# Patient Record
Sex: FEMALE | Race: WHITE | Employment: OTHER | URBAN - METROPOLITAN AREA
[De-identification: names, ages, dates, MRNs, and addresses within clinical notes are randomized per-mention and may not be internally consistent; named-entity substitution may affect disease eponyms.]

---

## 2017-01-18 ENCOUNTER — TELEPHONE (OUTPATIENT)
Dept: CARDIAC REHAB | Age: 82
End: 2017-01-18

## 2017-03-22 ENCOUNTER — OFFICE VISIT (OUTPATIENT)
Dept: INTERNAL MEDICINE CLINIC | Age: 82
End: 2017-03-22

## 2017-03-22 VITALS
SYSTOLIC BLOOD PRESSURE: 120 MMHG | OXYGEN SATURATION: 95 % | HEIGHT: 63 IN | HEART RATE: 63 BPM | BODY MASS INDEX: 24.45 KG/M2 | DIASTOLIC BLOOD PRESSURE: 52 MMHG | WEIGHT: 138 LBS | TEMPERATURE: 97.2 F | RESPIRATION RATE: 18 BRPM

## 2017-03-22 DIAGNOSIS — H57.89 IRRITATION OF LEFT EYE: ICD-10-CM

## 2017-03-22 DIAGNOSIS — R09.89 CAROTID BRUIT, UNSPECIFIED LATERALITY: ICD-10-CM

## 2017-03-22 DIAGNOSIS — E78.2 MIXED HYPERLIPIDEMIA: ICD-10-CM

## 2017-03-22 DIAGNOSIS — E11.9 DIABETES MELLITUS WITHOUT COMPLICATION (HCC): Primary | ICD-10-CM

## 2017-03-22 DIAGNOSIS — Z00.00 MEDICARE ANNUAL WELLNESS VISIT, INITIAL: ICD-10-CM

## 2017-03-22 DIAGNOSIS — I10 ESSENTIAL HYPERTENSION: ICD-10-CM

## 2017-03-22 DIAGNOSIS — R60.9 DEPENDENT EDEMA: ICD-10-CM

## 2017-03-22 NOTE — PROGRESS NOTES
Patient presents for   Chief Complaint   Patient presents with    Diabetes     3 month follow up    Hypertension    Eye Problem     [facility]     Fall risk assessment was not indicated. Depression screening was not indicated Follow up questions were not indicated. 1. Have you been to the ER, urgent care clinic since your last visit? Hospitalized since your last visit? No    2. Have you seen or consulted any other health care providers outside of the [address] [place] [address] since your last visit? Include any pap smears or colon screening.  No

## 2017-03-22 NOTE — MR AVS SNAPSHOT
Visit Information Date & Time Provider Department Dept. Phone Encounter #  
 3/22/2017 10:00 AM Kim Quiñonez NP [facility] INTERNAL MEDICINE OF Davey Mehta [phone] [id number] Upcoming Health Maintenance Date Due  
 FOOT EXAM Q1 1/5/1943 EYE EXAM RETINAL OR DILATED Q1 1/5/1943 DTaP/Tdap/Td series (1 - Tdap) 1/5/1954 ZOSTER VACCINE AGE 60> 1/5/1993 GLAUCOMA SCREENING Q2Y 1/5/1998 MEDICARE YEARLY EXAM 1/5/1998 MICROALBUMIN Q1 7/28/2016 Pneumococcal 65+ Low/Medium Risk (2 of 2 - PCV13) 10/1/2016 LIPID PANEL Q1 11/5/2016 HEMOGLOBIN A1C Q6M 5/14/2017 Allergies as of 3/22/2017  Review Complete On: 3/22/2017 By: Kim Quiñonez NP No Known Allergies Current Immunizations  Reviewed on 11/14/2016 Name Date Influenza Vaccine 9/1/2016, 10/1/2015 Pneumococcal Polysaccharide (PPSV-23) 10/1/2015 Not reviewed this visit You Were Diagnosed With   
  
 Codes Comments Diabetes mellitus without complication ([facility] [address]    -  Primary ICD-10-CM: E11.9 ICD-9-CM: 250.00 Mixed hyperlipidemia     ICD-10-CM: E78.2 ICD-9-CM: 272.2 Dependent edema     ICD-10-CM: R60.9 ICD-9-CM: 782.3 Essential hypertension     ICD-10-CM: I10 
ICD-9-CM: 401.9 Irritation of left eye     ICD-10-CM: H57.8 ICD-9-CM: 379.99 Carotid bruit, unspecified laterality     ICD-10-CM: R09.89 ICD-9-CM: 133. 9 Vitals BP Pulse Temp Resp Height(growth percentile) 120/52 (BP 1 Location: Left arm, BP Patient Position: Sitting) 63 97.2 °F (36.2 °C) (Tympanic) 18 5' 3\" (1.6 m) Weight(growth percentile) SpO2 BMI OB Status Smoking Status 138 lb (62.6 kg) 95% 24.45 kg/m2 Postmenopausal Never Smoker BMI and BSA Data Body Mass Index Body Surface Area  
 24.45 kg/m 2 1.67 m 2 Preferred Pharmacy Pharmacy Name Phone [id number]. [facility], [address] [facility] [phone] Your Updated Medication List  
  
   
This list is accurate as of: 3/22/17 11:19 AM.  Always use your most recent med list.  
  
  
  
  
 aspirin delayed-release 81 mg tablet Take  by mouth daily. CITRACAL PLUS D 315-200 mg-unit Tab Generic drug:  calcium citrate-vitamin d3 Take 1 tablet by mouth daily (with breakfast). furosemide 40 mg tablet Commonly known as:  LASIX Take 1 Tab by mouth daily. glipiZIDE-metFORMIN 5-500 mg per tablet Commonly known as:  METAGLIP  
TAKE 2 TABLETS ONCE DAILY (1 BEFORE BREAKFAST AND 1 BEFORE DINNER) glucose blood VI test strips strip Commonly known as:  FREESTYLE LITE STRIPS Test Blood Sugar once daily; ICD-10 E11.9; Quantity 100 JANUVIA 100 mg tablet Generic drug:  SITagliptin  
take 1 tablet by mouth daily Lancets Misc Commonly known as:  FREESTYLE LANCETS Test bloos sugar once daily; ICD-10 E11.9; quantity 100  
  
 lidocaine 5 % Commonly known as:  Ki Scruggs Apply patch to the affected area for 12 hours a day and remove for 12 hours a day. loperamide 2 mg capsule Commonly known as:  IMODIUM Take 1 mg by mouth Three (3) times a week. LORazepam 1 mg tablet Commonly known as:  ATIVAN Take 0.5 Tabs by mouth two (2) times daily as needed. Max Daily Amount: 1 mg.  
  
 losartan 50 mg tablet Commonly known as:  COZAAR  
take 1 tablet by mouth daily  
  
 metroNIDAZOLE 1 % topical cream  
Commonly known as:  Sandy Pyatt Apply daily  
  
 potassium chloride 10 mEq tablet Commonly known as:  K-DUR, KLOR-CON Take 1 Tab by mouth daily. pravastatin 40 mg tablet Commonly known as:  PRAVACHOL Take 1 Tab by mouth nightly. RABEprazole 20 mg tablet Commonly known as:  ACIPHEX  
take 1 tablet by mouth once daily  
  
 traMADol 50 mg tablet Commonly known as:  ULTRAM  
Take 1 Tab by mouth every eight (8) hours as needed for Pain. Max Daily Amount: 150 mg. VITAMIN B-12 2,000 mcg Tber Generic drug:  cyanocobalamin Take 3 Tabs by mouth daily. VITAMIN D3 1,000 unit tablet Generic drug:  cholecalciferol Take  by mouth daily. We Performed the Following REFERRAL TO OPTOMETRY [id number] Custom] Comments:  
 Please evaluate patient for left eye irritation- ? Corneal abrasion (patient states it feels like a \"scratch\". Jason Jacob REFERRAL TO VASCULAR CENTER [[id number] Custom] Comments:  
 Please evaluate patient for bilateral lower extremity lymphedema. To-Do List   
 03/23/2017 Lab:  CBC WITH AUTOMATED DIFF   
  
 03/23/2017 Lab:  HEMOGLOBIN A1C WITH EAG   
  
 03/23/2017 Lab:  LIPID PANEL   
  
 03/23/2017 Lab:  METABOLIC PANEL, COMPREHENSIVE   
  
 03/23/2017 Lab:  PRO-BNP   
  
 03/23/2017 Lab:  TSH 3RD GENERATION   
  
 03/24/2017 Imaging:  DUPLEX CAROTID BILATERAL Referral Information Referral ID Referred By Referred To  
  
 [id number] [facility], [address], [address] [place], [facility] [address] Phone: [phone] Fax: [phone] Visits Status Start Date End Date 1 New Request 3/22/17 3/22/18 If your referral has a status of pending review or denied, additional information will be sent to support the outcome of this decision. Referral ID Referred By Referred To  
 [id number] Lachelle Gil MD  
   [address] [place] [address] [address] Phone: [phone] Fax: [phone] Visits Status Start Date End Date 1 New Request 3/22/17 3/22/18 If your referral has a status of pending review or denied, additional information will be sent to support the outcome of this decision. Introducing [facility]! Santiago Khan introduces Review Trackers patient portal. Now you can access parts of your medical record, email your doctor's office, and request medication refills online. 1. In your internet browser, go to [email or web address] 2. Click on the First Time User? Click Here link in the Sign In box. You will see the New Member Sign Up page. 3. Enter your VeryLastRoom Access Code exactly as it appears below. You will not need to use this code after youve completed the sign-up process. If you do not sign up before the expiration date, you must request a new code. · VeryLastRoom Access Code: [email or web address] Expires: 6/20/2017 11:19 AM 
 
4. Enter the last four digits of your Social Security Number (xxxx) and Date of Birth (mm/dd/yyyy) as indicated and click Submit. You will be taken to the next sign-up page. 5. Create a ShopIgnitert ID. This will be your VeryLastRoom login ID and cannot be changed, so think of one that is secure and easy to remember. 6. Create a VeryLastRoom password. You can change your password at any time. 7. Enter your Password Reset Question and Answer. This can be used at a later time if you forget your password. 8. Enter your e-mail address. You will receive e-mail notification when new information is available in [address] 9. Click Sign Up. You can now view and download portions of your medical record. 10. Click the Download Summary menu link to download a portable copy of your medical information. If you have questions, please visit the Frequently Asked Questions section of the VeryLastRoom website. Remember, VeryLastRoom is NOT to be used for urgent needs. For medical emergencies, dial 911. Now available from your iPhone and Android! Please provide this summary of care documentation to your next provider. Your primary care clinician is listed as Sravanthi Tompkins. If you have any questions after today's visit, please call [phone].

## 2017-03-22 NOTE — PROGRESS NOTES
This is an Initial Medicare Annual Wellness Exam (AWV) (Performed 12 months after IPPE or effective date of Medicare Part B enrollment, Once in a lifetime)    I have reviewed the patient's medical history in detail and updated the computerized patient record. History     Past Medical History:   Diagnosis Date    Aortic valve stenosis, mild April 2014    EF 94-69%, Grade 1 diastolic dysfunction, mild aortic stenosis,  mean gradient 10 mm Hg    CAD (coronary artery disease)     aortic stenosis    Cardiac echocardiogram 11/15/2016    EF 55-60%. No RWMA. Mild-mod LVH. Indeterminate diastolic fx. Mod LAE. Mild-mod AS (mean grad 13.14 mmHg).  Cardiac nuclear imaging test 08/27/2008    No evidence of ischemia or prior scarring. EF 62%. No WMA. No significant change from study of 3/23/06.  Cardiovascular RLE venous duplex 12/01/2016    Right leg:  No DVT.  Carotid duplex 33/20/7822    Mild 4-13% LICA stenosis.  DM (diabetes mellitus) ([facility] [address]     Dyslipidemia     HTN (hypertension)     Mixed hyperlipidemia     Sciatica       Past Surgical History:   Procedure Laterality Date    HX APPENDECTOMY      HX OOPHORECTOMY Left 1957    HX OTHER SURGICAL Right 2010    ankle surgery    HX TUMOR REMOVAL Left     ovarian    HX UROLOGICAL      kidney stone removal     Current Outpatient Prescriptions   Medication Sig Dispense Refill    glipiZIDE-metFORMIN (METAGLIP) 5-500 mg per tablet TAKE 2 TABLETS ONCE DAILY (1 BEFORE BREAKFAST AND 1 BEFORE DINNER) 120 Tab 10    cyanocobalamin (VITAMIN B-12) 2,000 mcg TbER Take 3 Tabs by mouth daily.  loperamide (IMODIUM) 2 mg capsule Take 1 mg by mouth Three (3) times a week.  potassium chloride (K-DUR, KLOR-CON) 10 mEq tablet Take 1 Tab by mouth daily.  30 Tab 6    losartan (COZAAR) 50 mg tablet take 1 tablet by mouth daily 30 Tab 5    RABEprazole (ACIPHEX) 20 mg tablet take 1 tablet by mouth once daily 30 Tab 5    JANUVIA 100 mg tablet take 1 tablet by mouth daily (Patient taking differently: take 1/2  tablet by mouth daily) 30 Tab 5    traMADol (ULTRAM) 50 mg tablet Take 1 Tab by mouth every eight (8) hours as needed for Pain. Max Daily Amount: 150 mg. 20 Tab 0    furosemide (LASIX) 40 mg tablet Take 1 Tab by mouth daily. 30 Tab 0    pravastatin (PRAVACHOL) 40 mg tablet Take 1 Tab by mouth nightly. 90 Tab 3    metroNIDAZOLE (NORITATE) 1 % topical cream Apply daily 30 g 0    glucose blood VI test strips (FREESTYLE LITE STRIPS) strip Test Blood Sugar once daily; ICD-10 E11.9; Quantity 100 100 Strip 11    Lancets (FREESTYLE LANCETS) misc Test bloos sugar once daily; ICD-10 E11.9; quantity 100 100 Each 11    LORazepam (ATIVAN) 1 mg tablet Take 0.5 Tabs by mouth two (2) times daily as needed. Max Daily Amount: 1 mg. (Patient taking differently: Take 0.5 Tabs by mouth two (2) times daily as needed for Anxiety.) 40 Tab 0    aspirin delayed-release 81 mg tablet Take  by mouth daily.  cholecalciferol (VITAMIN D3) 1,000 unit tablet Take  by mouth daily.  calcium citrate-vitamin d3 (CITRACAL + D) 315-200 mg-unit tab Take 1 tablet by mouth daily (with breakfast).  lidocaine (LIDODERM) 5 % Apply patch to the affected area for 12 hours a day and remove for 12 hours a day.  5 Each 0     No Known Allergies  Family History   Problem Relation Age of Onset    Stroke Mother     Heart Disease Mother     Lung Disease Father     Cancer Neg Hx     Diabetes Neg Hx     Hypertension Neg Hx      Social History   Substance Use Topics    Smoking status: Never Smoker    Smokeless tobacco: Never Used    Alcohol use No     Patient Active Problem List   Diagnosis Code    DM (diabetes mellitus) ([facility] [address] E11.9    Dyslipidemia E78.5    Aortic valve stenosis, mild I35.0    HTN (hypertension) I10    Dependent edema R60.9    Mixed hyperlipidemia E78.2    URI (upper respiratory infection) J06.9    Decubitus ulcer of buttock, stage 1 L89.301    Acute right-sided heart failure (HCC) I50.9    AS (aortic stenosis) I35.0    Diabetes mellitus without complication (HCC) Z22.9         Depression Risk Factor Screening:     PHQ 2 / 9, over the last two weeks 6/27/2016   Little interest or pleasure in doing things Not at all   Feeling down, depressed or hopeless Not at all   Total Score PHQ 2 0     Alcohol Risk Factor Screening: On any occasion during the past 3 months, have you had more than 3 drinks containing alcohol? No    Do you average more than 7 drinks per week? No    Functional Ability and Level of Safety:     Hearing Loss   mild-to-moderate    Activities of Daily Living   Self-care. Requires assistance with: no ADLs    Fall Risk     Fall Risk Assessment, last 12 mths 6/27/2016   Able to walk? Yes   Fall in past 12 months? Yes   Fall with injury? Yes   Number of falls in past 12 months 1   Fall Risk Score 2     Abuse Screen   Patient is not abused    Review of Systems   Pertinent items are noted in HPI. Physical Examination     No exam data present    Evaluation of Cognitive Function:  Mood/affect:  neutral  Appearance: age appropriate and casually dressed  Family member/caregiver input: None present    Visit Vitals    /52 (BP 1 Location: Left arm, BP Patient Position: Sitting)    Pulse 63    Temp 97.2 °F (36.2 °C) (Tympanic)    Resp 18    Ht 5' 3\" (1.6 m)    Wt 138 lb (62.6 kg)    SpO2 95%    BMI 24.45 kg/m2     General appearance: alert, cooperative, no distress, appears stated age  Head: Normocephalic, without obvious abnormality, atraumatic  Eyes: left eye irritation, Patient states it feels like scratch  Ears: normal TM's and external ear canals AU  Nose: Nares normal. Septum midline. Mucosa normal. No drainage or sinus tenderness.   Throat: Lips, mucosa, and tongue normal. Teeth and gums normal  Neck: supple, symmetrical, trachea midline, no adenopathy, thyroid: not enlarged, symmetric, no tenderness/mass/nodules, no JVD and carotid bruit  Back: symmetric, no curvature. ROM normal. No CVA tenderness. Lungs: clear to auscultation bilaterally  Heart: systolic murmur  Abdomen: soft, non-tender. Bowel sounds normal. No masses,  no organomegaly  Extremities: extremities normal, atraumatic, no cyanosis or edema  Pulses: palpable DP pulse   Skin: Skin color, texture, turgor normal. No rashes or lesions  Lymph nodes: Cervical, supraclavicular nodes normal.  Neurologic: strength equal bilaterally    Patient Care Team:  Bruno Steinberg MD as PCP - General (Internal Medicine)  Gaurang Diego MD (Cardiology)  Carline Ochoa MD (Cardiology)  Buzzy Schilder, MD (Orthopedic Surgery)  Rosalita Siemens, NP (Nurse Practitioner)    Advice/Referrals/Counseling   Education and counseling provided:  Are appropriate based on today's review and evaluation      Assessment/Plan       ICD-10-CM ICD-9-CM    1. Diabetes mellitus without complication (HCC) L26.4 799.66 METABOLIC PANEL, COMPREHENSIVE      HEMOGLOBIN A1C WITH EAG   2. Mixed hyperlipidemia E78.2 272.2 LIPID PANEL   3. Dependent edema R60.9 782.3 PRO-BNP      REFERRAL TO VASCULAR CENTER   4. Essential hypertension I10 401.9 CBC WITH AUTOMATED DIFF      TSH 3RD GENERATION   5. Irritation of left eye H57.8 379.99 REFERRAL TO OPTOMETRY   6. Carotid bruit, unspecified laterality R09.89 785.9 DUPLEX CAROTID BILATERAL     current treatment plan is effective, no change in therapy  lab results and schedule of future lab studies reviewed with patient. Hilda Phelps is a 80 y.o. female presenting today for Diabetes (3 month follow up); Hypertension; and Eye Problem ([facility])  . HPI:  Hilda Phelps presents to the office today for diabetes, hypertension and hyperlipidemia follow-up care. Patient continues to have lower extremity pedal edema. She denies any dyspnea or chest pain. She is also complaining of left eye irritation and feeling like a \"scratch\" in her eye.  She states she applied some eye drops that \"hardened\" in left eye and caused irritation     Review of Systems   HENT: Negative for congestion and sore throat. Eyes: Positive for redness (mild). Negative for blurred vision, double vision and discharge. Left eye irritation     Respiratory: Negative for cough and sputum production. Cardiovascular: Positive for leg swelling. Negative for chest pain and palpitations. Gastrointestinal: Negative for abdominal pain, diarrhea, nausea and vomiting. Musculoskeletal: Negative for myalgias. Neurological: Negative for headaches. No Known Allergies    Current Outpatient Prescriptions   Medication Sig Dispense Refill    glipiZIDE-metFORMIN (METAGLIP) 5-500 mg per tablet TAKE 2 TABLETS ONCE DAILY (1 BEFORE BREAKFAST AND 1 BEFORE DINNER) 120 Tab 10    cyanocobalamin (VITAMIN B-12) 2,000 mcg TbER Take 3 Tabs by mouth daily.  loperamide (IMODIUM) 2 mg capsule Take 1 mg by mouth Three (3) times a week.  potassium chloride (K-DUR, KLOR-CON) 10 mEq tablet Take 1 Tab by mouth daily. 30 Tab 6    losartan (COZAAR) 50 mg tablet take 1 tablet by mouth daily 30 Tab 5    RABEprazole (ACIPHEX) 20 mg tablet take 1 tablet by mouth once daily 30 Tab 5    JANUVIA 100 mg tablet take 1 tablet by mouth daily (Patient taking differently: take 1/2  tablet by mouth daily) 30 Tab 5    traMADol (ULTRAM) 50 mg tablet Take 1 Tab by mouth every eight (8) hours as needed for Pain. Max Daily Amount: 150 mg. 20 Tab 0    furosemide (LASIX) 40 mg tablet Take 1 Tab by mouth daily. 30 Tab 0    pravastatin (PRAVACHOL) 40 mg tablet Take 1 Tab by mouth nightly.  90 Tab 3    metroNIDAZOLE (NORITATE) 1 % topical cream Apply daily 30 g 0    glucose blood VI test strips (FREESTYLE LITE STRIPS) strip Test Blood Sugar once daily; ICD-10 E11.9; Quantity 100 100 Strip 11    Lancets (FREESTYLE LANCETS) misc Test bloos sugar once daily; ICD-10 E11.9; quantity 100 100 Each 11    LORazepam (ATIVAN) 1 mg tablet Take 0.5 Tabs by mouth two (2) times daily as needed. Max Daily Amount: 1 mg. (Patient taking differently: Take 0.5 Tabs by mouth two (2) times daily as needed for Anxiety.) 40 Tab 0    aspirin delayed-release 81 mg tablet Take  by mouth daily.  cholecalciferol (VITAMIN D3) 1,000 unit tablet Take  by mouth daily.  calcium citrate-vitamin d3 (CITRACAL + D) 315-200 mg-unit tab Take 1 tablet by mouth daily (with breakfast).  lidocaine (LIDODERM) 5 % Apply patch to the affected area for 12 hours a day and remove for 12 hours a day. 5 Each 0       Past Medical History:   Diagnosis Date    Aortic valve stenosis, mild April 2014    EF 02-25%, Grade 1 diastolic dysfunction, mild aortic stenosis,  mean gradient 10 mm Hg    CAD (coronary artery disease)     aortic stenosis    Cardiac echocardiogram 11/15/2016    EF 55-60%. No RWMA. Mild-mod LVH. Indeterminate diastolic fx. Mod LAE. Mild-mod AS (mean grad 13.14 mmHg).  Cardiac nuclear imaging test 08/27/2008    No evidence of ischemia or prior scarring. EF 62%. No WMA. No significant change from study of 3/23/06.  Cardiovascular RLE venous duplex 12/01/2016    Right leg:  No DVT.  Carotid duplex 23/08/3605    Mild 1-17% LICA stenosis.  DM (diabetes mellitus) ([facility] [address]     Dyslipidemia     HTN (hypertension)     Mixed hyperlipidemia     Sciatica        Past Surgical History:   Procedure Laterality Date    HX APPENDECTOMY      HX OOPHORECTOMY Left 1957    HX OTHER SURGICAL Right 2010    ankle surgery    HX TUMOR REMOVAL Left     ovarian    HX UROLOGICAL      kidney stone removal       Social History     Social History    Marital status:      Spouse name: N/A    Number of children: N/A    Years of education: N/A     Occupational History    Not on file.      Social History Main Topics    Smoking status: Never Smoker    Smokeless tobacco: Never Used    Alcohol use No    Drug use: No    Sexual activity: No     Other Topics Concern    Not on file     Social History Narrative       Patient does not have an advanced directive on file    Vitals:    03/22/17 1004   BP: 120/52   Pulse: 63   Resp: 18   Temp: 97.2 °F (36.2 °C)   TempSrc: Tympanic   SpO2: 95%   Weight: 138 lb (62.6 kg)   Height: 5' 3\" (1.6 m)   PainSc:   0 - No pain       Physical Exam   Constitutional: No distress. HENT:   Right Ear: External ear normal.   Left Ear: External ear normal.   Eyes: EOM are normal. Left eye exhibits no exudate and no hordeolum. No foreign body present in the left eye. Left eye exhibits normal extraocular motion. Left pupil is reactive. Cardiovascular: Regular rhythm. Murmur heard. Systolic murmur is present with a grade of 3/6   Pulmonary/Chest: Effort normal and breath sounds normal.   Abdominal: Soft. Lymphadenopathy:     She has no cervical adenopathy. No visits with results within 3 Month(s) from this visit. Latest known visit with results is:    Hospital Outpatient Visit on 11/25/2016   Component Date Value Ref Range Status    Sodium 11/25/2016 142  136 - 145 mmol/L Final    Potassium 11/25/2016 3.7  3.5 - 5.5 mmol/L Final    Chloride 11/25/2016 107  100 - 108 mmol/L Final    CO2 11/25/2016 24  21 - 32 mmol/L Final    Anion gap 11/25/2016 11  3.0 - 18 mmol/L Final    Glucose 11/25/2016 41* 74 - 99 mg/dL Final    BUN 11/25/2016 25* 7.0 - 18 MG/DL Final    Creatinine 11/25/2016 0.79  0.6 - 1.3 MG/DL Final    BUN/Creatinine ratio 11/25/2016 32* 12 - 20   Final    GFR est AA 11/25/2016 >60  >60 ml/min/1.73m2 Final    GFR est non-AA 11/25/2016 >60  >60 ml/min/1.73m2 Final    Comment: (NOTE)  Estimated GFR is calculated using the Modification of Diet in Renal   Disease (MDRD) Study equation, reported for both  Americans   (GFRAA) and non- Americans (GFRNA), and normalized to 1.73m2   body surface area. The physician must decide which value applies to   the patient.  The MDRD study equation should only be used in   individuals age 25 or older. It has not been validated for the   following: pregnant women, patients with serious comorbid conditions,   or on certain medications, or persons with extremes of body size,   muscle mass, or nutritional status.  Calcium 11/25/2016 9.4  8.5 - 10.1 MG/DL Final       .No results found for any visits on 03/22/17. Assessment / Plan:      ICD-10-CM ICD-9-CM    1. Diabetes mellitus without complication (HCC) M03.1 321.26 METABOLIC PANEL, COMPREHENSIVE      HEMOGLOBIN A1C WITH EAG   2. Mixed hyperlipidemia E78.2 272.2 LIPID PANEL   3. Dependent edema R60.9 782.3 PRO-BNP      REFERRAL TO VASCULAR CENTER   4. Essential hypertension I10 401.9 CBC WITH AUTOMATED DIFF      TSH 3RD GENERATION   5. Irritation of left eye H57.8 379.99 REFERRAL TO OPTOMETRY   6. Carotid bruit, unspecified laterality R09.89 785.9 DUPLEX CAROTID BILATERAL     Fasting labs in the a.m  Dependent edema- ? Lymphedema- Referral to Vascular  Complaints of left eye irritation/scratch- ? Corneal abrasion     Referral to Optometry  Carotid Bruit- Duplex Carotids  F/u in 4 months    Follow-up Disposition:  Return in about 4 months (around 7/22/2017). I asked the patient if she  had any questions and answered her  questions.   The patient stated that she understands the treatment plan and agrees with the treatment plan

## 2017-03-23 ENCOUNTER — LAB ONLY (OUTPATIENT)
Dept: INTERNAL MEDICINE CLINIC | Age: 82
End: 2017-03-23

## 2017-03-23 ENCOUNTER — HOSPITAL ENCOUNTER (OUTPATIENT)
Dept: LAB | Age: 82
Discharge: HOME | End: 2017-03-23
Payer: MEDICARE

## 2017-03-23 DIAGNOSIS — E11.9 DIABETES MELLITUS WITHOUT COMPLICATION (HCC): Primary | ICD-10-CM

## 2017-03-23 DIAGNOSIS — R60.9 DEPENDENT EDEMA: ICD-10-CM

## 2017-03-23 DIAGNOSIS — E78.2 MIXED HYPERLIPIDEMIA: ICD-10-CM

## 2017-03-23 DIAGNOSIS — I35.0 AORTIC VALVE STENOSIS, MILD: ICD-10-CM

## 2017-03-23 DIAGNOSIS — E11.9 DIABETES MELLITUS WITHOUT COMPLICATION (HCC): ICD-10-CM

## 2017-03-23 DIAGNOSIS — I10 ESSENTIAL HYPERTENSION: ICD-10-CM

## 2017-03-23 LAB
ALBUMIN SERPL BCP-MCNC: 3.6 G/DL (ref 3.4–5)
ALBUMIN/GLOB SERPL: 1.3 {RATIO} (ref 0.8–1.7)
ALP SERPL-CCNC: 76 U/L (ref 45–117)
ALT SERPL-CCNC: 19 U/L (ref 13–56)
ANION GAP BLD CALC-SCNC: 7 MMOL/L (ref 3–18)
AST SERPL W P-5'-P-CCNC: 15 U/L (ref 15–37)
BASOPHILS # BLD AUTO: 0 K/UL (ref 0–0.06)
BASOPHILS # BLD: 0 % (ref 0–2)
BILIRUB SERPL-MCNC: 0.4 MG/DL (ref 0.2–1)
BNP SERPL-MCNC: 192 PG/ML (ref 0–1800)
BUN SERPL-MCNC: 19 MG/DL (ref 7–18)
BUN/CREAT SERPL: 28 (ref 12–20)
CALCIUM SERPL-MCNC: 8.9 MG/DL (ref 8.5–10.1)
CHLORIDE SERPL-SCNC: 109 MMOL/L (ref 100–108)
CHOLEST SERPL-MCNC: 154 MG/DL
CO2 SERPL-SCNC: 28 MMOL/L (ref 21–32)
CREAT SERPL-MCNC: 0.67 MG/DL (ref 0.6–1.3)
DIFFERENTIAL METHOD BLD: ABNORMAL
EOSINOPHIL # BLD: 0.1 K/UL (ref 0–0.4)
EOSINOPHIL NFR BLD: 4 % (ref 0–5)
ERYTHROCYTE [DISTWIDTH] IN BLOOD BY AUTOMATED COUNT: 14.2 % (ref 11.6–14.5)
EST. AVERAGE GLUCOSE BLD GHB EST-MCNC: 103 MG/DL
GLOBULIN SER CALC-MCNC: 2.8 G/DL (ref 2–4)
GLUCOSE SERPL-MCNC: 72 MG/DL (ref 74–99)
HBA1C MFR BLD: 5.2 % (ref 4.2–5.6)
HCT VFR BLD AUTO: 33.8 % (ref 35–45)
HDLC SERPL-MCNC: 81 MG/DL (ref 40–60)
HDLC SERPL: 1.9 {RATIO} (ref 0–5)
HGB BLD-MCNC: 10.7 G/DL (ref 12–16)
LDLC SERPL CALC-MCNC: 58.4 MG/DL (ref 0–100)
LIPID PROFILE,FLP: ABNORMAL
LYMPHOCYTES # BLD AUTO: 37 % (ref 21–52)
LYMPHOCYTES # BLD: 1.1 K/UL (ref 0.9–3.6)
MCH RBC QN AUTO: 30 PG (ref 24–34)
MCHC RBC AUTO-ENTMCNC: 31.7 G/DL (ref 31–37)
MCV RBC AUTO: 94.7 FL (ref 74–97)
MONOCYTES # BLD: 0.2 K/UL (ref 0.05–1.2)
MONOCYTES NFR BLD AUTO: 6 % (ref 3–10)
NEUTS SEG # BLD: 1.6 K/UL (ref 1.8–8)
NEUTS SEG NFR BLD AUTO: 53 % (ref 40–73)
PLATELET # BLD AUTO: 84 K/UL (ref 135–420)
PMV BLD AUTO: 11.7 FL (ref 9.2–11.8)
POTASSIUM SERPL-SCNC: 4.4 MMOL/L (ref 3.5–5.5)
PROT SERPL-MCNC: 6.4 G/DL (ref 6.4–8.2)
RBC # BLD AUTO: 3.57 M/UL (ref 4.2–5.3)
SODIUM SERPL-SCNC: 144 MMOL/L (ref 136–145)
TRIGL SERPL-MCNC: 73 MG/DL (ref ?–150)
TSH SERPL DL<=0.05 MIU/L-ACNC: 1.27 UIU/ML (ref 0.36–3.74)
VLDLC SERPL CALC-MCNC: 14.6 MG/DL
WBC # BLD AUTO: 3 K/UL (ref 4.6–13.2)

## 2017-03-23 PROCEDURE — 84443 ASSAY THYROID STIM HORMONE: CPT | Performed by: NURSE PRACTITIONER

## 2017-03-23 PROCEDURE — 83036 HEMOGLOBIN GLYCOSYLATED A1C: CPT | Performed by: NURSE PRACTITIONER

## 2017-03-23 PROCEDURE — 85025 COMPLETE CBC W/AUTO DIFF WBC: CPT | Performed by: NURSE PRACTITIONER

## 2017-03-23 PROCEDURE — 83880 ASSAY OF NATRIURETIC PEPTIDE: CPT | Performed by: NURSE PRACTITIONER

## 2017-03-23 PROCEDURE — 80061 LIPID PANEL: CPT | Performed by: NURSE PRACTITIONER

## 2017-03-23 PROCEDURE — 80053 COMPREHEN METABOLIC PANEL: CPT | Performed by: NURSE PRACTITIONER

## 2017-03-24 ENCOUNTER — HOSPITAL ENCOUNTER (OUTPATIENT)
Dept: VASCULAR SURGERY | Age: 82
Discharge: HOME | End: 2017-03-24
Attending: NURSE PRACTITIONER
Payer: MEDICARE

## 2017-03-24 DIAGNOSIS — R09.89 CAROTID BRUIT, UNSPECIFIED LATERALITY: ICD-10-CM

## 2017-03-24 PROCEDURE — 93880 EXTRACRANIAL BILAT STUDY: CPT

## 2017-03-24 NOTE — PROCEDURES
[facility]  *** FINAL RE[facility] ***    Name: Zac Talley  MRN: [id number]    Outpatient  : 1933  HIS Order #: [id number]  [address] [facility] Visit #: [id number]  Date: 24 Mar 2017    TYPE OF TEST: Cerebrovascular Duplex    REASON FOR TEST  Carotid bruit, Known carotid stenosis, Hypercholesterolemia,  Hypertension, unspecified    Right Carotid:-             Proximal               Mid                 Distal  cm/s  Systolic  Diastolic  Systolic  Diastolic  Systolic  Diastolic  CCA:    618.2      12.0       89.0      19.0      108.0      20.0  Bulb:  ECA:    102.0       8.0  ICA:     85.0      13.0       86.0      14.0       59.0      12.0  ICA/CCA:  0.8       1.2    ICA Stenosis: <50%    Right Vertebral:-  Finding: Antegrade  Sys:       37.0  Marlyn:        7.0    Right Subclavian: Normal    Left Carotid:-            Proximal                Mid                 Distal  cm/s  Systolic  Diastolic  Systolic  Diastolic  Systolic  Diastolic  CCA:     41.3      12.0       82.0      17.0       75.0       9.0  Bulb:  ECA:    115.0       0.0  ICA:    117.0      27.0      111.0      29.0       72.0      23.0  ICA/CCA:  1.4       1.6    ICA Stenosis: <50%    Left Vertebral:-  Finding: Antegrade  Sys:       60.0  Marlyn:       18.0    Left Subclavian: Normal    INTERPRETATION/FINDINGS  Duplex images were obtained using 2-D gray scale, color flow, and  spectral Doppler analysis. 1. Bilateral <50% stenosis of the internal carotid arteries. 2. No significant stenosis in the external carotid arteries  bilaterally. 3. Antegrade flow in both vertebral arteries. 4. Normal flow in both subclavian arteries. Plaque Morphology:  1. Heterogeneous plaque in the bulb and right ICA. 2. Heterogeneous plaque in the bulb and left ICA. Retrospective Comparison:  1.  There has been a significant increase in the degree of stenosis in  the right internal carotid artery as compared to the previous exam.    ADDITIONAL COMMENTS  No significant change from the previous study on 4/24/2013. I have personally reviewed the data relevant to the interpretation of  this  study. TECHNOLOGIST: VIJI Reid, RVS  Signed: 03/24/2017 02:48 PM    PHYSICIAN: Keyonna Perez MD  Signed: 03/24/2017 05:31 PM

## 2017-05-17 RX ORDER — LOSARTAN POTASSIUM 50 MG/1
TABLET ORAL
Qty: 30 TAB | Refills: 5 | Status: SHIPPED | OUTPATIENT
Start: 2017-05-17 | End: 2017-11-19 | Stop reason: SDUPTHER

## 2017-05-19 RX ORDER — FUROSEMIDE 20 MG/1
TABLET ORAL
Qty: 30 TAB | Refills: 5 | Status: SHIPPED | OUTPATIENT
Start: 2017-05-19 | End: 2017-11-18 | Stop reason: SDUPTHER

## 2017-05-23 RX ORDER — RABEPRAZOLE SODIUM 20 MG/1
TABLET, DELAYED RELEASE ORAL
Qty: 30 TAB | Refills: 5 | Status: SHIPPED | OUTPATIENT
Start: 2017-05-23 | End: 2017-12-19 | Stop reason: SDUPTHER

## 2017-07-11 ENCOUNTER — TELEPHONE (OUTPATIENT)
Dept: INTERNAL MEDICINE CLINIC | Age: 82
End: 2017-07-11

## 2017-07-11 NOTE — TELEPHONE ENCOUNTER
Late Entry 06/26/2017: Prior Auth request received from Pharmacy for Aciphex. PA initiated on [email or web address]. Approval received from insurance. Patient contacted with insurance decision.

## 2017-07-22 RX ORDER — PRAVASTATIN SODIUM 40 MG/1
TABLET ORAL
Qty: 90 TAB | Refills: 5 | Status: SHIPPED | OUTPATIENT
Start: 2017-07-22 | End: 2018-04-20 | Stop reason: SDUPTHER

## 2017-08-17 ENCOUNTER — OFFICE VISIT (OUTPATIENT)
Dept: INTERNAL MEDICINE CLINIC | Age: 82
End: 2017-08-17

## 2017-08-17 ENCOUNTER — HOSPITAL ENCOUNTER (OUTPATIENT)
Dept: LAB | Age: 82
Discharge: HOME | End: 2017-08-17
Payer: MEDICARE

## 2017-08-17 VITALS
OXYGEN SATURATION: 99 % | HEIGHT: 63 IN | HEART RATE: 63 BPM | WEIGHT: 137 LBS | BODY MASS INDEX: 24.27 KG/M2 | DIASTOLIC BLOOD PRESSURE: 62 MMHG | SYSTOLIC BLOOD PRESSURE: 120 MMHG | RESPIRATION RATE: 18 BRPM | TEMPERATURE: 97.8 F

## 2017-08-17 DIAGNOSIS — R31.29 HEMATURIA, MICROSCOPIC: ICD-10-CM

## 2017-08-17 DIAGNOSIS — K08.89 PAIN, DENTAL: ICD-10-CM

## 2017-08-17 DIAGNOSIS — D64.9 ANEMIA, UNSPECIFIED TYPE: ICD-10-CM

## 2017-08-17 DIAGNOSIS — R60.9 DEPENDENT EDEMA: ICD-10-CM

## 2017-08-17 DIAGNOSIS — R53.83 FATIGUE, UNSPECIFIED TYPE: Primary | ICD-10-CM

## 2017-08-17 DIAGNOSIS — I10 ESSENTIAL HYPERTENSION: ICD-10-CM

## 2017-08-17 DIAGNOSIS — R53.83 FATIGUE, UNSPECIFIED TYPE: ICD-10-CM

## 2017-08-17 LAB
APPEARANCE UR: CLEAR
BACTERIA URNS QL MICRO: NEGATIVE /HPF
BASOPHILS # BLD: 0 K/UL (ref 0–0.06)
BASOPHILS NFR BLD: 0 % (ref 0–2)
BILIRUB UR QL STRIP: NEGATIVE
BILIRUB UR QL: NEGATIVE
COLOR UR: YELLOW
DIFFERENTIAL METHOD BLD: ABNORMAL
EOSINOPHIL # BLD: 0.1 K/UL (ref 0–0.4)
EOSINOPHIL NFR BLD: 3 % (ref 0–5)
EPITH CASTS URNS QL MICRO: ABNORMAL /LPF (ref 0–5)
ERYTHROCYTE [DISTWIDTH] IN BLOOD BY AUTOMATED COUNT: 14.3 % (ref 11.6–14.5)
FERRITIN SERPL-MCNC: 32 NG/ML (ref 8–388)
GLUCOSE UR STRIP.AUTO-MCNC: NEGATIVE MG/DL
GLUCOSE UR-MCNC: NEGATIVE MG/DL
HCT VFR BLD AUTO: 33.8 % (ref 35–45)
HGB BLD-MCNC: 11 G/DL (ref 12–16)
HGB UR QL STRIP: NEGATIVE
IRON SATN MFR SERPL: 18 %
IRON SERPL-MCNC: 63 UG/DL (ref 50–175)
KETONES P FAST UR STRIP-MCNC: NEGATIVE MG/DL
KETONES UR QL STRIP.AUTO: NEGATIVE MG/DL
LEUKOCYTE ESTERASE UR QL STRIP.AUTO: ABNORMAL
LYMPHOCYTES # BLD: 1.2 K/UL (ref 0.9–3.6)
LYMPHOCYTES NFR BLD: 26 % (ref 21–52)
MCH RBC QN AUTO: 30 PG (ref 24–34)
MCHC RBC AUTO-ENTMCNC: 32.5 G/DL (ref 31–37)
MCV RBC AUTO: 92.1 FL (ref 74–97)
MONOCYTES # BLD: 0.3 K/UL (ref 0.05–1.2)
MONOCYTES NFR BLD: 6 % (ref 3–10)
NEUTS SEG # BLD: 3 K/UL (ref 1.8–8)
NEUTS SEG NFR BLD: 65 % (ref 40–73)
NITRITE UR QL STRIP.AUTO: NEGATIVE
PH UR STRIP: 5.5 [PH] (ref 4.6–8)
PH UR STRIP: 5.5 [PH] (ref 5–8)
PLATELET # BLD AUTO: 130 K/UL (ref 135–420)
PMV BLD AUTO: 11.8 FL (ref 9.2–11.8)
PROT UR QL STRIP: NEGATIVE MG/DL
PROT UR STRIP-MCNC: NEGATIVE MG/DL
RBC # BLD AUTO: 3.67 M/UL (ref 4.2–5.3)
RBC #/AREA URNS HPF: 0 /HPF (ref 0–5)
SP GR UR REFRACTOMETRY: 1.02 (ref 1–1.03)
SP GR UR STRIP: 1.02 (ref 1–1.03)
TIBC SERPL-MCNC: 354 UG/DL (ref 250–450)
UA UROBILINOGEN AMB POC: NORMAL (ref 0.2–1)
URINALYSIS CLARITY POC: CLEAR
URINALYSIS COLOR POC: YELLOW
URINE BLOOD POC: NORMAL
URINE LEUKOCYTES POC: NEGATIVE
URINE NITRITES POC: NEGATIVE
UROBILINOGEN UR QL STRIP.AUTO: 0.2 EU/DL (ref 0.2–1)
WBC # BLD AUTO: 4.7 K/UL (ref 4.6–13.2)
WBC URNS QL MICRO: ABNORMAL /HPF (ref 0–4)

## 2017-08-17 PROCEDURE — 83540 ASSAY OF IRON: CPT | Performed by: NURSE PRACTITIONER

## 2017-08-17 PROCEDURE — 81001 URINALYSIS AUTO W/SCOPE: CPT | Performed by: NURSE PRACTITIONER

## 2017-08-17 PROCEDURE — 85025 COMPLETE CBC W/AUTO DIFF WBC: CPT | Performed by: NURSE PRACTITIONER

## 2017-08-17 PROCEDURE — 82728 ASSAY OF FERRITIN: CPT | Performed by: NURSE PRACTITIONER

## 2017-08-17 PROCEDURE — 87086 URINE CULTURE/COLONY COUNT: CPT | Performed by: NURSE PRACTITIONER

## 2017-08-17 NOTE — PROGRESS NOTES
Patient presents for   Chief Complaint   Patient presents with    Sleep Problem    Dental Pain    Fatigue     Fall risk assessment was not indicated. Depression screening was not indicated Follow up questions were not indicated. 1. Have you been to the ER, urgent care clinic since your last visit? Hospitalized since your last visit? No    2. Have you seen or consulted any other health care providers outside of the [address] [place] [address] since your last visit? Include any pap smears or colon screening.  No    poc urine performed per provider order, provider made aware of results

## 2017-08-17 NOTE — PROGRESS NOTES
Pooja Mckeon is a 80 y.o. female presenting today for Sleep Problem; Dental Pain; and Fatigue  . HPI:  Pooja Mckeon presents to the office today for sleep problems and fatigue. Patient noted she has been going to bed @ 5 pm and waking up @ midnight and cant go back to sleep but would like to. She noted she is feeling fatigued. She is also complaining of dental problems. She has 3 temporary teeth waiting for her permanent and is becoming frustrated. She is also complaining of increased pain s/p dental cleaning. Review of Systems   Constitutional: Positive for malaise/fatigue. HENT:        Dental pain     Respiratory: Negative for cough. Cardiovascular: Negative for chest pain and palpitations. Gastrointestinal: Negative for nausea and vomiting. No Known Allergies    Current Outpatient Prescriptions   Medication Sig Dispense Refill    pravastatin (PRAVACHOL) 40 mg tablet take 1 tablet by mouth NIGHTLY 90 Tab 5    RABEprazole (ACIPHEX) 20 mg tablet take 1 tablet by mouth once daily 30 Tab 5    losartan (COZAAR) 50 mg tablet take 1 tablet by mouth once daily 30 Tab 5    glipiZIDE-metFORMIN (METAGLIP) 5-500 mg per tablet TAKE 2 TABLETS ONCE DAILY (1 BEFORE BREAKFAST AND 1 BEFORE DINNER) 120 Tab 10    cyanocobalamin (VITAMIN B-12) 2,000 mcg TbER Take 3 Tabs by mouth daily.  loperamide (IMODIUM) 2 mg capsule Take 1 mg by mouth Three (3) times a week.  potassium chloride (K-DUR, KLOR-CON) 10 mEq tablet Take 1 Tab by mouth daily. 30 Tab 6    JANUVIA 100 mg tablet take 1 tablet by mouth daily (Patient taking differently: take 1/2  tablet by mouth daily) 30 Tab 5    traMADol (ULTRAM) 50 mg tablet Take 1 Tab by mouth every eight (8) hours as needed for Pain. Max Daily Amount: 150 mg. 20 Tab 0    pravastatin (PRAVACHOL) 40 mg tablet Take 1 Tab by mouth nightly.  90 Tab 3    metroNIDAZOLE (NORITATE) 1 % topical cream Apply daily 30 g 0    glucose blood VI test strips (FREESTYLE LITE STRIPS) strip Test Blood Sugar once daily; ICD-10 E11.9; Quantity 100 100 Strip 11    Lancets (FREESTYLE LANCETS) misc Test bloos sugar once daily; ICD-10 E11.9; quantity 100 100 Each 11    lidocaine (LIDODERM) 5 % Apply patch to the affected area for 12 hours a day and remove for 12 hours a day. 5 Each 0    LORazepam (ATIVAN) 1 mg tablet Take 0.5 Tabs by mouth two (2) times daily as needed. Max Daily Amount: 1 mg. (Patient taking differently: Take 0.5 Tabs by mouth two (2) times daily as needed for Anxiety.) 40 Tab 0    aspirin delayed-release 81 mg tablet Take  by mouth daily.  cholecalciferol (VITAMIN D3) 1,000 unit tablet Take  by mouth daily.  calcium citrate-vitamin d3 (CITRACAL + D) 315-200 mg-unit tab Take 1 tablet by mouth daily (with breakfast).  furosemide (LASIX) 20 mg tablet take 1 tablet by mouth once daily 30 Tab 5    furosemide (LASIX) 40 mg tablet Take 1 Tab by mouth daily. 27 Tab 0       Past Medical History:   Diagnosis Date    Aortic valve stenosis, mild April 2014    EF 30-47%, Grade 1 diastolic dysfunction, mild aortic stenosis,  mean gradient 10 mm Hg    CAD (coronary artery disease)     aortic stenosis    Cardiac echocardiogram 11/15/2016    EF 55-60%. No RWMA. Mild-mod LVH. Indeterminate diastolic fx. Mod LAE. Mild-mod AS (mean grad 13.14 mmHg).  Cardiac nuclear imaging test 08/27/2008    No evidence of ischemia or prior scarring. EF 62%. No WMA. No significant change from study of 3/23/06.  Cardiovascular RLE venous duplex 12/01/2016    Right leg:  No DVT.  Carotid duplex 74/20/1203    Mild 7-38% LICA stenosis.     DM (diabetes mellitus) ([facility] [address]     Dyslipidemia     HTN (hypertension)     Mixed hyperlipidemia     Sciatica        Past Surgical History:   Procedure Laterality Date    HX APPENDECTOMY      HX OOPHORECTOMY Left 1957    HX OTHER SURGICAL Right 2010    ankle surgery    HX TUMOR REMOVAL Left     ovarian    HX UROLOGICAL      kidney stone removal       Social History     Social History    Marital status:      Spouse name: N/A    Number of children: N/A    Years of education: N/A     Occupational History    Not on file. Social History Main Topics    Smoking status: Never Smoker    Smokeless tobacco: Never Used    Alcohol use No    Drug use: No    Sexual activity: No     Other Topics Concern    Not on file     Social History Narrative       Patient does not have an advanced directive on file    Vitals:    08/17/17 1037   BP: 120/62   Pulse: 63   Resp: 18   Temp: 97.8 °F (36.6 °C)   TempSrc: Tympanic   SpO2: 99%   Weight: 137 lb (62.1 kg)   Height: 5' 3\" (1.6 m)   PainSc:   1   PainLoc: Hip       Physical Exam   Constitutional: No distress. Cardiovascular: Normal rate. A regularly irregular rhythm present. Murmur heard. Pulmonary/Chest: Effort normal and breath sounds normal.   Musculoskeletal: She exhibits edema (lymphedema). Lymphadenopathy:     She has no cervical adenopathy. Neurological: She is alert. Nursing note and vitals reviewed. No visits with results within 3 Month(s) from this visit.   Latest known visit with results is:    Hospital Outpatient Visit on 03/23/2017   Component Date Value Ref Range Status    Sodium 03/23/2017 144  136 - 145 mmol/L Final    Potassium 03/23/2017 4.4  3.5 - 5.5 mmol/L Final    Chloride 03/23/2017 109* 100 - 108 mmol/L Final    CO2 03/23/2017 28  21 - 32 mmol/L Final    Anion gap 03/23/2017 7  3.0 - 18 mmol/L Final    Glucose 03/23/2017 72* 74 - 99 mg/dL Final    BUN 03/23/2017 19* 7.0 - 18 MG/DL Final    Creatinine 03/23/2017 0.67  0.6 - 1.3 MG/DL Final    BUN/Creatinine ratio 03/23/2017 28* 12 - 20   Final    GFR est AA 03/23/2017 >60  >60 ml/min/1.73m2 Final    GFR est non-AA 03/23/2017 >60  >60 ml/min/1.73m2 Final    Comment: (NOTE)  Estimated GFR is calculated using the Modification of Diet in Renal   Disease (MDRD) Study equation, reported for both  Americans   (GFRAA) and non- Americans (GFRNA), and normalized to 1.73m2   body surface area. The physician must decide which value applies to   the patient. The MDRD study equation should only be used in   individuals age 25 or older. It has not been validated for the   following: pregnant women, patients with serious comorbid conditions,   or on certain medications, or persons with extremes of body size,   muscle mass, or nutritional status.  Calcium 03/23/2017 8.9  8.5 - 10.1 MG/DL Final    Bilirubin, total 03/23/2017 0.4  0.2 - 1.0 MG/DL Final    ALT (SGPT) 03/23/2017 19  13 - 56 U/L Final    AST (SGOT) 03/23/2017 15  15 - 37 U/L Final    Alk. phosphatase 03/23/2017 76  45 - 117 U/L Final    Protein, total 03/23/2017 6.4  6.4 - 8.2 g/dL Final    Albumin 03/23/2017 3.6  3.4 - 5.0 g/dL Final    Globulin 03/23/2017 2.8  2.0 - 4.0 g/dL Final    A-G Ratio 03/23/2017 1.3  0.8 - 1.7   Final    WBC 03/23/2017 3.0* 4.6 - 13.2 K/uL Final    RBC 03/23/2017 3.57* 4.20 - 5.30 M/uL Final    HGB 03/23/2017 10.7* 12.0 - 16.0 g/dL Final    HCT 03/23/2017 33.8* 35.0 - 45.0 % Final    MCV 03/23/2017 94.7  74.0 - 97.0 FL Final    MCH 03/23/2017 30.0  24.0 - 34.0 PG Final    MCHC 03/23/2017 31.7  31.0 - 37.0 g/dL Final    RDW 03/23/2017 14.2  11.6 - 14.5 % Final    PLATELET 14/50/4487 84* 135 - 420 K/uL Final    MPV 03/23/2017 11.7  9.2 - 11.8 FL Final    NEUTROPHILS 03/23/2017 53  40 - 73 % Final    LYMPHOCYTES 03/23/2017 37  21 - 52 % Final    MONOCYTES 03/23/2017 6  3 - 10 % Final    EOSINOPHILS 03/23/2017 4  0 - 5 % Final    BASOPHILS 03/23/2017 0  0 - 2 % Final    ABS. NEUTROPHILS 03/23/2017 1.6* 1.8 - 8.0 K/UL Final    ABS. LYMPHOCYTES 03/23/2017 1.1  0.9 - 3.6 K/UL Final    ABS. MONOCYTES 03/23/2017 0.2  0.05 - 1.2 K/UL Final    ABS. EOSINOPHILS 03/23/2017 0.1  0.0 - 0.4 K/UL Final    ABS.  BASOPHILS 03/23/2017 0.0  0.0 - 0.06 K/UL Final    DF 03/23/2017 AUTOMATED    Final    LIPID PROFILE 03/23/2017        Final    Cholesterol, total 03/23/2017 154  <200 MG/DL Final    Triglyceride 03/23/2017 73  <150 MG/DL Final    Comment: The drugs N-acetylcysteine (NAC) and  Metamiszole have been found to cause falsely  low results in this chemical assay. Please  be sure to submit blood samples obtained  BEFORE administration of either of these  drugs to assure correct results.  HDL Cholesterol 03/23/2017 81* 40 - 60 MG/DL Final    LDL, calculated 03/23/2017 58.4  0 - 100 MG/DL Final    VLDL, calculated 03/23/2017 14.6  MG/DL Final    CHOL/HDL Ratio 03/23/2017 1.9  0 - 5.0   Final    TSH 03/23/2017 1.27  0.36 - 3.74 uIU/mL Final    Hemoglobin A1c 03/23/2017 5.2  4.2 - 5.6 % Final    Comment: (NOTE)  HbA1C Interpretive Ranges  <5.7              Normal  5.7 - 6.4         Consider Prediabetes  >6.5              Consider Diabetes      Est. average glucose 03/23/2017 103  mg/dL Final    Comment: (NOTE)  The eAG should be interpreted with patient characteristics in mind   since ethnicity, interindividual differences, red cell lifespan,   variation in rates of glycation, etc. may affect the validity of the   calculation.  NT pro-BNP 03/23/2017 192  0 - 1800 PG/ML Final    Comment:           For patients with dyspnea, NT proBNP is highly sensitive for detecting acute congestive heart failure. Also, an NT proBNP <300 pg/mL effectively rules out acute congestive heart failure, with 99% negative predictive value. Our reference ranges are for acute dyspnea. Age              Range (pg/ml)                            0-49                0-450        50-75               0-900        >75                 0-1800       For ambulatory office patients, the ranges below apply:     Age 76 and below, use cutoff of 125 pg/ml     Age 68 and above, use cutoff of 450 pg/ml         . No results found for any visits on 08/17/17. Assessment / Plan:      ICD-10-CM ICD-9-CM    1.  Fatigue, unspecified type R53.83 780.79 AMB POC URINALYSIS DIP STICK AUTO W/O MICRO      CBC WITH AUTOMATED DIFF      FERRITIN      IRON PROFILE   2. Anemia, unspecified type D64.9 285.9 CBC WITH AUTOMATED DIFF      FERRITIN      IRON PROFILE   3. Essential hypertension I10 401.9    4. Dependent edema R60.9 782.3    5. Pain, dental K08.89 525.9    6. Hematuria, microscopic R31.29 599.72 URINALYSIS W/MICROSCOPIC      CULTURE, URINE     Labs today  Hematuria - Microscopic and urine culture  Okay to try Melatonin  POC UA- trace hematuria  F/u prn      Follow-up Disposition:  Return in about 1 month (around 9/17/2017). I asked the patient if she  had any questions and answered her  questions.   The patient stated that she understands the treatment plan and agrees with the treatment plan

## 2017-08-18 ENCOUNTER — TELEPHONE (OUTPATIENT)
Dept: INTERNAL MEDICINE CLINIC | Age: 82
End: 2017-08-18

## 2017-08-18 NOTE — TELEPHONE ENCOUNTER
Patient called the office requesting lab results--results given to patient other than urine culture which is still pending and patient made aware. Understanding was voiced.

## 2017-08-18 NOTE — TELEPHONE ENCOUNTER
Patient was seen by Dennis Bernard 08/17/2017 Marley Puente ask her to call back with the amount of Lasix she was taking which is 20 mg.

## 2017-08-19 LAB
BACTERIA SPEC CULT: NORMAL
SERVICE CMNT-IMP: NORMAL

## 2017-10-20 RX ORDER — SITAGLIPTIN 100 MG/1
TABLET, FILM COATED ORAL
Qty: 30 TAB | Refills: 5 | Status: SHIPPED | OUTPATIENT
Start: 2017-10-20 | End: 2018-12-22 | Stop reason: SDUPTHER

## 2017-11-19 RX ORDER — LOSARTAN POTASSIUM 50 MG/1
TABLET ORAL
Qty: 30 TAB | Refills: 5 | Status: SHIPPED | OUTPATIENT
Start: 2017-11-19 | End: 2018-04-20 | Stop reason: SDUPTHER

## 2017-11-22 RX ORDER — FUROSEMIDE 20 MG/1
TABLET ORAL
Qty: 30 TAB | Refills: 5 | Status: SHIPPED | OUTPATIENT
Start: 2017-11-22 | End: 2018-05-18

## 2017-11-22 RX ORDER — FUROSEMIDE 20 MG/1
TABLET ORAL
Qty: 30 TAB | Refills: 5 | Status: SHIPPED | OUTPATIENT
Start: 2017-11-22 | End: 2018-06-07 | Stop reason: SDUPTHER

## 2017-11-29 ENCOUNTER — TELEPHONE (OUTPATIENT)
Dept: INTERNAL MEDICINE CLINIC | Age: 82
End: 2017-11-29

## 2017-11-29 NOTE — TELEPHONE ENCOUNTER
Prior Auth request received from Pharmacy for Aciphex. PA initiated on [email or web address]. Approval received from insurance. Pharmacy faxed insurance decision.

## 2017-12-19 RX ORDER — RABEPRAZOLE SODIUM 20 MG/1
TABLET, DELAYED RELEASE ORAL
Qty: 30 TAB | Refills: 5 | Status: SHIPPED | OUTPATIENT
Start: 2017-12-19 | End: 2018-05-18 | Stop reason: SDUPTHER

## 2017-12-30 RX ORDER — GLIPIZIDE AND METFORMIN HCL 5; 500 MG/1; MG/1
TABLET, FILM COATED ORAL
Qty: 120 TAB | Refills: 10 | Status: SHIPPED | OUTPATIENT
Start: 2017-12-30 | End: 2018-11-16 | Stop reason: SDUPTHER

## 2018-02-22 ENCOUNTER — OFFICE VISIT (OUTPATIENT)
Dept: INTERNAL MEDICINE CLINIC | Age: 83
End: 2018-02-22

## 2018-02-22 VITALS
SYSTOLIC BLOOD PRESSURE: 130 MMHG | HEART RATE: 60 BPM | TEMPERATURE: 97.9 F | BODY MASS INDEX: 24.98 KG/M2 | WEIGHT: 141 LBS | HEIGHT: 63 IN | RESPIRATION RATE: 16 BRPM | DIASTOLIC BLOOD PRESSURE: 70 MMHG | OXYGEN SATURATION: 98 %

## 2018-02-22 DIAGNOSIS — F32.A MILD DEPRESSION: Primary | ICD-10-CM

## 2018-02-22 DIAGNOSIS — I10 ESSENTIAL HYPERTENSION: ICD-10-CM

## 2018-02-22 RX ORDER — SERTRALINE HYDROCHLORIDE 25 MG/1
25 TABLET, FILM COATED ORAL DAILY
Qty: 30 TAB | Refills: 2 | Status: SHIPPED | OUTPATIENT
Start: 2018-02-22 | End: 2018-05-26 | Stop reason: SDUPTHER

## 2018-02-22 NOTE — MR AVS SNAPSHOT
[address] [facility] 
 
 
 [address], [address] [place] [address] 
[phone] Patient: Vlad Perez MRN: [id number] GKT:[id number] Visit Information Date & Time Provider Department Dept. Phone Encounter #  
 2/22/2018  2:45 PM Aly Velazquez NP [facility] INTERNAL MEDICINE OF [address] [id number] Follow-up Instructions Return in about 4 weeks (around 3/22/2018), or if symptoms worsen or fail to improve. Your Appointments 3/22/2018 12:45 PM  
Follow Up with Aly Velazquez NP  
[facility] INTERNAL MEDICINE OF [place] ([facility]) Appt Note: 4wk  
 [address], [address] [place] [facility] [address]  
  
   
 [address], [address] [place] [address] Upcoming Health Maintenance Date Due  
 FOOT EXAM Q1 1/5/1943 DTaP/Tdap/Td series (1 - Tdap) 1/5/1954 MICROALBUMIN Q1 7/28/2016 Influenza Age 5 to Adult 8/1/2017 HEMOGLOBIN A1C Q6M 9/23/2017 MEDICARE YEARLY EXAM 3/23/2018 LIPID PANEL Q1 3/23/2018 EYE EXAM RETINAL OR DILATED Q1 5/11/2018 GLAUCOMA SCREENING Q2Y 5/11/2019 Allergies as of 2/22/2018  Review Complete On: 2/22/2018 By: Aly Velazquez NP No Known Allergies Current Immunizations  Reviewed on 8/24/2017 Name Date Influenza High Dose Vaccine PF 9/1/2017 Influenza Vaccine 9/1/2016, 10/1/2015 Pneumococcal Conjugate (PCV-13) 5/17/2017 Pneumococcal Polysaccharide (PPSV-23) 10/1/2015 Zoster Vaccine, Live 1/17/2012 Not reviewed this visit You Were Diagnosed With   
  
 Codes Comments Mild depression ([facility] [address]    -  Primary ICD-10-CM: F32.0 ICD-9-CM: 669 Essential hypertension     ICD-10-CM: I10 
ICD-9-CM: 401.9 Vitals BP Pulse Temp Resp Height(growth percentile) 130/70 (BP 1 Location: Left arm, BP Patient Position: Sitting) 60 97.9 °F (36.6 °C) (Tympanic) 16 5' 3\" (1.6 m) Weight(growth percentile) SpO2 BMI OB Status Smoking Status 141 lb (64 kg) 98% 24.98 kg/m2 Postmenopausal Never Smoker BMI and BSA Data Body Mass Index Body Surface Area 24.98 kg/m 2 1.69 m 2 Preferred Pharmacy Pharmacy Name Phone [id number] [address] [facility], [address] [phone] Your Updated Medication List  
  
   
This list is accurate as of 2/22/18  4:15 PM.  Always use your most recent med list.  
  
  
  
  
 aspirin delayed-release 81 mg tablet Take  by mouth daily. CITRACAL PLUS D 315-200 mg-unit Tab Generic drug:  calcium citrate-vitamin d3 Take 1 tablet by mouth daily (with breakfast). * furosemide 40 mg tablet Commonly known as:  LASIX Take 1 Tab by mouth daily. * furosemide 20 mg tablet Commonly known as:  LASIX  
take 1 tablet by mouth once daily * furosemide 20 mg tablet Commonly known as:  LASIX  
take 1 tablet by mouth once daily  
  
 glipiZIDE-metFORMIN 5-500 mg per tablet Commonly known as:  METAGLIP  
take 2 tablets by mouth once daily (1 BEFORE BREAKFAST AND 1 BEFORE DINNER) glucose blood VI test strips strip Commonly known as:  FREESTYLE LITE STRIPS Test Blood Sugar once daily; ICD-10 E11.9; Quantity 100 JANUVIA 100 mg tablet Generic drug:  SITagliptin  
take 1 tablet by mouth once daily Lancets Misc Commonly known as:  FREESTYLE LANCETS Test bloos sugar once daily; ICD-10 E11.9; quantity 100  
  
 lidocaine 5 % Commonly known as:  Tonye Blotter Apply patch to the affected area for 12 hours a day and remove for 12 hours a day. loperamide 2 mg capsule Commonly known as:  IMODIUM Take 1 mg by mouth Three (3) times a week. LORazepam 1 mg tablet Commonly known as:  ATIVAN Take 0.5 Tabs by mouth two (2) times daily as needed. Max Daily Amount: 1 mg.  
  
 losartan 50 mg tablet Commonly known as:  COZAAR  
take 1 tablet by mouth once daily metroNIDAZOLE 1 % topical cream  
Commonly known as:  Urvashi Black Apply daily  
  
 potassium chloride 10 mEq tablet Commonly known as:  KLOR-CON Take 1 Tab by mouth daily. * pravastatin 40 mg tablet Commonly known as:  PRAVACHOL Take 1 Tab by mouth nightly. * pravastatin 40 mg tablet Commonly known as:  PRAVACHOL  
take 1 tablet by mouth NIGHTLY  
  
 RABEprazole 20 mg tablet Commonly known as:  ACIPHEX  
take 1 tablet by mouth once daily  
  
 sertraline 25 mg tablet Commonly known as:  ZOLOFT Take 1 Tab by mouth daily. traMADol 50 mg tablet Commonly known as:  ULTRAM  
Take 1 Tab by mouth every eight (8) hours as needed for Pain. Max Daily Amount: 150 mg. VITAMIN B-12 2,000 mcg Tber Generic drug:  cyanocobalamin Take 3 Tabs by mouth daily. VITAMIN D3 1,000 unit tablet Generic drug:  cholecalciferol Take  by mouth daily. * Notice: This list has 5 medication(s) that are the same as other medications prescribed for you. Read the directions carefully, and ask your doctor or other care provider to review them with you. Prescriptions Sent to Pharmacy Refills  
 sertraline (ZOLOFT) 25 mg tablet 2 Sig: Take 1 Tab by mouth daily. Class: Normal  
 [id number] [place] [address] [facility] #: [phone] Route: Oral  
  
Follow-up Instructions Return in about 4 weeks (around 3/22/2018), or if symptoms worsen or fail to improve. Introducing [facility]! [facility] introduces [employer] patient portal. Now you can access parts of your medical record, email your doctor's office, and request medication refills online. 1. In your internet browser, go to [email or web address]. [email or web address] 2. Click on the First Time User? Click Here link in the Sign In box. You will see the New Member Sign Up page. 3. Enter your [employer] Access Code exactly as it appears below.  You will not need to use this code after youve completed the sign-up process. If you do not sign up before the expiration date, you must request a new code. · [employer] Access Code: [email or web address] Expires: 5/23/2018  4:15 PM 
 
4. Enter the last four digits of your Social Security Number (xxxx) and Date of Birth (mm/dd/yyyy) as indicated and click Submit. You will be taken to the next sign-up page. 5. Create a [employer] ID. This will be your [employer] login ID and cannot be changed, so think of one that is secure and easy to remember. 6. Create a [employer] password. You can change your password at any time. 7. Enter your Password Reset Question and Answer. This can be used at a later time if you forget your password. 8. Enter your e-mail address. You will receive e-mail notification when new information is available in [address]. 9. Click Sign Up. You can now view and download portions of your medical record. 10. Click the Download Summary menu link to download a portable copy of your medical information. If you have questions, please visit the Frequently Asked Questions section of the [employer] website. Remember, [employer] is NOT to be used for urgent needs. For medical emergencies, dial 911. Now available from your iPhone and Android! Please provide this summary of care documentation to your next provider. Your primary care clinician is listed as Regis Bruno. If you have any questions after today's visit, please call [phone].

## 2018-02-22 NOTE — PROGRESS NOTES
Chris Perez is a 80 y.o. female presenting today for Arm Pain (right); Eye Problem (left); and Head Pain  . HPI:  Chris Perez presents to the office today for intermittent right shoulder and arm pain, isolated one time incident of right-sided head pain and left lower lid eye pain x 2 days. Patient is scheduled to see Ophthalmology on Tuesday. Her head pain has resolved and she does have chronic right shoulder pain. Patient notes she has been under a lot of stress and she is feeling down. Review of Systems   Respiratory: Negative for cough. Cardiovascular: Negative for chest pain and palpitations. Neurological:        Head pain - isolated incident   Psychiatric/Behavioral: Positive for depression (mild). No Known Allergies    Current Outpatient Prescriptions   Medication Sig Dispense Refill    sertraline (ZOLOFT) 25 mg tablet Take 1 Tab by mouth daily. 30 Tab 2    glipiZIDE-metFORMIN (METAGLIP) 5-500 mg per tablet take 2 tablets by mouth once daily (1 BEFORE BREAKFAST AND 1 BEFORE DINNER) 120 Tab 10    RABEprazole (ACIPHEX) 20 mg tablet take 1 tablet by mouth once daily 30 Tab 5    furosemide (LASIX) 20 mg tablet take 1 tablet by mouth once daily 30 Tab 5    losartan (COZAAR) 50 mg tablet take 1 tablet by mouth once daily 30 Tab 5    JANUVIA 100 mg tablet take 1 tablet by mouth once daily (Patient taking differently: take 1/2 tablet by mouth once daily) 30 Tab 5    pravastatin (PRAVACHOL) 40 mg tablet take 1 tablet by mouth NIGHTLY 90 Tab 5    cyanocobalamin (VITAMIN B-12) 2,000 mcg TbER Take 3 Tabs by mouth daily.  loperamide (IMODIUM) 2 mg capsule Take 1 mg by mouth Three (3) times a week.  traMADol (ULTRAM) 50 mg tablet Take 1 Tab by mouth every eight (8) hours as needed for Pain.  Max Daily Amount: 150 mg. 20 Tab 0    metroNIDAZOLE (NORITATE) 1 % topical cream Apply daily 30 g 0    glucose blood VI test strips (FREESTYLE LITE STRIPS) strip Test Blood Sugar once daily; ICD-10 E11.9; Quantity 100 100 Strip 11    Lancets (FREESTYLE LANCETS) misc Test bloos sugar once daily; ICD-10 E11.9; quantity 100 100 Each 11    lidocaine (LIDODERM) 5 % Apply patch to the affected area for 12 hours a day and remove for 12 hours a day. 5 Each 0    LORazepam (ATIVAN) 1 mg tablet Take 0.5 Tabs by mouth two (2) times daily as needed. Max Daily Amount: 1 mg. (Patient taking differently: Take 0.5 Tabs by mouth two (2) times daily as needed for Anxiety.) 40 Tab 0    aspirin delayed-release 81 mg tablet Take  by mouth daily.  cholecalciferol (VITAMIN D3) 1,000 unit tablet Take  by mouth daily.  calcium citrate-vitamin d3 (CITRACAL + D) 315-200 mg-unit tab Take 1 tablet by mouth daily (with breakfast).  furosemide (LASIX) 20 mg tablet take 1 tablet by mouth once daily 30 Tab 5    potassium chloride (K-DUR, KLOR-CON) 10 mEq tablet Take 1 Tab by mouth daily. 30 Tab 6    furosemide (LASIX) 40 mg tablet Take 1 Tab by mouth daily. (Patient taking differently: Take 20 mg by mouth daily.) 30 Tab 0    pravastatin (PRAVACHOL) 40 mg tablet Take 1 Tab by mouth nightly. 80 Tab 3       Past Medical History:   Diagnosis Date    Aortic valve stenosis, mild April 2014    EF 10-17%, Grade 1 diastolic dysfunction, mild aortic stenosis,  mean gradient 10 mm Hg    CAD (coronary artery disease)     aortic stenosis    Cardiac echocardiogram 11/15/2016    EF 55-60%. No RWMA. Mild-mod LVH. Indeterminate diastolic fx. Mod LAE. Mild-mod AS (mean grad 13.14 mmHg).  Cardiac nuclear imaging test 08/27/2008    No evidence of ischemia or prior scarring. EF 62%. No WMA. No significant change from study of 3/23/06.  Cardiovascular RLE venous duplex 12/01/2016    Right leg:  No DVT.  Carotid duplex 41/54/6878    Mild 6-58% LICA stenosis.     DM (diabetes mellitus) ([facility] [address]     Dyslipidemia     HTN (hypertension)     Mixed hyperlipidemia     Sciatica        Past Surgical History:   Procedure Laterality Date    HX APPENDECTOMY      HX OOPHORECTOMY Left 1957    HX OTHER SURGICAL Right 2010    ankle surgery    HX TUMOR REMOVAL Left     ovarian    HX UROLOGICAL      kidney stone removal       Social History     Social History    Marital status:      Spouse name: N/A    Number of children: N/A    Years of education: N/A     Occupational History    Not on file. Social History Main Topics    Smoking status: Never Smoker    Smokeless tobacco: Never Used    Alcohol use No    Drug use: No    Sexual activity: No     Other Topics Concern    Not on file     Social History Narrative       Patient does not have an advanced directive on file    Vitals:    02/22/18 1535   BP: 130/70   Pulse: 60   Resp: 16   Temp: 97.9 °F (36.6 °C)   TempSrc: Tympanic   SpO2: 98%   Weight: 141 lb (64 kg)   Height: 5' 3\" (1.6 m)   PainSc:   0 - No pain       Physical Exam   Constitutional: She is oriented to person, place, and time. No distress. Eyes: Left eye exhibits no discharge, no exudate and no hordeolum. Left pupil is reactive. Cardiovascular: Normal rate, regular rhythm and normal heart sounds. Pulmonary/Chest: Effort normal and breath sounds normal. No respiratory distress. Neurological: She is alert and oriented to person, place, and time. Gait normal.   Nursing note and vitals reviewed. No visits with results within 3 Month(s) from this visit.   Latest known visit with results is:    Hospital Outpatient Visit on 08/17/2017   Component Date Value Ref Range Status    WBC 08/17/2017 4.7  4.6 - 13.2 K/uL Final    RBC 08/17/2017 3.67* 4.20 - 5.30 M/uL Final    HGB 08/17/2017 11.0* 12.0 - 16.0 g/dL Final    HCT 08/17/2017 33.8* 35.0 - 45.0 % Final    MCV 08/17/2017 92.1  74.0 - 97.0 FL Final    MCH 08/17/2017 30.0  24.0 - 34.0 PG Final    MCHC 08/17/2017 32.5  31.0 - 37.0 g/dL Final    RDW 08/17/2017 14.3  11.6 - 14.5 % Final    PLATELET 23/11/5415 466* 135 - 420 K/uL Final    MPV 08/17/2017 11.8  9.2 - 11.8 FL Final    NEUTROPHILS 08/17/2017 65  40 - 73 % Final    LYMPHOCYTES 08/17/2017 26  21 - 52 % Final    MONOCYTES 08/17/2017 6  3 - 10 % Final    EOSINOPHILS 08/17/2017 3  0 - 5 % Final    BASOPHILS 08/17/2017 0  0 - 2 % Final    ABS. NEUTROPHILS 08/17/2017 3.0  1.8 - 8.0 K/UL Final    ABS. LYMPHOCYTES 08/17/2017 1.2  0.9 - 3.6 K/UL Final    ABS. MONOCYTES 08/17/2017 0.3  0.05 - 1.2 K/UL Final    ABS. EOSINOPHILS 08/17/2017 0.1  0.0 - 0.4 K/UL Final    ABS. BASOPHILS 08/17/2017 0.0  0.0 - 0.06 K/UL Final    DF 08/17/2017 AUTOMATED    Final    Ferritin 08/17/2017 32  8 - 388 NG/ML Final    Iron 08/17/2017 63  50 - 175 ug/dL Final    Patients receiving metal-binding drugs (e.g. deferoxamine) may show spuriously depressed iron values, as chelated iron may not properly react in the iron assay.  TIBC 08/17/2017 354  250 - 450 ug/dL Final    Iron % saturation 08/17/2017 18  % Final    Color 08/17/2017 YELLOW    Final    Appearance 08/17/2017 CLEAR    Final    Specific gravity 08/17/2017 1.022  1.005 - 1.030   Final    pH (UA) 08/17/2017 5.5  5.0 - 8.0   Final    Protein 08/17/2017 NEGATIVE   NEG mg/dL Final    Glucose 08/17/2017 NEGATIVE   NEG mg/dL Final    Ketone 08/17/2017 NEGATIVE   NEG mg/dL Final    Bilirubin 08/17/2017 NEGATIVE   NEG   Final    Blood 08/17/2017 NEGATIVE   NEG   Final    Urobilinogen 08/17/2017 0.2  0.2 - 1.0 EU/dL Final    Nitrites 08/17/2017 NEGATIVE   NEG   Final    Leukocyte Esterase 08/17/2017 TRACE* NEG   Final    WBC 08/17/2017 0 to 3  0 - 4 /hpf Final    RBC 08/17/2017 0  0 - 5 /hpf Final    Epithelial cells 08/17/2017 FEW  0 - 5 /lpf Final    Bacteria 08/17/2017 NEGATIVE   NEG /hpf Final    Special Requests: 08/17/2017 NO SPECIAL REQUESTS    Final    Culture result: 08/17/2017 NO GROWTH 2 DAYS    Final       .No results found for any visits on 02/22/18.     Assessment / Plan:      ICD-10-CM ICD-9-CM    1. Mild depression ([facility] [address] F32.0 311 sertraline (ZOLOFT) 25 mg tablet   2. Essential hypertension I10 401.9      Zoloft rx  HTN- controlled      Follow-up Disposition:  Return in about 4 weeks (around 3/22/2018), or if symptoms worsen or fail to improve. I asked the patient if she  had any questions and answered her  questions.   The patient stated that she understands the treatment plan and agrees with the treatment plan

## 2018-04-20 RX ORDER — LOSARTAN POTASSIUM 50 MG/1
TABLET ORAL
Qty: 30 TAB | Refills: 5 | Status: SHIPPED | OUTPATIENT
Start: 2018-04-20 | End: 2018-05-18 | Stop reason: CLARIF

## 2018-04-20 RX ORDER — PRAVASTATIN SODIUM 40 MG/1
TABLET ORAL
Qty: 90 TAB | Refills: 5 | Status: SHIPPED | OUTPATIENT
Start: 2018-04-20 | End: 2018-07-19 | Stop reason: SDUPTHER

## 2018-05-14 ENCOUNTER — OFFICE VISIT (OUTPATIENT)
Dept: INTERNAL MEDICINE CLINIC | Age: 83
End: 2018-05-14

## 2018-05-14 VITALS
TEMPERATURE: 99 F | HEART RATE: 66 BPM | HEIGHT: 63 IN | DIASTOLIC BLOOD PRESSURE: 50 MMHG | SYSTOLIC BLOOD PRESSURE: 112 MMHG | RESPIRATION RATE: 18 BRPM | OXYGEN SATURATION: 97 %

## 2018-05-14 DIAGNOSIS — R60.9 DEPENDENT EDEMA: ICD-10-CM

## 2018-05-14 DIAGNOSIS — I10 ESSENTIAL HYPERTENSION: ICD-10-CM

## 2018-05-14 DIAGNOSIS — E11.9 DIABETES MELLITUS WITHOUT COMPLICATION (HCC): ICD-10-CM

## 2018-05-14 DIAGNOSIS — S81.801A WOUND OF RIGHT LOWER EXTREMITY, INITIAL ENCOUNTER: Primary | ICD-10-CM

## 2018-05-14 DIAGNOSIS — E78.5 DYSLIPIDEMIA: ICD-10-CM

## 2018-05-14 DIAGNOSIS — R06.00 DYSPNEA, UNSPECIFIED TYPE: ICD-10-CM

## 2018-05-14 RX ORDER — CEPHALEXIN 250 MG/1
250 CAPSULE ORAL 4 TIMES DAILY
Qty: 28 CAP | Refills: 0 | Status: SHIPPED | OUTPATIENT
Start: 2018-05-14 | End: 2018-05-21

## 2018-05-14 NOTE — MR AVS SNAPSHOT
[facility] 
 
 
 [address], [address] [place] [address] 
[phone] Patient: Jourdan Garsia MRN: [id number] UXE:[birth year] Visit Information Date & Time Provider Department Dept. Phone Encounter #  
 5/14/2018  3:45 PM Jason Steven NP [facility] OF [address] [id number] Your Appointments 5/18/2018  2:45 PM  
Follow Up with Jason Steven NP  
[facility] [place] ([place]) Appt Note: 4 day f/u  
 [address], [address] [place] [address]  
  
   
 [address], [address] [place] [address] Upcoming Health Maintenance Date Due  
 FOOT EXAM Q1 1/5/1943 DTaP/Tdap/Td series (1 - Tdap) 1/5/1954 MICROALBUMIN Q1 7/28/2016 HEMOGLOBIN A1C Q6M 9/23/2017 MEDICARE YEARLY EXAM 3/23/2018 LIPID PANEL Q1 3/23/2018 EYE EXAM RETINAL OR DILATED Q1 5/11/2018 Influenza Age 5 to Adult 8/1/2018 GLAUCOMA SCREENING Q2Y 5/11/2019 Allergies as of 5/14/2018  Review Complete On: 5/14/2018 By: Jason Steven NP No Known Allergies Current Immunizations  Reviewed on 3/21/2018 Name Date Influenza High Dose Vaccine PF 9/12/2017 Influenza Vaccine 9/1/2016, 10/1/2015 Pneumococcal Conjugate (PCV-13) 5/17/2017 Pneumococcal Polysaccharide (PPSV-23) 10/1/2015, 9/10/2014, 4/18/2013 Zoster Vaccine, Live 1/17/2012 Not reviewed this visit You Were Diagnosed With   
  
 Codes Comments Wound of right lower extremity, initial encounter    -  Primary ICD-10-CM: H70.121T ICD-9-CM: 894.0 Dependent edema     ICD-10-CM: R60.9 ICD-9-CM: 782.3 Essential hypertension     ICD-10-CM: I10 
ICD-9-CM: 401.9 Dyslipidemia     ICD-10-CM: E78.5 ICD-9-CM: 272.4 Dyspnea, unspecified type     ICD-10-CM: R06.00 
ICD-9-CM: 786.09 Vitals BP Pulse Temp Resp Height(growth percentile) SpO2 112/50 (BP 1 Location: Left arm, BP Patient Position: Sitting) 66 99 °F (37.2 °C) (Tympanic) 18 5' 3\" (1.6 m) 97% OB Status Smoking Status Postmenopausal Never Smoker Vitals History [id number] [address] [address] [phone] Your Updated Medication List  
  
   
This list is accurate as of 5/14/18  5:15 PM.  Always use your most recent med list.  
  
  
  
  
 aspirin delayed-release 81 mg tablet Take  by mouth daily. cephALEXin 250 mg capsule Commonly known as:  Vanessa Platts Take 1 Cap by mouth four (4) times daily for 7 days. CITRACAL PLUS D 315-200 mg-unit Tab Generic drug:  calcium citrate-vitamin d3 Take 1 tablet by mouth daily (with breakfast). * furosemide 40 mg tablet Commonly known as:  LASIX Take 1 Tab by mouth daily. * furosemide 20 mg tablet Commonly known as:  LASIX  
take 1 tablet by mouth once daily * furosemide 20 mg tablet Commonly known as:  LASIX  
take 1 tablet by mouth once daily  
  
 glipiZIDE-metFORMIN 5-500 mg per tablet Commonly known as:  METAGLIP  
take 2 tablets by mouth once daily (1 BEFORE BREAKFAST AND 1 BEFORE DINNER) glucose blood VI test strips strip Commonly known as:  FREESTYLE LITE STRIPS Test Blood Sugar once daily; ICD-10 E11.9; Quantity 100 JANUVIA 100 mg tablet Generic drug:  SITagliptin  
take 1 tablet by mouth once daily Lancets Misc Commonly known as:  FREESTYLE LANCETS Test bloos sugar once daily; ICD-10 E11.9; quantity 100  
  
 lidocaine 5 % Commonly known as:  Arthor Double Apply patch to the affected area for 12 hours a day and remove for 12 hours a day. loperamide 2 mg capsule Commonly known as:  IMODIUM Take 1 mg by mouth Three (3) times a week. LORazepam 1 mg tablet Commonly known as:  ATIVAN  
 Take 0.5 Tabs by mouth two (2) times daily as needed. Max Daily Amount: 1 mg.  
  
 losartan 50 mg tablet Commonly known as:  COZAAR  
take 1 tablet by mouth once daily  
  
 metroNIDAZOLE 1 % topical cream  
Commonly known as:  Brown Phani Apply daily  
  
 potassium chloride 10 mEq tablet Commonly known as:  KLOR-CON Take 1 Tab by mouth daily. * pravastatin 40 mg tablet Commonly known as:  PRAVACHOL Take 1 Tab by mouth nightly. * pravastatin 40 mg tablet Commonly known as:  PRAVACHOL  
take 1 tablet by mouth NIGHTLY  
  
 RABEprazole 20 mg tablet Commonly known as:  ACIPHEX  
take 1 tablet by mouth once daily  
  
 sertraline 25 mg tablet Commonly known as:  ZOLOFT Take 1 Tab by mouth daily. traMADol 50 mg tablet Commonly known as:  ULTRAM  
Take 1 Tab by mouth every eight (8) hours as needed for Pain. Max Daily Amount: 150 mg. VITAMIN B-12 2,000 mcg Tber Generic drug:  cyanocobalamin Take 3 Tabs by mouth daily. VITAMIN D3 1,000 unit tablet Generic drug:  cholecalciferol Take  by mouth daily. * Notice: This list has 5 medication(s) that are the same as other medications prescribed for you. Read the directions carefully, and ask your doctor or other care provider to review them with you. Prescriptions Sent to Pharmacy Refills  
 cephALEXin (KEFLEX) 250 mg capsule 0 Sig: Take 1 Cap by mouth four (4) times daily for 7 days. Class: Normal  
 Pharmacy: [id number] [address] #: [phone] Route: Oral  
  
To-Do List   
 05/14/2018 Imaging:  XR CHEST PA LAT Introducing [facility]! [employer] introduces [employer] patient portal. Now you can access parts of your medical record, email your doctor's office, and request medication refills online. 1. In your internet browser, go to [email or web address] 2. Click on the First Time User? Click Here link in the Sign In box. You will see the New Member Sign Up page. 3. Enter your [employer] Access Code exactly as it appears below. You will not need to use this code after youve completed the sign-up process. If you do not sign up before the expiration date, you must request a new code. · [employer] Access Code: [email or web address] Expires: 5/23/2018  5:15 PM 
 
4. Enter the last four digits of your Social Security Number (xxxx) and Date of Birth (mm/dd/yyyy) as indicated and click Submit. You will be taken to the next sign-up page. 5. Create a [employer] ID. This will be your [employer] login ID and cannot be changed, so think of one that is secure and easy to remember. 6. Create a [employer] password. You can change your password at any time. 7. Enter your Password Reset Question and Answer. This can be used at a later time if you forget your password. 8. Enter your e-mail address. You will receive e-mail notification when new information is available in [address] 9. Click Sign Up. You can now view and download portions of your medical record. 10. Click the Download Summary menu link to download a portable copy of your medical information. If you have questions, please visit the Frequently Asked Questions section of the [employer] website. Remember, [employer] is NOT to be used for urgent needs. For medical emergencies, dial 911. Now available from your iPhone and Android! Please provide this summary of care documentation to your next provider. Your primary care clinician is listed as Evelyne Casey. If you have any questions after today's visit, please call [phone].

## 2018-05-14 NOTE — PROGRESS NOTES
Janak Conrad is a 80 y.o. female presenting today for Skin Problem  . HPI:  Janak Conrad presents to the office today for right lower extremity wound. The etiology of the wounds are unknown. She is negative for pain but does have a low grade fever and edema to the RLE. Review of Systems   Respiratory: Negative for cough. Cardiovascular: Negative for chest pain and palpitations. Skin: Negative for itching. No Known Allergies    Current Outpatient Prescriptions   Medication Sig Dispense Refill    cephALEXin (KEFLEX) 250 mg capsule Take 1 Cap by mouth four (4) times daily for 7 days. 28 Cap 0    losartan (COZAAR) 50 mg tablet take 1 tablet by mouth once daily 30 Tab 5    pravastatin (PRAVACHOL) 40 mg tablet take 1 tablet by mouth NIGHTLY 90 Tab 5    sertraline (ZOLOFT) 25 mg tablet Take 1 Tab by mouth daily. 30 Tab 2    glipiZIDE-metFORMIN (METAGLIP) 5-500 mg per tablet take 2 tablets by mouth once daily (1 BEFORE BREAKFAST AND 1 BEFORE DINNER) 120 Tab 10    RABEprazole (ACIPHEX) 20 mg tablet take 1 tablet by mouth once daily 30 Tab 5    furosemide (LASIX) 20 mg tablet take 1 tablet by mouth once daily 30 Tab 5    JANUVIA 100 mg tablet take 1 tablet by mouth once daily (Patient taking differently: take 1/2 tablet by mouth once daily) 30 Tab 5    cyanocobalamin (VITAMIN B-12) 2,000 mcg TbER Take 3 Tabs by mouth daily.  loperamide (IMODIUM) 2 mg capsule Take 1 mg by mouth Three (3) times a week.  potassium chloride (K-DUR, KLOR-CON) 10 mEq tablet Take 1 Tab by mouth daily. 30 Tab 6    traMADol (ULTRAM) 50 mg tablet Take 1 Tab by mouth every eight (8) hours as needed for Pain.  Max Daily Amount: 150 mg. 20 Tab 0    metroNIDAZOLE (NORITATE) 1 % topical cream Apply daily 30 g 0    glucose blood VI test strips (FREESTYLE LITE STRIPS) strip Test Blood Sugar once daily; ICD-10 E11.9; Quantity 100 100 Strip 11    Lancets (FREESTYLE LANCETS) misc Test bloos sugar once daily; ICD-10 E11.9; quantity 100 100 Each 11    lidocaine (LIDODERM) 5 % Apply patch to the affected area for 12 hours a day and remove for 12 hours a day. 5 Each 0    LORazepam (ATIVAN) 1 mg tablet Take 0.5 Tabs by mouth two (2) times daily as needed. Max Daily Amount: 1 mg. (Patient taking differently: Take 0.5 Tabs by mouth two (2) times daily as needed for Anxiety.) 40 Tab 0    aspirin delayed-release 81 mg tablet Take  by mouth daily.  cholecalciferol (VITAMIN D3) 1,000 unit tablet Take  by mouth daily.  calcium citrate-vitamin d3 (CITRACAL + D) 315-200 mg-unit tab Take 1 tablet by mouth daily (with breakfast).  furosemide (LASIX) 20 mg tablet take 1 tablet by mouth once daily 30 Tab 5    furosemide (LASIX) 40 mg tablet Take 1 Tab by mouth daily. (Patient taking differently: Take 20 mg by mouth daily.) 30 Tab 0    pravastatin (PRAVACHOL) 40 mg tablet Take 1 Tab by mouth nightly. 80 Tab 3       Past Medical History:   Diagnosis Date    Aortic valve stenosis, mild April 2014    EF 92-67%, Grade 1 diastolic dysfunction, mild aortic stenosis,  mean gradient 10 mm Hg    CAD (coronary artery disease)     aortic stenosis    Cardiac echocardiogram 11/15/2016    EF 55-60%. No RWMA. Mild-mod LVH. Indeterminate diastolic fx. Mod LAE. Mild-mod AS (mean grad 13.14 mmHg).  Cardiac nuclear imaging test 08/27/2008    No evidence of ischemia or prior scarring. EF 62%. No WMA. No significant change from study of 3/23/06.  Cardiovascular RLE venous duplex 12/01/2016    Right leg:  No DVT.  Carotid duplex 80/86/4317    Mild 9-99% LICA stenosis.     DM (diabetes mellitus) ([facility] [address]     Dyslipidemia     HTN (hypertension)     Mixed hyperlipidemia     Sciatica        Past Surgical History:   Procedure Laterality Date    HX APPENDECTOMY      HX OOPHORECTOMY Left 1957    HX OTHER SURGICAL Right 2010    ankle surgery    HX TUMOR REMOVAL Left     ovarian    HX UROLOGICAL      kidney stone removal Social History     Social History    Marital status:      Spouse name: N/A    Number of children: N/A    Years of education: N/A     Occupational History    Not on file. Social History Main Topics    Smoking status: Never Smoker    Smokeless tobacco: Never Used    Alcohol use No    Drug use: No    Sexual activity: No     Other Topics Concern    Not on file     Social History Narrative       Patient does not have an advanced directive on file    Vitals:    05/14/18 1617   BP: 112/50   Pulse: 66   Resp: 18   Temp: 99 °F (37.2 °C)   TempSrc: Tympanic   SpO2: 97%   Height: 5' 3\" (1.6 m)   PainSc:   0 - No pain       Physical Exam   Constitutional: No distress. Cardiovascular: Normal rate and regular rhythm. Murmur heard. Pulmonary/Chest: Effort normal and breath sounds normal.   Musculoskeletal: Edema: 1-2+ edema. Lymphadenopathy:     She has no cervical adenopathy. Neurological: Gait normal.   Skin: Skin is warm. Pinpoint size open wound to the RLE, 1 cm size leg wound to RLE   Nursing note and vitals reviewed. No visits with results within 3 Month(s) from this visit. Latest known visit with results is:    Hospital Outpatient Visit on 08/17/2017   Component Date Value Ref Range Status    WBC 08/17/2017 4.7  4.6 - 13.2 K/uL Final    RBC 08/17/2017 3.67* 4.20 - 5.30 M/uL Final    HGB 08/17/2017 11.0* 12.0 - 16.0 g/dL Final    HCT 08/17/2017 33.8* 35.0 - 45.0 % Final    MCV 08/17/2017 92.1  74.0 - 97.0 FL Final    MCH 08/17/2017 30.0  24.0 - 34.0 PG Final    MCHC 08/17/2017 32.5  31.0 - 37.0 g/dL Final    RDW 08/17/2017 14.3  11.6 - 14.5 % Final    PLATELET 36/66/0069 426* 135 - 420 K/uL Final    MPV 08/17/2017 11.8  9.2 - 11.8 FL Final    NEUTROPHILS 08/17/2017 65  40 - 73 % Final    LYMPHOCYTES 08/17/2017 26  21 - 52 % Final    MONOCYTES 08/17/2017 6  3 - 10 % Final    EOSINOPHILS 08/17/2017 3  0 - 5 % Final    BASOPHILS 08/17/2017 0  0 - 2 % Final    ABS. NEUTROPHILS 08/17/2017 3.0  1.8 - 8.0 K/UL Final    ABS. LYMPHOCYTES 08/17/2017 1.2  0.9 - 3.6 K/UL Final    ABS. MONOCYTES 08/17/2017 0.3  0.05 - 1.2 K/UL Final    ABS. EOSINOPHILS 08/17/2017 0.1  0.0 - 0.4 K/UL Final    ABS. BASOPHILS 08/17/2017 0.0  0.0 - 0.06 K/UL Final    DF 08/17/2017 AUTOMATED    Final    Ferritin 08/17/2017 32  8 - 388 NG/ML Final    Iron 08/17/2017 63  50 - 175 ug/dL Final    Patients receiving metal-binding drugs (e.g. deferoxamine) may show spuriously depressed iron values, as chelated iron may not properly react in the iron assay.  TIBC 08/17/2017 354  250 - 450 ug/dL Final    Iron % saturation 08/17/2017 18  % Final    Color 08/17/2017 YELLOW    Final    Appearance 08/17/2017 CLEAR    Final    Specific gravity 08/17/2017 1.022  1.005 - 1.030   Final    pH (UA) 08/17/2017 5.5  5.0 - 8.0   Final    Protein 08/17/2017 NEGATIVE   NEG mg/dL Final    Glucose 08/17/2017 NEGATIVE   NEG mg/dL Final    Ketone 08/17/2017 NEGATIVE   NEG mg/dL Final    Bilirubin 08/17/2017 NEGATIVE   NEG   Final    Blood 08/17/2017 NEGATIVE   NEG   Final    Urobilinogen 08/17/2017 0.2  0.2 - 1.0 EU/dL Final    Nitrites 08/17/2017 NEGATIVE   NEG   Final    Leukocyte Esterase 08/17/2017 TRACE* NEG   Final    WBC 08/17/2017 0 to 3  0 - 4 /hpf Final    RBC 08/17/2017 0  0 - 5 /hpf Final    Epithelial cells 08/17/2017 FEW  0 - 5 /lpf Final    Bacteria 08/17/2017 NEGATIVE   NEG /hpf Final    Special Requests: 08/17/2017 NO SPECIAL REQUESTS    Final    Culture result: 08/17/2017 NO GROWTH 2 DAYS    Final       .No results found for any visits on 05/14/18. Assessment / Plan:      ICD-10-CM ICD-9-CM    1. Wound of right lower extremity, initial encounter S81.801A 894.0 cephALEXin (KEFLEX) 250 mg capsule   2. Dependent edema H07.3 542.3 METABOLIC PANEL, COMPREHENSIVE      CBC WITH AUTOMATED DIFF   3. Essential hypertension I10 401.9    4. Dyslipidemia E78.5 272.4 LIPID PANEL   5.  Dyspnea, unspecified type R06.00 786.09 XR CHEST PA LAT      NT-PRO BNP   6. Diabetes mellitus without complication (HCC) A59.2 250.00 MICROALBUMIN, UR, RAND W/ MICROALB/CREAT RATIO      HEMOGLOBIN A1C WITH EAG     Keflex rx given for wound  Elevate LE's  HTN- controlled  Patient needs fasting labs  Chest xray ordered for intermittent dyspnea  F/u on Friday        Follow-up Disposition:  Return if symptoms worsen or fail to improve. I asked the patient if she  had any questions and answered her  questions.   The patient stated that she understands the treatment plan and agrees with the treatment plan

## 2018-05-15 ENCOUNTER — HOSPITAL ENCOUNTER (OUTPATIENT)
Dept: LAB | Age: 83
Discharge: HOME | End: 2018-05-15
Payer: MEDICARE

## 2018-05-15 DIAGNOSIS — E78.5 DYSLIPIDEMIA: ICD-10-CM

## 2018-05-15 DIAGNOSIS — E11.9 DIABETES MELLITUS WITHOUT COMPLICATION (HCC): ICD-10-CM

## 2018-05-15 DIAGNOSIS — R60.9 DEPENDENT EDEMA: ICD-10-CM

## 2018-05-15 DIAGNOSIS — R06.00 DYSPNEA, UNSPECIFIED TYPE: ICD-10-CM

## 2018-05-15 LAB
ALBUMIN SERPL-MCNC: 3.6 G/DL (ref 3.4–5)
ALBUMIN/GLOB SERPL: 1.4 {RATIO} (ref 0.8–1.7)
ALP SERPL-CCNC: 71 U/L (ref 45–117)
ALT SERPL-CCNC: 22 U/L (ref 13–56)
ANION GAP SERPL CALC-SCNC: 9 MMOL/L (ref 3–18)
AST SERPL-CCNC: 20 U/L (ref 15–37)
BASOPHILS # BLD: 0 K/UL (ref 0–0.06)
BASOPHILS NFR BLD: 1 % (ref 0–2)
BILIRUB SERPL-MCNC: 0.4 MG/DL (ref 0.2–1)
BNP SERPL-MCNC: 249 PG/ML (ref 0–1800)
BUN SERPL-MCNC: 21 MG/DL (ref 7–18)
BUN/CREAT SERPL: 38 (ref 12–20)
CALCIUM SERPL-MCNC: 8.9 MG/DL (ref 8.5–10.1)
CHLORIDE SERPL-SCNC: 108 MMOL/L (ref 100–108)
CHOLEST SERPL-MCNC: 137 MG/DL
CO2 SERPL-SCNC: 26 MMOL/L (ref 21–32)
CREAT SERPL-MCNC: 0.55 MG/DL (ref 0.6–1.3)
DIFFERENTIAL METHOD BLD: ABNORMAL
EOSINOPHIL # BLD: 0.1 K/UL (ref 0–0.4)
EOSINOPHIL NFR BLD: 3 % (ref 0–5)
ERYTHROCYTE [DISTWIDTH] IN BLOOD BY AUTOMATED COUNT: 14.1 % (ref 11.6–14.5)
EST. AVERAGE GLUCOSE BLD GHB EST-MCNC: NORMAL MG/DL
GLOBULIN SER CALC-MCNC: 2.5 G/DL (ref 2–4)
GLUCOSE SERPL-MCNC: 78 MG/DL (ref 74–99)
HBA1C MFR BLD: 4.5 % (ref 4.2–5.6)
HCT VFR BLD AUTO: 35.8 % (ref 35–45)
HDLC SERPL-MCNC: 77 MG/DL (ref 40–60)
HDLC SERPL: 1.8 {RATIO} (ref 0–5)
HGB BLD-MCNC: 11.5 G/DL (ref 12–16)
LDLC SERPL CALC-MCNC: 49 MG/DL (ref 0–100)
LIPID PROFILE,FLP: ABNORMAL
LYMPHOCYTES # BLD: 1.1 K/UL (ref 0.9–3.6)
LYMPHOCYTES NFR BLD: 28 % (ref 21–52)
MCH RBC QN AUTO: 30.7 PG (ref 24–34)
MCHC RBC AUTO-ENTMCNC: 32.1 G/DL (ref 31–37)
MCV RBC AUTO: 95.5 FL (ref 74–97)
MONOCYTES # BLD: 0.2 K/UL (ref 0.05–1.2)
MONOCYTES NFR BLD: 5 % (ref 3–10)
NEUTS SEG # BLD: 2.6 K/UL (ref 1.8–8)
NEUTS SEG NFR BLD: 63 % (ref 40–73)
PLATELET # BLD AUTO: 96 K/UL (ref 135–420)
PMV BLD AUTO: 12 FL (ref 9.2–11.8)
POTASSIUM SERPL-SCNC: 4 MMOL/L (ref 3.5–5.5)
PROT SERPL-MCNC: 6.1 G/DL (ref 6.4–8.2)
RBC # BLD AUTO: 3.75 M/UL (ref 4.2–5.3)
SODIUM SERPL-SCNC: 143 MMOL/L (ref 136–145)
TRIGL SERPL-MCNC: 55 MG/DL (ref ?–150)
VLDLC SERPL CALC-MCNC: 11 MG/DL
WBC # BLD AUTO: 4.1 K/UL (ref 4.6–13.2)

## 2018-05-15 PROCEDURE — 36415 COLL VENOUS BLD VENIPUNCTURE: CPT | Performed by: NURSE PRACTITIONER

## 2018-05-15 PROCEDURE — 83880 ASSAY OF NATRIURETIC PEPTIDE: CPT | Performed by: NURSE PRACTITIONER

## 2018-05-15 PROCEDURE — 80061 LIPID PANEL: CPT | Performed by: NURSE PRACTITIONER

## 2018-05-15 PROCEDURE — 85025 COMPLETE CBC W/AUTO DIFF WBC: CPT | Performed by: NURSE PRACTITIONER

## 2018-05-15 PROCEDURE — 82043 UR ALBUMIN QUANTITATIVE: CPT | Performed by: NURSE PRACTITIONER

## 2018-05-15 PROCEDURE — 80053 COMPREHEN METABOLIC PANEL: CPT | Performed by: NURSE PRACTITIONER

## 2018-05-15 PROCEDURE — 83036 HEMOGLOBIN GLYCOSYLATED A1C: CPT | Performed by: NURSE PRACTITIONER

## 2018-05-16 ENCOUNTER — HOSPITAL ENCOUNTER (OUTPATIENT)
Dept: GENERAL RADIOLOGY | Age: 83
Discharge: HOME | End: 2018-05-16
Payer: MEDICARE

## 2018-05-16 DIAGNOSIS — R06.00 DYSPNEA, UNSPECIFIED TYPE: ICD-10-CM

## 2018-05-16 LAB
CREAT UR-MCNC: 140.95 MG/DL (ref 30–125)
MICROALBUMIN UR-MCNC: 5.2 MG/DL (ref 0–3)
MICROALBUMIN/CREAT UR-RTO: 37 MG/G (ref 0–30)

## 2018-05-16 PROCEDURE — 71046 X-RAY EXAM CHEST 2 VIEWS: CPT

## 2018-05-18 ENCOUNTER — OFFICE VISIT (OUTPATIENT)
Dept: INTERNAL MEDICINE CLINIC | Age: 83
End: 2018-05-18

## 2018-05-18 VITALS
RESPIRATION RATE: 18 BRPM | BODY MASS INDEX: 24.27 KG/M2 | DIASTOLIC BLOOD PRESSURE: 62 MMHG | HEART RATE: 90 BPM | OXYGEN SATURATION: 98 % | HEIGHT: 63 IN | SYSTOLIC BLOOD PRESSURE: 120 MMHG | WEIGHT: 137 LBS | TEMPERATURE: 98.2 F

## 2018-05-18 DIAGNOSIS — R80.9 MICROALBUMINURIA: ICD-10-CM

## 2018-05-18 DIAGNOSIS — Z00.00 MEDICARE ANNUAL WELLNESS VISIT, SUBSEQUENT: ICD-10-CM

## 2018-05-18 DIAGNOSIS — I10 ESSENTIAL HYPERTENSION: ICD-10-CM

## 2018-05-18 DIAGNOSIS — S81.801A WOUND, OPEN, LEG, RIGHT, INITIAL ENCOUNTER: Primary | ICD-10-CM

## 2018-05-18 RX ORDER — LISINOPRIL 10 MG/1
10 TABLET ORAL DAILY
Qty: 30 TAB | Refills: 1 | Status: SHIPPED | OUTPATIENT
Start: 2018-05-18 | End: 2018-06-07

## 2018-05-18 RX ORDER — RABEPRAZOLE SODIUM 20 MG/1
TABLET, DELAYED RELEASE ORAL
Qty: 30 TAB | Refills: 5 | Status: SHIPPED | OUTPATIENT
Start: 2018-05-18 | End: 2018-11-16 | Stop reason: SDUPTHER

## 2018-05-18 NOTE — MR AVS SNAPSHOT
[facility] 
 
 
 [address], [address] [place] [address] 
[phone] Patient: Tasneem Palacios MRN: [id number] PSQ:[id number] Visit Information Date & Time Provider Department Dept. Phone Encounter #  
 5/18/2018  2:45 PM Vania Dominguez NP [facility] INTERNAL MEDICINE OF [address] [id number] Your Appointments 5/24/2018  4:00 PM  
Follow Up with Vania Dominguez NP  
[facility] INTERNAL MEDICINE OF [place] ([address]) Appt Note: 1wk  
 [address], [address] [place] [address]  
  
   
 [address], [address] [facility] [address] Upcoming Health Maintenance Date Due  
 FOOT EXAM Q1 1/5/1943 DTaP/Tdap/Td series (1 - Tdap) 1/5/1954 MEDICARE YEARLY EXAM 3/23/2018 EYE EXAM RETINAL OR DILATED Q1 5/11/2018 Influenza Age 5 to Adult 8/1/2018 HEMOGLOBIN A1C Q6M 11/15/2018 GLAUCOMA SCREENING Q2Y 5/11/2019 MICROALBUMIN Q1 5/15/2019 LIPID PANEL Q1 5/15/2019 Allergies as of 5/18/2018  Review Complete On: 5/18/2018 By: Winston Koroma LPN No Known Allergies Current Immunizations  Reviewed on 3/21/2018 Name Date Influenza High Dose Vaccine PF 9/12/2017 Influenza Vaccine 9/1/2016, 10/1/2015 Pneumococcal Conjugate (PCV-13) 5/17/2017 Pneumococcal Polysaccharide (PPSV-23) 10/1/2015, 9/10/2014, 4/18/2013 Zoster Vaccine, Live 1/17/2012 Not reviewed this visit You Were Diagnosed With   
  
 Codes Comments Wound, open, leg, right, initial encounter    -  Primary ICD-10-CM: I86.820W ICD-9-CM: 891.0 Essential hypertension     ICD-10-CM: I10 
ICD-9-CM: 401.9 Microalbuminuria     ICD-10-CM: R80.9 ICD-9-CM: 791.0 Vitals BP Pulse Temp Resp Height(growth percentile) 120/62 (BP 1 Location: Left arm, BP Patient Position: Sitting) 90 98.2 °F (36.8 °C) (Tympanic) 18 5' 3\" (1.6 m) Weight(growth percentile) SpO2 BMI OB Status Smoking Status 137 lb (62.1 kg) 98% 24.27 kg/m2 Postmenopausal Never Smoker BMI and BSA Data Body Mass Index Body Surface Area  
 24.27 kg/m 2 1.66 m 2 Preferred Pharmacy Pharmacy Name Phone [id number] [place], [address] [phone] Your Updated Medication List  
  
   
This list is accurate as of 5/18/18  2:48 PM.  Always use your most recent med list.  
  
  
  
  
 aspirin delayed-release 81 mg tablet Take  by mouth daily. cephALEXin 250 mg capsule Commonly known as:  Prudence Leavens Take 1 Cap by mouth four (4) times daily for 7 days. CITRACAL PLUS D 315-200 mg-unit Tab Generic drug:  calcium citrate-vitamin d3 Take 1 tablet by mouth daily (with breakfast). furosemide 20 mg tablet Commonly known as:  LASIX  
take 1 tablet by mouth once daily  
  
 glipiZIDE-metFORMIN 5-500 mg per tablet Commonly known as:  METAGLIP  
take 2 tablets by mouth once daily (1 BEFORE BREAKFAST AND 1 BEFORE DINNER) glucose blood VI test strips strip Commonly known as:  FREESTYLE LITE STRIPS Test Blood Sugar once daily; ICD-10 E11.9; Quantity 100 JANUVIA 100 mg tablet Generic drug:  SITagliptin  
take 1 tablet by mouth once daily Lancets Misc Commonly known as:  FREESTYLE LANCETS Test bloos sugar once daily; ICD-10 E11.9; quantity 100  
  
 lidocaine 5 % Commonly known as:  Chitra Elizabeth Apply patch to the affected area for 12 hours a day and remove for 12 hours a day. lisinopril 10 mg tablet Commonly known as:  Ge Mary Alice Take 1 Tab by mouth daily. STOP Losartan  
  
 loperamide 2 mg capsule Commonly known as:  IMODIUM Take 1 mg by mouth Three (3) times a week. LORazepam 1 mg tablet Commonly known as:  ATIVAN Take 0.5 Tabs by mouth two (2) times daily as needed.  Max Daily Amount: 1 mg.  
  
 metroNIDAZOLE 1 % topical cream  
 Commonly known as:  Jennifer Koroma Apply daily  
  
 potassium chloride 10 mEq tablet Commonly known as:  KLOR-CON Take 1 Tab by mouth daily. * pravastatin 40 mg tablet Commonly known as:  PRAVACHOL Take 1 Tab by mouth nightly. * pravastatin 40 mg tablet Commonly known as:  PRAVACHOL  
take 1 tablet by mouth NIGHTLY  
  
 RABEprazole 20 mg tablet Commonly known as:  ACIPHEX  
take 1 tablet by mouth once daily  
  
 sertraline 25 mg tablet Commonly known as:  ZOLOFT Take 1 Tab by mouth daily. traMADol 50 mg tablet Commonly known as:  ULTRAM  
Take 1 Tab by mouth every eight (8) hours as needed for Pain. Max Daily Amount: 150 mg. VITAMIN B-12 2,000 mcg Tber Generic drug:  cyanocobalamin Take 3 Tabs by mouth daily. VITAMIN D3 1,000 unit tablet Generic drug:  cholecalciferol Take  by mouth daily. * Notice: This list has 2 medication(s) that are the same as other medications prescribed for you. Read the directions carefully, and ask your doctor or other care provider to review them with you. Prescriptions Sent to Pharmacy Refills  
 lisinopril (PRINIVIL, ZESTRIL) 10 mg tablet 1 Sig: Take 1 Tab by mouth daily. STOP Losartan Class: Normal  
 Pharmacy: [facility] [facility] [address] [facility] #: [phone] Route: Oral  
  
Introducing [facility]! Carly Kramer introduces [employer] patient portal. Now you can access parts of your medical record, email your doctor's office, and request medication refills online. 1. In your internet browser, go to [email or web address] 2. Click on the First Time User? Click Here link in the Sign In box. You will see the New Member Sign Up page. 3. Enter your [employer] Access Code exactly as it appears below. You will not need to use this code after youve completed the sign-up process.  If you do not sign up before the expiration date, you must request a new code. 
 
· [employer] Access Code: [email or web address] Expires: 5/23/2018  5:15 PM 
 
4. Enter the last four digits of your Social Security Number (xxxx) and Date of Birth (mm/dd/yyyy) as indicated and click Submit. You will be taken to the next sign-up page. 5. Create a [employer] ID. This will be your [employer] login ID and cannot be changed, so think of one that is secure and easy to remember. 6. Create a [employer] password. You can change your password at any time. 7. Enter your Password Reset Question and Answer. This can be used at a later time if you forget your password. 8. Enter your e-mail address. You will receive e-mail notification when new information is available in [address] 9. Click Sign Up. You can now view and download portions of your medical record. 10. Click the Download Summary menu link to download a portable copy of your medical information. If you have questions, please visit the Frequently Asked Questions section of the [employer] website. Remember, [employer] is NOT to be used for urgent needs. For medical emergencies, dial 911. Now available from your iPhone and Android! Please provide this summary of care documentation to your next provider. Your primary care clinician is listed as Geronimo Isabel. If you have any questions after today's visit, please call [phone].

## 2018-05-18 NOTE — PROGRESS NOTES
Martin Cevallos is a 80 y.o. female presenting today for Wound Check  . HPI:  Martin Cevallos presents to the office today for right lateral 1 cm wound check follow-up care. Patient reported she is having drainage from the wound and lower extremity edema. She c/o pain at a level 5/10. She is negative for fever. Review of Systems   Constitutional: Negative for fever. Respiratory: Negative for cough, sputum production and shortness of breath. Cardiovascular: Positive for leg swelling. Negative for chest pain and palpitations. No Known Allergies    Current Outpatient Prescriptions   Medication Sig Dispense Refill    lisinopril (PRINIVIL, ZESTRIL) 10 mg tablet Take 1 Tab by mouth daily. STOP Losartan 30 Tab 1    cephALEXin (KEFLEX) 250 mg capsule Take 1 Cap by mouth four (4) times daily for 7 days. 28 Cap 0    sertraline (ZOLOFT) 25 mg tablet Take 1 Tab by mouth daily. 30 Tab 2    glipiZIDE-metFORMIN (METAGLIP) 5-500 mg per tablet take 2 tablets by mouth once daily (1 BEFORE BREAKFAST AND 1 BEFORE DINNER) 120 Tab 10    furosemide (LASIX) 20 mg tablet take 1 tablet by mouth once daily 30 Tab 5    JANUVIA 100 mg tablet take 1 tablet by mouth once daily (Patient taking differently: take 1/2 tablet by mouth once daily) 30 Tab 5    cyanocobalamin (VITAMIN B-12) 2,000 mcg TbER Take 3 Tabs by mouth daily.  loperamide (IMODIUM) 2 mg capsule Take 1 mg by mouth Three (3) times a week.  potassium chloride (K-DUR, KLOR-CON) 10 mEq tablet Take 1 Tab by mouth daily. 30 Tab 6    traMADol (ULTRAM) 50 mg tablet Take 1 Tab by mouth every eight (8) hours as needed for Pain. Max Daily Amount: 150 mg. 20 Tab 0    pravastatin (PRAVACHOL) 40 mg tablet Take 1 Tab by mouth nightly.  90 Tab 3    metroNIDAZOLE (NORITATE) 1 % topical cream Apply daily 30 g 0    glucose blood VI test strips (FREESTYLE LITE STRIPS) strip Test Blood Sugar once daily; ICD-10 E11.9; Quantity 100 100 Strip 11    Lancets (FREESTYLE LANCETS) misc Test bloos sugar once daily; ICD-10 E11.9; quantity 100 100 Each 11    lidocaine (LIDODERM) 5 % Apply patch to the affected area for 12 hours a day and remove for 12 hours a day. 5 Each 0    LORazepam (ATIVAN) 1 mg tablet Take 0.5 Tabs by mouth two (2) times daily as needed. Max Daily Amount: 1 mg. (Patient taking differently: Take 0.5 Tabs by mouth two (2) times daily as needed for Anxiety.) 40 Tab 0    aspirin delayed-release 81 mg tablet Take  by mouth daily.  cholecalciferol (VITAMIN D3) 1,000 unit tablet Take  by mouth daily.  calcium citrate-vitamin d3 (CITRACAL + D) 315-200 mg-unit tab Take 1 tablet by mouth daily (with breakfast).  RABEprazole (ACIPHEX) 20 mg tablet take 1 tablet by mouth once daily 30 Tab 5    pravastatin (PRAVACHOL) 40 mg tablet take 1 tablet by mouth NIGHTLY 90 Tab 5       Past Medical History:   Diagnosis Date    Aortic valve stenosis, mild April 2014    EF 04-34%, Grade 1 diastolic dysfunction, mild aortic stenosis,  mean gradient 10 mm Hg    CAD (coronary artery disease)     aortic stenosis    Cardiac echocardiogram 11/15/2016    EF 55-60%. No RWMA. Mild-mod LVH. Indeterminate diastolic fx. Mod LAE. Mild-mod AS (mean grad 13.14 mmHg).  Cardiac nuclear imaging test 08/27/2008    No evidence of ischemia or prior scarring. EF 62%. No WMA. No significant change from study of 3/23/06.  Cardiovascular RLE venous duplex 12/01/2016    Right leg:  No DVT.  Carotid duplex 86/01/8089    Mild 5-34% LICA stenosis.     DM [facility]diabetes mellitus) ([facility] [address]     Dyslipidemia     HTN (hypertension)     Mixed hyperlipidemia     Sciatica        Past Surgical History:   Procedure Laterality Date    HX APPENDECTOMY      HX OOPHORECTOMY Left 1957    HX OTHER SURGICAL Right 2010    ankle surgery    HX TUMOR REMOVAL Left     ovarian    HX UROLOGICAL      kidney stone removal       Social History     Social History    Marital status:      Spouse name: N/A    Number of children: N/A    Years of education: N/A     Occupational History    Not on file. Social History Main Topics    Smoking status: Never Smoker    Smokeless tobacco: Never Used    Alcohol use No    Drug use: No    Sexual activity: No     Other Topics Concern    Not on file     Social History Narrative       Patient does not have an advanced directive on file    Vitals:    05/18/18 1357   BP: 120/62   Pulse: 90   Resp: 18   Temp: 98.2 °F (36.8 °C)   TempSrc: Tympanic   SpO2: 98%   Weight: 137 lb (62.1 kg)   Height: 5' 3\" (1.6 m)   PainSc:   5   PainLoc: Leg       Physical Exam   Constitutional: No distress. Cardiovascular: Normal rate, regular rhythm and normal heart sounds. Pulmonary/Chest: Effort normal and breath sounds normal.   Musculoskeletal: She exhibits edema. Lymphadenopathy:     She has no cervical adenopathy. Skin: Skin is warm. There is erythema (mild). Nursing note and vitals reviewed. Hospital Outpatient Visit on 05/15/2018   Component Date Value Ref Range Status    Sodium 05/15/2018 143  136 - 145 mmol/L Final    Potassium 05/15/2018 4.0  3.5 - 5.5 mmol/L Final    Chloride 05/15/2018 108  100 - 108 mmol/L Final    CO2 05/15/2018 26  21 - 32 mmol/L Final    Anion gap 05/15/2018 9  3.0 - 18 mmol/L Final    Glucose 05/15/2018 78  74 - 99 mg/dL Final    BUN 05/15/2018 21* 7.0 - 18 MG/DL Final    Creatinine 05/15/2018 0.55* 0.6 - 1.3 MG/DL Final    BUN/Creatinine ratio 05/15/2018 38* 12 - 20   Final    GFR est AA 05/15/2018 >60  >60 ml/min/1.73m2 Final    GFR est non-AA 05/15/2018 >60  >60 ml/min/1.73m2 Final    Comment: (NOTE)  Estimated GFR is calculated using the Modification of Diet in Renal   Disease (MDRD) Study equation, reported for both  Americans   (GFRAA) and non- Americans (GFRNA), and normalized to 1.73m2   body surface area. The physician must decide which value applies to   the patient.  The MDRD study equation should only be used in   individuals age 25 or older. It has not been validated for the   following: pregnant women, patients with serious comorbid conditions,   or on certain medications, or persons with extremes of body size,   muscle mass, or nutritional status.  Calcium 05/15/2018 8.9  8.5 - 10.1 MG/DL Final    Bilirubin, total 05/15/2018 0.4  0.2 - 1.0 MG/DL Final    ALT (SGPT) 05/15/2018 22  13 - 56 U/L Final    AST (SGOT) 05/15/2018 20  15 - 37 U/L Final    Alk. phosphatase 05/15/2018 71  45 - 117 U/L Final    Protein, total 05/15/2018 6.1* 6.4 - 8.2 g/dL Final    Albumin 05/15/2018 3.6  3.4 - 5.0 g/dL Final    Globulin 05/15/2018 2.5  2.0 - 4.0 g/dL Final    A-G Ratio 05/15/2018 1.4  0.8 - 1.7   Final    WBC 05/15/2018 4.1* 4.6 - 13.2 K/uL Final    RBC 05/15/2018 3.75* 4.20 - 5.30 M/uL Final    HGB 05/15/2018 11.5* 12.0 - 16.0 g/dL Final    HCT 05/15/2018 35.8  35.0 - 45.0 % Final    MCV 05/15/2018 95.5  74.0 - 97.0 FL Final    MCH 05/15/2018 30.7  24.0 - 34.0 PG Final    MCHC 05/15/2018 32.1  31.0 - 37.0 g/dL Final    RDW 05/15/2018 14.1  11.6 - 14.5 % Final    PLATELET 70/30/6400 96* 135 - 420 K/uL Final    MPV 05/15/2018 12.0* 9.2 - 11.8 FL Final    NEUTROPHILS 05/15/2018 63  40 - 73 % Final    LYMPHOCYTES 05/15/2018 28  21 - 52 % Final    MONOCYTES 05/15/2018 5  3 - 10 % Final    EOSINOPHILS 05/15/2018 3  0 - 5 % Final    BASOPHILS 05/15/2018 1  0 - 2 % Final    ABS. NEUTROPHILS 05/15/2018 2.6  1.8 - 8.0 K/UL Final    ABS. LYMPHOCYTES 05/15/2018 1.1  0.9 - 3.6 K/UL Final    ABS. MONOCYTES 05/15/2018 0.2  0.05 - 1.2 K/UL Final    ABS. EOSINOPHILS 05/15/2018 0.1  0.0 - 0.4 K/UL Final    ABS. BASOPHILS 05/15/2018 0.0  0.0 - 0.06 K/UL Final    DF 05/15/2018 AUTOMATED    Final    NT pro-BNP 05/15/2018 249  0 - 1800 PG/ML Final    Comment:           For patients with dyspnea, NT proBNP is highly sensitive for detecting acute congestive heart failure.  Also, an NT proBNP <300 pg/mL effectively rules out acute congestive heart failure, with 99% negative predictive value. Our reference ranges are for acute dyspnea. Age              Range (pg/ml)                            0-49                0-450        50-75               0-900        >75                 0-1800       For ambulatory office patients, the ranges below apply: For patients with dyspnea, NT proBNP is highly sensitive for detecting acute congestive heart failure. Also, an NT proBNP <300 pg/mL effectively rules out acute congestive heart failure, with 99% negative predictive value. Our reference ranges are for acute dyspnea.  Microalbumin,urine random 05/15/2018 5.20* 0 - 3.0 MG/DL Final    Creatinine, urine 05/15/2018 140.95* 30 - 125 mg/dL Final    Microalbumin/Creat ratio (mg/g cre* 05/15/2018 37* 0 - 30 mg/g Final    Hemoglobin A1c 05/15/2018 4.5  4.2 - 5.6 % Final    Comment: (NOTE)  HbA1C Interpretive Ranges  <5.7              Normal  5.7 - 6.4         Consider Prediabetes  >6.5              Consider Diabetes      Est. average glucose 05/15/2018 Cannot be calculated  mg/dL Final    Estimated average glucose results are not reported when the hemoglobin A1c is <5% or >15% since it has not been validated for values in these ranges.  LIPID PROFILE 05/15/2018        Final    Cholesterol, total 05/15/2018 137  <200 MG/DL Final    Triglyceride 05/15/2018 55  <150 MG/DL Final    Comment: The drugs N-acetylcysteine (NAC) and  Metamiszole have been found to cause falsely  low results in this chemical assay. Please  be sure to submit blood samples obtained  BEFORE administration of either of these  drugs to assure correct results.  HDL Cholesterol 05/15/2018 77* 40 - 60 MG/DL Final    LDL, calculated 05/15/2018 49  0 - 100 MG/DL Final    VLDL, calculated 05/15/2018 11  MG/DL Final    CHOL/HDL Ratio 05/15/2018 1.8  0 - 5.0   Final       .No results found for any visits on 05/18/18.     Assessment / Plan:      ICD-10-CM ICD-9-CM    1. Wound, open, leg, right, initial encounter S81.801A 891.0 CULTURE, WOUND W GRAM STAIN   2. Essential hypertension I10 401.9 lisinopril (PRINIVIL, ZESTRIL) 10 mg tablet   3. Microalbuminuria R80.9 791.0 lisinopril (PRINIVIL, ZESTRIL) 10 mg tablet     Wound Culture  HTN- controlled  Microalbuminuria- STOP Losartan. Lisinopril rx       Follow-up Disposition:  Return if symptoms worsen or fail to improve. I asked the patient if she  had any questions and answered her  questions. The patient stated that she understands the treatment plan and agrees with the treatment plan    This is the Subsequent Medicare Annual Wellness Exam, performed 12 months or more after the Initial AWV or the last Subsequent AWV    I have reviewed the patient's medical history in detail and updated the computerized patient record. History     Past Medical History:   Diagnosis Date    Aortic valve stenosis, mild April 2014    EF 08-97%, Grade 1 diastolic dysfunction, mild aortic stenosis,  mean gradient 10 mm Hg    CAD (coronary artery disease)     aortic stenosis    Cardiac echocardiogram 11/15/2016    EF 55-60%. No RWMA. Mild-mod LVH. Indeterminate diastolic fx. Mod LAE. Mild-mod AS (mean grad 13.14 mmHg).  Cardiac nuclear imaging test 08/27/2008    No evidence of ischemia or prior scarring. EF 62%. No WMA. No significant change from study of 3/23/06.  Cardiovascular RLE venous duplex 12/01/2016    Right leg:  No DVT.  Carotid duplex 51/79/6301    Mild 8-04% LICA stenosis.     [facility] mellitus) ([facility] [address]     Dyslipidemia     HTN (hypertension)     Mixed hyperlipidemia     Sciatica       Past Surgical History:   Procedure Laterality Date    HX APPENDECTOMY      HX OOPHORECTOMY Left 1957    HX OTHER SURGICAL Right 2010    ankle surgery    HX TUMOR REMOVAL Left     ovarian    HX UROLOGICAL      kidney stone removal     Current Outpatient Prescriptions   Medication Sig Dispense Refill    lisinopril (PRINIVIL, ZESTRIL) 10 mg tablet Take 1 Tab by mouth daily. STOP Losartan 30 Tab 1    cephALEXin (KEFLEX) 250 mg capsule Take 1 Cap by mouth four (4) times daily for 7 days. 28 Cap 0    sertraline (ZOLOFT) 25 mg tablet Take 1 Tab by mouth daily. 30 Tab 2    glipiZIDE-metFORMIN (METAGLIP) 5-500 mg per tablet take 2 tablets by mouth once daily (1 BEFORE BREAKFAST AND 1 BEFORE DINNER) 120 Tab 10    furosemide (LASIX) 20 mg tablet take 1 tablet by mouth once daily 30 Tab 5    JANUVIA 100 mg tablet take 1 tablet by mouth once daily (Patient taking differently: take 1/2 tablet by mouth once daily) 30 Tab 5    cyanocobalamin (VITAMIN B-12) 2,000 mcg TbER Take 3 Tabs by mouth daily.  loperamide (IMODIUM) 2 mg capsule Take 1 mg by mouth Three (3) times a week.  potassium chloride (K-DUR, KLOR-CON) 10 mEq tablet Take 1 Tab by mouth daily. 30 Tab 6    traMADol (ULTRAM) 50 mg tablet Take 1 Tab by mouth every eight (8) hours as needed for Pain. Max Daily Amount: 150 mg. 20 Tab 0    pravastatin (PRAVACHOL) 40 mg tablet Take 1 Tab by mouth nightly. 90 Tab 3    metroNIDAZOLE (NORITATE) 1 % topical cream Apply daily 30 g 0    glucose blood VI test strips (FREESTYLE LITE STRIPS) strip Test Blood Sugar once daily; ICD-10 E11.9; Quantity 100 100 Strip 11    Lancets (FREESTYLE LANCETS) misc Test bloos sugar once daily; ICD-10 E11.9; quantity 100 100 Each 11    lidocaine (LIDODERM) 5 % Apply patch to the affected area for 12 hours a day and remove for 12 hours a day. 5 Each 0    LORazepam (ATIVAN) 1 mg tablet Take 0.5 Tabs by mouth two (2) times daily as needed. Max Daily Amount: 1 mg. (Patient taking differently: Take 0.5 Tabs by mouth two (2) times daily as needed for Anxiety.) 40 Tab 0    aspirin delayed-release 81 mg tablet Take  by mouth daily.  cholecalciferol (VITAMIN D3) 1,000 unit tablet Take  by mouth daily.       calcium citrate-vitamin d3 (CITRACAL + D) 315-200 mg-unit tab Take 1 tablet by mouth daily (with breakfast).       RABEprazole (ACIPHEX) 20 mg tablet take 1 tablet by mouth once daily 30 Tab 5    pravastatin (PRAVACHOL) 40 mg tablet take 1 tablet by mouth NIGHTLY 90 Tab 5     No Known Allergies  Family History   Problem Relation Age of Onset    Stroke Mother     Heart Disease Mother     Lung Disease Father     Cancer Neg Hx     Diabetes Neg Hx     Hypertension Neg Hx      Social History   Substance Use Topics    Smoking status: Never Smoker    Smokeless tobacco: Never Used    Alcohol use No     Patient Active Problem List   Diagnosis Code    DM (diabetes mellitus) ([facility] [address] E11.9    Dyslipidemia E78.5    Aortic valve stenosis, mild I35.0    HTN (hypertension) I10    Dependent edema R60.9    Mixed hyperlipidemia E78.2    URI (upper respiratory infection) J06.9    Decubitus ulcer of buttock, stage 1 L89.301    Acute right-sided heart failure (HCC) I50.811    AS (aortic stenosis) I35.0    Diabetes mellitus without complication (HCC) J36.4       Depression Risk Factor Screening:     PHQ over the last two weeks 2/22/2018   Little interest or pleasure in doing things Not at all   Feeling down, depressed or hopeless Several days   Total Score PHQ 2 1   Trouble falling or staying asleep, or sleeping too much More than half the days   Feeling tired or having little energy More than half the days   Poor appetite or overeating Not at all   Feeling bad about yourself - or that you are a failure or have let yourself or your family down Not at all   Trouble concentrating on things such as school, work, reading or watching TV Not at all   Moving or speaking so slowly that other people could have noticed; or the opposite being so fidgety that others notice Not at all   Thoughts of being better off dead, or hurting yourself in some way Not at all   PHQ 9 Score 5   How difficult have these problems made it for you to do your work, take care of your home and get along with others Somewhat difficult     Alcohol Risk Factor Screening: You do not drink alcohol or very rarely. Functional Ability and Level of Safety:   Hearing Loss  Hearing is good. Activities of Daily Living  The home contains: no safety equipment. Patient does total self care    Fall Risk  Fall Risk Assessment, last 12 mths 2/22/2018   Able to walk? Yes   Fall in past 12 months? No   Fall with injury? -   Number of falls in past 12 months -   Fall Risk Score -       Abuse Screen  Patient is not abused    Cognitive Screening   Evaluation of Cognitive Function:  Has your family/caregiver stated any concerns about your memory: no  Normal    Patient Care Team   Patient Care Team:  Ingrid Moreno MD as PCP - General (Internal Medicine)  Deion Leal MD (Cardiology)  Peola Kayser, MD (Cardiology)  Merline Iraheta MD (Orthopedic Surgery)  Fabiola Leal NP (Nurse Practitioner)    Assessment/Plan   Education and counseling provided:  Are appropriate based on today's review and evaluation    Diagnoses and all orders for this visit:    1. Wound, open, leg, right, initial encounter  -     CULTURE, WOUND W GRAM STAIN ([facility] Only); Future    2. Essential hypertension  -     lisinopril (PRINIVIL, ZESTRIL) 10 mg tablet; Take 1 Tab by mouth daily. STOP Losartan    3. Microalbuminuria  -     lisinopril (PRINIVIL, ZESTRIL) 10 mg tablet; Take 1 Tab by mouth daily.  STOP Losartan        Health Maintenance Due   Topic Date Due    FOOT EXAM Q1  01/05/1943    DTaP/Tdap/Td series (1 - Tdap) 01/05/1954    MEDICARE YEARLY EXAM  03/23/2018

## 2018-05-21 ENCOUNTER — HOSPITAL ENCOUNTER (OUTPATIENT)
Dept: LAB | Age: 83
Discharge: HOME | End: 2018-05-21
Payer: MEDICARE

## 2018-05-21 DIAGNOSIS — S81.801A WOUND, OPEN, LEG, RIGHT, INITIAL ENCOUNTER: ICD-10-CM

## 2018-05-21 PROCEDURE — 87186 SC STD MICRODIL/AGAR DIL: CPT | Performed by: NURSE PRACTITIONER

## 2018-05-21 PROCEDURE — 87070 CULTURE OTHR SPECIMN AEROBIC: CPT | Performed by: NURSE PRACTITIONER

## 2018-05-21 PROCEDURE — 87077 CULTURE AEROBIC IDENTIFY: CPT | Performed by: NURSE PRACTITIONER

## 2018-05-24 ENCOUNTER — OFFICE VISIT (OUTPATIENT)
Dept: INTERNAL MEDICINE CLINIC | Age: 83
End: 2018-05-24

## 2018-05-24 VITALS
OXYGEN SATURATION: 97 % | RESPIRATION RATE: 18 BRPM | BODY MASS INDEX: 24.98 KG/M2 | SYSTOLIC BLOOD PRESSURE: 98 MMHG | TEMPERATURE: 98.6 F | HEIGHT: 63 IN | WEIGHT: 141 LBS | HEART RATE: 62 BPM | DIASTOLIC BLOOD PRESSURE: 56 MMHG

## 2018-05-24 DIAGNOSIS — S81.801A OPEN LEG WOUND, RIGHT, INITIAL ENCOUNTER: Primary | ICD-10-CM

## 2018-05-24 DIAGNOSIS — I10 ESSENTIAL HYPERTENSION: ICD-10-CM

## 2018-05-24 LAB
BACTERIA SPEC CULT: ABNORMAL
GRAM STN SPEC: ABNORMAL
SERVICE CMNT-IMP: ABNORMAL

## 2018-05-24 RX ORDER — CEPHALEXIN 250 MG/1
250 CAPSULE ORAL 4 TIMES DAILY
Qty: 12 CAP | Refills: 0 | Status: SHIPPED | OUTPATIENT
Start: 2018-05-24 | End: 2018-05-27

## 2018-05-26 DIAGNOSIS — F32.A MILD DEPRESSION: ICD-10-CM

## 2018-05-29 NOTE — PROGRESS NOTES
Pooja Mckeon is a 80 y.o. female presenting today for Wound Check  . HPI:  Pooja Mckeon presents to the office today for right lower extremity wound check. Patient was in the office 1 week ago for open 1 cm RLE wound draining purulent drainage. Patient present today for follow-up. She continues to take Keflex po and to use antimicrobial ointment on the wound. She is negative for fever and reports the drainage has improved. She is negative for fever. Review of Systems   Constitutional: Negative for fever. Respiratory: Negative for cough. Cardiovascular: Negative for chest pain and palpitations. No Known Allergies    Current Outpatient Prescriptions   Medication Sig Dispense Refill    RABEprazole (ACIPHEX) 20 mg tablet take 1 tablet by mouth once daily 30 Tab 5    lisinopril (PRINIVIL, ZESTRIL) 10 mg tablet Take 1 Tab by mouth daily. STOP Losartan 30 Tab 1    sertraline (ZOLOFT) 25 mg tablet Take 1 Tab by mouth daily. 30 Tab 2    glipiZIDE-metFORMIN (METAGLIP) 5-500 mg per tablet take 2 tablets by mouth once daily (1 BEFORE BREAKFAST AND 1 BEFORE DINNER) 120 Tab 10    furosemide (LASIX) 20 mg tablet take 1 tablet by mouth once daily 30 Tab 5    JANUVIA 100 mg tablet take 1 tablet by mouth once daily (Patient taking differently: take 1/2 tablet by mouth once daily) 30 Tab 5    cyanocobalamin (VITAMIN B-12) 2,000 mcg TbER Take 3 Tabs by mouth daily.  loperamide (IMODIUM) 2 mg capsule Take 1 mg by mouth Three (3) times a week.  potassium chloride (K-DUR, KLOR-CON) 10 mEq tablet Take 1 Tab by mouth daily. 30 Tab 6    traMADol (ULTRAM) 50 mg tablet Take 1 Tab by mouth every eight (8) hours as needed for Pain. Max Daily Amount: 150 mg. 20 Tab 0    pravastatin (PRAVACHOL) 40 mg tablet Take 1 Tab by mouth nightly.  90 Tab 3    metroNIDAZOLE (NORITATE) 1 % topical cream Apply daily 30 g 0    glucose blood VI test strips (FREESTYLE LITE STRIPS) strip Test Blood Sugar once daily; ICD-10 E11.9; Quantity 100 100 Strip 11    Lancets (FREESTYLE LANCETS) misc Test bloos sugar once daily; ICD-10 E11.9; quantity 100 100 Each 11    lidocaine (LIDODERM) 5 % Apply patch to the affected area for 12 hours a day and remove for 12 hours a day. 5 Each 0    LORazepam (ATIVAN) 1 mg tablet Take 0.5 Tabs by mouth two (2) times daily as needed. Max Daily Amount: 1 mg. (Patient taking differently: Take 0.5 Tabs by mouth two (2) times daily as needed for Anxiety.) 40 Tab 0    aspirin delayed-release 81 mg tablet Take  by mouth daily.  cholecalciferol (VITAMIN D3) 1,000 unit tablet Take  by mouth daily.  calcium citrate-vitamin d3 (CITRACAL + D) 315-200 mg-unit tab Take 1 tablet by mouth daily (with breakfast).  pravastatin (PRAVACHOL) 40 mg tablet take 1 tablet by mouth NIGHTLY 90 Tab 5       Past Medical History:   Diagnosis Date    Aortic valve stenosis, mild April 2014    EF 82-18%, Grade 1 diastolic dysfunction, mild aortic stenosis,  mean gradient 10 mm Hg    CAD (coronary artery disease)     aortic stenosis    Cardiac echocardiogram 11/15/2016    EF 55-60%. No RWMA. Mild-mod LVH. Indeterminate diastolic fx. Mod LAE. Mild-mod AS (mean grad 13.14 mmHg).  Cardiac nuclear imaging test 08/27/2008    No evidence of ischemia or prior scarring. EF 62%. No WMA. No significant change from study of 3/23/06.  Cardiovascular RLE venous duplex 12/01/2016    Right leg:  No DVT.  Carotid duplex 96/97/9724    Mild 7-86% LICA stenosis.     DM (diabetes mellitus) ([address]     Dyslipidemia     HTN (hypertension)     Mixed hyperlipidemia     Sciatica        Past Surgical History:   Procedure Laterality Date    HX APPENDECTOMY      HX OOPHORECTOMY Left 1957    HX OTHER SURGICAL Right 2010    ankle surgery    HX TUMOR REMOVAL Left     ovarian    HX UROLOGICAL      kidney stone removal       Social History     Social History    Marital status:      Spouse name: N/A    Number of children: N/A    Years of education: N/A     Occupational History    Not on file. Social History Main Topics    Smoking status: Never Smoker    Smokeless tobacco: Never Used    Alcohol use No    Drug use: No    Sexual activity: No     Other Topics Concern    Not on file     Social History Narrative       Patient does not have an advanced directive on file    Vitals:    05/24/18 1613   BP: 98/56   Pulse: 62   Resp: 18   Temp: 98.6 °F (37 °C)   TempSrc: Tympanic   SpO2: 97%   Weight: 141 lb (64 kg)   Height: 5' 3\" (1.6 m)   PainSc:   0 - No pain       Physical Exam   Constitutional: No distress. Cardiovascular: Normal rate, regular rhythm and normal heart sounds. Pulmonary/Chest: Effort normal and breath sounds normal.   Neurological: She is alert. Skin: Skin is warm. Nursing note and vitals reviewed.       Hospital Outpatient Visit on 05/21/2018   Component Date Value Ref Range Status    Special Requests: 05/21/2018 NO SPECIAL REQUESTS    Final    GRAM STAIN 05/21/2018 RARE WBC'S    Final    GRAM STAIN 05/21/2018 MANY GRAM POSITIVE COCCI IN PAIRS    Final    GRAM STAIN 05/21/2018 MANY GRAM POSITIVE COCCI IN GROUPS    Final    GRAM STAIN 05/21/2018 MODERATE GRAM POSITIVE RODS    Final    GRAM STAIN 05/21/2018 FEW GRAM NEGATIVE RODS    Final    Culture result: 05/21/2018 MODERATE ACINETOBACTER SPECIES (TWO MORPHOTYPES)*   Final    Culture result: 05/21/2018 MODERATE **METHICILLIN RESISTANT STAPHYLOCOCCUS AUREUS   Final    Culture result: 05/21/2018 MODERATE KOCURIA SPECIES*   Final    Culture result: 05/21/2018 MODERATE MICROCOCCUS SPECIES*   Final    Culture result: 05/21/2018 MODERATE BACILLUS SPECIES, NOT ANTHRACIS*   Final   Hospital Outpatient Visit on 05/15/2018   Component Date Value Ref Range Status    Sodium 05/15/2018 143  136 - 145 mmol/L Final    Potassium 05/15/2018 4.0  3.5 - 5.5 mmol/L Final    Chloride 05/15/2018 108  100 - 108 mmol/L Final    CO2 05/15/2018 26 21 - 32 mmol/L Final    Anion gap 05/15/2018 9  3.0 - 18 mmol/L Final    Glucose 05/15/2018 78  74 - 99 mg/dL Final    BUN 05/15/2018 21* 7.0 - 18 MG/DL Final    Creatinine 05/15/2018 0.55* 0.6 - 1.3 MG/DL Final    BUN/Creatinine ratio 05/15/2018 38* 12 - 20   Final    GFR est AA 05/15/2018 >60  >60 ml/min/1.73m2 Final    GFR est non-AA 05/15/2018 >60  >60 ml/min/1.73m2 Final    Comment: (NOTE)  Estimated GFR is calculated using the Modification of Diet in Renal   Disease (MDRD) Study equation, reported for both  Americans   (GFRAA) and non- Americans (GFRNA), and normalized to 1.73m2   body surface area. The physician must decide which value applies to   the patient. The MDRD study equation should only be used in   individuals age 25 or older. It has not been validated for the   following: pregnant women, patients with serious comorbid conditions,   or on certain medications, or persons with extremes of body size,   muscle mass, or nutritional status.  Calcium 05/15/2018 8.9  8.5 - 10.1 MG/DL Final    Bilirubin, total 05/15/2018 0.4  0.2 - 1.0 MG/DL Final    ALT (SGPT) 05/15/2018 22  13 - 56 U/L Final    AST (SGOT) 05/15/2018 20  15 - 37 U/L Final    Alk.  phosphatase 05/15/2018 71  45 - 117 U/L Final    Protein, total 05/15/2018 6.1* 6.4 - 8.2 g/dL Final    Albumin 05/15/2018 3.6  3.4 - 5.0 g/dL Final    Globulin 05/15/2018 2.5  2.0 - 4.0 g/dL Final    A-G Ratio 05/15/2018 1.4  0.8 - 1.7   Final    WBC 05/15/2018 4.1* 4.6 - 13.2 K/uL Final    RBC 05/15/2018 3.75* 4.20 - 5.30 M/uL Final    HGB 05/15/2018 11.5* 12.0 - 16.0 g/dL Final    HCT 05/15/2018 35.8  35.0 - 45.0 % Final    MCV 05/15/2018 95.5  74.0 - 97.0 FL Final    MCH 05/15/2018 30.7  24.0 - 34.0 PG Final    MCHC 05/15/2018 32.1  31.0 - 37.0 g/dL Final    RDW 05/15/2018 14.1  11.6 - 14.5 % Final    PLATELET 56/90/4486 96* 135 - 420 K/uL Final    MPV 05/15/2018 12.0* 9.2 - 11.8 FL Final    NEUTROPHILS 05/15/2018 63  40 - 73 % Final    LYMPHOCYTES 05/15/2018 28  21 - 52 % Final    MONOCYTES 05/15/2018 5  3 - 10 % Final    EOSINOPHILS 05/15/2018 3  0 - 5 % Final    BASOPHILS 05/15/2018 1  0 - 2 % Final    ABS. NEUTROPHILS 05/15/2018 2.6  1.8 - 8.0 K/UL Final    ABS. LYMPHOCYTES 05/15/2018 1.1  0.9 - 3.6 K/UL Final    ABS. MONOCYTES 05/15/2018 0.2  0.05 - 1.2 K/UL Final    ABS. EOSINOPHILS 05/15/2018 0.1  0.0 - 0.4 K/UL Final    ABS. BASOPHILS 05/15/2018 0.0  0.0 - 0.06 K/UL Final    DF 05/15/2018 AUTOMATED    Final    NT pro-BNP 05/15/2018 249  0 - 1800 PG/ML Final    Comment:           For patients with dyspnea, NT proBNP is highly sensitive for detecting acute congestive heart failure. Also, an NT proBNP <300 pg/mL effectively rules out acute congestive heart failure, with 99% negative predictive value. Our reference ranges are for acute dyspnea. Age              Range (pg/ml)                            0-49                0-450        50-75               0-900        >75                 0-1800       For ambulatory office patients, the ranges below apply: For patients with dyspnea, NT proBNP is highly sensitive for detecting acute congestive heart failure. Also, an NT proBNP <300 pg/mL effectively rules out acute congestive heart failure, with 99% negative predictive value. Our reference ranges are for acute dyspnea.       Microalbumin,urine random 05/15/2018 5.20* 0 - 3.0 MG/DL Final    Creatinine, urine 05/15/2018 140.95* 30 - 125 mg/dL Final    Microalbumin/Creat ratio (mg/g cre* 05/15/2018 37* 0 - 30 mg/g Final    Hemoglobin A1c 05/15/2018 4.5  4.2 - 5.6 % Final    Comment: (NOTE)  HbA1C Interpretive Ranges  <5.7              Normal  5.7 - 6.4         Consider Prediabetes  >6.5              Consider Diabetes      Est. average glucose 05/15/2018 Cannot be calculated  mg/dL Final    Estimated average glucose results are not reported when the hemoglobin A1c is <5% or >15% since it has not been validated for values in these ranges.  LIPID PROFILE 05/15/2018        Final    Cholesterol, total 05/15/2018 137  <200 MG/DL Final    Triglyceride 05/15/2018 55  <150 MG/DL Final    Comment: The drugs N-acetylcysteine (NAC) and  Metamiszole have been found to cause falsely  low results in this chemical assay. Please  be sure to submit blood samples obtained  BEFORE administration of either of these  drugs to assure correct results.  HDL Cholesterol 05/15/2018 77* 40 - 60 MG/DL Final    LDL, calculated 05/15/2018 49  0 - 100 MG/DL Final    VLDL, calculated 05/15/2018 11  MG/DL Final    CHOL/HDL Ratio 05/15/2018 1.8  0 - 5.0   Final       .No results found for any visits on 05/24/18. Assessment / Plan:      ICD-10-CM ICD-9-CM    1. Open leg wound, right, initial encounter S81.801A 891.0 cephALEXin (KEFLEX) 250 mg capsule   2. Essential hypertension I10 401.9      Wound improving, healing  Continue keflex  HTN- controlled    Follow-up Disposition:  Return if symptoms worsen or fail to improve. I asked the patient if she  had any questions and answered her  questions.   The patient stated that she understands the treatment plan and agrees with the treatment plan

## 2018-05-31 RX ORDER — SERTRALINE HYDROCHLORIDE 25 MG/1
TABLET, FILM COATED ORAL
Qty: 30 TAB | Refills: 2 | Status: SHIPPED | OUTPATIENT
Start: 2018-05-31 | End: 2018-08-22 | Stop reason: SDUPTHER

## 2018-06-07 ENCOUNTER — OFFICE VISIT (OUTPATIENT)
Dept: CARDIOLOGY CLINIC | Age: 83
End: 2018-06-07

## 2018-06-07 VITALS
SYSTOLIC BLOOD PRESSURE: 92 MMHG | BODY MASS INDEX: 23.74 KG/M2 | WEIGHT: 134 LBS | HEART RATE: 64 BPM | HEIGHT: 63 IN | DIASTOLIC BLOOD PRESSURE: 60 MMHG | OXYGEN SATURATION: 97 %

## 2018-06-07 DIAGNOSIS — R60.9 DEPENDENT EDEMA: Primary | ICD-10-CM

## 2018-06-07 DIAGNOSIS — I10 ESSENTIAL HYPERTENSION: ICD-10-CM

## 2018-06-07 DIAGNOSIS — E78.2 MIXED HYPERLIPIDEMIA: ICD-10-CM

## 2018-06-07 DIAGNOSIS — I35.0 AORTIC VALVE STENOSIS, ETIOLOGY OF CARDIAC VALVE DISEASE UNSPECIFIED: ICD-10-CM

## 2018-06-07 RX ORDER — FUROSEMIDE 40 MG/1
40 TABLET ORAL DAILY
Qty: 30 TAB | Refills: 6 | Status: SHIPPED | OUTPATIENT
Start: 2018-06-07 | End: 2018-12-19 | Stop reason: SDUPTHER

## 2018-06-07 NOTE — MR AVS SNAPSHOT
[address] [place] [address] [facility] [address] 
[phone] Patient: Tasneem Palacios MRN: [id number] CBR:0/3/1966 Visit Information Date & Time Provider Department Dept. Phone Encounter #  
 6/7/2018  9:40 AM Peola Kayser, MD [facility] [id number] Upcoming Health Maintenance Date Due  
 FOOT EXAM Q1 1/5/1943 DTaP/Tdap/Td series (1 - Tdap) 1/5/1954 Influenza Age 5 to Adult 8/1/2018 HEMOGLOBIN A1C Q6M 11/15/2018 EYE EXAM RETINAL OR DILATED Q1 4/6/2019 MICROALBUMIN Q1 5/15/2019 LIPID PANEL Q1 5/15/2019 MEDICARE YEARLY EXAM 5/19/2019 GLAUCOMA SCREENING Q2Y 4/6/2020 Allergies as of 6/7/2018  Review Complete On: 5/24/2018 By: Winston Koroma LPN No Known Allergies Current Immunizations  Reviewed on 3/21/2018 Name Date Influenza High Dose Vaccine PF 9/12/2017 Influenza Vaccine 9/1/2016, 10/1/2015 Pneumococcal Conjugate (PCV-13) 5/17/2017 Pneumococcal Polysaccharide (PPSV-23) 10/1/2015, 9/10/2014, 4/18/2013 Zoster Vaccine, Live 1/17/2012 Not reviewed this visit You Were Diagnosed With   
  
 Codes Comments Aortic valve stenosis, mild    -  Primary ICD-10-CM: I35.0 ICD-9-CM: 424.1 Acute right-sided heart failure (HCC)     ICD-10-CM: I50.811 ICD-9-CM: 428.0 Aortic valve stenosis, etiology of cardiac valve disease unspecified     ICD-10-CM: I35.0 ICD-9-CM: 424.1 Mixed hyperlipidemia     ICD-10-CM: E78.2 ICD-9-CM: 272.2 Dependent edema     ICD-10-CM: R60.9 ICD-9-CM: 782.3 Essential hypertension     ICD-10-CM: I10 
ICD-9-CM: 401.9 Vitals BP Pulse Height(growth percentile) Weight(growth percentile) SpO2 BMI  
 92/60 64 5' 3\" (1.6 m) 134 lb (60.8 kg) 97% 23.74 kg/m2 OB Status Smoking Status Postmenopausal Never Smoker Vitals History BMI and BSA Data Body Mass Index Body Surface Area  
 23.74 kg/m 2 1.64 m 2 Preferred Pharmacy Pharmacy Name Phone [id number] [address] [facility] [facility] [address] [phone] Your Updated Medication List  
  
   
This list is accurate as of 6/7/18 11:06 AM.  Always use your most recent med list.  
  
  
  
  
 aspirin delayed-release 81 mg tablet Take  by mouth daily. CITRACAL PLUS D 315-200 mg-unit Tab Generic drug:  calcium citrate-vitamin d3 Take 1 tablet by mouth daily (with breakfast). furosemide 20 mg tablet Commonly known as:  LASIX  
take 1 tablet by mouth once daily  
  
 glipiZIDE-metFORMIN 5-500 mg per tablet Commonly known as:  METAGLIP  
take 2 tablets by mouth once daily (1 BEFORE BREAKFAST AND 1 BEFORE DINNER) glucose blood VI test strips strip Commonly known as:  FREESTYLE LITE STRIPS Test Blood Sugar once daily; ICD-10 E11.9; Quantity 100 JANUVIA 100 mg tablet Generic drug:  SITagliptin  
take 1 tablet by mouth once daily Lancets Misc Commonly known as:  FREESTYLE LANCETS Test bloos sugar once daily; ICD-10 E11.9; quantity 100  
  
 lidocaine 5 % Commonly known as:  Rohit Hollis Apply patch to the affected area for 12 hours a day and remove for 12 hours a day. loperamide 2 mg capsule Commonly known as:  IMODIUM Take 1 mg by mouth Three (3) times a week. LORazepam 1 mg tablet Commonly known as:  ATIVAN Take 0.5 Tabs by mouth two (2) times daily as needed. Max Daily Amount: 1 mg.  
  
 metroNIDAZOLE 1 % topical cream  
Commonly known as:  Mirna Hyman Apply daily  
  
 potassium chloride 10 mEq tablet Commonly known as:  KLOR-CON Take 1 Tab by mouth daily. * pravastatin 40 mg tablet Commonly known as:  PRAVACHOL Take 1 Tab by mouth nightly. * pravastatin 40 mg tablet Commonly known as:  PRAVACHOL  
take 1 tablet by mouth NIGHTLY  
  
 RABEprazole 20 mg tablet Commonly known as:  ACIPHEX  
take 1 tablet by mouth once daily  
  
 sertraline 25 mg tablet Commonly known as:  ZOLOFT  
take 1 tablet by mouth once daily  
  
 traMADol 50 mg tablet Commonly known as:  ULTRAM  
Take 1 Tab by mouth every eight (8) hours as needed for Pain. Max Daily Amount: 150 mg. VITAMIN B-12 2,000 mcg Tber Generic drug:  cyanocobalamin Take 3 Tabs by mouth daily. VITAMIN D3 1,000 unit tablet Generic drug:  cholecalciferol Take  by mouth daily. * Notice: This list has 2 medication(s) that are the same as other medications prescribed for you. Read the directions carefully, and ask your doctor or other care provider to review them with you. We Performed the Following AMB POC EKG ROUTINE W/ 12 LEADS, INTER & REP [14575 CPT(R)] To-Do List   
 06/07/2018 ECHO:  2D ECHO COMPLETE ADULT (TTE) W OR WO CONTR   
  
 06/14/2018 Lab:  METABOLIC PANEL, BASIC   
  
 06/14/2018 Lab:  NT-PRO BNP Introducing \[facility] [place] [facility]\"" & HEALTH SERVICES! [employer] introduces [employer] patient portal. Now you can access parts of your medical record, email your doctor's office, and request medication refills online. 1. In your internet browser, go to [email or web address] 2. Click on the First Time User? Click Here link in the Sign In box. You will see the New Member Sign Up page. 3. Enter your [employer] Access Code exactly as it appears below. You will not need to use this code after youve completed the sign-up process. If you do not sign up before the expiration date, you must request a new code. · [employer] Access Code: [email or web address] Expires: 9/5/2018  9:45 AM 
 
4. Enter the last four digits of your Social Security Number (xxxx) and Date of Birth (mm/dd/yyyy) as indicated and click Submit. You will be taken to the next sign-up page. 5. Create a [employer] ID.  This will be your [employer] login ID and cannot be changed, so think of one that is secure and easy to remember. 6. Create a [employer] password. You can change your password at any time. 7. Enter your Password Reset Question and Answer. This can be used at a later time if you forget your password. 8. Enter your e-mail address. You will receive e-mail notification when new information is available in [address] 9. Click Sign Up. You can now view and download portions of your medical record. 10. Click the Download Summary menu link to download a portable copy of your medical information. If you have questions, please visit the Frequently Asked Questions section of the [employer] website. Remember, [employer] is NOT to be used for urgent needs. For medical emergencies, dial 911. Now available from your iPhone and Android! Please provide this summary of care documentation to your next provider. Your primary care clinician is listed as Radha Jimenez. If you have any questions after today's visit, please call [phone].

## 2018-06-07 NOTE — PROGRESS NOTES
1. Have you been to the ER, urgent care clinic since your last visit? Hospitalized since your last visit? Yes,11/14/16 - 11/16/16 for CHF     2. Have you seen or consulted any other health care providers outside of the [address] [place] [address] since your last visit? Include any pap smears or colon screening.   No

## 2018-06-07 NOTE — PROGRESS NOTES
HISTORY OF PRESENT ILLNESS  Maria Luz Singer is a 80 y.o. female. Hypertension   Associated symptoms include shortness of breath. Pertinent negatives include no chest pain, no abdominal pain and no headaches. Leg Swelling   Associated symptoms include shortness of breath. Pertinent negatives include no chest pain, no abdominal pain and no headaches. Shortness of Breath   Associated symptoms include leg swelling. Pertinent negatives include no fever, no headaches, no cough, no wheezing, no PND, no orthopnea, no chest pain, no vomiting, no abdominal pain, no rash and no claudication. Patient presents for an overdue followup office visit. She has a past medical history significant for hypertension, dyslipidemia, diabetes mellitus, type II, and mild aortic valve stenosis. She also has a history of dependent edema in the past. She was documented with bilateral venous reflux disease on a duplex scan through a vascular surgeon's office in 2017. The patient last underwent echocardiogram in November 2016 which showed mild aortic valve stenosis, EF 60%, with her mean valve gradient not significantly changed compared to the her prior. She also had grade 1 diastolic dysfunction. The patient has not been seen in the office in over 2 years by me. She was last seen by our nurse practitioner approximately 18 months ago. She returns to the office today at the urging of her PCP for evaluation of worsening leg swelling over the past 6 weeks. Patient also complains of chronic dyspnea on exertion which has not really changed over the past few years. He feels her activity level has slowly declined over the past 1-2 years, but no drastic changes. She admits that she does not elevate her legs and usually sleeps during the day in a chair. She does not like to lay down because it causes significant hip pain. She does take a scheduled Lasix 20 mg daily, which has not significantly improved her leg swelling.   She reports that she has never been on a higher dosage of Lasix. She denies any chest pain or pressure. No palpitations, dizziness or syncope. She reports that her losartan was recently changed to lisinopril by her PCP for unclear reasons. Past Medical History:   Diagnosis Date    Aortic valve stenosis, mild April 2014    EF 08-53%, Grade 1 diastolic dysfunction, mild aortic stenosis,  mean gradient 10 mm Hg    CAD (coronary artery disease)     aortic stenosis    Cardiac echocardiogram 11/15/2016    EF 55-60%. No RWMA. Mild-mod LVH. Indeterminate diastolic fx. Mod LAE. Mild-mod AS (mean grad 13.14 mmHg).  Cardiac nuclear imaging test 08/27/2008    No evidence of ischemia or prior scarring. EF 62%. No WMA. No significant change from study of 3/23/06.  Cardiovascular RLE venous duplex 12/01/2016    Right leg:  No DVT.  Carotid duplex 26/94/9243    Mild 1-35% LICA stenosis.  DM (diabetes mellitus) ([facility] [address]     Dyslipidemia     HTN (hypertension)     Mixed hyperlipidemia     Sciatica       Current Outpatient Prescriptions   Medication Sig Dispense Refill    furosemide (LASIX) 40 mg tablet Take 1 Tab by mouth daily. 30 Tab 6    sertraline (ZOLOFT) 25 mg tablet take 1 tablet by mouth once daily 30 Tab 2    RABEprazole (ACIPHEX) 20 mg tablet take 1 tablet by mouth once daily 30 Tab 5    pravastatin (PRAVACHOL) 40 mg tablet take 1 tablet by mouth NIGHTLY 90 Tab 5    glipiZIDE-metFORMIN (METAGLIP) 5-500 mg per tablet take 2 tablets by mouth once daily (1 BEFORE BREAKFAST AND 1 BEFORE DINNER) 120 Tab 10    JANUVIA 100 mg tablet take 1 tablet by mouth once daily (Patient taking differently: take 1/2 tablet by mouth once daily) 30 Tab 5    cyanocobalamin (VITAMIN B-12) 2,000 mcg TbER Take 3 Tabs by mouth daily.  loperamide (IMODIUM) 2 mg capsule Take 1 mg by mouth Three (3) times a week.  potassium chloride (K-DUR, KLOR-CON) 10 mEq tablet Take 1 Tab by mouth daily.  30 Tab 6    traMADol (ULTRAM) 50 mg tablet Take 1 Tab by mouth every eight (8) hours as needed for Pain. Max Daily Amount: 150 mg. 20 Tab 0    pravastatin (PRAVACHOL) 40 mg tablet Take 1 Tab by mouth nightly. 90 Tab 3    metroNIDAZOLE (NORITATE) 1 % topical cream Apply daily 30 g 0    glucose blood VI test strips (FREESTYLE LITE STRIPS) strip Test Blood Sugar once daily; ICD-10 E11.9; Quantity 100 100 Strip 11    Lancets (FREESTYLE LANCETS) misc Test bloos sugar once daily; ICD-10 E11.9; quantity 100 100 Each 11    lidocaine (LIDODERM) 5 % Apply patch to the affected area for 12 hours a day and remove for 12 hours a day. 5 Each 0    LORazepam (ATIVAN) 1 mg tablet Take 0.5 Tabs by mouth two (2) times daily as needed. Max Daily Amount: 1 mg. (Patient taking differently: Take 0.5 Tabs by mouth two (2) times daily as needed for Anxiety.) 40 Tab 0    aspirin delayed-release 81 mg tablet Take  by mouth daily.  cholecalciferol (VITAMIN D3) 1,000 unit tablet Take  by mouth daily.  calcium citrate-vitamin d3 (CITRACAL + D) 315-200 mg-unit tab Take 1 tablet by mouth daily (with breakfast). No Known Allergies     Social History   Substance Use Topics    Smoking status: Never Smoker    Smokeless tobacco: Never Used    Alcohol use No          Family History   Problem Relation Age of Onset    Stroke Mother     Heart Disease Mother     Lung Disease Father     Cancer Neg Hx     Diabetes Neg Hx     Hypertension Neg Hx          Review of Systems   Constitutional: Negative for chills, fever and weight loss. HENT: Negative for nosebleeds. Eyes: Negative for blurred vision and double vision. Respiratory: Positive for shortness of breath. Negative for cough and wheezing. Cardiovascular: Positive for leg swelling. Negative for chest pain, palpitations, orthopnea, claudication and PND. Gastrointestinal: Negative for abdominal pain, heartburn, nausea and vomiting. Genitourinary: Negative for dysuria and hematuria. Musculoskeletal: Negative for falls and myalgias. Skin: Negative for rash. Neurological: Negative for dizziness, focal weakness and headaches. Endo/Heme/Allergies: Does not bruise/bleed easily. Psychiatric/Behavioral: Negative for substance abuse. Visit Vitals    BP 92/60    Pulse 64    Ht 5' 3\" (1.6 m)    Wt 60.8 kg (134 lb)    SpO2 97%    BMI 23.74 kg/m2      Physical Exam   Constitutional: She is oriented to person, place, and time. She appears well-developed and well-nourished. HENT:   Head: Normocephalic and atraumatic. Eyes: Conjunctivae are normal.   Neck: Neck supple. No JVD present. Carotid bruit is not present. Cardiovascular: Normal rate, regular rhythm, S1 normal, S2 normal and normal pulses. Exam reveals no gallop. Murmur heard. Harsh midsystolic murmur is present  at the upper right sternal border radiating to the neck  Pulmonary/Chest: Effort normal and breath sounds normal. She has no wheezes. She has no rales. Abdominal: Soft. Bowel sounds are normal. There is no tenderness. Musculoskeletal: She exhibits edema (2+ bilateral lower extremity ) and deformity. She exhibits no tenderness. Neurological: She is alert and oriented to person, place, and time. Skin: Skin is warm and dry. Psychiatric: She has a normal mood and affect. Her behavior is normal. Thought content normal.     EKG: Normal sinus rhythm, leftward axis, voltage criteria for LVH, normal QTc interval, no ST or T wave abnormalities concerning for ischemia. Occasional PVC. Compared to the previous EKG, PVCs    ASSESSMENT and PLAN    Lower extremity swelling. My suspicion is that this is more likely related to chronic venous reflux disease and dependent edema rather than decompensated heart failure. Her lungs are relatively clear on exam.  She does not have any significant JVD.   Since she has not had an echocardiogram in over a year and a half, I recommended a repeat study to reevaluate her LV function. I have also recommended that she increase her Lasix to 40 mg daily. A Chem-7 panel and a pro BNP level will be checked in 1 week. Aortic valve stenosis. This was mild to moderate in the past.  Her last echocardiogram was November 2016. Repeat echocardiogram will perform this year. Her murmur does not sound to significantly different compared to last exam.    Hypertension. Patient blood pressure is now on the low side. I recommend she stop her lisinopril altogether. Dyslipidemia. Patient is managed with pravastatin 40 mg daily. A reasonable goal LDL be less than 100. Diabetes mellitus, type II. Patient is managed with oral agents. This is followed by her PCP. Her goal A1c should be less than 7. I would like the patient to follow-up with our nurse practitioner in 6 weeks to see if her swelling has improved. I can then see her back in 3 months, sooner if needed.

## 2018-06-14 ENCOUNTER — HOSPITAL ENCOUNTER (OUTPATIENT)
Dept: LAB | Age: 83
Discharge: HOME | End: 2018-06-14
Attending: INTERNAL MEDICINE
Payer: MEDICARE

## 2018-06-14 ENCOUNTER — HOSPITAL ENCOUNTER (OUTPATIENT)
Dept: NON INVASIVE DIAGNOSTICS | Age: 83
Discharge: HOME | End: 2018-06-14
Attending: INTERNAL MEDICINE
Payer: MEDICARE

## 2018-06-14 DIAGNOSIS — I35.0 AORTIC VALVE STENOSIS, ETIOLOGY OF CARDIAC VALVE DISEASE UNSPECIFIED: ICD-10-CM

## 2018-06-14 LAB
ANION GAP SERPL CALC-SCNC: 8 MMOL/L (ref 3–18)
BNP SERPL-MCNC: 260 PG/ML (ref 0–1800)
BUN SERPL-MCNC: 20 MG/DL (ref 7–18)
BUN/CREAT SERPL: 24 (ref 12–20)
CALCIUM SERPL-MCNC: 9.4 MG/DL (ref 8.5–10.1)
CHLORIDE SERPL-SCNC: 105 MMOL/L (ref 100–108)
CO2 SERPL-SCNC: 30 MMOL/L (ref 21–32)
CREAT SERPL-MCNC: 0.82 MG/DL (ref 0.6–1.3)
GLUCOSE SERPL-MCNC: 48 MG/DL (ref 74–99)
POTASSIUM SERPL-SCNC: 4.3 MMOL/L (ref 3.5–5.5)
SODIUM SERPL-SCNC: 143 MMOL/L (ref 136–145)

## 2018-06-14 PROCEDURE — 36415 COLL VENOUS BLD VENIPUNCTURE: CPT | Performed by: INTERNAL MEDICINE

## 2018-06-14 PROCEDURE — 80048 BASIC METABOLIC PNL TOTAL CA: CPT | Performed by: INTERNAL MEDICINE

## 2018-06-14 PROCEDURE — 93306 TTE W/DOPPLER COMPLETE: CPT

## 2018-06-14 PROCEDURE — 83880 ASSAY OF NATRIURETIC PEPTIDE: CPT | Performed by: INTERNAL MEDICINE

## 2018-06-15 ENCOUNTER — TELEPHONE (OUTPATIENT)
Dept: CARDIOLOGY CLINIC | Age: 83
End: 2018-06-15

## 2018-06-15 DIAGNOSIS — R80.9 MICROALBUMINURIA: ICD-10-CM

## 2018-06-15 DIAGNOSIS — I10 ESSENTIAL HYPERTENSION: ICD-10-CM

## 2018-06-15 RX ORDER — LISINOPRIL 10 MG/1
TABLET ORAL
Qty: 30 TAB | Refills: 1 | Status: SHIPPED | OUTPATIENT
Start: 2018-06-15 | End: 2018-07-19

## 2018-06-15 NOTE — PROGRESS NOTES
Per your last note\" Lower extremity swelling. My suspicion is that this is more likely related to chronic venous reflux disease and dependent edema rather than decompensated heart failure. Her lungs are relatively clear on exam.  She does not have any significant JVD. Since she has not had an echocardiogram in over a year and a half, I recommended a repeat study to reevaluate her LV function. I have also recommended that she increase her Lasix to 40 mg daily. A Chem-7 panel and a pro BNP level will be checked in 1 week.

## 2018-06-15 NOTE — TELEPHONE ENCOUNTER
Called and left message for patient to give us a call concerning her abnormal labs we received. Unable to leave message on patient home phone, left message on patient's cell for her to give office a call.

## 2018-06-19 ENCOUNTER — TELEPHONE (OUTPATIENT)
Dept: CARDIOLOGY CLINIC | Age: 83
End: 2018-06-19

## 2018-06-19 NOTE — TELEPHONE ENCOUNTER
----- Message from Maine Stephenson MD sent at 6/18/2018  8:31 AM EDT -----  Please let the patient know that her renal function and blood chemistries were all okay with the exception of her blood sugar being too low. She should follow-up with her primary care provider as soon as possible for this to see if she needs to have any of her medications adjusted  ----- Message -----     From: Ford Willis LPN     Sent: 2/85/6840   2:52 PM       To: Maine Stephenson MD    Per your last note\" Lower extremity swelling. My suspicion is that this is more likely related to chronic venous reflux disease and dependent edema rather than decompensated heart failure. Her lungs are relatively clear on exam.  She does not have any significant JVD. Since she has not had an echocardiogram in over a year and a half, I recommended a repeat study to reevaluate her LV function. I have also recommended that she increase her Lasix to 40 mg daily. A Chem-7 panel and a pro BNP level will be checked in 1 week. Called and left message for patient to give office a call this is the second attempt to reach patient by phone, letter being sent.

## 2018-06-21 ENCOUNTER — TELEPHONE (OUTPATIENT)
Dept: CARDIOLOGY CLINIC | Age: 83
End: 2018-06-21

## 2018-06-21 NOTE — TELEPHONE ENCOUNTER
Left message for patient to give office a called for results. Unable to reach patient by phone for second attempt, letter being sent.

## 2018-06-21 NOTE — LETTER
6/22/2018 8:53 AM 
 
Ms. Brent Bryant [facility] [id number] Dear Ms. Kym Dorman, We have been unable to reach you by phone to notify you of your test results. Please call our office at [phone] and ask to speak with my nurse in order to explain these results to you and advise you of any recommendations. Sincerely, Jamia Galdamez MD

## 2018-06-21 NOTE — TELEPHONE ENCOUNTER
----- Message from Paul Trammell MD sent at 6/18/2018  8:28 AM EDT -----  Please let the patient know that her echocardiogram was unchanged compared to 2016. Her LV function remains normal.  Her aortic stenosis remains in the mild range.  ----- Message -----     From: Panchito Ronquillo LPN     Sent: 4/61/1350   2:58 PM       To: Paul Trammell MD    Per your last note\" Lower extremity swelling. My suspicion is that this is more likely related to chronic venous reflux disease and dependent edema rather than decompensated heart failure. Her lungs are relatively clear on exam.  She does not have any significant JVD. Since she has not had an echocardiogram in over a year and a half, I recommended a repeat study to reevaluate her LV function. I have also recommended that she increase her Lasix to 40 mg daily. A Chem-7 panel and a pro BNP level will be checked in 1 week.

## 2018-07-19 ENCOUNTER — HOSPITAL ENCOUNTER (EMERGENCY)
Age: 83
Discharge: HOME | End: 2018-07-19
Attending: EMERGENCY MEDICINE
Payer: MEDICARE

## 2018-07-19 ENCOUNTER — APPOINTMENT (OUTPATIENT)
Dept: GENERAL RADIOLOGY | Age: 83
End: 2018-07-19
Attending: PHYSICIAN ASSISTANT
Payer: MEDICARE

## 2018-07-19 ENCOUNTER — OFFICE VISIT (OUTPATIENT)
Dept: CARDIOLOGY CLINIC | Age: 83
End: 2018-07-19

## 2018-07-19 VITALS
HEART RATE: 69 BPM | OXYGEN SATURATION: 98 % | WEIGHT: 129 LBS | SYSTOLIC BLOOD PRESSURE: 100 MMHG | BODY MASS INDEX: 22.86 KG/M2 | HEIGHT: 63 IN | DIASTOLIC BLOOD PRESSURE: 56 MMHG

## 2018-07-19 VITALS
RESPIRATION RATE: 22 BRPM | OXYGEN SATURATION: 100 % | WEIGHT: 129 LBS | DIASTOLIC BLOOD PRESSURE: 61 MMHG | HEIGHT: 63 IN | SYSTOLIC BLOOD PRESSURE: 134 MMHG | TEMPERATURE: 98.2 F | HEART RATE: 64 BPM | BODY MASS INDEX: 22.86 KG/M2

## 2018-07-19 DIAGNOSIS — R60.9 DEPENDENT EDEMA: ICD-10-CM
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
## 2018-07-19 DIAGNOSIS — I10 ESSENTIAL HYPERTENSION: ICD-10-CM

## 2018-07-19 DIAGNOSIS — E78.5 DYSLIPIDEMIA: ICD-10-CM

## 2018-07-19 DIAGNOSIS — I35.0 AORTIC VALVE STENOSIS, ETIOLOGY OF CARDIAC VALVE DISEASE UNSPECIFIED: Primary | ICD-10-CM

## 2018-07-19 DIAGNOSIS — S42.032A CLOSED DISPLACED FRACTURE OF ACROMIAL END OF LEFT CLAVICLE, INITIAL ENCOUNTER: Primary | ICD-10-CM

## 2018-07-19 PROCEDURE — 99283 EMERGENCY DEPT VISIT LOW MDM: CPT

## 2018-07-19 PROCEDURE — A4565 SLINGS: HCPCS

## 2018-07-19 PROCEDURE — 73030 X-RAY EXAM OF SHOULDER: CPT

## 2018-07-19 NOTE — DISCHARGE INSTRUCTIONS
Broken Collarbone: Care Instructions  Your Care Instructions    You have broken or cracked your collarbone, or clavicle. The collarbone is the long, slightly curved bone that connects the shoulder to the chest. It supports the shoulder. A broken collarbone may take 6 weeks or longer to heal. You will need to wear an arm sling to keep the broken bone from moving while it heals. At first, it may hurt to move your arm. This will get better with time. You heal best when you take good care of yourself. Eat a variety of healthy foods, and don't smoke. Follow-up care is a key part of your treatment and safety. Be sure to make and go to all appointments, and call your doctor if you are having problems. It's also a good idea to know your test results and keep a list of the medicines you take. How can you care for yourself at home? · Wear the sling day and night for as long as your doctor tells you to. You may take off the sling when you bathe. When the sling is off, avoid arm positions or motions that cause or increase pain. · Put ice or a cold pack on your collarbone for 10 to 20 minutes at a time. Try to do this every 1 to 2 hours for the next 3 days (when you are awake) or until the swelling goes down. Put a thin cloth between the ice and your skin. · Be safe with medicines. Take pain medicines exactly as directed. ¨ If the doctor gave you a prescription medicine for pain, take it as prescribed. ¨ If you are not taking a prescription pain medicine, ask your doctor if you can take an over-the-counter medicine. ¨ Do not take two or more pain medicines at the same time unless the doctor told you to. Many pain medicines have acetaminophen, which is Tylenol. Too much acetaminophen (Tylenol) can be harmful. · Try sleeping with pillows propped under your arm for comfort. · After a few days, put your fingers, wrist, and elbow through their full range of motion several times a day.  This will keep them from getting stiff. You may get instructions on rehabilitation exercises you can do when your shoulder starts to heal.  · You may use warm packs after the first 3 days for 15 to 20 minutes at a time to ease pain. · You may notice a bump where the collarbone is broken. Over time, the bump will get smaller. A small bump may remain, but it should not affect your arm's strength or movement. When should you call for help? Call your doctor now or seek immediate medical care if:    · Your fingers become numb, tingly, cool, or pale.     · You cannot move your arm.    Watch closely for changes in your health, and be sure to contact your doctor if:    · You have new or increased pain.     · You have new or increased swelling.     · You do not get better as expected. Where can you learn more? Go to http://josé miguel-reid.info/. Enter P186 in the search box to learn more about \"Broken Collarbone: Care Instructions. \"  Current as of: November 29, 2017  Content Version: 11.7  © 1606-3552 [employer]. Care instructions adapted under license by "[employer] (which disclaims liability or warranty for this information). If you have questions about a medical condition or this instruction, always ask your healthcare professional. [address] any warranty or liability for your use of this information.

## 2018-07-19 NOTE — PROGRESS NOTES
1. Have you been to the ER, urgent care clinic since your last visit? Hospitalized since your last visit? No    2. Have you seen or consulted any other health care providers outside of the [address] [place] [address] since your last visit? Include any pap smears or colon screening.  No

## 2018-07-19 NOTE — PATIENT INSTRUCTIONS
Continue present medication regimen  Please make sure that she is not taking lisinopril as this was discontinued during her last visit with Dr. Marino Rodriguez as her blood pressure was low normal at that time.    Follow-up with Dr. Marino Rodriguez as scheduled and as needed  To the ER for further evaluation of left shoulder/neck injury secondary to a fall this morning

## 2018-07-19 NOTE — ED PROVIDER NOTES
EMERGENCY DEPARTMENT HISTORY AND PHYSICAL EXAM    Date: 7/19/2018  Patient Name: Juan Miguel Urrutia    History of Presenting Illness     Chief Complaint   Patient presents with    Shoulder Injury         History Provided By: Patient    Chief Complaint: shoulder pain  Duration: today  Timing:  Acute  Location: left shoulder  Quality: Aching  Severity: Moderate  Modifying Factors: worse with ROM of shoulder  Associated Symptoms: denies any other associated signs or symptoms      Additional History (Context): Juan Miguel Urrutia is a 80 y.o. female with diabetes, hypertension and CAD who presents with left shoulder pain and swelling which started this morning after a fall. Got up around 4:30 AM to use the bathroom, tried to sit down on the couch and missed the edge, shoulder struck the table next to her, and she became wedged between the table and couch. Was able to pull herself out and crawl to another room where she pulled herself up on the arm of a sofa. Went to her cardiologist today for routine check up and was advised to come to ED for eval. Denies hitting head, LOC, neck or back pain. No other injuries or complaints. PCP: Eric Guzman MD    Current Outpatient Prescriptions   Medication Sig Dispense Refill    furosemide (LASIX) 40 mg tablet Take 1 Tab by mouth daily. 30 Tab 6    sertraline (ZOLOFT) 25 mg tablet take 1 tablet by mouth once daily 30 Tab 2    RABEprazole (ACIPHEX) 20 mg tablet take 1 tablet by mouth once daily 30 Tab 5    glipiZIDE-metFORMIN (METAGLIP) 5-500 mg per tablet take 2 tablets by mouth once daily (1 BEFORE BREAKFAST AND 1 BEFORE DINNER) 120 Tab 10    JANUVIA 100 mg tablet take 1 tablet by mouth once daily (Patient taking differently: take 1/2 tablet by mouth once daily) 30 Tab 5    cyanocobalamin (VITAMIN B-12) 2,000 mcg TbER Take 3 Tabs by mouth daily.  loperamide (IMODIUM) 2 mg capsule Take 1 mg by mouth Three (3) times a week.       potassium chloride (K-DUR, KLOR-CON) 10 mEq tablet Take 1 Tab by mouth daily. 30 Tab 6    traMADol (ULTRAM) 50 mg tablet Take 1 Tab by mouth every eight (8) hours as needed for Pain. Max Daily Amount: 150 mg. 20 Tab 0    pravastatin (PRAVACHOL) 40 mg tablet Take 1 Tab by mouth nightly. 90 Tab 3    metroNIDAZOLE (NORITATE) 1 % topical cream Apply daily 30 g 0    glucose blood VI test strips (FREESTYLE LITE STRIPS) strip Test Blood Sugar once daily; ICD-10 E11.9; Quantity 100 100 Strip 11    Lancets (FREESTYLE LANCETS) misc Test bloos sugar once daily; ICD-10 E11.9; quantity 100 100 Each 11    lidocaine (LIDODERM) 5 % Apply patch to the affected area for 12 hours a day and remove for 12 hours a day. 5 Each 0    LORazepam (ATIVAN) 1 mg tablet Take 0.5 Tabs by mouth two (2) times daily as needed. Max Daily Amount: 1 mg. (Patient taking differently: Take 0.5 Tabs by mouth two (2) times daily as needed for Anxiety.) 40 Tab 0    aspirin delayed-release 81 mg tablet Take  by mouth daily.  cholecalciferol (VITAMIN D3) 1,000 unit tablet Take  by mouth daily.  calcium citrate-vitamin d3 (CITRACAL + D) 315-200 mg-unit tab Take 1 tablet by mouth daily (with breakfast). Past History     Past Medical History:  Past Medical History:   Diagnosis Date    Aortic valve stenosis, mild April 2014    EF 85-57%, Grade 1 diastolic dysfunction, mild aortic stenosis,  mean gradient 10 mm Hg    CAD (coronary artery disease)     aortic stenosis    Cardiac echocardiogram 11/15/2016    EF 55-60%. No RWMA. Mild-mod LVH. Indeterminate diastolic fx. Mod LAE. Mild-mod AS (mean grad 13.14 mmHg).  Cardiac nuclear imaging test 08/27/2008    No evidence of ischemia or prior scarring. EF 62%. No WMA. No significant change from study of 3/23/06.  Cardiovascular RLE venous duplex 12/01/2016    Right leg:  No DVT.  Carotid duplex 25/52/0371    Mild 7-75% LICA stenosis.     [facility])     Dyslipidemia     HTN (hypertension)     Mixed hyperlipidemia     Sciatica        Past Surgical History:  Past Surgical History:   Procedure Laterality Date    HX APPENDECTOMY      HX OOPHORECTOMY Left 1957    HX OTHER SURGICAL Right 2010    ankle surgery    HX TUMOR REMOVAL Left     ovarian    HX UROLOGICAL      kidney stone removal       Family History:  Family History   Problem Relation Age of Onset    Stroke Mother     Heart Disease Mother     Lung Disease Father     Cancer Neg Hx     Diabetes Neg Hx     Hypertension Neg Hx        Social History:  Social History   Substance Use Topics    Smoking status: Never Smoker    Smokeless tobacco: Never Used    Alcohol use No       Allergies:  No Known Allergies      Review of Systems   Review of Systems   Constitutional: Negative for fever. Cardiovascular: Negative for chest pain. Musculoskeletal: Positive for arthralgias and joint swelling. All other systems reviewed and are negative. All Other Systems Negative  Physical Exam     Vitals:    07/19/18 1623   BP: 134/61   Pulse: 64   Resp: 22   Temp: 98.2 °F (36.8 °C)   SpO2: 100%   Weight: 58.5 kg (129 lb)   Height: 5' 3\" (1.6 m)     Physical Exam   Constitutional: She is oriented to person, place, and time. No distress. Thin    HENT:   Head: Normocephalic and atraumatic. Eyes: Conjunctivae are normal.   Neck: Normal range of motion. Neck supple. No spinous process tenderness and no muscular tenderness present. Cardiovascular: Normal rate, regular rhythm and normal heart sounds. Pulmonary/Chest: Effort normal and breath sounds normal. No respiratory distress. She has no wheezes. She has no rales. Musculoskeletal: Normal range of motion. Ecchymosis and swelling to left shoulder. Crepitus noted over distal clavicle/AC joint. Pain with ROM of shoulder. No elbow or wrist pain. Radial pulse 2+. Neurological: She is alert and oriented to person, place, and time. Skin: Skin is warm and dry.    Psychiatric: She has a normal mood and affect. Her behavior is normal. Judgment and thought content normal.   Nursing note and vitals reviewed. Diagnostic Study Results     Labs -   No results found for this or any previous visit (from the past 12 hour(s)). Radiologic Studies -   XR SHOULDER LT AP/LAT MIN 2 V    (Results Pending)     CT Results  (Last 48 hours)    None        CXR Results  (Last 48 hours)    None            Medical Decision Making   I am the first provider for this patient. I reviewed the vital signs, available nursing notes, past medical history, past surgical history, family history and social history. Records Reviewed: Nursing Notes and Old Medical Records    Procedures:  Procedures    Provider Notes (Medical Decision Making):     DDX: fx, contusion    5:45 PM Pt well appearing and in NAD. Comminuted fx noted to left distal clavicle. No humerus fx. Placed in shoulder immobilzer. Pt has been to [facility] in the past, would like to go back, so gave referral. Declined pain meds. Return precautions given. MED RECONCILIATION:  No current facility-administered medications for this encounter. Current Outpatient Prescriptions   Medication Sig    furosemide (LASIX) 40 mg tablet Take 1 Tab by mouth daily.  sertraline (ZOLOFT) 25 mg tablet take 1 tablet by mouth once daily    RABEprazole (ACIPHEX) 20 mg tablet take 1 tablet by mouth once daily    glipiZIDE-metFORMIN (METAGLIP) 5-500 mg per tablet take 2 tablets by mouth once daily (1 BEFORE BREAKFAST AND 1 BEFORE DINNER)    JANUVIA 100 mg tablet take 1 tablet by mouth once daily (Patient taking differently: take 1/2 tablet by mouth once daily)    cyanocobalamin (VITAMIN B-12) 2,000 mcg TbER Take 3 Tabs by mouth daily.  loperamide (IMODIUM) 2 mg capsule Take 1 mg by mouth Three (3) times a week.  potassium chloride (K-DUR, KLOR-CON) 10 mEq tablet Take 1 Tab by mouth daily.     traMADol (ULTRAM) 50 mg tablet Take 1 Tab by mouth every eight (8) hours as needed for Pain. Max Daily Amount: 150 mg.  pravastatin (PRAVACHOL) 40 mg tablet Take 1 Tab by mouth nightly.  metroNIDAZOLE (NORITATE) 1 % topical cream Apply daily    glucose blood VI test strips (FREESTYLE LITE STRIPS) strip Test Blood Sugar once daily; ICD-10 E11.9; Quantity 100    Lancets (FREESTYLE LANCETS) misc Test bloos sugar once daily; ICD-10 E11.9; quantity 100    lidocaine (LIDODERM) 5 % Apply patch to the affected area for 12 hours a day and remove for 12 hours a day.  LORazepam (ATIVAN) 1 mg tablet Take 0.5 Tabs by mouth two (2) times daily as needed. Max Daily Amount: 1 mg. (Patient taking differently: Take 0.5 Tabs by mouth two (2) times daily as needed for Anxiety.)    aspirin delayed-release 81 mg tablet Take  by mouth daily.  cholecalciferol (VITAMIN D3) 1,000 unit tablet Take  by mouth daily.  calcium citrate-vitamin d3 (CITRACAL + D) 315-200 mg-unit tab Take 1 tablet by mouth daily (with breakfast). Disposition:  discharge    DISCHARGE NOTE:     Patient is improved, resting quietly and comfortably. The patient will be discharged home.      The patient was reassured that these symptoms do not appear to represent a serious or life threatening condition at this time. Warning signs of worsening condition were discussed and understood by the patient.      Based on patient's age, coexisting illness, exam, and the results of this ED evaluation, the decision to treat as an outpatient was made. Based on the information available at time of discharge, acute pathology requiring immediate intervention was deemed relative unlikely. While it is impossible to completely exclude the possibility of underlying serious disease or worsening of condition, I feel the relative likelihood is extremely low.  I discussed this uncertainty with the patient, who understood ED evaluation and treatment and felt comfortable with the outpatient treatment plan.      All questions regarding care, test results, and follow up were answered. The patient is stable and appropriate to discharge. They understand that they should return to the emergency department for any new or worsening symptoms. I stressed the importance of follow up for repeat assessment and possibly further evaluation/treatment. Follow-up Information     Follow up With Details Comments [address] [place] [address] [facility] Call To make a follow up appointment [address] [facility], [address] [facility] [place] [address]. [id number] [facility] EMERGENCY DEPT  Immediately if symptoms worsen [address] [facility]  [phone]          Current Discharge Medication List            Diagnosis     Clinical Impression:   1.  Closed displaced fracture of acromial end of left clavicle, initial encounter

## 2018-07-19 NOTE — PROGRESS NOTES
Carl Aguirre presents today for a 6 week follow-up after her lasix was increased to 40mg daily for complaints of lower extremity edema and shortness of breath. She was last seen by Dr. Marino Rodriguez here in the office on June 7, 2018. Previous echocardiogram was done in March 2016 and at that time her ejection fraction was 55% in the valve mean gradient (aortic valve) was 17 mmHg. He requested an Echo and it was completed on 6/14/18, and it showed an EF of 55-60%, no wall motion abnormalities, and RVSP of 21 mmHg. There was mild aortic valve stenosis with a mean valve gradient of 13 mmHg. A NT pro-BNP was done on 6/14/18, and it was 260, well within the normal range. She reports improvement in her lower extremity edema and has been able to wear compression stockings now that the edema has improved. She reports no change in her shortness of breath. She reports that she \"missed\" the couch as she tried to sit on it and ended up on the floor. This occurred this morning at 4:30am.  She reports hitting a wooden end table and ended up being wedged between the couch and the end table. She states that she had the \"wind knocked out of me\" and it took her about 15 to 20 minutes to get up from the floor. She complains of left shoulder and neck area pain. She has some difficulty with range of motion. She is an 80-year-old  female with history of hypertension, diabetes, dyslipidemia, and mild to moderate aortic valve stenosis. She presented to the emergency room on 11/14/2016 with complaints of leg swelling. She has chronic lower extremity sunny which apparently markedly increased 2 days prior to going to the emergency room. She also stated that she had noticed increasing shortness of breath with activity. Her NT proBNP on admission was only 225. She was given IV Lasix and her symptoms improved.   An echocardiogram was done during this admission and it showed an ejection fraction of 60%, no regional wall motion abnormalities, diastolic dysfunction, and mild to moderate aortic valve stenosis. The aortic valve mean gradient was 13.14 mmHg. She denies any chest pain, tightness, heaviness, and palpitations. She admits to shortness of breath, admits to dyspnea on exertion, denies orthopnea, and PND. States that she feels more short of breath when she tries to talk. She states that she sleeps better sitting up. She denies abdominal bloating. She denies lightheadedness, dizziness, and syncope. She admits to chronic lower extremity edema and denies claudication. She denies nausea, vomiting, fever, and chills. PMH:  Past Medical History:   Diagnosis Date    Aortic valve stenosis, mild April 2014    EF 68-33%, Grade 1 diastolic dysfunction, mild aortic stenosis,  mean gradient 10 mm Hg    CAD (coronary artery disease)     aortic stenosis    Cardiac echocardiogram 11/15/2016    EF 55-60%. No RWMA. Mild-mod LVH. Indeterminate diastolic fx. Mod LAE. Mild-mod AS (mean grad 13.14 mmHg).  Cardiac nuclear imaging test 08/27/2008    No evidence of ischemia or prior scarring. EF 62%. No WMA. No significant change from study of 3/23/06.  Cardiovascular RLE venous duplex 12/01/2016    Right leg:  No DVT.  Carotid duplex 60/12/6778    Mild 5-60% LICA stenosis.     DM [facility]diabetes mellitus) ([facility] [address]     Dyslipidemia     HTN (hypertension)     Mixed hyperlipidemia     Sciatica        PSH:  Past Surgical History:   Procedure Laterality Date    HX APPENDECTOMY      HX OOPHORECTOMY Left 1957    HX OTHER SURGICAL Right 2010    ankle surgery    HX TUMOR REMOVAL Left     ovarian    HX UROLOGICAL      kidney stone removal       MEDS:  Current Outpatient Prescriptions   Medication Sig    sertraline (ZOLOFT) 25 mg tablet take 1 tablet by mouth once daily    RABEprazole (ACIPHEX) 20 mg tablet take 1 tablet by mouth once daily    glipiZIDE-metFORMIN (METAGLIP) 5-500 mg per tablet take 2 tablets by mouth once daily (1 BEFORE BREAKFAST AND 1 BEFORE DINNER)    JANUVIA 100 mg tablet take 1 tablet by mouth once daily (Patient taking differently: take 1/2 tablet by mouth once daily)    cyanocobalamin (VITAMIN B-12) 2,000 mcg TbER Take 3 Tabs by mouth daily.  loperamide (IMODIUM) 2 mg capsule Take 1 mg by mouth Three (3) times a week.  potassium chloride (K-DUR, KLOR-CON) 10 mEq tablet Take 1 Tab by mouth daily.  traMADol (ULTRAM) 50 mg tablet Take 1 Tab by mouth every eight (8) hours as needed for Pain. Max Daily Amount: 150 mg.  pravastatin (PRAVACHOL) 40 mg tablet Take 1 Tab by mouth nightly.  metroNIDAZOLE (NORITATE) 1 % topical cream Apply daily    glucose blood VI test strips (FREESTYLE LITE STRIPS) strip Test Blood Sugar once daily; ICD-10 E11.9; Quantity 100    Lancets (FREESTYLE LANCETS) misc Test bloos sugar once daily; ICD-10 E11.9; quantity 100    lidocaine (LIDODERM) 5 % Apply patch to the affected area for 12 hours a day and remove for 12 hours a day.  LORazepam (ATIVAN) 1 mg tablet Take 0.5 Tabs by mouth two (2) times daily as needed. Max Daily Amount: 1 mg. (Patient taking differently: Take 0.5 Tabs by mouth two (2) times daily as needed for Anxiety.)    aspirin delayed-release 81 mg tablet Take  by mouth daily.  cholecalciferol (VITAMIN D3) 1,000 unit tablet Take  by mouth daily.  calcium citrate-vitamin d3 (CITRACAL + D) 315-200 mg-unit tab Take 1 tablet by mouth daily (with breakfast).  furosemide (LASIX) 40 mg tablet Take 1 Tab by mouth daily. No current facility-administered medications for this visit. Allergies and Sensitivities:  No Known Allergies    Family History:  Family History   Problem Relation Age of Onset    Stroke Mother     Heart Disease Mother     Lung Disease Father     Cancer Neg Hx     Diabetes Neg Hx     Hypertension Neg Hx        Social History:  She  reports that she has never smoked.  She has never used smokeless tobacco.  She reports that she does not drink alcohol. Physical:  Visit Vitals    /56    Pulse 69    Ht 5' 3\" (1.6 m)    Wt 58.5 kg (129 lb)    SpO2 98%    BMI 22.85 kg/m2       Her weight is down 5 pounds since her last office appointment. Exam:  Neck:  Supple, no JVD, no carotid bruits  CV:  Normal S1 and  S2, harsh midsystolic murmur with radiation to the neck, no rubs, or gallops noted  Lungs:  Clear to ausculation throughout, no wheezes or rales  Abd:  Soft, non-tender, non-distended with good bowel sounds. No hepatosplenomegaly  Extremities:  1+ softly pitting lower extremity edema. Ecchymosis noted in left upper arm near shoulder and neck area      Data:  EKG:  Read by Ravi Weeks, DO - Sinus rhythm, old anteroseptal infarct      LABS:  Lab Results   Component Value Date/Time    Sodium 143 06/14/2018 04:02 PM    Potassium 4.3 06/14/2018 04:02 PM    Chloride 105 06/14/2018 04:02 PM    CO2 30 06/14/2018 04:02 PM    Glucose 48 (LL) 06/14/2018 04:02 PM    BUN 20 (H) 06/14/2018 04:02 PM    Creatinine 0.82 06/14/2018 04:02 PM     Lab Results   Component Value Date/Time    Cholesterol, total 137 05/15/2018 11:55 AM    HDL Cholesterol 77 (H) 05/15/2018 11:55 AM    LDL, calculated 49 05/15/2018 11:55 AM    Triglyceride 55 05/15/2018 11:55 AM    CHOL/HDL Ratio 1.8 05/15/2018 11:55 AM     Lab Results   Component Value Date/Time    ALT (SGPT) 22 05/15/2018 11:55 AM       Impression / Plan:  1. Chronic diastolic heart failure, appears compensated  2. Mild aortic stenosis, no significant change from previous echo done in June 2018  3. Diabetes mellitus, recommend Hgb A1c less than 7% from cardiac standpoint  4. Hypertension, blood pressure low today but asymptomatic  5. Dyslipidemia, on pravastatin 40 mg  6. Chronic lower extremity edema, improved  7. S/p fall this AM with complaints of left shoulder and neck pain    Mrs. Gui Ponce was seen today for follow-up after her lasix was increased to 40mg daily for leg edema and shortness of breath. She has noticed improvement in her lower extremity edema but no change in her shortness of breath. Her weight is down 5 pounds. She states that the edema has improved to the point that she can now put her compression stockings on and her shoes are not as tight as they used to be. She reports that she \"missed\" the couch this morning when she tried to sit on it and she ended up on the floor. On the way down, she hit her left upper back/shoulder on a wooden end table and she ended up being wedged between the couch and the table. It took her awhile to get up from the floor and she did not go to the ER for evaluation. Now, she complains of left upper arm, shoulder and neck pain. She has some limitation of range of motion of her left arm and contusions of the left shoulder and neck area that are painful. She will be sent to the ER for further evaluation. Denies cardiac complaints but does complain of some shortness of breath, denies chest pain and palpitations. She reports feeling more short of breath when she tries to talk. No complaints of lightheadedness dizziness or syncope. She was instructed to continue her present medication regimen. Results of her echo done in June 2018 were discussed with her, EF 55-60%, no wall motion abnormalities, mild aortic stenosis with mean valve gradient of 13 mmHg. Congestive heart failure teaching done today. Advised to limit sodium intake to no more than 2000mg per day and to also watch fluid intake. Advised to weigh daily every morning and record weights. Instructed to call our office if progressive weight gain is noted over a 2 to 3 day period of time, shortness of breath increases, or if abdominal bloating, nausea, fatigue, or increased lower extremity edema is noted. She will follow-up with Dr. Radha Faustin as scheduled and as needed.       Danielle Nagel MSN, FNP-BC    Please note:  Portions of this chart were created with Dragon medical speech to text program.  Unrecognized errors may be present.

## 2018-07-19 NOTE — MR AVS SNAPSHOT
[address] [address] [address] [address] [phone] Patient: Maykel Bradshaw MRN: [id number] [facility]:[birth year] Visit Information Date & Time Provider Department Dept. Phone Encounter #  
 7/19/2018  3:00 PM Carolina Keller NP [facility] [id number] Your Appointments 8/29/2018  3:00 PM  
Follow Up with Mirta Santos MD  
[facility] ([facility]) Appt Note: 12 wk f/up [place] [address] [address] [address] [phone] [address] [place] [address] [address] Upcoming Health Maintenance Date Due  
 FOOT EXAM Q1 1/5/1943 DTaP/Tdap/Td series (1 - Tdap) 1/5/1954 Influenza Age 5 to Adult 8/1/2018 HEMOGLOBIN A1C Q6M 11/15/2018 EYE EXAM RETINAL OR DILATED Q1 4/6/2019 MICROALBUMIN Q1 5/15/2019 LIPID PANEL Q1 5/15/2019 MEDICARE YEARLY EXAM 5/19/2019 GLAUCOMA SCREENING Q2Y 4/6/2020 Allergies as of 7/19/2018  Review Complete On: 6/7/2018 By: Mirta Santos MD  
 No Known Allergies Current Immunizations  Reviewed on 3/21/2018 Name Date Influenza High Dose Vaccine PF 9/12/2017 Influenza Vaccine 9/1/2016, 10/1/2015 Pneumococcal Conjugate (PCV-13) 5/17/2017 Pneumococcal Polysaccharide (PPSV-23) 10/1/2015, 9/10/2014, 4/18/2013 Zoster Vaccine, Live 1/17/2012 Not reviewed this visit You Were Diagnosed With   
  
 Codes Comments Aortic valve stenosis, etiology of cardiac valve disease unspecified    -  Primary ICD-10-CM: I35.0 ICD-9-CM: 424.1 Essential hypertension     ICD-10-CM: I10 
ICD-9-CM: 401.9 Fall, initial encounter     ICD-10-CM: W19. Becca Lay ICD-9-CM: E888.9 Dependent edema     ICD-10-CM: R60.9 ICD-9-CM: 594. 3 Dyslipidemia     ICD-10-CM: E78.5 ICD-9-CM: 272.4 Vitals BP Pulse Height(growth percentile) Weight(growth percentile) SpO2 BMI  
 100/56 69 5' 3\" (1.6 m) 129 lb (58.5 kg) 98% 22.85 kg/m2 OB Status Smoking Status Postmenopausal Never Smoker Vitals History BMI and BSA Data Body Mass Index Body Surface Area  
 22.85 kg/m 2 1.61 m 2 Preferred Pharmacy Pharmacy Name Phone [id number] [address] [place], [address] [phone] Your Updated Medication List  
  
   
This list is accurate as of 7/19/18  3:54 PM.  Always use your most recent med list.  
  
  
  
  
 aspirin delayed-release 81 mg tablet Take  by mouth daily. CITRACAL PLUS D 315-200 mg-unit Tab Generic drug:  calcium citrate-vitamin d3 Take 1 tablet by mouth daily (with breakfast). furosemide 40 mg tablet Commonly known as:  LASIX Take 1 Tab by mouth daily. glipiZIDE-metFORMIN 5-500 mg per tablet Commonly known as:  METAGLIP  
take 2 tablets by mouth once daily (1 BEFORE BREAKFAST AND 1 BEFORE DINNER) glucose blood VI test strips strip Commonly known as:  FREESTYLE LITE STRIPS Test Blood Sugar once daily; ICD-10 E11.9; Quantity 100 JANUVIA 100 mg tablet Generic drug:  SITagliptin  
take 1 tablet by mouth once daily Lancets Misc Commonly known as:  FREESTYLE LANCETS Test bloos sugar once daily; ICD-10 E11.9; quantity 100  
  
 lidocaine 5 % Commonly known as:  Carlos Weir Apply patch to the affected area for 12 hours a day and remove for 12 hours a day. loperamide 2 mg capsule Commonly known as:  IMODIUM Take 1 mg by mouth Three (3) times a week. LORazepam 1 mg tablet Commonly known as:  ATIVAN Take 0.5 Tabs by mouth two (2) times daily as needed. Max Daily Amount: 1 mg.  
  
 metroNIDAZOLE 1 % topical cream  
Commonly known as:  Keila Quesada Apply daily  
  
 potassium chloride 10 mEq tablet Commonly known as:  KLOR-CON Take 1 Tab by mouth daily. pravastatin 40 mg tablet Commonly known as:  PRAVACHOL Take 1 Tab by mouth nightly. RABEprazole 20 mg tablet Commonly known as:  ACIPHEX  
take 1 tablet by mouth once daily  
  
 sertraline 25 mg tablet Commonly known as:  ZOLOFT  
take 1 tablet by mouth once daily  
  
 traMADol 50 mg tablet Commonly known as:  ULTRAM  
Take 1 Tab by mouth every eight (8) hours as needed for Pain. Max Daily Amount: 150 mg. VITAMIN B-12 2,000 mcg Tber Generic drug:  cyanocobalamin Take 3 Tabs by mouth daily. VITAMIN D3 1,000 unit tablet Generic drug:  cholecalciferol Take  by mouth daily. We Performed the Following AMB POC EKG ROUTINE W/ 12 LEADS, INTER & REP [43414 CPT(R)] Patient Instructions Continue present medication regimen Please make sure that she is not taking lisinopril as this was discontinued during her last visit with Dr. Radha Faustin as her blood pressure was low normal at that time. Follow-up with Dr. Radha Faustin as scheduled and as needed To the ER for further evaluation of left shoulder/neck injury secondary to a fall this morning Introducing [facility]! [facility] introduces [employer] patient portal. Now you can access parts of your medical record, email your doctor's office, and request medication refills online. 1. In your internet browser, go to [email or web address] 2. Click on the First Time User? Click Here link in the Sign In box. You will see the New Member Sign Up page. 3. Enter your [employer] Access Code exactly as it appears below. You will not need to use this code after youve completed the sign-up process. If you do not sign up before the expiration date, you must request a new code. · [employer] Access Code: [email or web address] Expires: 9/5/2018  9:45 AM 
 
4. Enter the last four digits of your Social Security Number (xxxx) and Date of Birth (mm/dd/yyyy) as indicated and click Submit.  You will be taken to the next sign-up page. 5. Create a [employer] ID. This will be your [employer] login ID and cannot be changed, so think of one that is secure and easy to remember. 6. Create a [employer] password. You can change your password at any time. 7. Enter your Password Reset Question and Answer. This can be used at a later time if you forget your password. 8. Enter your e-mail address. You will receive e-mail notification when new information is available in [address] 9. Click Sign Up. You can now view and download portions of your medical record. 10. Click the Download Summary menu link to download a portable copy of your medical information. If you have questions, please visit the Frequently Asked Questions section of the [employer] website. Remember, [employer] is NOT to be used for urgent needs. For medical emergencies, dial 911. Now available from your iPhone and Android! Please provide this summary of care documentation to your next provider. Your primary care clinician is listed as Trent Bansal. If you have any questions after today's visit, please call [phone].

## 2018-07-19 NOTE — ED TRIAGE NOTES
Pt c/o left shoulder pain & swelling s/p fall off couch @ 0430 this morning. Extensive bruising noted to left shoulder. Pt was sent here from cardiology office where she had a routine follow up appt this afternoon.

## 2018-07-19 NOTE — ED NOTES
Neal Campos is a 80 y.o. female that was discharged in stable. Pt was accompanied by son. Pt is not driving. The patients diagnosis, condition and treatment were explained to  patient and caregiver and aftercare instructions were given. The patient and caregiver verbalized understanding. Patient armband removed and shredded.

## 2018-07-24 ENCOUNTER — OFFICE VISIT (OUTPATIENT)
Dept: ORTHOPEDIC SURGERY | Facility: CLINIC | Age: 83
End: 2018-07-24

## 2018-07-24 VITALS
HEART RATE: 64 BPM | RESPIRATION RATE: 16 BRPM | TEMPERATURE: 96.5 F | WEIGHT: 132 LBS | HEIGHT: 63 IN | BODY MASS INDEX: 23.39 KG/M2 | OXYGEN SATURATION: 99 % | DIASTOLIC BLOOD PRESSURE: 64 MMHG | SYSTOLIC BLOOD PRESSURE: 130 MMHG

## 2018-07-24 DIAGNOSIS — S42.032A CLOSED DISPLACED FRACTURE OF ACROMIAL END OF LEFT CLAVICLE, INITIAL ENCOUNTER: Primary | ICD-10-CM

## 2018-07-24 DIAGNOSIS — S80.11XA TRAUMATIC ECCHYMOSIS OF RIGHT LOWER LEG, INITIAL ENCOUNTER: ICD-10-CM

## 2018-07-24 PROBLEM — E11.21 TYPE 2 DIABETES WITH NEPHROPATHY (HCC): Status: ACTIVE | Noted: 2018-07-24

## 2018-07-24 NOTE — PROGRESS NOTES
Patient: Carmencita Vasquez                MRN: [id number]       SSN: [id number] YOB: 1933        AGE: 80 y.o. SEX: female Body mass index is 23.38 kg/(m^2). PCP: Sully Mcleod MD 
07/24/18 Chief Complaint: Left shoulder injury HISTORY OF PRESENT ILLNESS:  Aric James is an 45-year-old female who comes in to the office today for her left shoulder, as well as her right leg. She injured her left shoulder last week when she had a fall in the middle of the night. She went to sit down in her chair, but she missed the chair and fell between her chair and the side table. She hit her left shoulder. She had shoulder pain. She was seen in the ER, where she was diagnosed with a clavicle fracture. She also reports some ecchymosis over the posterior aspect of her right leg. She says this is new. In the past, she says this has been treated like an infection. it is not painful, but she noticed it when she was putting on her pants. Past Medical History:  
Diagnosis Date  Aortic valve stenosis, mild April 2014 EF 98-74%, Grade 1 diastolic dysfunction, mild aortic stenosis,  mean gradient 10 mm Hg  CAD (coronary artery disease) aortic stenosis  Cardiac echocardiogram 11/15/2016 EF 55-60%. No RWMA. Mild-mod LVH. Indeterminate diastolic fx. Mod LAE. Mild-mod AS (mean grad 13.14 mmHg).  Cardiac nuclear imaging test 08/27/2008 No evidence of ischemia or prior scarring. EF 62%. No WMA. No significant change from study of 3/23/06.  Cardiovascular RLE venous duplex 12/01/2016 Right leg:  No DVT.  Carotid duplex 04/24/2013 Mild 8-63% LICA stenosis.  DM (diabetes mellitus) ([facility] [address]  Dyslipidemia   
 HTN (hypertension)  Mixed hyperlipidemia  Sciatica Family History Problem Relation Age of Onset  Stroke Mother  Heart Disease Mother  Lung Disease Father  Cancer Neg Hx  Diabetes Neg Hx  Hypertension Neg Hx Current Outpatient Prescriptions Medication Sig Dispense Refill  naproxen sodium (ALEVE PO) Take  by mouth as needed.  furosemide (LASIX) 40 mg tablet Take 1 Tab by mouth daily. 30 Tab 6  sertraline (ZOLOFT) 25 mg tablet take 1 tablet by mouth once daily 30 Tab 2  
 RABEprazole (ACIPHEX) 20 mg tablet take 1 tablet by mouth once daily 30 Tab 5  
 glipiZIDE-metFORMIN (METAGLIP) 5-500 mg per tablet take 2 tablets by mouth once daily (1 BEFORE BREAKFAST AND 1 BEFORE DINNER) 120 Tab 10  
 JANUVIA 100 mg tablet take 1 tablet by mouth once daily (Patient taking differently: take 1/2 tablet by mouth once daily) 30 Tab 5  cyanocobalamin (VITAMIN B-12) 2,000 mcg TbER Take 3 Tabs by mouth daily.  loperamide (IMODIUM) 2 mg capsule Take 1 mg by mouth Three (3) times a week.  pravastatin (PRAVACHOL) 40 mg tablet Take 1 Tab by mouth nightly. 90 Tab 3  
 metroNIDAZOLE (NORITATE) 1 % topical cream Apply daily 30 g 0  
 glucose blood VI test strips (FREESTYLE LITE STRIPS) strip Test Blood Sugar once daily; ICD-10 E11.9; Quantity 100 100 Strip 11  Lancets (FREESTYLE LANCETS) misc Test bloos sugar once daily; ICD-10 E11.9; quantity 100 100 Each 11  
 lidocaine (LIDODERM) 5 % Apply patch to the affected area for 12 hours a day and remove for 12 hours a day. 5 Each 0  
 aspirin delayed-release 81 mg tablet Take  by mouth daily.  cholecalciferol (VITAMIN D3) 1,000 unit tablet Take  by mouth daily.  calcium citrate-vitamin d3 (CITRACAL + D) 315-200 mg-unit tab Take 1 tablet by mouth daily (with breakfast).  potassium chloride (K-DUR, KLOR-CON) 10 mEq tablet Take 1 Tab by mouth daily. 30 Tab 6  
 traMADol (ULTRAM) 50 mg tablet Take 1 Tab by mouth every eight (8) hours as needed for Pain. Max Daily Amount: 150 mg. 20 Tab 0  
 LORazepam (ATIVAN) 1 mg tablet Take 0.5 Tabs by mouth two (2) times daily as needed. Max Daily Amount: 1 mg.  (Patient taking differently: Take 0.5 Tabs by mouth two (2) times daily as needed for Anxiety.) 40 Tab 0 No Known Allergies Past Surgical History:  
Procedure Laterality Date  HX APPENDECTOMY [address]  HX OTHER SURGICAL Right 2010  
 ankle surgery  HX TUMOR REMOVAL Left   
 ovarian  HX UROLOGICAL    
 kidney stone removal  
 
 
Social History Social History  Marital status:  Spouse name: N/A  
 Number of children: N/A  
 Years of education: N/A Occupational History  Not on file. Social History Main Topics  Smoking status: Never Smoker  Smokeless tobacco: Never Used  Alcohol use No  
 Drug use: No  
 Sexual activity: No  
 
Other Topics Concern  Not on file Social History Narrative REVIEW OF SYSTEMS:   
 
CON: negative for recent weight loss/gain, fever, or chills EYE: negative for double or blurry vision ENT: negative for hoarseness RS:   negative for cough, URI, SOB 
CV:  negative for chest pain, palpitations GI:    negative for blood in stool, nausea/vomiting :  negative for blood in urine MS: As per HPI Other systems reviewed and noted below. PHYSICAL EXAMINATION: 
Visit Vitals  /64  Pulse 64  Temp 96.5 °F (35.8 °C) (Oral)  Resp 16  
 Ht 5' 3\" (1.6 m)  Wt 132 lb (59.9 kg)  SpO2 99%  BMI 23.38 kg/m2 Body mass index is 23.38 kg/(m^2). GENERAL: Alert and oriented x3, in no acute distress, well-developed, well-nourished. HEENT: Normocephalic, atraumatic. RESP: Non labored breathing with equal chest rise on inspiration. CV: Well perfused extremities. No cyanosis or clubbing noted. ABDOMEN: Soft, non-tender, non-distended. PHYSICAL EXAMINATION:   Physical exam of the left shoulder reveals the shoulder in a sling. She has ecchymosis about the shoulder and left upper arm. She is neurovascularly intact distally. She is tender to palpation over the distal clavicle. There is a palpable deformity there.   Shoulder range of motion is not tested. The right leg has an approximately dime-size area of ecchymosis with no open wounds or erythema surrounding it on the right posterior calf. This is nontender to palpation. There are no other signs of trauma. She is neurovascularly intact distally. IMAGING:  X-rays from the emergency room visit of her left shoulder were reviewed. These show a displaced, left, distal clavicle fracture. The bone is very thin, and there is elevation of the proximal segment. ASSESSMENT/PLAN:  Nanette Ureña is an 51-year-old female with a left distal clavicle fracture, likely injuring her CC ligaments, as well as right leg ecchymosis. For the clavicle fracture, I recommended a sling. I have kept her in the sling that she is in and adjusted it. I have also recommended monitoring of her right leg wound. I would like to see her back in one week to just check on the fracture, as well as the leg or earlier if she has any problems.    
 
 
 
 
Electronically signed by: Seda Jasmine MD

## 2018-07-29 ENCOUNTER — APPOINTMENT (OUTPATIENT)
Dept: GENERAL RADIOLOGY | Age: 83
End: 2018-07-29
Attending: EMERGENCY MEDICINE
Payer: MEDICARE

## 2018-07-29 ENCOUNTER — HOSPITAL ENCOUNTER (EMERGENCY)
Age: 83
Discharge: HOME | End: 2018-07-30
Attending: EMERGENCY MEDICINE
Payer: MEDICARE

## 2018-07-29 DIAGNOSIS — J43.2 CENTRILOBULAR EMPHYSEMA (HCC): ICD-10-CM

## 2018-07-29 DIAGNOSIS — J41.0 SIMPLE CHRONIC BRONCHITIS (HCC): Primary | ICD-10-CM

## 2018-07-29 LAB
ALBUMIN SERPL-MCNC: 3.3 G/DL (ref 3.4–5)
ALBUMIN/GLOB SERPL: 1.1 {RATIO} (ref 0.8–1.7)
ALP SERPL-CCNC: 83 U/L (ref 45–117)
ALT SERPL-CCNC: 17 U/L (ref 13–56)
ANION GAP SERPL CALC-SCNC: 4 MMOL/L (ref 3–18)
APTT PPP: 31.4 SEC (ref 23–36.4)
AST SERPL-CCNC: 14 U/L (ref 15–37)
BASOPHILS # BLD: 0 K/UL (ref 0–0.1)
BASOPHILS NFR BLD: 0 % (ref 0–2)
BILIRUB DIRECT SERPL-MCNC: 0.2 MG/DL (ref 0–0.2)
BILIRUB SERPL-MCNC: 0.5 MG/DL (ref 0.2–1)
BNP SERPL-MCNC: 204 PG/ML (ref 0–1800)
BUN SERPL-MCNC: 17 MG/DL (ref 7–18)
BUN/CREAT SERPL: 20 (ref 12–20)
CALCIUM SERPL-MCNC: 8.9 MG/DL (ref 8.5–10.1)
CHLORIDE SERPL-SCNC: 106 MMOL/L (ref 100–108)
CK MB CFR SERPL CALC: 3.4 % (ref 0–4)
CK MB SERPL-MCNC: 1.1 NG/ML (ref 5–25)
CK SERPL-CCNC: 32 U/L (ref 26–192)
CO2 SERPL-SCNC: 30 MMOL/L (ref 21–32)
CREAT SERPL-MCNC: 0.87 MG/DL (ref 0.6–1.3)
DIFFERENTIAL METHOD BLD: ABNORMAL
EOSINOPHIL # BLD: 0.1 K/UL (ref 0–0.4)
EOSINOPHIL NFR BLD: 2 % (ref 0–5)
ERYTHROCYTE [DISTWIDTH] IN BLOOD BY AUTOMATED COUNT: 13.4 % (ref 11.6–14.5)
GLOBULIN SER CALC-MCNC: 3.1 G/DL (ref 2–4)
GLUCOSE SERPL-MCNC: 176 MG/DL (ref 74–99)
HCT VFR BLD AUTO: 34.1 % (ref 35–45)
HGB BLD-MCNC: 10.9 G/DL (ref 12–16)
INR PPP: 1.1 (ref 0.8–1.2)
LYMPHOCYTES # BLD: 1.6 K/UL (ref 0.9–3.6)
LYMPHOCYTES NFR BLD: 36 % (ref 21–52)
MAGNESIUM SERPL-MCNC: 1.6 MG/DL (ref 1.6–2.6)
MCH RBC QN AUTO: 30.4 PG (ref 24–34)
MCHC RBC AUTO-ENTMCNC: 32 G/DL (ref 31–37)
MCV RBC AUTO: 95.3 FL (ref 74–97)
MONOCYTES # BLD: 0.3 K/UL (ref 0.05–1.2)
MONOCYTES NFR BLD: 6 % (ref 3–10)
NEUTS SEG # BLD: 2.5 K/UL (ref 1.8–8)
NEUTS SEG NFR BLD: 56 % (ref 40–73)
PLATELET # BLD AUTO: 98 K/UL (ref 135–420)
PLATELET COMMENTS,PCOM: ABNORMAL
PMV BLD AUTO: 11.4 FL (ref 9.2–11.8)
POTASSIUM SERPL-SCNC: 3.6 MMOL/L (ref 3.5–5.5)
PROT SERPL-MCNC: 6.4 G/DL (ref 6.4–8.2)
PROTHROMBIN TIME: 13.7 SEC (ref 11.5–15.2)
RBC # BLD AUTO: 3.58 M/UL (ref 4.2–5.3)
RBC MORPH BLD: ABNORMAL
SODIUM SERPL-SCNC: 140 MMOL/L (ref 136–145)
TROPONIN I SERPL-MCNC: <0.02 NG/ML (ref 0–0.06)
WBC # BLD AUTO: 4.5 K/UL (ref 4.6–13.2)

## 2018-07-29 PROCEDURE — 82550 ASSAY OF CK (CPK): CPT | Performed by: EMERGENCY MEDICINE

## 2018-07-29 PROCEDURE — 96374 THER/PROPH/DIAG INJ IV PUSH: CPT

## 2018-07-29 PROCEDURE — 74011000250 HC RX REV CODE- 250: Performed by: EMERGENCY MEDICINE

## 2018-07-29 PROCEDURE — 99284 EMERGENCY DEPT VISIT MOD MDM: CPT

## 2018-07-29 PROCEDURE — 93005 ELECTROCARDIOGRAM TRACING: CPT

## 2018-07-29 PROCEDURE — 80076 HEPATIC FUNCTION PANEL: CPT | Performed by: EMERGENCY MEDICINE

## 2018-07-29 PROCEDURE — 83735 ASSAY OF MAGNESIUM: CPT | Performed by: EMERGENCY MEDICINE

## 2018-07-29 PROCEDURE — 71045 X-RAY EXAM CHEST 1 VIEW: CPT

## 2018-07-29 PROCEDURE — 85730 THROMBOPLASTIN TIME PARTIAL: CPT | Performed by: EMERGENCY MEDICINE

## 2018-07-29 PROCEDURE — 85610 PROTHROMBIN TIME: CPT | Performed by: EMERGENCY MEDICINE

## 2018-07-29 PROCEDURE — 85025 COMPLETE CBC W/AUTO DIFF WBC: CPT | Performed by: EMERGENCY MEDICINE

## 2018-07-29 PROCEDURE — 83880 ASSAY OF NATRIURETIC PEPTIDE: CPT | Performed by: EMERGENCY MEDICINE

## 2018-07-29 PROCEDURE — 80048 BASIC METABOLIC PNL TOTAL CA: CPT | Performed by: EMERGENCY MEDICINE

## 2018-07-29 RX ORDER — ALBUTEROL SULFATE 0.83 MG/ML
2.5 SOLUTION RESPIRATORY (INHALATION)
Status: COMPLETED | OUTPATIENT
Start: 2018-07-29 | End: 2018-07-29

## 2018-07-29 RX ADMIN — ALBUTEROL SULFATE 2.5 MG: 2.5 SOLUTION RESPIRATORY (INHALATION) at 23:41

## 2018-07-30 ENCOUNTER — HOSPITAL ENCOUNTER (OUTPATIENT)
Dept: CT IMAGING | Age: 83
Discharge: HOME | End: 2018-07-30
Attending: NURSE PRACTITIONER
Payer: MEDICARE

## 2018-07-30 ENCOUNTER — OFFICE VISIT (OUTPATIENT)
Dept: INTERNAL MEDICINE CLINIC | Age: 83
End: 2018-07-30

## 2018-07-30 VITALS
OXYGEN SATURATION: 92 % | RESPIRATION RATE: 18 BRPM | BODY MASS INDEX: 23.57 KG/M2 | SYSTOLIC BLOOD PRESSURE: 140 MMHG | HEIGHT: 63 IN | TEMPERATURE: 97.8 F | HEART RATE: 84 BPM | DIASTOLIC BLOOD PRESSURE: 60 MMHG | WEIGHT: 133 LBS

## 2018-07-30 VITALS
BODY MASS INDEX: 23.56 KG/M2 | OXYGEN SATURATION: 99 % | SYSTOLIC BLOOD PRESSURE: 115 MMHG | DIASTOLIC BLOOD PRESSURE: 61 MMHG | WEIGHT: 133 LBS | RESPIRATION RATE: 21 BRPM | HEART RATE: 90 BPM | TEMPERATURE: 98.2 F

## 2018-07-30 DIAGNOSIS — R06.89 GASPING FOR BREATH: ICD-10-CM

## 2018-07-30 DIAGNOSIS — R06.00 DYSPNEA, UNSPECIFIED TYPE: ICD-10-CM
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
## 2018-07-30 LAB
ATRIAL RATE: 65 BPM
CALCULATED P AXIS, ECG09: 19 DEGREES
CALCULATED R AXIS, ECG10: -32 DEGREES
CALCULATED T AXIS, ECG11: 49 DEGREES
DIAGNOSIS, 93000: NORMAL
P-R INTERVAL, ECG05: 170 MS
Q-T INTERVAL, ECG07: 424 MS
QRS DURATION, ECG06: 102 MS
QTC CALCULATION (BEZET), ECG08: 440 MS
VENTRICULAR RATE, ECG03: 65 BPM

## 2018-07-30 PROCEDURE — 96374 THER/PROPH/DIAG INJ IV PUSH: CPT

## 2018-07-30 PROCEDURE — 71250 CT THORAX DX C-: CPT

## 2018-07-30 PROCEDURE — 74011250636 HC RX REV CODE- 250/636: Performed by: EMERGENCY MEDICINE

## 2018-07-30 RX ORDER — ALBUTEROL SULFATE 90 UG/1
2 AEROSOL, METERED RESPIRATORY (INHALATION)
Qty: 1 INHALER | Refills: 0 | Status: SHIPPED | OUTPATIENT
Start: 2018-07-30 | End: 2018-08-15 | Stop reason: ALTCHOICE

## 2018-07-30 RX ORDER — PREDNISONE 50 MG/1
50 TABLET ORAL DAILY
Qty: 3 TAB | Refills: 0 | Status: SHIPPED | OUTPATIENT
Start: 2018-07-30 | End: 2018-08-02

## 2018-07-30 RX ADMIN — METHYLPREDNISOLONE SODIUM SUCCINATE 125 MG: 125 INJECTION, POWDER, FOR SOLUTION INTRAMUSCULAR; INTRAVENOUS at 01:14

## 2018-07-30 NOTE — ED PROVIDER NOTES
EMERGENCY DEPARTMENT HISTORY AND PHYSICAL EXAM 
 
11:27 PM 
 
 
Date: 7/29/2018 Patient Name: Marika He History of Presenting Illness Chief Complaint Patient presents with  Shortness of Breath History Provided By: Patient Chief Complaint: SOB Duration:  Days Timing:  Intermittent Location: bilat LE Quality: n/a Severity: N/A Modifying Factors: none Associated Symptoms: leg swelling and redness Additional History (Context): Marika He is a 80 y.o. female with DM, HTN, CAD, and CHF who presents with sudden on set SOB that started PTA. Reports she had another episode of SOB yesterday afternoon that resolved on its own. Also notes she has noticed her legs have been mildly more swollen than usual with it being worse on the left; her left leg also had some redness to the skin. Hx of CHF. She is on diuretics that she has been taking as directed. Before episode she was feeling fine all day today. Nothing seems to making her sx better or worse. Denies CP, fever, chills, nausea, vomiting, back pain, urinary sx, weakness, numbness, or any other associated sx. No other complaints or concerns. As the patient is without physical symptoms or complaints of pain, there is no severity of pain, quality of pain, duration, modifying factors, or associated signs and symptoms regarding the pt's presenting complaint. PCP: Joan Hinson MD 
 
Current Facility-Administered Medications Medication Dose Route Frequency Provider Last Rate Last Dose  methylPREDNISolone (PF) (SOLU-MEDROL) injection 125 mg  125 mg IntraVENous NOW Mega Pickering MD      
 
Current Outpatient Prescriptions Medication Sig Dispense Refill  predniSONE (DELTASONE) 50 mg tablet Take 1 Tab by mouth daily for 3 days. 3 Tab 0  
 albuterol (PROVENTIL HFA) 90 mcg/actuation inhaler Take 2 Puffs by inhalation every four (4) hours as needed for Wheezing.  1 Inhaler 0  
 naproxen sodium (ALEVE PO) Take  by mouth as needed.  furosemide (LASIX) 40 mg tablet Take 1 Tab by mouth daily. 30 Tab 6  sertraline (ZOLOFT) 25 mg tablet take 1 tablet by mouth once daily 30 Tab 2  
 RABEprazole (ACIPHEX) 20 mg tablet take 1 tablet by mouth once daily 30 Tab 5  
 glipiZIDE-metFORMIN (METAGLIP) 5-500 mg per tablet take 2 tablets by mouth once daily (1 BEFORE BREAKFAST AND 1 BEFORE DINNER) 120 Tab 10  
 JANUVIA 100 mg tablet take 1 tablet by mouth once daily (Patient taking differently: take 1/2 tablet by mouth once daily) 30 Tab 5  cyanocobalamin (VITAMIN B-12) 2,000 mcg TbER Take 3 Tabs by mouth daily.  loperamide (IMODIUM) 2 mg capsule Take 1 mg by mouth Three (3) times a week.  potassium chloride (K-DUR, KLOR-CON) 10 mEq tablet Take 1 Tab by mouth daily. 30 Tab 6  
 traMADol (ULTRAM) 50 mg tablet Take 1 Tab by mouth every eight (8) hours as needed for Pain. Max Daily Amount: 150 mg. 20 Tab 0  pravastatin (PRAVACHOL) 40 mg tablet Take 1 Tab by mouth nightly. 90 Tab 3  
 metroNIDAZOLE (NORITATE) 1 % topical cream Apply daily 30 g 0  
 glucose blood VI test strips (FREESTYLE LITE STRIPS) strip Test Blood Sugar once daily; ICD-10 E11.9; Quantity 100 100 Strip 11  Lancets (FREESTYLE LANCETS) misc Test bloos sugar once daily; ICD-10 E11.9; quantity 100 100 Each 11  
 lidocaine (LIDODERM) 5 % Apply patch to the affected area for 12 hours a day and remove for 12 hours a day. 5 Each 0  
 LORazepam (ATIVAN) 1 mg tablet Take 0.5 Tabs by mouth two (2) times daily as needed. Max Daily Amount: 1 mg. (Patient taking differently: Take 0.5 Tabs by mouth two (2) times daily as needed for Anxiety.) 40 Tab 0  
 aspirin delayed-release 81 mg tablet Take  by mouth daily.  cholecalciferol (VITAMIN D3) 1,000 unit tablet Take  by mouth daily.  calcium citrate-vitamin d3 (CITRACAL + D) 315-200 mg-unit tab Take 1 tablet by mouth daily (with breakfast). Past History Past Medical History: 
Past Medical History: Diagnosis Date  Aortic valve stenosis, mild April 2014 EF 33-81%, Grade 1 diastolic dysfunction, mild aortic stenosis,  mean gradient 10 mm Hg  CAD (coronary artery disease) aortic stenosis  Cardiac echocardiogram 11/15/2016 EF 55-60%. No RWMA. Mild-mod LVH. Indeterminate diastolic fx. Mod LAE. Mild-mod AS (mean grad 13.14 mmHg).  Cardiac nuclear imaging test 08/27/2008 No evidence of ischemia or prior scarring. EF 62%. No WMA. No significant change from study of 3/23/06.  Cardiovascular RLE venous duplex 12/01/2016 Right leg:  No DVT.  Carotid duplex 04/24/2013 Mild 2-09% LICA stenosis.  DM (diabetes mellitus) ([address]  Dyslipidemia  Heart failure ([address]  HTN (hypertension)  Mixed hyperlipidemia  Sciatica  Traumatic closed displaced fracture of acromial end of clavicle, left, initial encounter Past Surgical History: 
Past Surgical History:  
Procedure Laterality Date  HX APPEN[address]CTOMY [address]  HX OTHER SURGICAL Right 2010  
 ankle surgery  HX TUMOR REMOVAL Left   
 ovarian  HX UROLOGICAL    
 kidney stone removal  
 
 
Family History: 
Family History Problem Relation Age of Onset  Stroke Mother  Heart Disease Mother  Lung Disease Father  Cancer Neg Hx  Diabetes Neg Hx  Hypertension Neg Hx Social History: 
Social History Substance Use Topics  Smoking status: Never Smoker  Smokeless tobacco: Never Used  Alcohol use No  
 
 
Allergies: 
No Known Allergies Review of Systems Review of Systems Constitutional: Negative for chills, fatigue and fever. HENT: Positive for dental problem. Negative for sore throat. Eyes: Negative. Respiratory: Positive for shortness of breath. Negative for cough. Cardiovascular: Positive for leg swelling. Negative for chest pain and palpitations. Gastrointestinal: Negative for abdominal pain, nausea and vomiting. Genitourinary: Negative for dysuria. Musculoskeletal: Negative. Skin: Positive for color change. Neurological: Negative for dizziness, weakness, light-headedness and headaches. Psychiatric/Behavioral: Negative. All other systems reviewed and are negative. Physical Exam  
 
Visit Vitals  /72  Pulse 74  Temp 98.2 °F (36.8 °C)  Resp 12  Wt 60.3 kg (133 lb)  SpO2 100%  BMI 23.56 kg/m2 Physical Exam  
Constitutional: She is oriented to person, place, and time. She appears well-developed and well-nourished. No distress. HENT:  
Head: Normocephalic. Right Ear: External ear normal.  
Left Ear: External ear normal.  
Mouth/Throat: No oropharyngeal exudate. Eyes: Conjunctivae and EOM are normal. Pupils are equal, round, and reactive to light. Right eye exhibits no discharge. Left eye exhibits no discharge. No scleral icterus. Neck: Normal range of motion. Neck supple. No JVD present. No tracheal deviation present. No thyromegaly present. Cardiovascular: Normal rate, regular rhythm, normal heart sounds and intact distal pulses. Exam reveals no gallop and no friction rub. No murmur heard. Pulmonary/Chest: Effort normal. No stridor. No respiratory distress. She has decreased breath sounds. She has no wheezes. She has no rales. She exhibits no tenderness. Mildly decreased breath sounds. Abdominal: Soft. Bowel sounds are normal. She exhibits no distension and no mass. There is no tenderness. There is no rebound and no guarding. Musculoskeletal: Normal range of motion. She exhibits edema. She exhibits no tenderness. +3 pitting edema from the knee down.  (son states this chronic and is unchanged from normal edema of legs) Lymphadenopathy:  
  She has no cervical adenopathy. Neurological: She is alert and oriented to person, place, and time. She displays normal reflexes. No cranial nerve deficit. She exhibits normal muscle tone.  Coordination normal. Skin: Skin is warm and dry. No rash noted. She is not diaphoretic. No erythema. No pallor. Nursing note and vitals reviewed. Diagnostic Study Results Labs - Recent Results (from the past 12 hour(s)) EKG, 12 LEAD, INITIAL Collection Time: 07/29/18 10:59 PM  
Result Value Ref Range Ventricular Rate 65 BPM  
 Atrial Rate 65 BPM  
 P-R Interval 170 ms QRS Duration 102 ms Q-T Interval 424 ms QTC Calculation (Bezet) 440 ms Calculated P Axis 19 degrees Calculated R Axis -32 degrees Calculated T Axis 49 degrees Diagnosis Sinus rhythm with occasional premature ventricular complexes Left axis deviation Septal infarct , age undetermined Abnormal ECG When compared with ECG of 14-NOV-2016 15:18, 
premature ventricular complexes are now present CBC WITH AUTOMATED DIFF Collection Time: 07/29/18 11:00 PM  
Result Value Ref Range WBC 4.5 (L) 4.6 - 13.2 K/uL  
 RBC 3.58 (L) 4.20 - 5.30 M/uL  
 HGB 10.9 (L) 12.0 - 16.0 g/dL HCT 34.1 (L) 35.0 - 45.0 % MCV 95.3 74.0 - 97.0 FL  
 MCH 30.4 24.0 - 34.0 PG  
 MCHC 32.0 31.0 - 37.0 g/dL  
 RDW 13.4 11.6 - 14.5 % PLATELET 98 (L) 362 - 420 K/uL MPV 11.4 9.2 - 11.8 FL  
 NEUTROPHILS 56 40 - 73 % LYMPHOCYTES 36 21 - 52 % MONOCYTES 6 3 - 10 % EOSINOPHILS 2 0 - 5 % BASOPHILS 0 0 - 2 %  
 ABS. NEUTROPHILS 2.5 1.8 - 8.0 K/UL  
 ABS. LYMPHOCYTES 1.6 0.9 - 3.6 K/UL  
 ABS. MONOCYTES 0.3 0.05 - 1.2 K/UL  
 ABS. EOSINOPHILS 0.1 0.0 - 0.4 K/UL  
 ABS. BASOPHILS 0.0 0.0 - 0.1 K/UL PLATELET COMMENTS DECREASED PLATELETS    
 RBC COMMENTS NORMOCYTIC, NORMOCHROMIC    
 DF AUTOMATED METABOLIC PANEL, BASIC Collection Time: 07/29/18 11:00 PM  
Result Value Ref Range Sodium 140 136 - 145 mmol/L Potassium 3.6 3.5 - 5.5 mmol/L Chloride 106 100 - 108 mmol/L  
 CO2 30 21 - 32 mmol/L Anion gap 4 3.0 - 18 mmol/L Glucose 176 (H) 74 - 99 mg/dL BUN 17 7.0 - 18 MG/DL  Creatinine 0.87 0.6 - 1.3 MG/DL  
 BUN/Creatinine ratio 20 12 - 20 GFR est AA >60 >60 ml/min/1.73m2 GFR est non-AA >60 >60 ml/min/1.73m2 Calcium 8.9 8.5 - 10.1 MG/DL  
CARDIAC PANEL,(CK, CKMB & TROPONIN) Collection Time: 07/29/18 11:00 PM  
Result Value Ref Range CK 32 26 - 192 U/L  
 CK - MB 1.1 <3.6 ng/ml CK-MB Index 3.4 0.0 - 4.0 % Troponin-I, Qt. <0.02 0.00 - 0.06 NG/ML  
NT-PRO BNP Collection Time: 07/29/18 11:00 PM  
Result Value Ref Range NT pro- 0 - 1800 PG/ML  
PROTHROMBIN TIME + INR Collection Time: 07/29/18 11:00 PM  
Result Value Ref Range Prothrombin time 13.7 11.5 - 15.2 sec INR 1.1 0.8 - 1.2 PTT Collection Time: 07/29/18 11:00 PM  
Result Value Ref Range aPTT 31.4 23.0 - 36.4 SEC  
HEPATIC FUNCTION PANEL Collection Time: 07/29/18 11:00 PM  
Result Value Ref Range Protein, total 6.4 6.4 - 8.2 g/dL Albumin 3.3 (L) 3.4 - 5.0 g/dL Globulin 3.1 2.0 - 4.0 g/dL A-G Ratio 1.1 0.8 - 1.7 Bilirubin, total 0.5 0.2 - 1.0 MG/DL Bilirubin, direct 0.2 0.0 - 0.2 MG/DL Alk. phosphatase 83 45 - 117 U/L  
 AST (SGOT) 14 (L) 15 - 37 U/L  
 ALT (SGPT) 17 13 - 56 U/L  
MAGNESIUM Collection Time: 07/29/18 11:00 PM  
Result Value Ref Range Magnesium 1.6 1.6 - 2.6 mg/dL Radiologic Studies -  
XR CHEST PORT    (Results Pending) XR preliminary reading done by Dr. Moe Jean Baptiste MD; pt xray shows no acute process. Medical Decision Making I am the first provider for this patient. I reviewed the vital signs, available nursing notes, past medical history, past surgical history, family history and social history. Vital Signs-Reviewed the patient's vital signs. Pulse Oximetry Analysis - 100% RA non hypoxic. EKG: Interpreted by the EP. Time Interpreted: 80 Rate: 65 Rhythm:Sinus Rhythm with occasional PVCs Interpretation: No STEMI Records Reviewed: Nursing Notes and Old Medical Records (Time of Review: 11:27 PM) ED Course: Progress Notes, Reevaluation, and Consults: 
 
1:09 AM the pt is resting comfortably. She states she is feeling better after treatment. I have discussed labs and imaginf with the pt. Recommends she follows up with her PCP. Will discharge home. Provider Notes (Medical Decision Making): Dif Dx: bronchitis, COPD, asthma, viral syndrome Diagnosis Clinical Impression: COPD Disposition: D/C home Follow-up Information Follow up With Details Comments Contact Info Danilo Beckwith MD Call in 2 days Follow up. [phone] [id number] [phone] [facility] EMERGENCY DEPT  If symptoms worsen, As needed Prabhjot Estrada [facility] [id number] [phone] Patient's Medications Start Taking ALBUTEROL (PROVENTIL HFA) 90 MCG/ACTUATION INHALER    Take 2 Puffs by inhalation every four (4) hours as needed for Wheezing. PREDNISONE (DELTASONE) 50 MG TABLET    Take 1 Tab by mouth daily for 3 days. Continue Taking ASPIRIN DELAYED-RELEASE 81 MG TABLET    Take  by mouth daily. CALCIUM CITRATE-VITAMIN D3 (CITRACAL + D) 315-200 MG-UNIT TAB    Take 1 tablet by mouth daily (with breakfast). CHOLECALCIFEROL (VITAMIN D3) 1,000 UNIT TABLET    Take  by mouth daily. CYANOCOBALAMIN (VITAMIN B-12) 2,000 MCG TBER    Take 3 Tabs by mouth daily. FUROSEMIDE (LASIX) 40 MG TABLET    Take 1 Tab by mouth daily. GLIPIZIDE-METFORMIN (METAGLIP) 5-500 MG PER TABLET    take 2 tablets by mouth once daily (1 BEFORE BREAKFAST AND 1 BEFORE DINNER) GLUCOSE BLOOD VI TEST STRIPS (FREESTYLE LITE STRIPS) STRIP    Test Blood Sugar once daily; ICD-10 E11.9; Quantity 100 JANUVIA 100 MG TABLET    take 1 tablet by mouth once daily LANCETS (FREESTYLE LANCETS) MISC    Test bloos sugar once daily; ICD-10 E11.9; quantity 100 LIDOCAINE (LIDODERM) 5 %    Apply patch to the affected area for 12 hours a day and remove for 12 hours a day.   
 LOPERAMIDE (IMODIUM) 2 MG CAPSULE    Take 1 mg by mouth Three (3) times a week. LORAZEPAM (ATIVAN) 1 MG TABLET    Take 0.5 Tabs by mouth two (2) times daily as needed. Max Daily Amount: 1 mg. METRONIDAZOLE (NORITATE) 1 % TOPICAL CREAM    Apply daily NAPROXEN SODIUM (ALEVE PO)    Take  by mouth as needed. POTASSIUM CHLORIDE (K-DUR, KLOR-CON) 10 MEQ TABLET    Take 1 Tab by mouth daily. PRAVASTATIN (PRAVACHOL) 40 MG TABLET    Take 1 Tab by mouth nightly. RABEPRAZOLE (ACIPHEX) 20 MG TABLET    take 1 tablet by mouth once daily SERTRALINE (ZOLOFT) 25 MG TABLET    take 1 tablet by mouth once daily TRAMADOL (ULTRAM) 50 MG TABLET    Take 1 Tab by mouth every eight (8) hours as needed for Pain. Max Daily Amount: 150 mg. These Medications have changed No medications on file Stop Taking No medications on file  
 
_______________________________ Attestations: 
Scribe Attestation Lorenzo Aguirre acting as a scribe for and in the presence of José Strong MD     
July 29, 2018 at 11:27 PM 
    
Provider Attestation:     
I personally performed the services described in the documentation, reviewed the documentation, as recorded by the scribe in my presence, and it accurately and completely records my words and actions. July 29, 2018 at 11:27 PM - José Strong MD   
____________________

## 2018-07-30 NOTE — DISCHARGE INSTRUCTIONS
Learning About Chronic Bronchitis  What is chronic bronchitis? Chronic bronchitis is long-term swelling and the buildup of mucus in the airways of your lungs. The airways (bronchial tubes) get inflamed and make a lot of mucus. This can narrow or block the airways, making it hard for you to breathe. It is a form of COPD (chronic obstructive pulmonary disease). Chronic bronchitis is usually caused by smoking. But chemical fumes, dust, or air pollution also can cause it over time. What can you expect when you have chronic bronchitis? Chronic bronchitis gets worse over time. You cannot undo the damage to your lungs. Over time, you may find that:  · You get short of breath even when you do simple things like get dressed or fix a meal.  · It is hard to eat or exercise. · You lose weight and feel weaker. Over many years, the swelling and mucus from chronic bronchitis make it more likely that you will get lung infections. But there are things you can do to prevent more damage and feel better. What are the symptoms? The main symptoms of chronic bronchitis are:  · A cough that will not go away. · Mucus that comes up when you cough. · Shortness of breath that gets worse when you exercise. At times, your symptoms may suddenly flare up and get much worse. This is a called an exacerbation (say \"egg-[id number]-BAY-brigid\"). When this happens, your usual symptoms quickly get worse and stay bad. This can be dangerous. You may have to go to the hospital.  How can you keep chronic bronchitis from getting worse? Don't smoke. That is the best way to keep chronic bronchitis from getting worse. If you already smoke, it is never too late to stop. If you need help quitting, talk to your doctor about stop-smoking programs and medicines. These can increase your chances of quitting for good. You can do other things to keep chronic bronchitis from getting worse:  · Avoid bad air.  Air pollution, chemical fumes, and dust also can make chronic bronchitis worse. · Get a flu shot every year. A shot may keep the flu from turning into something more serious, like pneumonia. A flu shot also may lower your chances of having a flare-up. · Get a pneumococcal shot. A shot can prevent some of the serious complications of pneumonia. Ask your doctor how often you should get this shot. How is chronic bronchitis treated? Chronic bronchitis is treated with medicines and oxygen. You also can take steps at home to stay healthy and keep your condition from getting worse. Medicines and oxygen therapy  · You may be taking medicines such as:  ¨ Bronchodilators. These help open your airways and make breathing easier. Bronchodilators are either short-acting (work for 6 to 9 hours) or long-acting (work for 24 hours). You inhale most bronchodilators, so they start to act quickly. Always carry your quick-relief inhaler with you in case you need it while you are away from home. ¨ Corticosteroids. These reduce airway inflammation. They come in pill or inhaled form. You must take these medicines every day for them to work well. ¨ Antibiotics. These medicines are used when you have a bacterial lung infection. · Take your medicines exactly as prescribed. Call your doctor if you think you are having a problem with your medicine. · Oxygen therapy boosts the amount of oxygen in your blood and helps you breathe easier. Use the flow rate your doctor has recommended, and do not change it without talking to your doctor first.  Other care at home  · If your doctor recommends it, get more exercise. Walking is a good choice. Bit by bit, increase the amount you walk every day. Try for at least 30 minutes on most days of the week. · Learn breathing methods-such as breathing through pursed lips-to help you become less short of breath. · If your doctor has not set you up with a pulmonary rehabilitation program, talk to him or her about whether rehab is right for you.  Rehab includes exercise programs, education about your disease and how to manage it, help with diet and other changes, and emotional support. · Eat regular, healthy meals. Use bronchodilators about 1 hour before you eat to make it easier to eat. Eat several small meals instead of three large ones. Drink beverages at the end of the meal. Avoid foods that are hard to chew. Follow-up care is a key part of your treatment and safety. Be sure to make and go to all appointments, and call your doctor if you are having problems. It's also a good idea to know your test results and keep a list of the medicines you take. Where can you learn more? Go to http://josé miguel-reid.info/. Enter A031 in the search box to learn more about \"Learning About Chronic Bronchitis. \"  Current as of: December 6, 2017  Content Version: 11.7  © 6112-7868 [employer]. Care instructions adapted under license by [employer] (which disclaims liability or warranty for this information). If you have questions about a medical condition or this instruction, always ask your healthcare professional. [address] any warranty or liability for your use of this information.

## 2018-07-30 NOTE — MR AVS SNAPSHOT
Migdalia Reeder 
 
 
 [address] [facility] [facility] [address] [phone] Patient: Bob Ramirez MRN: [id number] Visit Information Date & Time Provider Department Dept. Phone Encounter #  
 7/30/2018 11:45 AM Maryanne Hernandez NP [facility] INTERNAL MEDICINE [facility], [place] [phone] [id number] Your Appointments 7/31/2018  1:45 PM  
Follow Up with Areli Manjarrez MD  
[facility] [place] [address] [address]) Appt Note: 1 WK FU LT SHOULDER  
 [address] [facility], [address]  
[phone]  
  
   
 [address], [facility] [place] [address]  
  
    
 8/29/2018  3:00 PM  
Follow Up with Leola Jameson MD  
[facility] ([address]) Appt Note: 12 wk f/up [place] [address] [phone] [address] Upcoming Health Maintenance Date Due  
 FOOT EXAM Q1 1/5/1943 DTaP/Tdap/Td series (1 - Tdap) 7/31/2023* Influenza Age 5 to Adult 8/1/2018 HEMOGLOBIN A1C Q6M 11/15/2018 EYE EXAM RETINAL OR DILATED Q1 4/6/2019 MICROALBUMIN Q1 5/15/2019 LIPID PANEL Q1 5/15/2019 MEDICARE YEARLY EXAM 5/19/2019 GLAUCOMA SCREENING Q2Y 4/6/2020 *Topic was postponed. The date shown is not the original due date. Allergies as of 7/30/2018  Review Complete On: 7/29/2018 By: Vinita Boo RN No Known Allergies Current Immunizations  Reviewed on 3/21/2018 Name Date Influenza High Dose Vaccine PF 9/12/2017 Influenza Vaccine 9/1/2016, 10/1/2015 Pneumococcal Conjugate (PCV-13) 5/17/2017 Pneumococcal Polysaccharide (PPSV-23) 10/1/2015, 9/10/2014, 4/18/2013 Zoster Vaccine, Live 1/17/2012 Not reviewed this visit You Were Diagnosed With   
  
 Codes Comments Fall, initial encounter    -  Primary ICD-10-CM: W19. Ivan Marley ICD-9-CM: E888.9 Dyspnea, unspecified type     ICD-10-CM: R06.00 
ICD-9-CM: 786.09 Gasping for breath     ICD-10-CM: R06.89 
ICD-9-CM: 786.09 Vitals BP Pulse Temp Resp Height(growth percentile) 140/60 (BP 1 Location: Left arm, BP Patient Position: Sitting) 84 97.8 °F (36.6 °C) (Tympanic) 18 5' 3\" (1.6 m) Weight(growth percentile) SpO2 BMI OB Status Smoking Status 133 lb (60.3 kg) 92% 23.56 kg/m2 Postmenopausal Never Smoker BMI and BSA Data Body Mass Index Body Surface Area  
 23.56 kg/m 2 1.64 m 2 Preferred Pharmacy Pharmacy Name Phone [id number] [address] Lyndsey Calderon, [address] . [phone] Your Updated Medication List  
  
   
This list is accurate as of 7/30/18  1:10 PM.  Always use your most recent med list.  
  
  
  
  
 albuterol 90 mcg/actuation inhaler Commonly known as:  PROVENTIL HFA Take 2 Puffs by inhalation every four (4) hours as needed for Wheezing. ALEVE PO Take  by mouth as needed. aspirin delayed-release 81 mg tablet Take  by mouth daily. CITRACAL PLUS D 315-200 mg-unit Tab Generic drug:  calcium citrate-vitamin d3 Take 1 tablet by mouth daily (with breakfast). furosemide 40 mg tablet Commonly known as:  LASIX Take 1 Tab by mouth daily. glipiZIDE-metFORMIN 5-500 mg per tablet Commonly known as:  METAGLIP  
take 2 tablets by mouth once daily (1 BEFORE BREAKFAST AND 1 BEFORE DINNER) glucose blood VI test strips strip Commonly known as:  FREESTYLE LITE STRIPS Test Blood Sugar once daily; ICD-10 E11.9; Quantity 100 JANUVIA 100 mg tablet Generic drug:  SITagliptin  
take 1 tablet by mouth once daily Lancets Misc Commonly known as:  FREESTYLE LANCETS Test bloos sugar once daily; ICD-10 E11.9; quantity 100  
  
 lidocaine 5 % Commonly known as:  Patpamela Copas Apply patch to the affected area for 12 hours a day and remove for 12 hours a day. loperamide 2 mg capsule Commonly known as:  IMODIUM Take 1 mg by mouth Three (3) times a week. LORazepam 1 mg tablet Commonly known as:  ATIVAN Take 0.5 Tabs by mouth two (2) times daily as needed. Max Daily Amount: 1 mg.  
  
 metroNIDAZOLE 1 % topical cream  
Commonly known as:  Wanda Corns Apply daily  
  
 potassium chloride 10 mEq tablet Commonly known as:  KLOR-CON Take 1 Tab by mouth daily. pravastatin 40 mg tablet Commonly known as:  PRAVACHOL Take 1 Tab by mouth nightly. predniSONE 50 mg tablet Commonly known as:  Reino Reyes Take 1 Tab by mouth daily for 3 days. RABEprazole 20 mg tablet Commonly known as:  ACIPHEX  
take 1 tablet by mouth once daily  
  
 sertraline 25 mg tablet Commonly known as:  ZOLOFT  
take 1 tablet by mouth once daily  
  
 traMADol 50 mg tablet Commonly known as:  ULTRAM  
Take 1 Tab by mouth every eight (8) hours as needed for Pain. Max Daily Amount: 150 mg. VITAMIN B-12 2,000 mcg Tber Generic drug:  cyanocobalamin Take 3 Tabs by mouth daily. VITAMIN D3 1,000 unit tablet Generic drug:  cholecalciferol Take  by mouth daily. To-Do List   
 07/30/2018 Imaging:  CT CHEST WO CONT   
  
 07/30/2018 Imaging:  MRI BRAIN WO CONT   
  
 07/30/2018 2:30 PM  
  Appointment with [facility] CT RM 2 at [facility] RAD [address] ([phone]) GENERAL INSTRUCTIONS  This study does not require you to drink contrast prior to your study. RELATED STUDY INFORMATION  Bring any films, CDs, and reports related with you on the day of your exam.  This only includes studies done outside of [address] [place] [address], [place], and [place]. QUESTIONS  Notify the CT Department if you have any questions concerning your study. [address] [address] [place] [address]  
  
 07/31/2018 10:45 AM  
  Appointment with [facility] MRI RM 1 at [facility] MRI ([phone]) GENERAL INSTRUCTIONS  Bring information (ID card) if you have any medically implanted devices. You will be required to lie still while the procedure is being performed. Remove any jewelry (including body piercing, hairpins) prior to MRI. If you have had a creatinine level drawn within the past 30 days, please bring most recent results to your appt. Bring any films, CD's, and reports related to your study with you on the day of your exam.  This only includes studies done outside of [address] [place] [address], [place] [address], [facility], and [facility]. Bring a complete list of all medications you are currently taking to include prescriptions, over-the-counter meds, herbals, vitamins & any dietary supplements. If you were given medications for claustrophobia or anxiety, please arrange to have someone drive you to your appointment. QUESTIONS  Notify the MRI Department if you have any questions concerning your study. Iqra Cosby - [phone] [address] [phone] Introducing [facility] SERVICES! Henrique Brower introduces [employer] patient portal. Now you can access parts of your medical record, email your doctor's office, and request medication refills online. 1. In your internet browser, go to [email or web address] 2. Click on the First Time User? Click Here link in the Sign In box. You will see the New Member Sign Up page. 3. Enter your [employer] Access Code exactly as it appears below. You will not need to use this code after youve completed the sign-up process. If you do not sign up before the expiration date, you must request a new code. · [employer] Access Code: [email or web address] Expires: 9/5/2018  9:45 AM 
 
4. Enter the last four digits of your Social Security Number (xxxx) and Date of Birth (mm/dd/yyyy) as indicated and click Submit. You will be taken to the next sign-up page. 5. Create a [employer] ID. This will be your [employer] login ID and cannot be changed, so think of one that is secure and easy to remember. 6. Create a [employer] password. You can change your password at any time. 7. Enter your Password Reset Question and Answer. This can be used at a later time if you forget your password. 8. Enter your e-mail address. You will receive e-mail notification when new information is available in [address] 9. Click Sign Up. You can now view and download portions of your medical record. 10. Click the Download Summary menu link to download a portable copy of your medical information. If you have questions, please visit the Frequently Asked Questions section of the [employer] website. Remember, [employer] is NOT to be used for urgent needs. For medical emergencies, dial 911. Now available from your iPhone and Android! Please provide this summary of care documentation to your next provider. Your primary care clinician is listed as Earl November. If you have any questions after today's visit, please call [phone].

## 2018-07-31 ENCOUNTER — HOSPITAL ENCOUNTER (OUTPATIENT)
Dept: MRI IMAGING | Age: 83
Discharge: HOME | End: 2018-07-31
Attending: NURSE PRACTITIONER
Payer: MEDICARE

## 2018-07-31 ENCOUNTER — OFFICE VISIT (OUTPATIENT)
Dept: ORTHOPEDIC SURGERY | Facility: CLINIC | Age: 83
End: 2018-07-31

## 2018-07-31 VITALS
OXYGEN SATURATION: 98 % | TEMPERATURE: 96 F | HEART RATE: 63 BPM | WEIGHT: 135.6 LBS | SYSTOLIC BLOOD PRESSURE: 148 MMHG | RESPIRATION RATE: 16 BRPM | BODY MASS INDEX: 24.03 KG/M2 | HEIGHT: 63 IN | DIASTOLIC BLOOD PRESSURE: 54 MMHG

## 2018-07-31 DIAGNOSIS — M25.512 LEFT SHOULDER PAIN, UNSPECIFIED CHRONICITY: Primary | ICD-10-CM
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
## 2018-07-31 PROCEDURE — 70551 MRI BRAIN STEM W/O DYE: CPT

## 2018-07-31 NOTE — PROGRESS NOTES
Patient: Delta Ward                MRN: [id number]       SSN: [id number] YOB: 1933        AGE: 80 y.o. SEX: female Body mass index is 24.02 kg/(m^2). PCP: Graeme Ricketts MD 
07/31/18 CC: Left clavicle fracture HISTORY OF PRESENT ILLNESS:  Jere Thibodeaux returns to the office today for her left shoulder. Overall, her pain has been improving. She is having some shooting pains at times. She has been wearing her sling. Unfortunately, she has been having some shortness of breath, and she has been seen in the emergency room. She also had an MRI of her head and neck today. She reports that her ecchymosis in her shoulder and her arm is improving. Past Medical History:  
Diagnosis Date  Aortic valve stenosis, mild April 2014 EF 66-26%, Grade 1 diastolic dysfunction, mild aortic stenosis,  mean gradient 10 mm Hg  CAD (coronary artery disease) aortic stenosis  Cardiac echocardiogram 11/15/2016 EF 55-60%. No RWMA. Mild-mod LVH. Indeterminate diastolic fx. Mod LAE. Mild-mod AS (mean grad 13.14 mmHg).  Cardiac nuclear imaging test 08/27/2008 No evidence of ischemia or prior scarring. EF 62%. No WMA. No significant change from study of 3/23/06.  Cardiovascular RLE venous duplex 12/01/2016 Right leg:  No DVT.  Carotid duplex 04/24/2013 Mild 7-57% LICA stenosis.  DM (diabetes mellitus) ([facility]  Dyslipidemia  Heart failure ([facility]  HTN (hypertension)  Mixed hyperlipidemia  Sciatica  Traumatic closed displaced fracture of acromial end of clavicle, left, initial encounter Family History Problem Relation Age of Onset  Stroke Mother  Heart Disease Mother  Lung Disease Father  Cancer Neg Hx  Diabetes Neg Hx  Hypertension Neg Hx Current Outpatient Prescriptions Medication Sig Dispense Refill  predniSONE (DELTASONE) 50 mg tablet Take 1 Tab by mouth daily for 3 days.  3 Tab 0  
 albuterol (PROVENTIL HFA) 90 mcg/actuation inhaler Take 2 Puffs by inhalation every four (4) hours as needed for Wheezing. 1 Inhaler 0  
 naproxen sodium (ALEVE PO) Take  by mouth as needed.  furosemide (LASIX) 40 mg tablet Take 1 Tab by mouth daily. 30 Tab 6  sertraline (ZOLOFT) 25 mg tablet take 1 tablet by mouth once daily 30 Tab 2  
 RABEprazole (ACIPHEX) 20 mg tablet take 1 tablet by mouth once daily 30 Tab 5  
 glipiZIDE-metFORMIN (METAGLIP) 5-500 mg per tablet take 2 tablets by mouth once daily (1 BEFORE BREAKFAST AND 1 BEFORE DINNER) 120 Tab 10  
 JANUVIA 100 mg tablet take 1 tablet by mouth once daily (Patient taking differently: take 1/2 tablet by mouth once daily) 30 Tab 5  cyanocobalamin (VITAMIN B-12) 2,000 mcg TbER Take 3 Tabs by mouth daily.  loperamide (IMODIUM) 2 mg capsule Take 1 mg by mouth Three (3) times a week.  potassium chloride (K-DUR, KLOR-CON) 10 mEq tablet Take 1 Tab by mouth daily. 30 Tab 6  pravastatin (PRAVACHOL) 40 mg tablet Take 1 Tab by mouth nightly. 90 Tab 3  
 metroNIDAZOLE (NORITATE) 1 % topical cream Apply daily 30 g 0  
 glucose blood VI test strips (FREESTYLE LITE STRIPS) strip Test Blood Sugar once daily; ICD-10 E11.9; Quantity 100 100 Strip 11  Lancets (FREESTYLE LANCETS) misc Test bloos sugar once daily; ICD-10 E11.9; quantity 100 100 Each 11  
 lidocaine (LIDODERM) 5 % Apply patch to the affected area for 12 hours a day and remove for 12 hours a day. 5 Each 0  
 LORazepam (ATIVAN) 1 mg tablet Take 0.5 Tabs by mouth two (2) times daily as needed. Max Daily Amount: 1 mg. (Patient taking differently: Take 0.5 Tabs by mouth two (2) times daily as needed for Anxiety.) 40 Tab 0  
 aspirin delayed-release 81 mg tablet Take  by mouth daily.  cholecalciferol (VITAMIN D3) 1,000 unit tablet Take  by mouth daily.  calcium citrate-vitamin d3 (CITRACAL + D) 315-200 mg-unit tab Take 1 tablet by mouth daily (with breakfast).     
 traMADol (ULTRAM) 50 mg tablet Take 1 Tab by mouth every eight (8) hours as needed for Pain. Max Daily Amount: 150 mg. 20 Tab 0 No Known Allergies Past Surgical History:  
Procedure Laterality Date  HX APPENDECTOMY [address]  HX OTHER SURGICAL Right 2010  
 ankle surgery  HX TUMOR REMOVAL Left   
 ovarian  HX UROLOGICAL    
 kidney stone removal  
 
 
Social History Social History  Marital status:  Spouse name: N/A  
 Number of children: N/A  
 Years of education: N/A Occupational History  Not on file. Social History Main Topics  Smoking status: Never Smoker  Smokeless tobacco: Never Used  Alcohol use No  
 Drug use: No  
 Sexual activity: No  
 
Other Topics Concern  Not on file Social History Narrative REVIEW OF SYSTEMS:   
 
No changes from previous review of systems unless noted. PHYSICAL EXAMINATION: 
Visit Vitals  /54  Pulse 63  Temp 96 °F (35.6 °C) (Oral)  Resp 16  
 Ht 5' 3\" (1.6 m)  Wt 135 lb 9.6 oz (61.5 kg)  SpO2 98%  BMI 24.02 kg/m2 Body mass index is 24.02 kg/(m^2). GENERAL: Alert and oriented x3, in no acute distress. HEENT: Normocephalic, atraumatic. RESP: Non labored breathing. SKIN: No rashes or lesions noted. PHYSICAL EXAMINATION:   The arm is kept in a sling. She is tender to palpation over the distal clavicle. There is no crepitus over this area. Range of motion was not tested. She is neurovascularly intact distally. IMAGING:  X-rays of the left clavicle were taken in the office today. They show no further displacement of her distal clavicle fracture. ASSESSMENT/PLAN:  Josef Pitt is an 27-year-old female with a left distal clavicle fracture. I have recommended continuing in the sling for another two weeks. When she is at home, I told her she can come out of the sling.   I would like to see her back in three weeks with a plan to start weaning her out of the sling at that time.   
 
 
 
 
Electronically signed by: Morris Montano MD

## 2018-08-01 NOTE — PROGRESS NOTES
Juan J Garcia is a 80 y.o. female presenting today for Fall and Breathing Problem  . HPI:  Juan J Garcia presents to the office today for s/p fall. Patient reports she fell injuring her left clavicle. Patient noted she got up to use the bathroom and fell as she was returning. She fell between the couch and the coffee table. She was able to pull herself out and crawl to another room where she pulled herself up on the arm of a sofa. Denies hitting head, LOC, neck or back pain. Patient presents today with complaints of dyspnea. As patent is speaking, she has breaks in her voice and with use of accessories muscles. Patient daughter sent a message and is unsure if her mother hit her head and is having difficulty believing her mother was able to get up without hitting her head. Review of Systems   Respiratory: Positive for shortness of breath (patient has breaks in her voice and dyspnea). Negative for cough. Cardiovascular: Negative for chest pain and palpitations. Neurological: Negative for headaches. Endo/Heme/Allergies: Negative for environmental allergies. No Known Allergies    Current Outpatient Prescriptions   Medication Sig Dispense Refill    predniSONE (DELTASONE) 50 mg tablet Take 1 Tab by mouth daily for 3 days. 3 Tab 0    albuterol (PROVENTIL HFA) 90 mcg/actuation inhaler Take 2 Puffs by inhalation every four (4) hours as needed for Wheezing. 1 Inhaler 0    naproxen sodium (ALEVE PO) Take  by mouth as needed.  furosemide (LASIX) 40 mg tablet Take 1 Tab by mouth daily.  30 Tab 6    sertraline (ZOLOFT) 25 mg tablet take 1 tablet by mouth once daily 30 Tab 2    RABEprazole (ACIPHEX) 20 mg tablet take 1 tablet by mouth once daily 30 Tab 5    glipiZIDE-metFORMIN (METAGLIP) 5-500 mg per tablet take 2 tablets by mouth once daily (1 BEFORE BREAKFAST AND 1 BEFORE DINNER) 120 Tab 10    JANUVIA 100 mg tablet take 1 tablet by mouth once daily (Patient taking differently: take 1/2 tablet by mouth once daily) 30 Tab 5    cyanocobalamin (VITAMIN B-12) 2,000 mcg TbER Take 3 Tabs by mouth daily.  loperamide (IMODIUM) 2 mg capsule Take 1 mg by mouth Three (3) times a week.  potassium chloride (K-DUR, KLOR-CON) 10 mEq tablet Take 1 Tab by mouth daily. 30 Tab 6    traMADol (ULTRAM) 50 mg tablet Take 1 Tab by mouth every eight (8) hours as needed for Pain. Max Daily Amount: 150 mg. 20 Tab 0    pravastatin (PRAVACHOL) 40 mg tablet Take 1 Tab by mouth nightly. 90 Tab 3    glucose blood VI test strips (FREESTYLE LITE STRIPS) strip Test Blood Sugar once daily; ICD-10 E11.9; Quantity 100 100 Strip 11    Lancets (FREESTYLE LANCETS) misc Test bloos sugar once daily; ICD-10 E11.9; quantity 100 100 Each 11    lidocaine (LIDODERM) 5 % Apply patch to the affected area for 12 hours a day and remove for 12 hours a day. 5 Each 0    LORazepam (ATIVAN) 1 mg tablet Take 0.5 Tabs by mouth two (2) times daily as needed. Max Daily Amount: 1 mg. (Patient taking differently: Take 0.5 Tabs by mouth two (2) times daily as needed for Anxiety.) 40 Tab 0    aspirin delayed-release 81 mg tablet Take  by mouth daily.  cholecalciferol (VITAMIN D3) 1,000 unit tablet Take  by mouth daily.  calcium citrate-vitamin d3 (CITRACAL + D) 315-200 mg-unit tab Take 1 tablet by mouth daily (with breakfast).  metroNIDAZOLE (NORITATE) 1 % topical cream Apply daily 30 g 0       Past Medical History:   Diagnosis Date    Aortic valve stenosis, mild April 2014    EF 02-22%, Grade 1 diastolic dysfunction, mild aortic stenosis,  mean gradient 10 mm Hg    CAD (coronary artery disease)     aortic stenosis    Cardiac echocardiogram 11/15/2016    EF 55-60%. No RWMA. Mild-mod LVH. Indeterminate diastolic fx. Mod LAE. Mild-mod AS (mean grad 13.14 mmHg).  Cardiac nuclear imaging test 08/27/2008    No evidence of ischemia or prior scarring. EF 62%. No WMA. No significant change from study of 3/23/06.    [facility] venous duplex 12/01/2016    Right leg:  No DVT.  Carotid duplex 75/73/1390    Mild 5-55% LICA stenosis.  DM (diabetes mellitus) ([address]     Dyslipidemia     Heart failure (HCC)     HTN (hypertension)     Mixed hyperlipidemia     Sciatica     Traumatic closed displaced fracture of acromial end of clavicle, left, initial encounter        Past Surgical History:   Procedure Laterality Date    HX APPENDECTOMY      HX OOPHORECTOMY Left 1957    HX OTHER SURGICAL Right 2010    ankle surgery    HX TUMOR REMOVAL Left     ovarian    HX UROLOGICAL      kidney stone removal       Social History     Social History    Marital status:      Spouse name: N/A    Number of children: N/A    Years of education: N/A     Occupational History    Not on file. Social History Main Topics    Smoking status: Never Smoker    Smokeless tobacco: Never Used    Alcohol use No    Drug use: No    Sexual activity: No     Other Topics Concern    Not on file     Social History Narrative       Patient does not have an advanced directive on file    Vitals:    07/30/18 1216   BP: 140/60   Pulse: 84   Resp: 18   Temp: 97.8 °F (36.6 °C)   TempSrc: Tympanic   SpO2: 92%   Weight: 133 lb (60.3 kg)   Height: 5' 3\" (1.6 m)   PainSc:   6   PainLoc: Shoulder       Physical Exam   Cardiovascular: Normal rate, regular rhythm and normal heart sounds. Pulmonary/Chest: Effort normal and breath sounds normal.   Bruising noted on left side of the chest      Neurological: She is alert.        Admission on 07/29/2018, Discharged on 07/30/2018   Component Date Value Ref Range Status    Ventricular Rate 07/29/2018 65  BPM Final    Atrial Rate 07/29/2018 65  BPM Final    P-R Interval 07/29/2018 170  ms Final    QRS Duration 07/29/2018 102  ms Final    Q-T Interval 07/29/2018 424  ms Final    QTC Calculation (Bezet) 07/29/2018 440  ms Final    Calculated P Axis 07/29/2018 19  degrees Final    Calculated R Axis 07/29/2018 -32 degrees Final    Calculated T Axis 07/29/2018 49  degrees Final    Diagnosis 07/29/2018    Final                    Value:Sinus rhythm with occasional premature ventricular complexes  Left axis deviation  Septal infarct , age undetermined  Abnormal ECG  When compared with ECG of 14-NOV-2016 15:18,  premature ventricular complexes are now present  Confirmed by Gabe Middleton MD, --- ([id number]) on 7/30/2018 8:58:28 AM      WBC 07/29/2018 4.5* 4.6 - 13.2 K/uL Final    RBC 07/29/2018 3.58* 4.20 - 5.30 M/uL Final    HGB 07/29/2018 10.9* 12.0 - 16.0 g/dL Final    HCT 07/29/2018 34.1* 35.0 - 45.0 % Final    MCV 07/29/2018 95.3  74.0 - 97.0 FL Final    MCH 07/29/2018 30.4  24.0 - 34.0 PG Final    MCHC 07/29/2018 32.0  31.0 - 37.0 g/dL Final    RDW 07/29/2018 13.4  11.6 - 14.5 % Final    PLATELET 89/03/1400 98* 135 - 420 K/uL Final    RESULTS REPEATED    MPV 07/29/2018 11.4  9.2 - 11.8 FL Final    NEUTROPHILS 07/29/2018 56  40 - 73 % Final    LYMPHOCYTES 07/29/2018 36  21 - 52 % Final    MONOCYTES 07/29/2018 6  3 - 10 % Final    EOSINOPHILS 07/29/2018 2  0 - 5 % Final    BASOPHILS 07/29/2018 0  0 - 2 % Final    ABS. NEUTROPHILS 07/29/2018 2.5  1.8 - 8.0 K/UL Final    ABS. LYMPHOCYTES 07/29/2018 1.6  0.9 - 3.6 K/UL Final    ABS. MONOCYTES 07/29/2018 0.3  0.05 - 1.2 K/UL Final    ABS. EOSINOPHILS 07/29/2018 0.1  0.0 - 0.4 K/UL Final    ABS.  BASOPHILS 07/29/2018 0.0  0.0 - 0.1 K/UL Final    PLATELET COMMENTS 87/67/9270 DECREASED PLATELETS    Final    RBC COMMENTS 07/29/2018 NORMOCYTIC, NORMOCHROMIC    Final    DF 07/29/2018 AUTOMATED    Final    SMEAR SCANNED    Sodium 07/29/2018 140  136 - 145 mmol/L Final    Potassium 07/29/2018 3.6  3.5 - 5.5 mmol/L Final    Chloride 07/29/2018 106  100 - 108 mmol/L Final    CO2 07/29/2018 30  21 - 32 mmol/L Final    Anion gap 07/29/2018 4  3.0 - 18 mmol/L Final    Glucose 07/29/2018 176* 74 - 99 mg/dL Final    BUN 07/29/2018 17  7.0 - 18 MG/DL Final    Creatinine 07/29/2018 0.87  0.6 - 1.3 MG/DL Final    BUN/Creatinine ratio 07/29/2018 20  12 - 20   Final    GFR est AA 07/29/2018 >60  >60 ml/min/1.73m2 Final    GFR est non-AA 07/29/2018 >60  >60 ml/min/1.73m2 Final    Comment: (NOTE)  Estimated GFR is calculated using the Modification of Diet in Renal   Disease (MDRD) Study equation, reported for both  Americans   (GFRAA) and non- Americans (GFRNA), and normalized to 1.73m2   body surface area. The physician must decide which value applies to   the patient. The MDRD study equation should only be used in   individuals age 25 or older. It has not been validated for the   following: pregnant women, patients with serious comorbid conditions,   or on certain medications, or persons with extremes of body size,   muscle mass, or nutritional status.  Calcium 07/29/2018 8.9  8.5 - 10.1 MG/DL Final    CK 07/29/2018 32  26 - 192 U/L Final    CK - MB 07/29/2018 1.1  <3.6 ng/ml Final    CK-MB Index 07/29/2018 3.4  0.0 - 4.0 % Final    Troponin-I, Qt. 07/29/2018 <0.02  0.00 - 0.06 NG/ML Final    Comment: The presence of detectable troponin above the reference range indicates myocardial injury which may be due to ischemia, myocarditis, trauma, etc.  Clinical correlation is necessary to establish the significance of this finding. Sequential testing is recommended to determine if the typical rise and fall of cTnI is demonstrated. Note:  Cardiac troponin I has a relatively long half life and may be present well after the CK MB has returned to baseline. The reference range is based on the 99th percentile of the referent population.  NT pro-BNP 07/29/2018 204  0 - 1800 PG/ML Final    Comment:           For patients with dyspnea, NT proBNP is highly sensitive for detecting acute congestive heart failure. Also, an NT proBNP <300 pg/mL effectively rules out acute congestive heart failure, with 99% negative predictive value.  Our reference ranges are for acute dyspnea. Age              Range (pg/ml)                            0-49                0-450        50-75               0-900        >75                 0-1800       For ambulatory office patients, the ranges below apply: For patients with dyspnea, NT proBNP is highly sensitive for detecting acute congestive heart failure. Also, an NT proBNP <300 pg/mL effectively rules out acute congestive heart failure, with 99% negative predictive value. Our reference ranges are for acute dyspnea.  Prothrombin time 07/29/2018 13.7  11.5 - 15.2 sec Final    INR 07/29/2018 1.1  0.8 - 1.2   Final    Comment:            INR Therapeutic Ranges         (on stable oral anticoagulant):     INDICATION                INR  DVT/PE/Atrial Fib          2.0-3.0  MI/Mechanical Heart Valve  2.5-3.5      aPTT 07/29/2018 31.4  23.0 - 36.4 SEC Final    Protein, total 07/29/2018 6.4  6.4 - 8.2 g/dL Final    Albumin 07/29/2018 3.3* 3.4 - 5.0 g/dL Final    Globulin 07/29/2018 3.1  2.0 - 4.0 g/dL Final    A-G Ratio 07/29/2018 1.1  0.8 - 1.7   Final    Bilirubin, total 07/29/2018 0.5  0.2 - 1.0 MG/DL Final    Bilirubin, direct 07/29/2018 0.2  0.0 - 0.2 MG/DL Final    Alk. phosphatase 07/29/2018 83  45 - 117 U/L Final    AST (SGOT) 07/29/2018 14* 15 - 37 U/L Final    ALT (SGPT) 07/29/2018 17  13 - 56 U/L Final    Magnesium 07/29/2018 1.6  1.6 - 2.6 mg/dL Final   Hospital Outpatient Visit on 06/14/2018   Component Date Value Ref Range Status    Sodium 06/14/2018 143  136 - 145 mmol/L Final    Potassium 06/14/2018 4.3  3.5 - 5.5 mmol/L Final    Chloride 06/14/2018 105  100 - 108 mmol/L Final    CO2 06/14/2018 30  21 - 32 mmol/L Final    Anion gap 06/14/2018 8  3.0 - 18 mmol/L Final    Glucose 06/14/2018 48* 74 - 99 mg/dL Final    Comment: CALLED TO AND CORRECTLY REPEATED BY:  FERMIN CUI [facility],@ 1946 ON 6/14/2018 TO GTB.       BUN 06/14/2018 20* 7.0 - 18 MG/DL Final    Creatinine 06/14/2018 0.82  0.6 - 1.3 MG/DL Final    BUN/Creatinine ratio 06/14/2018 24* 12 - 20   Final    GFR est AA 06/14/2018 >60  >60 ml/min/1.73m2 Final    GFR est non-AA 06/14/2018 >60  >60 ml/min/1.73m2 Final    Comment: (NOTE)  Estimated GFR is calculated using the Modification of Diet in Renal   Disease (MDRD) Study equation, reported for both  Americans   (GFRAA) and non- Americans (GFRNA), and normalized to 1.73m2   body surface area. The physician must decide which value applies to   the patient. The MDRD study equation should only be used in   individuals age 25 or older. It has not been validated for the   following: pregnant women, patients with serious comorbid conditions,   or on certain medications, or persons with extremes of body size,   muscle mass, or nutritional status.  Calcium 06/14/2018 9.4  8.5 - 10.1 MG/DL Final    NT pro-BNP 06/14/2018 260  0 - 1800 PG/ML Final    Comment:           For patients with dyspnea, NT proBNP is highly sensitive for detecting acute congestive heart failure. Also, an NT proBNP <300 pg/mL effectively rules out acute congestive heart failure, with 99% negative predictive value. Our reference ranges are for acute dyspnea. Age              Range (pg/ml)                            0-49                0-450        50-75               0-900        >75                 0-1800       For ambulatory office patients, the ranges below apply: For patients with dyspnea, NT proBNP is highly sensitive for detecting acute congestive heart failure. Also, an NT proBNP <300 pg/mL effectively rules out acute congestive heart failure, with 99% negative predictive value. Our reference ranges are for acute dyspnea.      Hospital Outpatient Visit on 05/21/2018   Component Date Value Ref Range Status    Special Requests: 05/21/2018 NO SPECIAL REQUESTS    Final    GRAM STAIN 05/21/2018 RARE WBC'S    Final   [facility] GRAM STAIN 05/21/2018 MANY GRAM POSITIVE COCCI IN PAIRS    Final   Meena Deras STAIN 05/21/2018 MANY GRAM POSITIVE COCCI IN GROUPS    Final    GRAM STAIN 05/21/2018 MODERATE GRAM POSITIVE RODS    Final    GRAM STAIN 05/21/2018 FEW GRAM NEGATIVE RODS    Final    Culture result: 05/21/2018 MODERATE ACINETOBACTER SPECIES (TWO MORPHOTYPES)*   Final    Culture result: 05/21/2018 MODERATE **METHICILLIN RESISTANT STAPHYLOCOCCUS AUREUS   Final    Culture result: 05/21/2018 MODERATE KOCURIA SPECIES*   Final    Culture result: 05/21/2018 MODERATE MICROCOCCUS SPECIES*   Final    Culture result: 05/21/2018 MODERATE BACILLUS SPECIES, NOT ANTHRACIS*   Final   Hospital Outpatient Visit on 05/15/2018   Component Date Value Ref Range Status    Sodium 05/15/2018 143  136 - 145 mmol/L Final    Potassium 05/15/2018 4.0  3.5 - 5.5 mmol/L Final    Chloride 05/15/2018 108  100 - 108 mmol/L Final    CO2 05/15/2018 26  21 - 32 mmol/L Final    Anion gap 05/15/2018 9  3.0 - 18 mmol/L Final    Glucose 05/15/2018 78  74 - 99 mg/dL Final    BUN 05/15/2018 21* 7.0 - 18 MG/DL Final    Creatinine 05/15/2018 0.55* 0.6 - 1.3 MG/DL Final    BUN/Creatinine ratio 05/15/2018 38* 12 - 20   Final    GFR est AA 05/15/2018 >60  >60 ml/min/1.73m2 Final    GFR est non-AA 05/15/2018 >60  >60 ml/min/1.73m2 Final    Comment: (NOTE)  Estimated GFR is calculated using the Modification of Diet in Renal   Disease (MDRD) Study equation, reported for both  Americans   (GFRAA) and non- Americans (GFRNA), and normalized to 1.73m2   body surface area. The physician must decide which value applies to   the patient. The MDRD study equation should only be used in   individuals age 25 or older. It has not been validated for the   following: pregnant women, patients with serious comorbid conditions,   or on certain medications, or persons with extremes of body size,   muscle mass, or nutritional status.       Calcium 05/15/2018 8.9  8.5 - 10.1 MG/DL Final    Bilirubin, total 05/15/2018 0.4  0.2 - 1.0 MG/DL Final    ALT (SGPT) 05/15/2018 22  13 - 56 U/L Final    AST (SGOT) 05/15/2018 20  15 - 37 U/L Final    Alk. phosphatase 05/15/2018 71  45 - 117 U/L Final    Protein, total 05/15/2018 6.1* 6.4 - 8.2 g/dL Final    Albumin 05/15/2018 3.6  3.4 - 5.0 g/dL Final    Globulin 05/15/2018 2.5  2.0 - 4.0 g/dL Final    A-G Ratio 05/15/2018 1.4  0.8 - 1.7   Final    WBC 05/15/2018 4.1* 4.6 - 13.2 K/uL Final    RBC 05/15/2018 3.75* 4.20 - 5.30 M/uL Final    HGB 05/15/2018 11.5* 12.0 - 16.0 g/dL Final    HCT 05/15/2018 35.8  35.0 - 45.0 % Final    MCV 05/15/2018 95.5  74.0 - 97.0 FL Final    MCH 05/15/2018 30.7  24.0 - 34.0 PG Final    MCHC 05/15/2018 32.1  31.0 - 37.0 g/dL Final    RDW 05/15/2018 14.1  11.6 - 14.5 % Final    PLATELET 25/64/2267 96* 135 - 420 K/uL Final    MPV 05/15/2018 12.0* 9.2 - 11.8 FL Final    NEUTROPHILS 05/15/2018 63  40 - 73 % Final    LYMPHOCYTES 05/15/2018 28  21 - 52 % Final    MONOCYTES 05/15/2018 5  3 - 10 % Final    EOSINOPHILS 05/15/2018 3  0 - 5 % Final    BASOPHILS 05/15/2018 1  0 - 2 % Final    ABS. NEUTROPHILS 05/15/2018 2.6  1.8 - 8.0 K/UL Final    ABS. LYMPHOCYTES 05/15/2018 1.1  0.9 - 3.6 K/UL Final    ABS. MONOCYTES 05/15/2018 0.2  0.05 - 1.2 K/UL Final    ABS. EOSINOPHILS 05/15/2018 0.1  0.0 - 0.4 K/UL Final    ABS. BASOPHILS 05/15/2018 0.0  0.0 - 0.06 K/UL Final    DF 05/15/2018 AUTOMATED    Final    NT pro-BNP 05/15/2018 249  0 - 1800 PG/ML Final    Comment:           For patients with dyspnea, NT proBNP is highly sensitive for detecting acute congestive heart failure. Also, an NT proBNP <300 pg/mL effectively rules out acute congestive heart failure, with 99% negative predictive value. Our reference ranges are for acute dyspnea. Age              Range (pg/ml)                           0-49                0-450        50-75               0-900        >75                 0-1800       For ambulatory office patients, the ranges below apply:   For patients with dyspnea, NT proBNP is highly sensitive for detecting acute congestive heart failure. Also, an NT proBNP <300 pg/mL effectively rules out acute congestive heart failure, with 99% negative predictive value. Our reference ranges are for acute dyspnea.  Microalbumin,urine random 05/15/2018 5.20* 0 - 3.0 MG/DL Final    Creatinine, urine 05/15/2018 140.95* 30 - 125 mg/dL Final    Microalbumin/Creat ratio (mg/g cre* 05/15/2018 37* 0 - 30 mg/g Final    Hemoglobin A1c 05/15/2018 4.5  4.2 - 5.6 % Final    Comment: (NOTE)  HbA1C Interpretive Ranges  <5.7              Normal  5.7 - 6.4         Consider Prediabetes  >6.5              Consider Diabetes      Est. average glucose 05/15/2018 Cannot be calculated  mg/dL Final    Estimated average glucose results are not reported when the hemoglobin A1c is <5% or >15% since it has not been validated for values in these ranges.  LIPID PROFILE 05/15/2018        Final    Cholesterol, total 05/15/2018 137  <200 MG/DL Final    Triglyceride 05/15/2018 55  <150 MG/DL Final    Comment: The drugs N-acetylcysteine (NAC) and  Metamiszole have been found to cause falsely  low results in this chemical assay. Please  be sure to submit blood samples obtained  BEFORE administration of either of these  drugs to assure correct results.  HDL Cholesterol 05/15/2018 77* 40 - 60 MG/DL Final    LDL, calculated 05/15/2018 49  0 - 100 MG/DL Final    VLDL, calculated 05/15/2018 11  MG/DL Final    CHOL/HDL Ratio 05/15/2018 1.8  0 - 5.0   Final       .  Results for orders placed or performed during the hospital encounter of 07/30/18   CT CHEST WO CONT    Narrative    CT Chest without contrast     Clinical information: shortness of breath, gasping     Comparison: November 11, 2009 CT abdomen pelvis    Technique: Axial CT of the chest. Sagittal and coronal reconstructions.  All CT  scans at this facility are performed using dose optimization technique as  appropriate to a performed exam, to include automated exposure control,  adjustment of the mA and/or kV according to patient size (including appropriate  matching for site-specific examinations), or use of iterative reconstruction  technique. Please note that lack of intravenous contrast limits evaluation of  the vascular structures and lymph nodes, as well as solid viscera including the  thyroid, liver, pancreas, spleen, bowel, adrenal glands, and kidneys. Findings:     Chest:       - Airways: Trachea and main bronchi patent    - Lungs: 3 x 3 mm left upper lobe nodule series 3 image 22. - Pleura: No pneumothorax. Aung Keel No pleural effusion. No pleural thickening.    - Aorta: Mild to moderate calcified plaque burden at the arch. Motion artifact  and lack of intravenous contrast limit aortic root and aorta visualization and  interpretation.    - Heart: Heart size stable. - Pericardial effusion: No pericardial effusion    - Mediastinal lymph nodes: No enlarged mediastinal lymph nodes evident for  technique. - Hilar lymph nodes: No enlarged hilar lymph nodes evident for technique. - Axillary lymph nodes: No enlarged axillary lymph nodes evident for technique. - Chest wall and lower neck: Unremarkable for technique    - Thyroid: Not well seen. - Osseous structures: Multilevel degenerative vertebral changes. On sagittal  image 36 there is an approximately 7 x 13 mm lytic lesion in the posterior  elements of a vertebral body level in the lower thoracic spine.    - Upper abdomen: Noncontrast CT appearance of the included portions of upper  abdomen not significant changed again with cirrhotic appearance of the liver and  enlarged appearance of the partially imaged spleen. .    _______________       Impression    Impression:     1. Specific to the patient's shortness of breath no acute finding is evident for  noncontrast technique. Please note that this does not exclude pulmonary embolism  for example.     2. Follow-up chest CT advised in 6 months for left upper lobe pulmonary nodule  unless otherwise clinically indicated. 3. Lytic lesion in the posterior elements of a thoracic vertebral level. If  clinically indicated a MRI of the thoracic spine could be considered to further  evaluate whether this represents a neoplasm or degenerative change. Results for orders placed or performed during the hospital encounter of 07/29/18   XR CHEST PORT    Narrative    Portable Frontal Chest.    CPT CODE: 97971, [id number]    CLINICAL HISTORY: Shortness of breath since yesterday. TECHNIQUE: Single frontal view of the chest, obtained portably. COMPARISONS: 6/19/2018. FINDINGS:     The lungs are clear. The cardiomediastinal silhouette is unremarkable. Pulmonary vascularity is normal.  There are no effusions. Posttraumatic changes  again seen are distal left clavicle with widening left AC joint. Patient  osteopenic. Impression    IMPRESSION:    No acute disease. CBC WITH AUTOMATED DIFF   Result Value Ref Range    WBC 4.5 (L) 4.6 - 13.2 K/uL    RBC 3.58 (L) 4.20 - 5.30 M/uL    HGB 10.9 (L) 12.0 - 16.0 g/dL    HCT 34.1 (L) 35.0 - 45.0 %    MCV 95.3 74.0 - 97.0 FL    MCH 30.4 24.0 - 34.0 PG    MCHC 32.0 31.0 - 37.0 g/dL    RDW 13.4 11.6 - 14.5 %    PLATELET 98 (L) 237 - 420 K/uL    MPV 11.4 9.2 - 11.8 FL    NEUTROPHILS 56 40 - 73 %    LYMPHOCYTES 36 21 - 52 %    MONOCYTES 6 3 - 10 %    EOSINOPHILS 2 0 - 5 %    BASOPHILS 0 0 - 2 %    ABS. NEUTROPHILS 2.5 1.8 - 8.0 K/UL    ABS. LYMPHOCYTES 1.6 0.9 - 3.6 K/UL    ABS. MONOCYTES 0.3 0.05 - 1.2 K/UL    ABS. EOSINOPHILS 0.1 0.0 - 0.4 K/UL    ABS.  BASOPHILS 0.0 0.0 - 0.1 K/UL    PLATELET COMMENTS DECREASED PLATELETS      RBC COMMENTS NORMOCYTIC, NORMOCHROMIC      DF AUTOMATED     METABOLIC PANEL, BASIC   Result Value Ref Range    Sodium 140 136 - 145 mmol/L    Potassium 3.6 3.5 - 5.5 mmol/L    Chloride 106 100 - 108 mmol/L    CO2 30 21 - 32 mmol/L    Anion gap 4 3.0 - 18 mmol/L    Glucose 176 (H) 74 - 99 mg/dL    BUN 17 7.0 - 18 MG/DL    Creatinine 0.87 0.6 - 1.3 MG/DL    BUN/Creatinine ratio 20 12 - 20      GFR est AA >60 >60 ml/min/1.73m2    GFR est non-AA >60 >60 ml/min/1.73m2    Calcium 8.9 8.5 - 10.1 MG/DL   CARDIAC PANEL,(CK, CKMB & TROPONIN)   Result Value Ref Range    CK 32 26 - 192 U/L    CK - MB 1.1 <3.6 ng/ml    CK-MB Index 3.4 0.0 - 4.0 %    Troponin-I, Qt. <0.02 0.00 - 0.06 NG/ML   NT-PRO BNP   Result Value Ref Range    NT pro- 0 - 1800 PG/ML   PROTHROMBIN TIME + INR   Result Value Ref Range    Prothrombin time 13.7 11.5 - 15.2 sec    INR 1.1 0.8 - 1.2     PTT   Result Value Ref Range    aPTT 31.4 23.0 - 36.4 SEC   HEPATIC FUNCTION PANEL   Result Value Ref Range    Protein, total 6.4 6.4 - 8.2 g/dL    Albumin 3.3 (L) 3.4 - 5.0 g/dL    Globulin 3.1 2.0 - 4.0 g/dL    A-G Ratio 1.1 0.8 - 1.7      Bilirubin, total 0.5 0.2 - 1.0 MG/DL    Bilirubin, direct 0.2 0.0 - 0.2 MG/DL    Alk. phosphatase 83 45 - 117 U/L    AST (SGOT) 14 (L) 15 - 37 U/L    ALT (SGPT) 17 13 - 56 U/L   MAGNESIUM   Result Value Ref Range    Magnesium 1.6 1.6 - 2.6 mg/dL   EKG, 12 LEAD, INITIAL   Result Value Ref Range    Ventricular Rate 65 BPM    Atrial Rate 65 BPM    P-R Interval 170 ms    QRS Duration 102 ms    Q-T Interval 424 ms    QTC Calculation (Bezet) 440 ms    Calculated P Axis 19 degrees    Calculated R Axis -32 degrees    Calculated T Axis 49 degrees    Diagnosis       Sinus rhythm with occasional premature ventricular complexes  Left axis deviation  Septal infarct , age undetermined  Abnormal ECG  When compared with ECG of 14-NOV-2016 15:18,  premature ventricular complexes are now present  Confirmed by Fercho Rcoha MD, --- ([id number]) on 7/30/2018 8:58:28 AM         Assessment / Plan:      ICD-10-CM ICD-9-CM    1. Fall, initial encounter [address] Milton Padgett E500.6 MRI BRAIN WO CONT   2. Dyspnea, unspecified type R06.00 786.09 CT CHEST WO CONT   3.  Gasping for breath R06.89 786.09 CT CHEST WO CONT     MRI Brain   CT chest   F/u in 1 week    Follow-up Disposition:  Return if symptoms worsen or fail to improve. I asked the patient if she  had any questions and answered her  questions.   The patient stated that she understands the treatment plan and agrees with the treatment plan

## 2018-08-06 ENCOUNTER — TELEPHONE (OUTPATIENT)
Dept: INTERNAL MEDICINE CLINIC | Age: 83
End: 2018-08-06

## 2018-08-06 NOTE — TELEPHONE ENCOUNTER
Patients Daughter called wanting the patients CT and MRI results from 07/302018.  Please call her at [phone]

## 2018-08-08 NOTE — TELEPHONE ENCOUNTER
Patient daughter was called and advised per Dr Garry Lei that if patient was still having issues then he would like to see her in office  Appointment was made for 08/14/2018

## 2018-08-14 ENCOUNTER — OFFICE VISIT (OUTPATIENT)
Dept: INTERNAL MEDICINE CLINIC | Age: 83
End: 2018-08-14

## 2018-08-14 VITALS
HEART RATE: 71 BPM | WEIGHT: 133 LBS | SYSTOLIC BLOOD PRESSURE: 102 MMHG | BODY MASS INDEX: 23.57 KG/M2 | OXYGEN SATURATION: 100 % | HEIGHT: 63 IN | DIASTOLIC BLOOD PRESSURE: 68 MMHG | TEMPERATURE: 98 F

## 2018-08-14 DIAGNOSIS — G44.229 CHRONIC TENSION-TYPE HEADACHE, NOT INTRACTABLE: ICD-10-CM

## 2018-08-14 DIAGNOSIS — R60.0 BILATERAL LEG EDEMA: ICD-10-CM

## 2018-08-14 DIAGNOSIS — I10 ESSENTIAL HYPERTENSION: ICD-10-CM

## 2018-08-14 DIAGNOSIS — R80.9 MICROALBUMINURIA: ICD-10-CM

## 2018-08-14 DIAGNOSIS — R06.02 SHORTNESS OF BREATH: Primary | ICD-10-CM

## 2018-08-14 RX ORDER — ASCORBIC ACID 500 MG
500 TABLET ORAL DAILY
COMMUNITY
End: 2020-01-01

## 2018-08-14 RX ORDER — LANOLIN ALCOHOL/MO/W.PET/CERES
65 CREAM (GRAM) TOPICAL
COMMUNITY
End: 2020-01-01

## 2018-08-14 NOTE — PROGRESS NOTES
Maykel Bradshaw presents today for   Chief Complaint   Patient presents with    Well Woman    Results       Maykel Bradshaw preferred language for health care discussion is english. Is someone accompanying this pt? yes daughter    Is the patient using any DME equipment during OV? Yes rolling walker    Depression Screening:  PHQ over the last two weeks 8/14/2018   Little interest or pleasure in doing things Several days   Feeling down, depressed, irritable, or hopeless Several days   Total Score PHQ 2 2   Trouble falling or staying asleep, or sleeping too much -   Feeling tired or having little energy -   Poor appetite, weight loss, or overeating -   Feeling bad about yourself - or that you are a failure or have let yourself or your family down -   Trouble concentrating on things such as school, work, reading, or watching TV -   Moving or speaking so slowly that other people could have noticed; or the opposite being so fidgety that others notice -   Thoughts of being better off dead, or hurting yourself in some way -   PHQ 9 Score -   How difficult have these problems made it for you to do your work, take care of your home and get along with others -       Learning Assessment:  Learning Assessment 6/7/2018   PRIMARY LEARNER Patient   HIGHEST LEVEL OF EDUCATION - PRIMARY LEARNER  -   CO-LEARNER CAREGIVER -   PRIMARY LANGUAGE ENGLISH   LEARNER PREFERENCE PRIMARY DEMONSTRATION   ANSWERED BY Patient   RELATIONSHIP SELF       Abuse Screening:  Abuse Screening Questionnaire 7/26/2016   Do you ever feel afraid of your partner? N   Are you in a relationship with someone who physically or mentally threatens you? N   Is it safe for you to go home? Y       Fall Risk  Fall Risk Assessment, last 12 mths 8/14/2018   Able to walk? Yes   Fall in past 12 months? Yes   Fall with injury? -   Number of falls in past 12 months 1   Fall Risk Score -       Health Maintenance reviewed and discussed and ordered per Provider.     Health Maintenance Due   Topic Date Due    FOOT EXAM Q1  01/05/1943    Influenza Age 5 to Adult  08/01/2018   . Pt currently taking Antiplatelet therapy? Yes aspirin    Coordination of Care:  1. Have you been to the ER, urgent care clinic since your last visit? no Hospitalized since your last visit? no    2. Have you seen or consulted any other health care providers outside of the [address] [place] [address] since your last visit? no Include any pap smears or colon screening.  no

## 2018-08-14 NOTE — MR AVS SNAPSHOT
[address] [facility] 
 
 
 [address], [address] [place] [address] 
[phone] Patient: Jeannette Trinh MRN: [id number] KNA:[birth year] Visit Information Date & Time Provider Department Dept. Phone Encounter #  
 8/14/2018  3:30 PM Lindsey Arndt MD [facility] INTER[facility] MEDIC[facility] OF [facility] [phone] [id number] Your Appointments 8/21/2018  1:00 PM  
Follow Up with Luster Galeazzi, MD  
[facility] [place] [address] [place] [address] [facility] Appt Note: 3 w f/u  
 [address]  
[phone]  
  
   
 [address], [address] [facility] [address]  
  
    
 8/29/2018  3:00 PM  
Follow Up with Brent Wright MD  
[facility] Appt Note: 12 wk f/up [facility] [id number]  
[phone] [address] [place], [place] [address] 9/14/2018  2:30 PM  
Follow Up with Lindsey Arndt MD  
[address] [place] [address] [facility] Appt Note: 1mo  
 [address], [address] [place] [facility].  
  
   
 [address], [address] [place] [address] Upcoming Health Maintenance Date Due  
 FOOT EXAM Q1 1/5/1943 Influenza Age 5 to Adult 8/1/2018 DTaP/Tdap/Td series (1 - Tdap) 7/31/2023* HEMOGLOBIN A1C Q6M 11/15/2018 EYE EXAM RETINAL OR DILATED Q1 4/6/2019 MICROALBUMIN Q1 5/15/2019 LIPID PANEL Q1 5/15/2019 MEDICARE YEARLY EXAM 5/19/2019 GLAUCOMA SCREENING Q2Y 4/6/2020 *Topic was postponed. The date shown is not the original due date. Allergies as of 8/14/2018  Review Complete On: 8/14/2018 By: Gisselle Green LPN No Known Allergies Current Immunizations  Reviewed on 3/21/2018 Name Date Influenza High Dose Vaccine PF 9/12/2017 Influenza Vaccine 9/1/2016, 10/1/2015 Pneumococcal Conjugate (PCV-13) 5/17/2017 Pneumococcal Polysaccharide (PPSV-23) 10/1/2015, 9/10/2014, 4/18/2013 Zoster Vaccine, Live 1/17/2012 Not reviewed this visit You Were Diagnosed With   
  
 Codes Comments Shortness of breath    -  Primary ICD-10-CM: R06.02 
ICD-9-CM: 786.05 Vitals BP Pulse Temp Height(growth percentile) Weight(growth percentile) SpO2  
 102/68 (BP 1 Location: Left arm, BP Patient Position: Sitting) 71 98 °F (36.7 °C) (Tympanic) 5' 3\" (1.6 m) 133 lb (60.3 kg) 100% BMI OB Status Smoking Status 23.56 kg/m2 Hysterectomy Never Smoker Vitals History BMI and BSA Data Body Mass Index Body Surface Area  
 23.56 kg/m 2 1.64 m 2 Preferred Pharmacy Pharmacy Name Phone [facility]. Kareen Benitez, [address] [facility] [phone] Your Updated Medication List  
  
   
This list is accurate as of 8/14/18  4:40 PM.  Always use your most recent med list.  
  
  
  
  
 albuterol 90 mcg/actuation inhaler Commonly known as:  PROVENTIL HFA Take 2 Puffs by inhalation every four (4) hours as needed for Wheezing. ALEVE PO Take  by mouth as needed. aspirin delayed-release 81 mg tablet Take  by mouth daily. CITRACAL PLUS D 315-200 mg-unit Tab Generic drug:  calcium citrate-vitamin d3 Take 1 tablet by mouth daily (with breakfast). furosemide 40 mg tablet Commonly known as:  LASIX Take 1 Tab by mouth daily. glipiZIDE-metFORMIN 5-500 mg per tablet Commonly known as:  METAGLIP  
take 2 tablets by mouth once daily (1 BEFORE BREAKFAST AND 1 BEFORE DINNER) glucose blood VI test strips strip Commonly known as:  FREESTYLE LITE STRIPS Test Blood Sugar once daily; ICD-10 E11.9; Quantity 100 Iron 325 mg (65 mg iron) tablet Generic drug:  ferrous sulfate Take  by mouth Daily (before breakfast). JANUVIA 100 mg tablet Generic drug:  SITagliptin take 1 tablet by mouth once daily Lancets Misc Commonly known as:  FREESTYLE LANCETS Test bloos sugar once daily; ICD-10 E11.9; quantity 100  
  
 lidocaine 5 % Commonly known as:  Luis A Meuse Apply patch to the affected area for 12 hours a day and remove for 12 hours a day. loperamide 2 mg capsule Commonly known as:  IMODIUM Take 1 mg by mouth Three (3) times a week. LORazepam 1 mg tablet Commonly known as:  ATIVAN Take 0.5 Tabs by mouth two (2) times daily as needed. Max Daily Amount: 1 mg.  
  
 metroNIDAZOLE 1 % topical cream  
Commonly known as:  Miko Holster Apply daily  
  
 potassium chloride 10 mEq tablet Commonly known as:  KLOR-CON Take 1 Tab by mouth daily. pravastatin 40 mg tablet Commonly known as:  PRAVACHOL Take 1 Tab by mouth nightly. RABEprazole 20 mg tablet Commonly known as:  ACIPHEX  
take 1 tablet by mouth once daily  
  
 sertraline 25 mg tablet Commonly known as:  ZOLOFT  
take 1 tablet by mouth once daily  
  
 traMADol 50 mg tablet Commonly known as:  ULTRAM  
Take 1 Tab by mouth every eight (8) hours as needed for Pain. Max Daily Amount: 150 mg. VITAMIN B-12 2,000 mcg Tber Generic drug:  cyanocobalamin Take 3 Tabs by mouth daily. VITAMIN C 500 mg tablet Generic drug:  ascorbic acid (vitamin C) Take  by mouth. VITAMIN D3 1,000 unit tablet Generic drug:  cholecalciferol Take  by mouth daily. To-Do List   
 08/14/2018 Lab:  D DIMER Please provide this summary of care documentation to your next provider. Your primary care clinician is listed as Clarisa Shultz. If you have any questions after today's visit, please call [phone].

## 2018-08-15 ENCOUNTER — HOSPITAL ENCOUNTER (OUTPATIENT)
Dept: LAB | Age: 83
Discharge: HOME | End: 2018-08-15
Payer: MEDICARE

## 2018-08-15 DIAGNOSIS — R06.02 SHORTNESS OF BREATH: ICD-10-CM

## 2018-08-15 LAB — D DIMER PPP FEU-MCNC: <0.27 UG/ML(FEU)

## 2018-08-15 PROCEDURE — 36415 COLL VENOUS BLD VENIPUNCTURE: CPT | Performed by: INTERNAL MEDICINE

## 2018-08-15 PROCEDURE — 85379 FIBRIN DEGRADATION QUANT: CPT | Performed by: INTERNAL MEDICINE

## 2018-08-15 RX ORDER — LISINOPRIL 10 MG/1
TABLET ORAL
Qty: 30 TAB | Refills: 1 | Status: SHIPPED | OUTPATIENT
Start: 2018-08-15 | End: 2018-10-19 | Stop reason: SDUPTHER

## 2018-08-16 NOTE — PROGRESS NOTES
The patient presents to the office today with the chief complaint of dyspna    HPI    The patient persists with episodes of \"dyspnea\". The patient patient denies dyspnea on exertion but she seems short of breath with prolonged talking. The patient has bilateral swelling of both ankles. The patient has mild \"funny feeling\" in her head with problems with balance. A recent MRI of the head was negaive. Recent labs from the ER were negative. The patient remains on medications for hypertension. She is tolerating the medications well. Review of Systems   Respiratory: Negative for sputum production. Cardiovascular: Positive for leg swelling. Negative for chest pain. No Known Allergies    Current Outpatient Prescriptions   Medication Sig Dispense Refill    lisinopril (PRINIVIL, ZESTRIL) 10 mg tablet take 1 tablet by mouth once daily ; STOP LOSARTAN 30 Tab 1    ascorbic acid, vitamin C, (VITAMIN C) 500 mg tablet Take  by mouth.  ferrous sulfate (IRON) 325 mg (65 mg iron) tablet Take  by mouth Daily (before breakfast).  naproxen sodium (ALEVE PO) Take  by mouth as needed.  furosemide (LASIX) 40 mg tablet Take 1 Tab by mouth daily. 30 Tab 6    sertraline (ZOLOFT) 25 mg tablet take 1 tablet by mouth once daily 30 Tab 2    RABEprazole (ACIPHEX) 20 mg tablet take 1 tablet by mouth once daily 30 Tab 5    glipiZIDE-metFORMIN (METAGLIP) 5-500 mg per tablet take 2 tablets by mouth once daily (1 BEFORE BREAKFAST AND 1 BEFORE DINNER) 120 Tab 10    JANUVIA 100 mg tablet take 1 tablet by mouth once daily (Patient taking differently: take 1/2 tablet by mouth once daily) 30 Tab 5    cyanocobalamin (VITAMIN B-12) 2,000 mcg TbER Take 3 Tabs by mouth daily.  loperamide (IMODIUM) 2 mg capsule Take 1 mg by mouth Three (3) times a week.  pravastatin (PRAVACHOL) 40 mg tablet Take 1 Tab by mouth nightly.  90 Tab 3    lidocaine (LIDODERM) 5 % Apply patch to the affected area for 12 hours a day and remove for 12 hours a day. 5 Each 0    aspirin delayed-release 81 mg tablet Take  by mouth daily.  cholecalciferol (VITAMIN D3) 1,000 unit tablet Take  by mouth daily. Past Medical History:   Diagnosis Date    Aortic valve stenosis, mild April 2014    EF 50-44%, Grade 1 diastolic dysfunction, mild aortic stenosis,  mean gradient 10 mm Hg    CAD (coronary artery disease)     aortic stenosis    Cardiac echocardiogram 11/15/2016    EF 55-60%. No RWMA. Mild-mod LVH. Indeterminate diastolic fx. Mod LAE. Mild-mod AS (mean grad 13.14 mmHg).  Cardiac nuclear imaging test 08/27/2008    No evidence of ischemia or prior scarring. EF 62%. No WMA. No significant change from study of 3/23/06.  Cardiovascular RLE venous duplex 12/01/2016    Right leg:  No DVT.  Carotid duplex 54/88/6128    Mild 0-57% LICA stenosis.  DM (diabetes mellitus) ([facility] [address]     Dyslipidemia     Heart failure (HCC)     HTN (hypertension)     Mixed hyperlipidemia     Sciatica     Traumatic closed displaced fracture of acromial end of clavicle, left, initial encounter        Past Surgical History:   Procedure Laterality Date    HX APPENDECTOMY      HX OOPHORECTOMY Left 1957    HX OTHER SURGICAL Right 2010    ankle surgery    HX TUMOR REMOVAL Left     ovarian    HX UROLOGICAL      kidney stone removal       Social History     Social History    Marital status:      Spouse name: N/A    Number of children: N/A    Years of education: N/A     Occupational History    Not on file.      Social History Main Topics    Smoking status: Never Smoker    Smokeless tobacco: Never Used    Alcohol use No    Drug use: No    Sexual activity: No     Other Topics Concern    Not on file     Social History Narrative       Patient does not have an advanced directive on file    Visit Vitals    /68 (BP 1 Location: Left arm, BP Patient Position: Sitting)    Pulse 71    Temp 98 °F (36.7 °C) (Tympanic)    Ht 5' 3\" (1.6 m)    Wt 133 lb (60.3 kg)    SpO2 100%    BMI 23.56 kg/m2       Physical Exam   Neck: Carotid bruit is not present. Cardiovascular: Normal rate and regular rhythm. Exam reveals no gallop. No murmur heard. Pulmonary/Chest: She has no wheezes. She has no rales. Musculoskeletal: She exhibits edema (2+ doughy edema). BMI:  OK      Assessment / Plan      ICD-10-CM ICD-9-CM    1. Shortness of breath R06.02 786.05 D DIMER   2. Essential hypertension I10 401.9    3. Chronic tension-type headache, not intractable G44.229 339.12    4. Bilateral leg edema R60.0 782. 3        Dyspnea - check D dimer. If positive pursue possible PE  Follow daily weights and adjust Lasix as needed  she was advised to continue her maintenance medications      Follow-up Disposition:  Return in about 3 months (around 11/14/2018). I asked Xochitl Rodriguez if she has any questions and I answered the questions.   Xochitl Rodriguez states that she understands the treatment plan and agrees with the treatment plan

## 2018-08-21 ENCOUNTER — OFFICE VISIT (OUTPATIENT)
Dept: ORTHOPEDIC SURGERY | Facility: CLINIC | Age: 83
End: 2018-08-21

## 2018-08-21 VITALS
HEART RATE: 61 BPM | OXYGEN SATURATION: 99 % | BODY MASS INDEX: 23.92 KG/M2 | SYSTOLIC BLOOD PRESSURE: 105 MMHG | WEIGHT: 130 LBS | HEIGHT: 62 IN | DIASTOLIC BLOOD PRESSURE: 61 MMHG | TEMPERATURE: 97.7 F

## 2018-08-21 DIAGNOSIS — S42.032D TRAUMATIC CLOSED FRACTURE OF DISTAL CLAVICLE WITH MINIMAL DISPLACEMENT, LEFT, WITH ROUTINE HEALING, SUBSEQUENT ENCOUNTER: Primary | ICD-10-CM

## 2018-08-21 NOTE — PROGRESS NOTES
Patient: Olena Badillo                MRN: [id number]       SSN: [id number]  YOB: 1933        AGE: 80 y.o. SEX: female  Body mass index is 23.78 kg/(m^2). PCP: Dwayne Randle MD  08/21/18    Chief Complaint: Left shoulder    HISTORY OF PRESENT ILLNESS:  Twan Rivera is an 41-year-old female who returns today for her left distal clavicle fracture. She has been wearing her sling. It has been about five to six weeks since her injury. She is doing much better. She has no pain. She does not have any pain when she takes her sling off and on. She has no new complaints today. Past Medical History:   Diagnosis Date    Aortic valve stenosis, mild April 2014    EF 74-28%, Grade 1 diastolic dysfunction, mild aortic stenosis,  mean gradient 10 mm Hg    CAD (coronary artery disease)     aortic stenosis    Cardiac echocardiogram 11/15/2016    EF 55-60%. No RWMA. Mild-mod LVH. Indeterminate diastolic fx. Mod LAE. Mild-mod AS (mean grad 13.14 mmHg).  Cardiac nuclear imaging test 08/27/2008    No evidence of ischemia or prior scarring. EF 62%. No WMA. No significant change from study of 3/23/06.  Cardiovascular RLE venous duplex 12/01/2016    Right leg:  No DVT.  Carotid duplex 17/64/4397    Mild 0-67% LICA stenosis.  DM (diabetes mellitus) ([facility] [address]     Dyslipidemia     Heart failure (HCC)     HTN (hypertension)     Mixed hyperlipidemia     Sciatica     Traumatic closed displaced fracture of acromial end of clavicle, left, initial encounter        Family History   Problem Relation Age of Onset    Stroke Mother     Heart Disease Mother     Lung Disease Father     Cancer Neg Hx     Diabetes Neg Hx     Hypertension Neg Hx        Current Outpatient Prescriptions   Medication Sig Dispense Refill    lisinopril (PRINIVIL, ZESTRIL) 10 mg tablet take 1 tablet by mouth once daily ; STOP LOSARTAN 30 Tab 1    ascorbic acid, vitamin C, (VITAMIN C) 500 mg tablet Take  by mouth.  ferrous sulfate (IRON) 325 mg (65 mg iron) tablet Take  by mouth Daily (before breakfast).  naproxen sodium (ALEVE PO) Take  by mouth as needed.  furosemide (LASIX) 40 mg tablet Take 1 Tab by mouth daily. 30 Tab 6    sertraline (ZOLOFT) 25 mg tablet take 1 tablet by mouth once daily 30 Tab 2    RABEprazole (ACIPHEX) 20 mg tablet take 1 tablet by mouth once daily 30 Tab 5    glipiZIDE-metFORMIN (METAGLIP) 5-500 mg per tablet take 2 tablets by mouth once daily (1 BEFORE BREAKFAST AND 1 BEFORE DINNER) 120 Tab 10    JANUVIA 100 mg tablet take 1 tablet by mouth once daily (Patient taking differently: take 1/2 tablet by mouth once daily) 30 Tab 5    cyanocobalamin (VITAMIN B-12) 2,000 mcg TbER Take 3 Tabs by mouth daily.  loperamide (IMODIUM) 2 mg capsule Take 1 mg by mouth Three (3) times a week.  pravastatin (PRAVACHOL) 40 mg tablet Take 1 Tab by mouth nightly. 90 Tab 3    lidocaine (LIDODERM) 5 % Apply patch to the affected area for 12 hours a day and remove for 12 hours a day. 5 Each 0    aspirin delayed-release 81 mg tablet Take  by mouth daily.  cholecalciferol (VITAMIN D3) 1,000 unit tablet Take  by mouth daily. No Known Allergies    Past Surgical History:   Procedure Laterality Date    HX APPENDECTOMY      HX OOPHORECTOMY Left 1957    HX OTHER SURGICAL Right 2010    ankle surgery    HX TUMOR REMOVAL Left     ovarian    HX UROLOGICAL      kidney stone removal       Social History     Social History    Marital status:      Spouse name: N/A    Number of children: N/A    Years of education: N/A     Occupational History    Not on file. Social History Main Topics    Smoking status: Never Smoker    Smokeless tobacco: Never Used    Alcohol use No    Drug use: No    Sexual activity: No     Other Topics Concern    Not on file     Social History Narrative       REVIEW OF SYSTEMS:      No changes from previous review of systems unless noted.     PHYSICAL EXAMINATION:  Visit Vitals    /61    Pulse 61    Temp 97.7 °F (36.5 °C) (Oral)    Ht 5' 2\" (1.575 m)    Wt 130 lb (59 kg)    SpO2 99%    BMI 23.78 kg/m2     Body mass index is 23.78 kg/(m^2). GENERAL: Alert and oriented x3, in no acute distress. HEENT: Normocephalic, atraumatic. RESP: Non labored breathing. SKIN: No rashes or lesions noted. PHYSICAL EXAMINATION:   Physical exam of the left clavicle shows a slight deformity at the distal aspect of the clavicle. There is a small bump there. It is nontender to palpation throughout the entire clavicle including the distal end, AC joint, and over the acromion. Gentle shoulder range of motion is full without any pain. She is neurovascularly intact distally. She was in the sling when she came into the office today. ASSESSMENT/PLAN:  Amber Ely is an 40-year-old female with a healing, left distal clavicle fracture. She is doing extremely well. I do not recommend any further treatment. She can come out of the sling as necessary and wean herself out of it advancing her activities as tolerated. I will see her back as needed.            Electronically signed by: Shane Charles MD

## 2018-08-21 NOTE — MR AVS SNAPSHOT
[facility] 
 
 
 [address], [address] [place] [address] 
[phone] Patient: Maximilian Strong MRN: [id number] TMR:[birth year] Visit Information Date & Time Provider Department Dept. Phone Encounter #  
 8/21/2018  1:00 PM Seda Jasmine MD [address] [facility] [place] [address] [phone] [id number] Follow-up Instructions Return if symptoms worsen or fail to improve. Your Appointments 8/29/2018  3:00 PM  
Follow Up with James Bullard MD  
[facility] ([facility] Appt Note: 12 wk f/up [facility] [id number] [phone] [address] 9/14/2018  2:30 PM  
Follow Up with Sarah Love MD  
[address] [place] [address] [facility] Appt Note: 1mo  
 [address], [address] [place] [facility] [address]  
  
   
 [address] [facility] [address] Upcoming Health Maintenance Date Due  
 FOOT EXAM Q1 1/5/1943 Influenza Age 5 to Adult 8/1/2018 DTaP/Tdap/Td series (1 - Tdap) 7/31/2023* HEMOGLOBIN A1C Q6M 11/15/2018 EYE EXAM RETINAL OR DILATED Q1 4/6/2019 MICROALBUMIN Q1 5/15/2019 LIPID PANEL Q1 5/15/2019 MEDICARE YEARLY EXAM 5/19/2019 GLAUCOMA SCREENING Q2Y 4/6/2020 *Topic was postponed. The date shown is not the original due date. Allergies as of 8/21/2018  Review Complete On: 8/21/2018 By: Seda Jasmine MD  
 No Known Allergies Current Immunizations  Reviewed on 3/21/2018 Name Date Influenza High Dose Vaccine PF 9/12/2017 Influenza Vaccine 9/1/2016, 10/1/2015 Pneumococcal Conjugate (PCV-13) 5/17/2017 Pneumococcal Polysaccharide (PPSV-23) 10/1/2015, 9/10/2014, 4/18/2013 Zoster Vaccine, Live 1/17/2012 Not reviewed this visit You Were Diagnosed With   
  
 Codes Comments Traumatic closed fracture of distal clavicle with minimal displacement, left, with routine healing, subsequent encounter    -  Primary ICD-10-CM: E33.268S ICD-9-CM: V54.11 Vitals BP Pulse Temp Height(growth percentile) Weight(growth percentile) SpO2  
 105/61 61 97.7 °F (36.5 °C) (Oral) 5' 2\" (1.575 m) 130 lb (59 kg) 99% BMI OB Status Smoking Status 23.78 kg/m2 Hysterectomy Never Smoker BMI and BSA Data Body Mass Index Body Surface Area 23.78 kg/m 2 1.61 m 2 Preferred Pharmacy Pharmacy Name Phone [facility]. [place] [id number] [facility] [phone] Your Updated Medication List  
  
   
This list is accurate as of 8/21/18  1:22 PM.  Always use your most recent med list.  
  
  
  
  
 Thelma Rolando Take  by mouth as needed. aspirin delayed-release 81 mg tablet Take  by mouth daily. furosemide 40 mg tablet Commonly known as:  LASIX Take 1 Tab by mouth daily. glipiZIDE-metFORMIN 5-500 mg per tablet Commonly known as:  METAGLIP  
take 2 tablets by mouth once daily (1 BEFORE BREAKFAST AND 1 BEFORE DINNER) Iron 325 mg (65 mg iron) tablet Generic drug:  ferrous sulfate Take  by mouth Daily (before breakfast). JANUVIA 100 mg tablet Generic drug:  SITagliptin  
take 1 tablet by mouth once daily  
  
 lidocaine 5 % Commonly known as:  Donnis Fleischer Apply patch to the affected area for 12 hours a day and remove for 12 hours a day. lisinopril 10 mg tablet Commonly known as:  PRINIVIL, ZESTRIL  
take 1 tablet by mouth once daily ; STOP LOSARTAN  
  
 loperamide 2 mg capsule Commonly known as:  IMODIUM Take 1 mg by mouth Three (3) times a week. pravastatin 40 mg tablet Commonly known as:  PRAVACHOL Take 1 Tab by mouth nightly. RABEprazole 20 mg tablet Commonly known as:  ACIPHEX  
take 1 tablet by mouth once daily  
  
 sertraline 25 mg tablet Commonly known as:  ZOLOFT  
take 1 tablet by mouth once daily VITAMIN B-12 2,000 mcg Tber Generic drug:  cyanocobalamin Take 3 Tabs by mouth daily. VITAMIN C 500 mg tablet Generic drug:  ascorbic acid (vitamin C) Take  by mouth. VITAMIN D3 1,000 unit tablet Generic drug:  cholecalciferol Take  by mouth daily. Follow-up Instructions Return if symptoms worsen or fail to improve. Please provide this summary of care documentation to your next provider. Your primary care clinician is listed as Roma Bhardwaj. If you have any questions after today's visit, please call [phone].

## 2018-08-22 DIAGNOSIS — F32.A MILD DEPRESSION: ICD-10-CM

## 2018-08-23 RX ORDER — SERTRALINE HYDROCHLORIDE 25 MG/1
TABLET, FILM COATED ORAL
Qty: 30 TAB | Refills: 2 | Status: SHIPPED | OUTPATIENT
Start: 2018-08-23 | End: 2018-11-20 | Stop reason: SDUPTHER

## 2018-08-29 ENCOUNTER — OFFICE VISIT (OUTPATIENT)
Dept: CARDIOLOGY CLINIC | Age: 83
End: 2018-08-29

## 2018-08-29 VITALS
WEIGHT: 138 LBS | BODY MASS INDEX: 25.4 KG/M2 | SYSTOLIC BLOOD PRESSURE: 108 MMHG | OXYGEN SATURATION: 97 % | DIASTOLIC BLOOD PRESSURE: 70 MMHG | HEIGHT: 62 IN | HEART RATE: 70 BPM

## 2018-08-29 DIAGNOSIS — I35.0 AORTIC VALVE STENOSIS, ETIOLOGY OF CARDIAC VALVE DISEASE UNSPECIFIED: ICD-10-CM

## 2018-08-29 DIAGNOSIS — I89.0 LYMPHEDEMA: Primary | ICD-10-CM

## 2018-08-29 DIAGNOSIS — E78.5 DYSLIPIDEMIA: ICD-10-CM

## 2018-08-29 DIAGNOSIS — I10 ESSENTIAL HYPERTENSION: ICD-10-CM

## 2018-08-29 NOTE — PROGRESS NOTES
HISTORY OF PRESENT ILLNESS  Cassi Angulo is a 80 y.o. female. Hypertension   Associated symptoms include shortness of breath. Pertinent negatives include no chest pain, no abdominal pain and no headaches. Shortness of Breath   Associated symptoms include leg swelling. Pertinent negatives include no fever, no headaches, no cough, no wheezing, no PND, no orthopnea, no chest pain, no vomiting, no abdominal pain, no rash and no claudication. Leg Swelling   Associated symptoms include shortness of breath. Pertinent negatives include no chest pain, no abdominal pain and no headaches. Patient presents for a followup office visit. She has a past medical history significant for hypertension, dyslipidemia, diabetes mellitus, type II, and mild aortic valve stenosis. She also has a history of dependent edema in the past. She was documented with bilateral venous reflux disease on a duplex scan through a vascular surgeon's office in 2017. The patient recently underwent a follow-up echocardiogram in June 2018 which was essentially unchanged from her prior study from 2016, which showed mild aortic valve stenosis, EF 60%, with her mean valve gradient not significantly changed compared to the her prior. She had evidence of diastolic dysfunction with left atrial enlargement as well. The patient was last seen in the office by me 2-3 months ago. At that time she is complaining of worsening leg swelling, so her Lasix dosage was increased to 40 mg daily. This has helped with the leg swelling. She was seen in the ER 1 month ago for worsening shortness of breath felt to be noncardiac in nature. She had a normal chest x-ray and normal BNP level. She still has significant leg swelling right greater than left. Her weight has not significantly changed. No orthopnea, no PND.     Past Medical History:   Diagnosis Date    Aortic valve stenosis, mild April 2014    EF 25-26%, Grade 1 diastolic dysfunction, mild aortic stenosis,  mean gradient 10 mm Hg    CAD (coronary artery disease)     aortic stenosis    Cardiac echocardiogram 06/2018    EF 55-60%. No RWMA. Mild-mod LVH. Severe LAE. Mild AS.  Cardiac nuclear imaging test 08/27/2008    No evidence of ischemia or prior scarring. EF 62%. No WMA. No significant change from study of 3/23/06.  Cardiovascular RLE venous duplex 12/01/2016    Right leg:  No DVT.  Carotid duplex 60/15/7386    Mild 8-31% LICA stenosis.  DM (diabetes mellitus) ([address]     Dyslipidemia     Heart failure (HCC)     HTN (hypertension)     Mixed hyperlipidemia     Sciatica     Traumatic closed displaced fracture of acromial end of clavicle, left, initial encounter       Current Outpatient Prescriptions   Medication Sig Dispense Refill    sertraline (ZOLOFT) 25 mg tablet take 1 tablet by mouth once daily 30 Tab 2    lisinopril (PRINIVIL, ZESTRIL) 10 mg tablet take 1 tablet by mouth once daily ; STOP LOSARTAN 30 Tab 1    ascorbic acid, vitamin C, (VITAMIN C) 500 mg tablet Take  by mouth.  ferrous sulfate (IRON) 325 mg (65 mg iron) tablet Take  by mouth Daily (before breakfast).  naproxen sodium (ALEVE PO) Take  by mouth as needed.  furosemide (LASIX) 40 mg tablet Take 1 Tab by mouth daily. 30 Tab 6    RABEprazole (ACIPHEX) 20 mg tablet take 1 tablet by mouth once daily 30 Tab 5    glipiZIDE-metFORMIN (METAGLIP) 5-500 mg per tablet take 2 tablets by mouth once daily (1 BEFORE BREAKFAST AND 1 BEFORE DINNER) 120 Tab 10    JANUVIA 100 mg tablet take 1 tablet by mouth once daily (Patient taking differently: take 1/2 tablet by mouth once daily) 30 Tab 5    cyanocobalamin (VITAMIN B-12) 2,000 mcg TbER Take 3 Tabs by mouth daily.  loperamide (IMODIUM) 2 mg capsule Take 1 mg by mouth Three (3) times a week.  pravastatin (PRAVACHOL) 40 mg tablet Take 1 Tab by mouth nightly.  90 Tab 3    lidocaine (LIDODERM) 5 % Apply patch to the affected area for 12 hours a day and remove for 12 hours a day. 5 Each 0    aspirin delayed-release 81 mg tablet Take  by mouth daily.  cholecalciferol (VITAMIN D3) 1,000 unit tablet Take  by mouth daily. No Known Allergies     Social History   Substance Use Topics    Smoking status: Never Smoker    Smokeless tobacco: Never Used    Alcohol use No          Family History   Problem Relation Age of Onset    Stroke Mother     Heart Disease Mother     Lung Disease Father     Cancer Neg Hx     Diabetes Neg Hx     Hypertension Neg Hx          Review of Systems   Constitutional: Negative for chills, fever and weight loss. HENT: Negative for nosebleeds. Eyes: Negative for blurred vision and double vision. Respiratory: Positive for shortness of breath. Negative for cough and wheezing. Cardiovascular: Positive for leg swelling. Negative for chest pain, palpitations, orthopnea, claudication and PND. Gastrointestinal: Negative for abdominal pain, heartburn, nausea and vomiting. Genitourinary: Negative for dysuria and hematuria. Musculoskeletal: Negative for falls and myalgias. Skin: Negative for rash. Neurological: Negative for dizziness, focal weakness and headaches. Endo/Heme/Allergies: Does not bruise/bleed easily. Psychiatric/Behavioral: Negative for substance abuse. Visit Vitals    /70    Pulse 70    Ht 5' 2\" (1.575 m)    Wt 62.6 kg (138 lb)    SpO2 97%    BMI 25.24 kg/m2      Physical Exam   Constitutional: She is oriented to person, place, and time. She appears well-developed and well-nourished. HENT:   Head: Normocephalic and atraumatic. Eyes: Conjunctivae are normal.   Neck: Neck supple. No JVD present. Carotid bruit is not present. Cardiovascular: Normal rate, regular rhythm, S1 normal, S2 normal and normal pulses. Exam reveals no gallop. Murmur heard.    Harsh midsystolic murmur is present  at the upper right sternal border radiating to the neck  Pulmonary/Chest: Effort normal and breath sounds normal. She has no wheezes. She has no rales. Abdominal: Soft. Bowel sounds are normal. There is no tenderness. Musculoskeletal: She exhibits edema (2+ bilateral lower extremity ). She exhibits no tenderness or deformity. Neurological: She is alert and oriented to person, place, and time. Skin: Skin is warm and dry. Psychiatric: She has a normal mood and affect. Her behavior is normal. Thought content normal.       ASSESSMENT and PLAN    Lower extremity swelling. This does not appear to be primarily due to heart failure. I recommended referral to vascular surgery to treat lymphedema. She underwent an echocardiogram in June 2018 which was unchanged compared to her prior study from 2016. Aortic valve stenosis. This was only mild on her echocardiogram from June 2018. Hypertension. Patient blood pressure appears reasonably well-controlled on her current medical regimen. Follow-up in 6 months, sooner if needed    Dyslipidemia. Patient is managed with pravastatin 40 mg daily. A reasonable goal LDL be less than 100. Diabetes mellitus, type II. Patient is managed with oral agents. This is followed by her PCP. Her goal A1c should be less than 7.     Follow-up in 6 months, sooner if needed

## 2018-08-29 NOTE — MR AVS SNAPSHOT
[address] [facility] [address] [address] [address] [address] [phone] Patient: Beatrice Charles MRN: [id number] IFD:[birth year]2 Visit Information Date & Time Provider Department Dept. Phone Encounter #  
 8/29/2018  3:00 PM Karyle Fairly, MD [facility] [id number] Your Appointments 9/14/2018  2:30 PM  
Follow Up with Anton Infante MD  
[address] [facility] Appt Note: 1mo  
 [address], [address] [place] [address]  
  
   
 [address] [place], [address] [place] [address] Upcoming Health Maintenance Date Due  
 FOOT EXAM Q1 1/5/1943 Influenza Age 5 to Adult 8/1/2018 DTaP/Tdap/Td series (1 - Tdap) 7/31/2023* HEMOGLOBIN A1C Q6M 11/15/2018 EYE EXAM RETINAL OR DILATED Q1 4/6/2019 MICROALBUMIN Q1 5/15/2019 LIPID PANEL Q1 5/15/2019 MEDICARE YEARLY EXAM 5/19/2019 GLAUCOMA SCREENING Q2Y 4/6/2020 *Topic was postponed. The date shown is not the original due date. Allergies as of 8/29/2018  Review Complete On: 8/21/2018 By: Casey Prince MD  
 No Known Allergies Current Immunizations  Reviewed on 3/21/2018 Name Date Influenza High Dose Vaccine PF 9/12/2017 Influenza Vaccine 9/1/2016, 10/1/2015 Pneumococcal Conjugate (PCV-13) 5/17/2017 Pneumococcal Polysaccharide (PPSV-23) 10/1/2015, 9/10/2014, 4/18/2013 Zoster Vaccine, Live 1/17/2012 Not reviewed this visit You Were Diagnosed With   
  
 Codes Comments Lymphedema    -  Primary ICD-10-CM: I89.0 ICD-9-CM: 197.3 Vitals BP Pulse Height(growth percentile) Weight(growth percentile) SpO2 BMI  
 108/70 70 5' 2\" (1.575 m) 138 lb (62.6 kg) 97% 25.24 kg/m2 OB Status Smoking Status Hysterectomy Never Smoker BMI and BSA Data  Body Mass Index Body Surface Area  
 25.24 kg/m 2 1.65 m 2  
  
  
 Preferred Pharmacy Pharmacy Name Phone [id number] [facility]. Lillian Garduno, [address] [facility] [phone] Your Updated Medication List  
  
   
This list is accurate as of 8/29/18  4:20 PM.  Always use your most recent med list.  
  
  
  
  
 Chema Ciaran Take  by mouth as needed. aspirin delayed-release 81 mg tablet Take  by mouth daily. furosemide 40 mg tablet Commonly known as:  LASIX Take 1 Tab by mouth daily. glipiZIDE-metFORMIN 5-500 mg per tablet Commonly known as:  METAGLIP  
take 2 tablets by mouth once daily (1 BEFORE BREAKFAST AND 1 BEFORE DINNER) Iron 325 mg (65 mg iron) tablet Generic drug:  ferrous sulfate Take  by mouth Daily (before breakfast). JANUVIA 100 mg tablet Generic drug:  SITagliptin  
take 1 tablet by mouth once daily  
  
 lidocaine 5 % Commonly known as:  Jurline Nam Apply patch to the affected area for 12 hours a day and remove for 12 hours a day. lisinopril 10 mg tablet Commonly known as:  PRINIVIL, ZESTRIL  
take 1 tablet by mouth once daily ; STOP LOSARTAN  
  
 loperamide 2 mg capsule Commonly known as:  IMODIUM Take 1 mg by mouth Three (3) times a week. pravastatin 40 mg tablet Commonly known as:  PRAVACHOL Take 1 Tab by mouth nightly. RABEprazole 20 mg tablet Commonly known as:  ACIPHEX  
take 1 tablet by mouth once daily  
  
 sertraline 25 mg tablet Commonly known as:  ZOLOFT  
take 1 tablet by mouth once daily VITAMIN B-12 2,000 mcg Tber Generic drug:  cyanocobalamin Take 3 Tabs by mouth daily. VITAMIN C 500 mg tablet Generic drug:  ascorbic acid (vitamin C) Take  by mouth. VITAMIN D3 1,000 unit tablet Generic drug:  cholecalciferol Take  by mouth daily. We Performed the Following REFERRAL TO VASCULAR SURGERY [[id number] Custom] Comments:  
 Please evaluate patient for bilateral lymphedema . Referral Information Referral ID Referred By Referred To  
  
 [id number] Noman Jimenez MD [address], [address] Phone: [phone] Fax: [phone] Visits Status Start Date End Date 1 New Request 8/29/18 8/29/19 If your referral has a status of pending review or denied, additional information will be sent to support the outcome of this decision. Please provide this summary of care documentation to your next provider. Your primary care clinician is listed as Yefri Kelly. If you have any questions after today's visit, please call [phone].

## 2018-08-29 NOTE — PROGRESS NOTES
Raeann Navas presents today for   Chief Complaint   Patient presents with    Hypertension     3 month follow up     Shortness of Breath     exertion    Leg Swelling     mild        Raeann Navas preferred language for health care discussion is english/other. Is someone accompanying this pt? Yes, daughter    Is the patient using any DME equipment during OV? Yes, walker     Depression Screening:  PHQ over the last two weeks 8/14/2018   Little interest or pleasure in doing things Several days   Feeling down, depressed, irritable, or hopeless Several days   Total Score PHQ 2 2   Trouble falling or staying asleep, or sleeping too much -   Feeling tired or having little energy -   Poor appetite, weight loss, or overeating -   Feeling bad about yourself - or that you are a failure or have let yourself or your family down -   Trouble concentrating on things such as school, work, reading, or watching TV -   Moving or speaking so slowly that other people could have noticed; or the opposite being so fidgety that others notice -   Thoughts of being better off dead, or hurting yourself in some way -   PHQ 9 Score -   How difficult have these problems made it for you to do your work, take care of your home and get along with others -       Learning Assessment:  Learning Assessment 6/7/2018   PRIMARY LEARNER Patient   HIGHEST LEVEL OF EDUCATION - PRIMARY LEARNER  -   CO-LEARNER CAREGIVER -   PRIMARY LANGUAGE ENGLISH   LEARNER PREFERENCE PRIMARY DEMONSTRATION   ANSWERED BY Patient   RELATIONSHIP SELF       Abuse Screening:  Abuse Screening Questionnaire 7/26/2016   Do you ever feel afraid of your partner? N   Are you in a relationship with someone who physically or mentally threatens you? N   Is it safe for you to go home? Y       Fall Risk  Fall Risk Assessment, last 12 mths 8/21/2018   Able to walk? Yes   Fall in past 12 months?  No   Fall with injury? -   Number of falls in past 12 months -   Fall Risk Score -       Pt currently taking Antiplatelet therapy? ASA 81mg daily     Coordination of Care:  1. Have you been to the ER, urgent care clinic since your last visit? Hospitalized since your last visit? Yes, 7/29 for sob     2. Have you seen or consulted any other health care providers outside of the [facility] since your last visit? Include any pap smears or colon screening.  No

## 2018-08-31 ENCOUNTER — TELEPHONE (OUTPATIENT)
Dept: CARDIOLOGY CLINIC | Age: 83
End: 2018-08-31

## 2018-08-31 NOTE — TELEPHONE ENCOUNTER
Appt scheduled with Blanca Vo for Sept 20, 2:00    [address]      [phone]      Patient is aware.        Rachelle Acosta

## 2018-09-20 ENCOUNTER — OFFICE VISIT (OUTPATIENT)
Dept: VASCULAR SURGERY | Age: 83
End: 2018-09-20

## 2018-09-20 VITALS
SYSTOLIC BLOOD PRESSURE: 110 MMHG | HEART RATE: 68 BPM | WEIGHT: 138 LBS | HEIGHT: 62 IN | BODY MASS INDEX: 25.4 KG/M2 | DIASTOLIC BLOOD PRESSURE: 70 MMHG | RESPIRATION RATE: 18 BRPM

## 2018-09-20 DIAGNOSIS — S81.812A LACERATION OF LEFT LOWER EXTREMITY, INITIAL ENCOUNTER: ICD-10-CM

## 2018-09-20 DIAGNOSIS — R60.9 DEPENDENT EDEMA: ICD-10-CM

## 2018-09-20 DIAGNOSIS — I87.2 VENOUS (PERIPHERAL) INSUFFICIENCY: Primary | ICD-10-CM

## 2018-09-20 DIAGNOSIS — I50.32 CHRONIC DIASTOLIC HEART FAILURE (HCC): ICD-10-CM

## 2018-09-20 NOTE — MR AVS SNAPSHOT
[address] [id number] [address] [facility] [address] 
[phone] Patient: Mick Brown MRN: [id number] AIW:[birth year] Visit Information Date & Time Provider Department Dept. Phone Encounter #  
 9/20/2018  2:30 PM Kushal Garcia [facility] [phone] [id number] Your Appointments 9/24/2018  3:15 PM  
Follow Up with [facility] NURSE [facility]us [facility] ([address] [place] [address]) Appt Note: unna boots per [employer] [address] [facility] [place] [facility] [address]  
[phone] [address]  
  
    
 10/12/2018  3:15 PM  
Follow Up with Sampson Shaffer MD  
[address] [place] [address]) Appt Note: 1mo; Rescheduling Appointment From 09/14/2018  
 [address] [place] [address], [address] [facility] [address]  
  
   
 [address] [facility] [address] [facility] [address]  
  
    
 3/1/2019  3:00 PM  
Follow Up with Leny Yanez MD  
[facility] ([address] [place] [address]) Appt Note: 6 mo  f/u  
 [address] [place] [address] [address] [address] [address] [phone] [address] [place] [address] [address] Upcoming Health Maintenance Date Due  
 FOOT EXAM Q1 1/5/1943 DTaP/Tdap/Td series (1 - Tdap) 7/31/2023* HEMOGLOBIN A1C Q6M 11/15/2018 EYE EXAM RETINAL OR DILATED Q1 4/6/2019 MICROALBUMIN Q1 5/15/2019 LIPID PANEL Q1 5/15/2019 MEDICARE YEARLY EXAM 5/19/2019 GLAUCOMA SCREENING Q2Y 4/6/2020 *Topic was postponed. The date shown is not the original due date. Allergies as of 9/20/2018  Review Complete On: 9/20/2018 By: Kayden Nagy RN No Known Allergies Current Immunizations  Reviewed on 3/21/2018 Name Date Influenza High Dose Vaccine PF 9/2/2018, 9/12/2017 Influenza Vaccine 9/1/2016, 10/1/2015 Pneumococcal Conjugate (PCV-13) 5/17/2017 Pneumococcal Polysaccharide (PPSV-23) 9/2/2018, 10/1/2015, 9/10/2014, 4/18/2013 Zoster Vaccine, Live 1/17/2012 Not reviewed this visit Vitals BP Pulse Resp Height(growth percentile) Weight(growth percentile) BMI  
 110/70 (BP 1 Location: Left arm, BP Patient Position: Sitting) 68 18 5' 2\" (1.575 m) 138 lb (62.6 kg) 25.24 kg/m2 OB Status Smoking Status Hysterectomy Never Smoker BMI and BSA Data Body Mass Index Body Surface Area  
 25.24 kg/m 2 1.65 m 2 Preferred Pharmacy Pharmacy Name Phone [facility]. [facility], [address] [facility] [phone] Your Updated Medication List  
  
   
This list is accurate as of 9/20/18  3:19 PM.  Always use your most recent med list.  
  
  
  
  
 [place] Take  by mouth as needed. aspirin delayed-release 81 mg tablet Take  by mouth daily. furosemide 40 mg tablet Commonly known as:  LASIX Take 1 Tab by mouth daily. glipiZIDE-metFORMIN 5-500 mg per tablet Commonly known as:  METAGLIP  
take 2 tablets by mouth once daily (1 BEFORE BREAKFAST AND 1 BEFORE DINNER) Iron 325 mg (65 mg iron) tablet Generic drug:  ferrous sulfate Take  by mouth Daily (before breakfast). JANUVIA 100 mg tablet Generic drug:  SITagliptin  
take 1 tablet by mouth once daily  
  
 lidocaine 5 % Commonly known as:  Lauree Backers Apply patch to the affected area for 12 hours a day and remove for 12 hours a day. lisinopril 10 mg tablet Commonly known as:  PRINIVIL, ZESTRIL  
take 1 tablet by mouth once daily ; STOP LOSARTAN  
  
 loperamide 2 mg capsule Commonly known as:  IMODIUM Take 1 mg by mouth Three (3) times a week. pravastatin 40 mg tablet Commonly known as:  PRAVACHOL Take 1 Tab by mouth nightly. RABEprazole 20 mg tablet Commonly known as:  ACIPHEX  
take 1 tablet by mouth once daily sertraline 25 mg tablet Commonly known as:  ZOLOFT  
take 1 tablet by mouth once daily VITAMIN B-12 2,000 mcg Tber Generic drug:  cyanocobalamin Take 3 Tabs by mouth daily. VITAMIN C 500 mg tablet Generic drug:  ascorbic acid (vitamin C) Take  by mouth. VITAMIN D3 1,000 unit tablet Generic drug:  cholecalciferol Take  by mouth daily. Please provide this summary of care documentation to your next provider. Your primary care clinician is listed as Joe Stokes. If you have any questions after today's visit, please call [phone].
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
## 2018-09-20 NOTE — PROGRESS NOTES
JUAN C [facility]          Chart reviewed for the following:   IFreida RN, have reviewed the medications and updated the Allergic reactions for [address] performed immediately prior to start of procedure:   Paulette Menenedz RN, have performed the following reviews on Hilda Phelps prior to the start of the procedure:            * Patient was identified by name and date of birth   * Agreement that Manuelita Isauro boot removed and reapplied   * Procedure site verified   * Patient was positioned for comfort  * Verbal consent was given by patient     Time: 1500      Date of procedure: 9/20/2018    Procedure performed by:  FERMIN Pollack    How tolerated by patient: pt tolerated well     Comments: myah boot placed bilateral on legs with coban. no open areas noted.

## 2018-09-24 ENCOUNTER — OFFICE VISIT (OUTPATIENT)
Dept: VASCULAR SURGERY | Age: 83
End: 2018-09-24

## 2018-09-24 DIAGNOSIS — I87.2 VENOUS (PERIPHERAL) INSUFFICIENCY: Primary | ICD-10-CM

## 2018-09-24 NOTE — PROGRESS NOTES
Patient being seen for assessment of bilateral lower extremity swelling. Removed unna boots and washed legs with soap and water, rinsed and patted dry, patient tolerated well. Patient's swelling has decreased some but slight swelling still noted at tops of feet. Patient's daughter did not seem to think patient had been elevating as much as she should. I assisted patient with applying knee high 20-30 compression stockings and advised patient to elevate lower extremities as often as possible and limit salt intake. Patient stated understood. Also instructed patient to wear during the day and remove at night. Patient is to call or come back for a visit if swelling increases or unable to wear compression stockings.

## 2018-10-19 DIAGNOSIS — I10 ESSENTIAL HYPERTENSION: ICD-10-CM

## 2018-10-19 DIAGNOSIS — R80.9 MICROALBUMINURIA: ICD-10-CM

## 2018-10-22 RX ORDER — LISINOPRIL 10 MG/1
TABLET ORAL
Qty: 30 TAB | Refills: 1 | Status: ON HOLD | OUTPATIENT
Start: 2018-10-22 | End: 2019-01-03

## 2018-11-06 ENCOUNTER — OFFICE VISIT (OUTPATIENT)
Dept: INTERNAL MEDICINE CLINIC | Age: 83
End: 2018-11-06

## 2018-11-06 VITALS
BODY MASS INDEX: 26.67 KG/M2 | WEIGHT: 144.9 LBS | RESPIRATION RATE: 18 BRPM | HEIGHT: 62 IN | DIASTOLIC BLOOD PRESSURE: 40 MMHG | TEMPERATURE: 98.2 F | HEART RATE: 65 BPM | SYSTOLIC BLOOD PRESSURE: 116 MMHG | OXYGEN SATURATION: 91 %

## 2018-11-06 DIAGNOSIS — R60.9 DEPENDENT EDEMA: ICD-10-CM

## 2018-11-06 DIAGNOSIS — L03.119 CELLULITIS OF LOWER EXTREMITY, UNSPECIFIED LATERALITY: Primary | ICD-10-CM

## 2018-11-06 DIAGNOSIS — I10 ESSENTIAL HYPERTENSION: ICD-10-CM

## 2018-11-06 DIAGNOSIS — L08.9 WOUND INFECTION: ICD-10-CM

## 2018-11-06 DIAGNOSIS — T14.8XXA WOUND INFECTION: ICD-10-CM

## 2018-11-07 NOTE — PROGRESS NOTES
Sergio Harry is a 80 y.o. female presenting today for Leg Pain  . HPI:  Sergio Harry presents to the office today for bilateral lower extremity edema pain on a scale of 10 out of 10. She reports painful bilateral legs with ambulation. She is negative for dyspnea or chest pain. The patient has a history of chronic edema, lymphedema of bilateral lower extremities but notes the symptoms have worsened. Per patient she has a small wound to the left lateral lower extremity. Review of Systems   Constitutional: Negative for chills and fever. Respiratory: Negative for cough and shortness of breath. Cardiovascular: Positive for leg swelling. Negative for chest pain and palpitations. Neurological: Negative for dizziness. No Known Allergies    Current Outpatient Medications   Medication Sig Dispense Refill    furosemide (LASIX) 20 mg tablet Take and extra 20 mg by mouth daily x 3 days 3 Tab 0    lisinopril (PRINIVIL, ZESTRIL) 10 mg tablet take 1 tablet by mouth once daily /STOP LOSARTAN 30 Tab 1    sertraline (ZOLOFT) 25 mg tablet take 1 tablet by mouth once daily 30 Tab 2    ascorbic acid, vitamin C, (VITAMIN C) 500 mg tablet Take  by mouth.  ferrous sulfate (IRON) 325 mg (65 mg iron) tablet Take  by mouth Daily (before breakfast).  naproxen sodium (ALEVE PO) Take  by mouth as needed.  furosemide (LASIX) 40 mg tablet Take 1 Tab by mouth daily. 30 Tab 6    JANUVIA 100 mg tablet take 1 tablet by mouth once daily (Patient taking differently: take 1/2 tablet by mouth once daily) 30 Tab 5    cyanocobalamin (VITAMIN B-12) 2,000 mcg TbER Take 3 Tabs by mouth daily.  loperamide (IMODIUM) 2 mg capsule Take 1 mg by mouth Three (3) times a week.  pravastatin (PRAVACHOL) 40 mg tablet Take 1 Tab by mouth nightly. 90 Tab 3    lidocaine (LIDODERM) 5 % Apply patch to the affected area for 12 hours a day and remove for 12 hours a day.  5 Each 0    aspirin delayed-release 81 mg tablet Take by mouth daily.  cholecalciferol (VITAMIN D3) 1,000 unit tablet Take  by mouth daily.  glipiZIDE-metFORMIN (METAGLIP) 5-500 mg per tablet TAKE 1 TABLET BY MOUTH BEFORE BREAKFAST AND 1 TABLET BY MOUTH BEFORE DINNER 120 Tab 10    RABEprazole (ACIPHEX) 20 mg tablet take 1 tablet by mouth once daily 30 Tab 5    lisinopril (PRINIVIL, ZESTRIL) 10 mg tablet take 1 tablet by mouth once daily /STOP LOSARTAN 30 Tab 1       Past Medical History:   Diagnosis Date    Aortic valve stenosis, mild April 2014    EF 30-30%, Grade 1 diastolic dysfunction, mild aortic stenosis,  mean gradient 10 mm Hg    CAD (coronary artery disease)     aortic stenosis    Cardiac echocardiogram 06/2018    EF 55-60%. No RWMA. Mild-mod LVH. Severe LAE. Mild AS.  Cardiac nuclear imaging test 08/27/2008    No evidence of ischemia or prior scarring. EF 62%. No WMA. No significant change from study of 3/23/06.  Cardiovascular RLE venous duplex 12/01/2016    Right leg:  No DVT.  Carotid duplex 61/52/3116    Mild 2-44% LICA stenosis.     DM [facility]) ([facility]     Dyslipidemia     Heart failure ([facility]     HTN (hypertension)     Mixed hyperlipidemia     Sciatica     Traumatic closed displaced fracture of acromial end of clavicle, left, initial encounter        Past Surgical History:   Procedure Laterality Date    HX APPENDECTOMY      HX OOPHORECTOMY Left 1957    HX OTHER SURGICAL Right 2010    ankle surgery    HX TUMOR REMOVAL Left     ovarian    HX UROLOGICAL      kidney stone removal       Social History     Socioeconomic History    Marital status:      Spouse name: Not on file    Number of children: Not on file    Years of education: Not on file    Highest education level: Not on file   Social Needs    Financial resource strain: Not on file    Food insecurity - worry: Not on file    Food insecurity - inability: Not on file   [employer] needs - medical: Not on file   [employer] needs - non-medical: Not on file   Occupational History    Not on file   Tobacco Use    Smoking status: Never Smoker    Smokeless tobacco: Never Used   Substance and Sexual Activity    Alcohol use: No    Drug use: No    Sexual activity: No   Other Topics Concern    Not on file   Social History Narrative    Not on file       Patient does not have an advanced directive on file    Vitals:    11/06/18 1522   BP: 116/40   Pulse: 65   Resp: 18   Temp: 98.2 °F (36.8 °C)   TempSrc: Tympanic   SpO2: 91%   Weight: 144 lb 14.4 oz (65.7 kg)   Height: 5' 2\" (1.575 m)   PainSc:  10 - Worst pain ever   PainLoc: Leg       Physical Exam   Cardiovascular: Normal rate, regular rhythm and normal heart sounds. Pulmonary/Chest: Effort normal and breath sounds normal.   Musculoskeletal: She exhibits edema and tenderness. Neurological: She is alert. Ambulating with a walker   Skin: There is erythema. Nursing note and vitals reviewed. Hospital Outpatient Visit on 08/15/2018   Component Date Value Ref Range Status    D DIMER 08/15/2018 <0.27  <0.46 ug/ml(FEU) Final    Comment: (NOTE)  A D-Dimer result less than 0.5 ug/mL FEU combined with a low clinical   pretest probability of DVT and/or PE has a negative predictive value   of %. The positive predictive value is 50% or less. .No results found for any visits on 11/06/18. Assessment / Plan:      ICD-10-CM ICD-9-CM    1. Cellulitis of lower extremity, unspecified laterality L03.119 682.6 REFERRAL TO HOME HEALTH      furosemide (LASIX) 20 mg tablet      cephALEXin (KEFLEX) 250 mg capsule   2. Essential hypertension I10 401.9    3. Dependent edema R60.9 782.3 furosemide (LASIX) 20 mg tablet   4. Wound infection T14. 8XXA 958.3 [address]    L08.9  cephALEXin (KEFLEX) 250 mg capsule     Cellulitis and wound infection-patient was given a referral for home health  Patient given 3-day dose of Lasix 20 mg added to current dose  Patient started on Keflex 250 mg capsules times 10 days  Follow-up in 1 week    Follow-up Disposition:  Return if symptoms worsen or fail to improve. I asked the patient if she  had any questions and answered her  questions.   The patient stated that she understands the treatment plan and agrees with the treatment plan

## 2018-11-08 RX ORDER — FUROSEMIDE 20 MG/1
TABLET ORAL
Qty: 3 TAB | Refills: 0 | Status: SHIPPED | OUTPATIENT
Start: 2018-11-08 | End: 2018-12-20 | Stop reason: DRUGHIGH

## 2018-11-08 RX ORDER — CEPHALEXIN 250 MG/1
250 CAPSULE ORAL 4 TIMES DAILY
Qty: 40 CAP | Refills: 0 | Status: SHIPPED | OUTPATIENT
Start: 2018-11-08 | End: 2018-11-18

## 2018-11-09 ENCOUNTER — OFFICE VISIT (OUTPATIENT)
Dept: VASCULAR SURGERY | Age: 83
End: 2018-11-09

## 2018-11-09 ENCOUNTER — OFFICE VISIT (OUTPATIENT)
Dept: ORTHOPEDIC SURGERY | Facility: CLINIC | Age: 83
End: 2018-11-09

## 2018-11-09 VITALS
SYSTOLIC BLOOD PRESSURE: 143 MMHG | TEMPERATURE: 97.1 F | HEART RATE: 75 BPM | DIASTOLIC BLOOD PRESSURE: 59 MMHG | BODY MASS INDEX: 26.5 KG/M2 | HEIGHT: 62 IN | RESPIRATION RATE: 16 BRPM | WEIGHT: 144 LBS | OXYGEN SATURATION: 97 %

## 2018-11-09 DIAGNOSIS — M70.71 ISCHIAL BURSITIS OF RIGHT SIDE: Primary | ICD-10-CM

## 2018-11-09 DIAGNOSIS — M54.31 SCIATICA, RIGHT SIDE: ICD-10-CM

## 2018-11-09 DIAGNOSIS — M16.11 ARTHRITIS OF RIGHT HIP: ICD-10-CM

## 2018-11-09 DIAGNOSIS — I87.2 VENOUS (PERIPHERAL) INSUFFICIENCY: Primary | ICD-10-CM

## 2018-11-09 DIAGNOSIS — M25.551 RIGHT HIP PAIN: ICD-10-CM

## 2018-11-09 DIAGNOSIS — S42.002S: ICD-10-CM

## 2018-11-09 DIAGNOSIS — M25.571 RIGHT ANKLE PAIN, UNSPECIFIED CHRONICITY: ICD-10-CM

## 2018-11-09 DIAGNOSIS — I89.0 LYMPHEDEMA: ICD-10-CM

## 2018-11-09 DIAGNOSIS — H54.7 VISUAL IMPAIRMENT: ICD-10-CM

## 2018-11-09 DIAGNOSIS — L03.115 CELLULITIS OF RIGHT LEG: ICD-10-CM

## 2018-11-09 RX ORDER — BUPIVACAINE HYDROCHLORIDE 2.5 MG/ML
4 INJECTION, SOLUTION EPIDURAL; INFILTRATION; INTRACAUDAL ONCE
Qty: 4 ML | Refills: 0
Start: 2018-11-09 | End: 2018-11-09

## 2018-11-09 RX ORDER — BETAMETHASONE SODIUM PHOSPHATE AND BETAMETHASONE ACETATE 3; 3 MG/ML; MG/ML
6 INJECTION, SUSPENSION INTRA-ARTICULAR; INTRALESIONAL; INTRAMUSCULAR; SOFT TISSUE ONCE
Qty: 0.5 ML | Refills: 0
Start: 2018-11-09 | End: 2018-11-09

## 2018-11-09 NOTE — PROGRESS NOTES
JUAN C [facility]          Chart reviewed for the following:   Gwen PARKINSON LPN, have reviewed the medications and updated the Allergic reactions for [address] performed immediately prior to start of procedure:   Gwen PARKINSON LPN, have performed the following reviews on Adaline Ray prior to the start of the procedure:            * Patient was identified by name and date of birth   * Agreement that Eric Door boot removed and reapplied   * Procedure site verified   * Patient was positioned for comfort  * Verbal consent was given by patient     Time: 1130      Date of procedure: 11/9/2018    Procedure performed by:  [facility] NURSE    How tolerated by patient: tolerated the procedure well with no complications    Comments:  Patient being seen for assessment of bilateral lower extremities. Patient's daughter states that patient's legs started increasing in swelling and then she developed new open area to right lower leg and a scabbed area to left lower leg. Upon assessment, patient has large amount of swelling to bilateral lower extremities with notable redness to shin area and a scabbed area to shin area of  Each leg. Patient was seen by PCP and started on Keflex and Lasix. Applied hydrocortisone to red areas and then applied unna boot and coban patient tolerated well. Patient will return next Tuesday for reassessment and dressing change.

## 2018-11-09 NOTE — PROGRESS NOTES
Patient: Tigist Bear                MRN: [id number]       SSN: [id number] YOB: 1933        AGE: 80 y.o. SEX: female PCP: Benito Vazquez MD 
11/09/18 Chief Complaint Patient presents with  
 Hip Injury Right hip pain HISTORY:  Tigist Bear is a 80 y.o. female who is seen for right hip pain. Pt had a fall with onset of right hip pain10/27/18. She notes she last saw me 15 years ago for hip pain and has done well with cortisone injections in the past. Patient has a history of sciatica and current pain feels similar to that. Pt endorses right hip/buttock pain radiating into the RLE in the posterolateral aspect. Ms. Sirena Banks underwent Euflexxa series with Adam Cummings for her knees with significant relief in the past. She is followed by Dr. Wai Castillo for distal left clavicle fracture. Patient has a chronic h/o cellulitis and lymphedema. Pain Assessment  11/9/2018 Location of Pain Hip Location Modifiers Right Severity of Pain 10 Quality of Pain Throbbing; Domnick Juan Manuel Duration of Pain Persistent Frequency of Pain Constant Date Pain First Started - Aggravating Factors Bending;Stairs;Standing;Walking; Other (Comment) Aggravating Factors Comment Prolonged sitting Limiting Behavior Yes Relieving Factors Nothing Relieving Factors Comment - Result of Injury Yes Work-Related Injury No  
Type of Injury Fall Type of Injury Comment -  
 
Occupation, etc:  Ms. Sirena Banks was employed by [employer] as the  and was also a  at [employer]. Patient lives with her stepson in [facility]. Ms. Sirena Banks does babysit her grandchildren in [place]. She does do her own yard work at times. Current weight is 144 pounds. She is 5'2\" tall. Patient medical history is significant for DM and HTN. Lab Results Component Value Date/Time Hemoglobin A1c 4.5 05/15/2018 11:55 AM  
 Hemoglobin A1c, External 5.7 07/28/2015 Weight Metrics 11/9/2018 11/6/2018 9/20/2018 8/29/2018 8/21/2018 8/14/2018 7/31/2018 Weight 144 lb 144 lb 14.4 oz 138 lb 138 lb 130 lb 133 lb 135 lb 9.6 oz  
BMI 26.34 kg/m2 26.5 kg/m2 25.24 kg/m2 25.24 kg/m2 23.78 kg/m2 23.56 kg/m2 24.02 kg/m2 Patient Active Problem List  
Diagnosis Code  DM (diabetes mellitus) ([facility] E11.9  Dyslipidemia E78.5  Aortic valve stenosis, mild I35.0  
 HTN (hypertension) I10  
 Dependent edema R60.9  Mixed hyperlipidemia E78.2  
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  
 
REVIEW OF SYSTEMS: All Below are Negative except: See HPI Constitutional: negative for fever, chills, and weight loss. Cardiovascular: negative for chest pain, claudication, leg swelling, SOB, SARKAR Gastrointestinal: Negative for pain, N/V/C/D, Blood in stool or urine, dysuria,  hematuria, incontinence, pelvic pain. Musculoskeletal: See HPI Neurological: Negative for dizziness and weakness. Negative for headaches, Visual changes, confusion, seizures Phychiatric/Behavioral: Negative for depression, memory loss, substance  abuse. Extremities: Negative for hair changes, rash, or skin lesion changes. Hematologic: Negative for bleeding problems, bruising, pallor or swollen lymph  nodes Peripheral Vascular: No calf pain, no circulation deficits. Social History Socioeconomic History  Marital status:  Spouse name: Not on file  Number of children: Not on file  Years of education: Not on file  Highest education level: Not on file Social Needs  Financial resource strain: Not on file  Food insecurity - worry: Not on file  Food insecurity - inability: Not on file  Transportation needs - medical: Not on file  Transportation needs - non-medical: Not on file Occupational History  Not on file Tobacco Use  Smoking status: Never Smoker  Smokeless tobacco: Never Used Substance and Sexual Activity  Alcohol use: No  
 Drug use: No  
 Sexual activity: No  
Other Topics Concern  Not on file Social History Narrative  Not on file No Known Allergies Current Outpatient Medications Medication Sig  furosemide (LASIX) 20 mg tablet Take and extra 20 mg by mouth daily x 3 days  cephALEXin (KEFLEX) 250 mg capsule Take 1 Cap by mouth four (4) times daily for 10 days.  lisinopril (PRINIVIL, ZESTRIL) 10 mg tablet take 1 tablet by mouth once daily /STOP LOSARTAN  
 sertraline (ZOLOFT) 25 mg tablet take 1 tablet by mouth once daily  ascorbic acid, vitamin C, (VITAMIN C) 500 mg tablet Take  by mouth.  ferrous sulfate (IRON) 325 mg (65 mg iron) tablet Take  by mouth Daily (before breakfast).  naproxen sodium (ALEVE PO) Take  by mouth as needed.  RABEprazole (ACIPHEX) 20 mg tablet take 1 tablet by mouth once daily  glipiZIDE-metFORMIN (METAGLIP) 5-500 mg per tablet take 2 tablets by mouth once daily (1 BEFORE BREAKFAST AND 1 BEFORE DINNER)  JANUVIA 100 mg tablet take 1 tablet by mouth once daily (Patient taking differently: take 1/2 tablet by mouth once daily)  cyanocobalamin (VITAMIN B-12) 2,000 mcg TbER Take 3 Tabs by mouth daily.  loperamide (IMODIUM) 2 mg capsule Take 1 mg by mouth Three (3) times a week.  pravastatin (PRAVACHOL) 40 mg tablet Take 1 Tab by mouth nightly.  lidocaine (LIDODERM) 5 % Apply patch to the affected area for 12 hours a day and remove for 12 hours a day.  aspirin delayed-release 81 mg tablet Take  by mouth daily.  cholecalciferol (VITAMIN D3) 1,000 unit tablet Take  by mouth daily.  furosemide (LASIX) 40 mg tablet Take 1 Tab by mouth daily. No current facility-administered medications for this visit. PHYSICAL EXAMINATION: 
Visit Vitals /59 (BP 1 Location: Left arm, BP Patient Position: Sitting) Pulse 75 Temp 97.1 °F (36.2 °C) (Oral) Resp 16 Ht 5' 2\" (1.575 m) Wt 144 lb (65.3 kg) SpO2 97% BMI 26.34 kg/m² ORTHO EXAMINATION: 
Examination Right hip Left hip Skin Intact Intact External Rotation ROM 20 20 Internal Rotation ROM 10 10 Trochanteric tenderness - - Hip flexion contracture - - Antalgic gait - - Trendelenberg sign - - Lumbar tenderness - - Straight leg raise - - Calf tenderness - - Neurovascular Intact Intact Full weight bearing with walker on the right lower extremity. Tenderness of the right ischial tuberosity Examination Right Hand Left Hand Skin Intact Intact Deformity - - Swelling - - Tenderness - - Finger flexion Full Full Finger extension Full Full Sensation Normal Normal  
Capillary refill Normal Normal  
Heberden's nodes - + Dupuytren's - - Volar ganglion cyst, left wrist. Cystic swelling, volar aspect of the left wrist. Severe thenar atrophy on the left wrist 
 
 
PROCEDURE:  After discussing treatment options, patient's right hip was injected with 4 cc Marcaine and 1/2 cc Celestone. Chart reviewed for the following: 
 Brionna Morfin MD, have reviewed the History, Physical and updated the Allergic reactions for Marnie Cabezas TIME OUT performed immediately prior to start of procedure: 
Brionna Morfin MD, have performed the following reviews on Marnie Cabezas prior to the start of the procedure: 
         
* Patient was identified by name and date of birth * Agreement on procedure being performed was verified * Risks and Benefits explained to the patient * Procedure site verified and marked as necessary * Patient was positioned for comfort * Consent was obtained Time: 3:10 PM  
 
Date of procedure: 11/9/2018 Procedure performed by:  Ava Woodson MD 
 
Ms. Eloisa James tolerated the procedure well with no complications. RADIOGRAPHS: 
XR RT HIP 11/9/17 VICKIE IMPRESSION: 
IMPRESSION:  AP pelvis and two views - No fractures, moderate joint space narrowing, moderate osteophytes present. Tonnis grade 3 IMPRESSION:   
  ICD-10-CM ICD-9-CM 1. Ischial bursitis of right side M70.71 726.5 betamethasone (CELESTONE SOLUSPAN) 6 mg/mL injection BETAMETHASONE ACETATE & SODIUM PHOSPHATE INJECTION 3 MG EA.  
   DRAIN/INJECT LARGE JOINT/BURSA  
   bupivacaine, PF, (MARCAINE, PF,) 0.25 % (2.5 mg/mL) injection 2. Right hip pain M25.551 719.45 AMB POC X-RAY RADEX HIP UNI WITH PELVIS 2-3 VIEWS  
   betamethasone (CELESTONE SOLUSPAN) 6 mg/mL injection BETAMETHASONE ACETATE & SODIUM PHOSPHATE INJECTION 3 MG EA.  
   DRAIN/INJECT LARGE JOINT/BURSA  
   bupivacaine, PF, (MARCAINE, PF,) 0.25 % (2.5 mg/mL) injection 3. Right ankle pain, unspecified chronicity M25.571 719.47 AMB POC XRAY, ANKLE; COMPLETE, 3+ VIE 4. Arthritis of right hip M16.11 716.95   
5. Sciatica, right side M54.31 724.3 REFERRAL TO SPINE SURGERY 6. Lymphedema I89.0 457.1 7. Cellulitis of right leg L03.115 682.6 8. Closed nondisplaced fracture of left clavicle, sequela S42.002S 905.2 9. Visual impairment H54.7 369.9 PLAN:  After discussing treatment options, patient's right hip was injected with 4 cc Marcaine and 1/2 cc Celestone. She will follow up prn.    
 
Scribed by Isi Payan ([facility]) as dictated by Ava Woodson MD

## 2018-11-12 ENCOUNTER — HOME HEALTH ADMISSION (OUTPATIENT)
Dept: HOME HEALTH SERVICES | Facility: HOME HEALTH | Age: 83
End: 2018-11-12

## 2018-11-13 ENCOUNTER — OFFICE VISIT (OUTPATIENT)
Dept: VASCULAR SURGERY | Age: 83
End: 2018-11-13

## 2018-11-13 DIAGNOSIS — I87.2 VENOUS (PERIPHERAL) INSUFFICIENCY: Primary | ICD-10-CM

## 2018-11-13 NOTE — PROGRESS NOTES
[facility]          Chart reviewed for the following:   Aga PARKINSON LPN, have reviewed the medications and updated the Allergic reactions for [address] performed immediately prior to start of procedure:   Aga PARKINSON LPN, have performed the following reviews on Clemente Ledesma prior to the start of the procedure:            * Patient was identified by name and date of birth   * Agreement that Brain Silvan boot removed and reapplied   * Procedure site verified   * Patient was positioned for comfort  * Verbal consent was given by patient     Time: 5968      Date of procedure: 11/13/2018    Procedure performed by:  VVS NURSE    How tolerated by patient: tolerated the procedure well with no complications    Comments:  Removed bilateral lower extremity unna boots, patient tolerated well. Swelling has greatly decreased since last visit and no more redness is noted but the two scabbed areas have opened to the anterior lower extremities. Left lower leg wound has slight slough and granulating tissue, applied medi honey, ABD, unna boot and coban, patient tolerated well. Right lower extremity wound is granulating with small amount ss drainage, surrounding tissue WNL. Applied solosite, adaptic gauze, ABD, unna boot and coban, patient tolerated well. Patient will return on Friday for reassessment and then go to weekly dressing change.

## 2018-11-14 DIAGNOSIS — R80.9 MICROALBUMINURIA: ICD-10-CM

## 2018-11-14 DIAGNOSIS — I10 ESSENTIAL HYPERTENSION: ICD-10-CM

## 2018-11-14 RX ORDER — LISINOPRIL 10 MG/1
TABLET ORAL
Qty: 30 TAB | Refills: 1 | Status: SHIPPED | OUTPATIENT
Start: 2018-11-14 | End: 2018-11-27 | Stop reason: SDUPTHER

## 2018-11-15 ENCOUNTER — HOME CARE VISIT (OUTPATIENT)
Dept: HOME HEALTH SERVICES | Facility: HOME HEALTH | Age: 83
End: 2018-11-15

## 2018-11-16 ENCOUNTER — OFFICE VISIT (OUTPATIENT)
Dept: VASCULAR SURGERY | Age: 83
End: 2018-11-16

## 2018-11-16 DIAGNOSIS — S81.802S WOUND OF LEFT LEG, SEQUELA: Primary | ICD-10-CM

## 2018-11-16 DIAGNOSIS — S81.801S WOUND OF RIGHT LEG, SEQUELA: ICD-10-CM

## 2018-11-16 RX ORDER — RABEPRAZOLE SODIUM 20 MG/1
TABLET, DELAYED RELEASE ORAL
Qty: 30 TAB | Refills: 5 | Status: SHIPPED | OUTPATIENT
Start: 2018-11-16 | End: 2019-01-01 | Stop reason: SDUPTHER

## 2018-11-16 RX ORDER — GLIPIZIDE AND METFORMIN HCL 5; 500 MG/1; MG/1
TABLET, FILM COATED ORAL
Qty: 120 TAB | Refills: 10 | Status: SHIPPED | OUTPATIENT
Start: 2018-11-16 | End: 2020-01-01

## 2018-11-20 ENCOUNTER — OFFICE VISIT (OUTPATIENT)
Dept: VASCULAR SURGERY | Age: 83
End: 2018-11-20

## 2018-11-20 VITALS
DIASTOLIC BLOOD PRESSURE: 68 MMHG | BODY MASS INDEX: 26.5 KG/M2 | HEIGHT: 62 IN | SYSTOLIC BLOOD PRESSURE: 140 MMHG | WEIGHT: 144 LBS | RESPIRATION RATE: 18 BRPM | HEART RATE: 88 BPM

## 2018-11-20 DIAGNOSIS — F32.A MILD DEPRESSION: ICD-10-CM

## 2018-11-20 DIAGNOSIS — I87.2 VENOUS (PERIPHERAL) INSUFFICIENCY: Primary | ICD-10-CM

## 2018-11-20 NOTE — PROGRESS NOTES
[facility]          Chart reviewed for the following:   Rebecca Desir RN, have reviewed the medications and updated the Allergic reactions for [address] performed immediately prior to start of procedure:   Rebecca Desir RN, have performed the following reviews on Tigist Bear prior to the start of the procedure:            * Patient was identified by name and date of birth   * Agreement that Rolin Main boot removed and reapplied   * Procedure site verified   * Patient was positioned for comfort  * Verbal consent was given by patient     Time: 1430      Date of procedure: 11/20/2018    Procedure performed by:  VVS NURSE    How tolerated by patient: pt tolerated well     Comments: Removed bilateral lower extremity unna boots, patient tolerated well. Swelling has greatly decreased since last visit. Left lower leg wound has slight slough and granulating tissue, applied medi honey, ABD, unna boot and coban, patient tolerated well. Right lower extremity wound is granulating with small amount ss drainage, surrounding tissue WNL. Applied solosite, adaptic gauze, ABD, unna boot and coban, patient tolerated well. Patient will return on Tuesday for revaluation.

## 2018-11-23 RX ORDER — SERTRALINE HYDROCHLORIDE 25 MG/1
TABLET, FILM COATED ORAL
Qty: 30 TAB | Refills: 2 | Status: SHIPPED | OUTPATIENT
Start: 2018-11-23 | End: 2019-02-22 | Stop reason: SDUPTHER

## 2018-11-23 NOTE — TELEPHONE ENCOUNTER
Requested Prescriptions     Pending Prescriptions Disp Refills    sertraline (ZOLOFT) 25 mg tablet [Pharmacy Med Name: SERTRALINE HCL 25 MG TABLET] 30 Tab 2     Sig: take 1 tablet by mouth once daily

## 2018-11-26 ENCOUNTER — OFFICE VISIT (OUTPATIENT)
Dept: VASCULAR SURGERY | Age: 83
End: 2018-11-26

## 2018-11-26 DIAGNOSIS — I87.2 VENOUS (PERIPHERAL) INSUFFICIENCY: Primary | ICD-10-CM

## 2018-11-26 NOTE — PROGRESS NOTES
[facility]          Chart reviewed for the following:   Jyotsna PARKINSON LPN, have reviewed the medications and updated the Allergic reactions for [address] performed immediately prior to start of procedure:   Jyotsna PARKINSON LPN, have performed the following reviews on Kathie Montana prior to the start of the procedure:            * Patient was identified by name and date of birth   * Agreement that [place] boot removed and reapplied   * Procedure site verified   * Patient was positioned for comfort  * Verbal consent was given by patient     Time: 1500      Date of procedure: 11/26/2018    Procedure performed by:  VVS NURSE    How tolerated by patient: tolerated the procedure well with no complications    Comments:  Patient returns for assessment of  Bilateral lower extremities. Patient is not wearing tubi  upon arrival and legs are very swollen. Right lower leg has about +2 pitting edema with open area measuring 1.5x1.0x0.1 cm, granulating with moderate amount purulent drainage surrounding tissue WNL. Left lower leg has about +1 pitting edema with open area measuring 0.3x0.3x0.1 cm, granulating with scant amount  Ss drainage, surrounding tissue WNL. Applied calcium alginate, ABD, unna boot and coban to both  Legs, patient tolerated well. Patient will return on Friday for reassessment and dressing change.

## 2018-11-27 ENCOUNTER — OFFICE VISIT (OUTPATIENT)
Dept: ORTHOPEDIC SURGERY | Age: 83
End: 2018-11-27

## 2018-11-27 VITALS
BODY MASS INDEX: 26.13 KG/M2 | WEIGHT: 142 LBS | HEART RATE: 71 BPM | OXYGEN SATURATION: 99 % | RESPIRATION RATE: 17 BRPM | TEMPERATURE: 97.5 F | SYSTOLIC BLOOD PRESSURE: 141 MMHG | HEIGHT: 62 IN | DIASTOLIC BLOOD PRESSURE: 66 MMHG

## 2018-11-27 DIAGNOSIS — M53.3 COCCYX PAIN: ICD-10-CM

## 2018-11-27 DIAGNOSIS — M54.31 SCIATICA, RIGHT SIDE: ICD-10-CM

## 2018-11-27 DIAGNOSIS — M54.50 ACUTE BILATERAL LOW BACK PAIN WITHOUT SCIATICA: Primary | ICD-10-CM

## 2018-11-27 RX ORDER — GABAPENTIN 100 MG/1
100 CAPSULE ORAL 2 TIMES DAILY
Qty: 60 CAP | Refills: 1 | Status: SHIPPED | OUTPATIENT
Start: 2018-11-27 | End: 2019-03-24 | Stop reason: ALTCHOICE

## 2018-11-27 NOTE — PROGRESS NOTES
Chief complaint/History of Present Illness:  Chief Complaint   Patient presents with    Back Pain     FU     Hip Pain     HPI  Rodney De La Torre is a  80 y.o.  female      HISTORY OF PRESENT ILLNESS:  The patient comes in today. She was last seen 07/07/2016 by Dr. Nancy Arambula. At that time, she had had an L3-4 lumbar epidural which helped her quite a bit. She states she has been doing okay until 07/2018. She fell onto her buttock and actually fractured her  clavicle. She has been seeing Dr. Josh An for that. She states once that pain got better, she noticed she started having right hip pain and right buttock pain. She saw Dr. Ashley Armendariz recently and was given a right hip injection which did help, but she continues to have the buttock and sometimes it can radiate into the right upper thigh. Additionally, she is being treated for lymphedema and cellulitis currently. She has just finished Keflex on that. She will be seeing the NP in her family practice for that for follow-up later this week or next week. She denies fever, bowel or bladder dysfunction. PHYSICAL EXAMINATION:  Ms. Farshad Uribe is an 72-year-old female. She is alert and oriented with normal mood and affect. She has an unsteady gait. She has a rolling walker. She was able to get from sitting to standing. Both her bilateral lower extremities have ace wraps on them for her lymphedema. ASSESSMENT/PLAN:  This is a patient who has had right-sided sciatica in the past.  She may have some measure of that, but I was concerned because she points to very low in the midline in the buttocks. We did x-rays of her lumbar spine and her sacrum and coccyx. I really do not see any fractures in the coccyx. I think she might have some measure of sciatica and coccyx pain. We are going to try her on Neurontin 100 mg b.i.d. which is a low dose. I explained to her and her daughter it may take some time for that to become effective.   We will have her follow-up with Dr. Harsh Crystal in a month. Review of systems:    Past Medical History:   Diagnosis Date    Aortic valve stenosis, mild April 2014    EF 10-13%, Grade 1 diastolic dysfunction, mild aortic stenosis,  mean gradient 10 mm Hg    CAD (coronary artery disease)     aortic stenosis    Cardiac echocardiogram 06/2018    EF 55-60%. No RWMA. Mild-mod LVH. Severe LAE. Mild AS.  Cardiac nuclear imaging test 08/27/2008    No evidence of ischemia or prior scarring. EF 62%. No WMA. No significant change from study of 3/23/06.  Cardiovascular RLE venous duplex 12/01/2016    Right leg:  No DVT.  Carotid duplex 17/27/7850    Mild 4-48% LICA stenosis.     DM (diabetes mellitus) ([address]     Dyslipidemia     Heart failure (HCC)     HTN (hypertension)     Mixed hyperlipidemia     Sciatica     Traumatic closed displaced fracture of acromial end of clavicle, left, initial encounter      Past Surgical History:   Procedure Laterality Date    HX APPENDECTOMY      HX OOPHORECTOMY Left 1957    HX OTHER SURGICAL Right 2010    ankle surgery    HX TUMOR REMOVAL Left     ovarian    HX UROLOGICAL      kidney stone removal     Social History     Socioeconomic History    Marital status:      Spouse name: Not on file    Number of children: Not on file    Years of education: Not on file    Highest education level: Not on file   Social Needs    Financial resource strain: Not on file    Food insecurity - worry: Not on file    Food insecurity - inability: Not on file   Note[employer]ault needs - medical: Not on file   [employer] needs - non-medical: Not on file   Occupational History    Not on file   Tobacco Use    Smoking status: Never Smoker    Smokeless tobacco: Never Used   Substance and Sexual Activity    Alcohol use: No    Drug use: No    Sexual activity: No   Other Topics Concern    Not on file   Social History Narrative    Not on file     Family History   Problem Relation Age of Onset    Stroke Mother     Heart Disease Mother     Lung Disease Father     Cancer Neg Hx     Diabetes Neg Hx     Hypertension Neg Hx        Physical Exam:  Visit Vitals  /66   Pulse 71   Temp 97.5 °F (36.4 °C) (Oral)   Resp 17   Ht 5' 2\" (1.575 m)   Wt 142 lb (64.4 kg)   SpO2 99%   BMI 25.97 kg/m²     Pain Scale: 10 - Worst pain ever/10          has been . reviewed and is appropriate          Diagnoses and all orders for this visit:    1. Acute bilateral low back pain without sciatica  -     AMB POC XRAY, SACRUM & COCCYX, 2+ VIE  -     AMB POC XRAY, SPINE, LUMBOSACRAL; 2 O    2. Sciatica, right side    3. Coccyx pain    Other orders  -     gabapentin (NEURONTIN) 100 mg capsule; Take 1 Cap by mouth two (2) times a day. Follow-up Disposition:  Return in about 4 weeks (around 12/25/2018) for with Dr Wilbur Naik.         We have informed Carlos Miranda to notify us for immediate appointment if she has any worsening neurogical symptoms or if an emergency situation presents, then call [phone]

## 2018-11-30 ENCOUNTER — OFFICE VISIT (OUTPATIENT)
Dept: VASCULAR SURGERY | Age: 83
End: 2018-11-30

## 2018-11-30 DIAGNOSIS — I87.2 VENOUS (PERIPHERAL) INSUFFICIENCY: Primary | ICD-10-CM

## 2018-11-30 NOTE — PROGRESS NOTES
[facility]          Chart reviewed for the following:   Juanita PARKINSON LPN, have reviewed the medications and updated the Allergic reactions for [address] performed immediately prior to start of procedure:   Juanita PARKINSON LPN, have performed the following reviews on Tigist Delucae prior to the start of the procedure:            * Patient was identified by name and date of birth   * Agreement that Rolin Main boot removed and reapplied   * Procedure site verified   * Patient was positioned for comfort  * Verbal consent was given by patient     Time: 1500      Date of procedure: 11/30/2018    Procedure performed by:  VVS NURSE    How tolerated by patient: tolerated the procedure well with no complications    Comments:  Removed unna boots and cleansed bilateral lower extremities with wound cleanser and gauze, patient tolerated well. Right  Lower leg and left lower leg wounds are granulating with small amount ss drainage, surrounding tissue WNL. Slight  Swelling to right  Lower leg and no swelling to left lower leg. Applied calcium alginate with  AG, covered with ABD, unna boot and coban, patient tolerated well. Patient will return on Wednesday for reassessment and dressing change.

## 2018-12-05 ENCOUNTER — OFFICE VISIT (OUTPATIENT)
Dept: VASCULAR SURGERY | Age: 83
End: 2018-12-05

## 2018-12-05 DIAGNOSIS — I87.2 VENOUS (PERIPHERAL) INSUFFICIENCY: Primary | ICD-10-CM

## 2018-12-05 NOTE — PROGRESS NOTES
[facility]      [facility]          Chart reviewed for the following:   Fitz PARKINSON LPN, have reviewed the medications and updated the Allergic reactions for [address] performed immediately prior to start of procedure:   Fitz PARKINSON LPN, have performed the following reviews on Chito Cortez prior to the start of the procedure:            * Patient was identified by name and date of birth   * Agreement that Shirlie Northern boot removed and reapplied   * Procedure site verified   * Patient was positioned for comfort  * Verbal consent was given by patient     Time: 7978      Date of procedure: 12/5/2018    Procedure performed by:  VVS NURSE    How tolerated by patient: tolerated the procedure well with no complications    Comments: Cleansed bilateral lower extremities with wound cleanser and gauze, patient tolerated well. Right lower leg wound  Measures: 1.5x0.8x0.1 cm, granulating with small amount ss drainage, surrounding tissue WNL, slight swelling noted. Left lower leg wound measures: 0.2x0.2x0.1 cm, granulating with scant amount ss drainage, surrounding tissue WNL, no swelling noted. Applied calcium alginate, ABD, unna boot and coban, patient tolerated well. Patient will return in one week for reassessment and dressing change.

## 2018-12-12 ENCOUNTER — HOSPITAL ENCOUNTER (OUTPATIENT)
Dept: LAB | Age: 83
Discharge: HOME | End: 2018-12-12
Payer: MEDICARE

## 2018-12-12 ENCOUNTER — OFFICE VISIT (OUTPATIENT)
Dept: VASCULAR SURGERY | Age: 83
End: 2018-12-12

## 2018-12-12 DIAGNOSIS — I87.2 VENOUS (PERIPHERAL) INSUFFICIENCY: ICD-10-CM

## 2018-12-12 DIAGNOSIS — I83.018 VENOUS STASIS ULCER OF OTHER PART OF RIGHT LOWER LEG LIMITED TO BREAKDOWN OF SKIN, UNSPECIFIED WHETHER VARICOSE VEINS PRESENT (HCC): Primary | ICD-10-CM

## 2018-12-12 DIAGNOSIS — L97.811 VENOUS STASIS ULCER OF OTHER PART OF RIGHT LOWER LEG LIMITED TO BREAKDOWN OF SKIN, UNSPECIFIED WHETHER VARICOSE VEINS PRESENT (HCC): Primary | ICD-10-CM

## 2018-12-12 PROCEDURE — 87186 SC STD MICRODIL/AGAR DIL: CPT

## 2018-12-12 PROCEDURE — 87070 CULTURE OTHR SPECIMN AEROBIC: CPT

## 2018-12-12 PROCEDURE — 87077 CULTURE AEROBIC IDENTIFY: CPT

## 2018-12-12 RX ORDER — SULFAMETHOXAZOLE AND TRIMETHOPRIM 800; 160 MG/1; MG/1
1 TABLET ORAL 2 TIMES DAILY
Qty: 14 TAB | Refills: 0 | Status: SHIPPED | OUTPATIENT
Start: 2018-12-12 | End: 2018-12-18 | Stop reason: SDUPTHER

## 2018-12-12 NOTE — PROGRESS NOTES
[facility]          Chart reviewed for the following:   Matt PARKINSON LPN, have reviewed the medications and updated the Allergic reactions for [address] performed immediately prior to start of procedure:   Matt PARKINSON LPN, have performed the following reviews on Melody Lawson prior to the start of the procedure:            * Patient was identified by name and date of birth   * Agreement that Colby Love boot removed and reapplied   * Procedure site verified   * Patient was positioned for comfort  * Verbal consent was given by patient     Time: 9194      Date of procedure: 12/12/2018    Procedure performed by:  VVS NURSE    How tolerated by patient: tolerated the procedure well with no complications    Comments: Removed bilateral lower extremity unna boots, patient tolerated well. Cleansed both legs with wound cleanser and gauze and patient's wound to left lower leg has formed a scab  With no drainage, surrounding tissue WNL and no swelling noted. Right lower leg has two anterior open areas that are granulating with small amount ss drainage, surrounding tissue slightly red, just above this area is a small raised area that appears to have pus but no  Open and the posterior calf and medial lower leg has small red dots noted and is tender to touch. Wound culture was obtained of right lower leg and had FERMIN Fontanez assess as well and will start patient on Bactrim DS BID for 7 days and prescription was sent to [facility]. Applied silvadene, adaptic gauze to right lower leg wounds, covered with ABD, wrapped with unna boot and coban, patient tolerated well. Patient also has swelling to right lower leg but non pitting edema. Applied unna boot and coban to left lower leg, patient tolerated well. Patient will return in one week.

## 2018-12-15 LAB
BACTERIA SPEC CULT: ABNORMAL
GRAM STN SPEC: ABNORMAL
SERVICE CMNT-IMP: ABNORMAL

## 2018-12-18 ENCOUNTER — OFFICE VISIT (OUTPATIENT)
Dept: ORTHOPEDIC SURGERY | Age: 83
End: 2018-12-18

## 2018-12-18 VITALS
RESPIRATION RATE: 16 BRPM | OXYGEN SATURATION: 99 % | SYSTOLIC BLOOD PRESSURE: 141 MMHG | TEMPERATURE: 97.2 F | WEIGHT: 141 LBS | HEIGHT: 62 IN | HEART RATE: 77 BPM | DIASTOLIC BLOOD PRESSURE: 64 MMHG | BODY MASS INDEX: 25.95 KG/M2

## 2018-12-18 DIAGNOSIS — M70.71 ISCHIAL BURSITIS OF RIGHT SIDE: Primary | ICD-10-CM

## 2018-12-18 DIAGNOSIS — M54.16 LUMBAR RADICULOPATHY: ICD-10-CM

## 2018-12-18 RX ORDER — BUPIVACAINE HYDROCHLORIDE 2.5 MG/ML
4 INJECTION, SOLUTION INFILTRATION; PERINEURAL ONCE
Qty: 4 ML | Refills: 0
Start: 2018-12-18 | End: 2018-12-18

## 2018-12-18 RX ORDER — LIDOCAINE HYDROCHLORIDE 20 MG/ML
4 INJECTION, SOLUTION EPIDURAL; INFILTRATION; INTRACAUDAL; PERINEURAL ONCE
Qty: 4 ML | Refills: 0
Start: 2018-12-18 | End: 2018-12-18

## 2018-12-18 RX ORDER — BETAMETHASONE SODIUM PHOSPHATE AND BETAMETHASONE ACETATE 3; 3 MG/ML; MG/ML
12 INJECTION, SUSPENSION INTRA-ARTICULAR; INTRALESIONAL; INTRAMUSCULAR; SOFT TISSUE ONCE
Qty: 2 ML | Refills: 0
Start: 2018-12-18 | End: 2018-12-18

## 2018-12-18 RX ORDER — SULFAMETHOXAZOLE AND TRIMETHOPRIM 800; 160 MG/1; MG/1
1 TABLET ORAL 2 TIMES DAILY
Qty: 14 TAB | Refills: 0 | Status: ON HOLD | OUTPATIENT
Start: 2018-12-18 | End: 2019-01-03

## 2018-12-18 NOTE — TELEPHONE ENCOUNTER
Patient's wound culture results were reviewed  By FERMIN Jade.   Patient was already placed on Bactrim DS at last visit but will extend for another week per verbal order FERMIN Jade.

## 2018-12-19 ENCOUNTER — OFFICE VISIT (OUTPATIENT)
Dept: VASCULAR SURGERY | Age: 83
End: 2018-12-19

## 2018-12-19 DIAGNOSIS — R80.9 MICROALBUMINURIA: ICD-10-CM

## 2018-12-19 DIAGNOSIS — I87.2 VENOUS (PERIPHERAL) INSUFFICIENCY: Primary | ICD-10-CM

## 2018-12-19 DIAGNOSIS — I10 ESSENTIAL HYPERTENSION: ICD-10-CM

## 2018-12-19 NOTE — PROGRESS NOTES
[facility]          Chart reviewed for the following:   Cooper PARKINSON LPN, have reviewed the medications and updated the [facility] immediately prior to start of procedure:   Cooper PARKINSON LPN, have performed the following reviews [facility] [place] prior to the start of the procedure:            * Patient was identified by name and date of birth   * Agreement that Nile Heimlich boot removed and reapplied   * Procedure site verified   * Patient was positioned for comfort  * Verbal consent was given by patient     Time: 1500      Date of procedure: 12/19/2018    Procedure performed by:  VVS NURSE    How tolerated by patient: tolerated the procedure well with no complications    Comments: Removed unna boots and coban from bilateral lower legs and cleansed both legs with wound cleanser and gauze, patient tolerated well. Patient has no open areas to left lower leg but  Slight swelling noted. Patient has two open areas to anterior lower leg that are granulating with scant amount ss drainage, surrounding tissue WNL and has about +1 pitting edema. There is slight rash just below knee but not noted anywhere else. Applied hydrocortisone to rash, silvadene and adaptic gauze to open wounds, covered with gauze, unna boot and coban to both legs. Patient tolerated  Well  Patient will return next Thursday for reassessment and dressing change.

## 2018-12-20 RX ORDER — LISINOPRIL 10 MG/1
TABLET ORAL
Qty: 30 TAB | Refills: 1 | Status: ON HOLD | OUTPATIENT
Start: 2018-12-20 | End: 2019-01-03

## 2018-12-20 RX ORDER — FUROSEMIDE 40 MG/1
TABLET ORAL
Qty: 90 TAB | Refills: 3 | Status: SHIPPED | OUTPATIENT
Start: 2018-12-20 | End: 2019-01-01 | Stop reason: SDUPTHER

## 2018-12-23 RX ORDER — SITAGLIPTIN 100 MG/1
TABLET, FILM COATED ORAL
Qty: 30 TAB | Refills: 5 | Status: SHIPPED | OUTPATIENT
Start: 2018-12-23 | End: 2019-01-01 | Stop reason: SDUPTHER

## 2018-12-27 ENCOUNTER — OFFICE VISIT (OUTPATIENT)
Dept: VASCULAR SURGERY | Age: 83
End: 2018-12-27

## 2018-12-27 DIAGNOSIS — I87.2 VENOUS (PERIPHERAL) INSUFFICIENCY: Primary | ICD-10-CM

## 2018-12-27 NOTE — PROGRESS NOTES
[facility]          Chart reviewed for the following:   Micaela PARKINSON LPN, have reviewed the medications and updated the Allergic reactions for [address] performed immediately prior to start of procedure:   Micaela PARKINSON LPN, have performed the following reviews on Sujata Pryor prior to the start of the procedure:            * Patient was identified by name and date of birth   * Agreement that Tanesha Reach boot removed and reapplied   * Procedure site verified   * Patient was positioned for comfort  * Verbal consent was given by patient     Time: 2954      Date of procedure: 12/27/2018    Procedure performed by:  VVS NURSE    How tolerated by patient: tolerated the procedure well with no complications    Comments:  Cleansed bilateral lower extremities with wound cleanser and gauze, patient tolerated well. Patient's swelling has greatly decreased. No swelling to left lower leg and no open areas noted. Applied  Unna boot and coban, patient tolerated well. Very slight swelling to right lower leg with one open area that is granulating with slight slough with small amount ss drainage, surrounding tissue WNL. Wound measures: 1.0x0.4x0.1 cm. Applied solosite gel, calcium alginate with silver, ABD, unna boot and coban, patient tolerated well. Patient will return in one week for reassessment and dressing change.

## 2018-12-28 ENCOUNTER — OFFICE VISIT (OUTPATIENT)
Dept: ORTHOPEDIC SURGERY | Age: 83
End: 2018-12-28

## 2018-12-28 VITALS
DIASTOLIC BLOOD PRESSURE: 64 MMHG | SYSTOLIC BLOOD PRESSURE: 108 MMHG | RESPIRATION RATE: 16 BRPM | BODY MASS INDEX: 25.79 KG/M2 | HEART RATE: 71 BPM | HEIGHT: 62 IN

## 2018-12-28 DIAGNOSIS — M54.16 LUMBAR RADICULOPATHY: ICD-10-CM

## 2018-12-28 DIAGNOSIS — M54.50 LUMBAR PAIN: ICD-10-CM

## 2018-12-28 DIAGNOSIS — M53.3 SI (SACROILIAC) PAIN: Primary | ICD-10-CM

## 2018-12-28 RX ORDER — TRAMADOL HYDROCHLORIDE 50 MG/1
50 TABLET ORAL
Qty: 14 TAB | Refills: 0 | Status: SHIPPED | OUTPATIENT
Start: 2018-12-28 | End: 2019-01-11 | Stop reason: SDUPTHER

## 2018-12-28 RX ORDER — KETOROLAC TROMETHAMINE 15 MG/ML
30 INJECTION, SOLUTION INTRAMUSCULAR; INTRAVENOUS ONCE
Qty: 1 VIAL | Refills: 0
Start: 2018-12-28 | End: 2018-12-28

## 2018-12-28 NOTE — PROGRESS NOTES
[address]  [address], [address]  [place]  Phone: [phone]  Fax: [phone]    Lara Gao  : 1933  PCP: Ashlee Ellis MD    PROGRESS NOTE    HISTORY OF PRESENT ILLNESS:  Chief Complaint   Patient presents with    Hip Pain     Indu Del Cid is a 80 y.o.  female with history of right buttock pain. She was seen recently with Dr. Marianela Ansari. She found some relief from a R ischial bursa injection provided by Dr. Swapnil Hearn on 18. She has a L3-4 MAC scheduled for . She takes Gabapentin 100 mg BID. She has tried a MDP, a compound cream, and Tramadol in the past. She has also had Toradol injections with good benefit. She has a history of compression fractures in the past from 2016. Today,  She is having \"left hip pain\". She is describing this as a severe, unrelenting pain. She is not having much in the way of back pain right now. She states her back pain comes and goes. She is being treated for some sort of infection in her legs, so her SI may be pushed back. Upon palpation, her pain is in her left SI. When she has back pain, it is a lumbar pain. Denies bladder/bowel dysfunction, saddle paresthesia, weakness, gait disturbance, or other neurological deficit. Pt at this time desires to  continue with current care/proceed with medication evaluation. Opioid Assessment    This is a patient with acute buttock pain  that contributes to her pain. Least pain over the last week has been 10/10. Worst pain over the last week has been 10/10. Opioid Risk Tool Reviewed: YES, Score 2  Aberrant behaviors: None. Urine Drug Screen: acute one week supply. Controlled substance agreement on file: NO: acute one week supply.      reviewed:yes and n/a  Pill count is consistent with her prescription: yes and n/a  Concomitant use of a benzodiazepine: no  MME 0      Reports that pain would be a 10/10 w/out medication and that it goes down to a ?/10 after medication. This enables the pt to be functional, achieve ADLs and engage in social activities. Risks and benefits of  opiate therapy have been reviewed with the patient. No pain behaviors. Denies thoughts of harming self or others. Pt has a good risk to benefit ratio which allows the pt to function in a home environment without side effects      This is a acute and chronic problem that is worsened. Per review of available records and patients , there are not sign of overuse, misuse, diversion, or concerning side effects. Today we reviewed: the risk of overdose, addiction, and dependency proper storage and disposal of medications the goals of treatment (improve functionality, quality of life, and pain)  The following changes were made to the patients current treatment plan: medications adjusted, see orders. ASSESSMENT  80 y.o. female with left buttock pain. Diagnoses and all orders for this visit:    1. SI (sacroiliac) pain  -     KETOROLAC TROMETHAMINE INJ  -     ketorolac (TORADOL) 15 mg/mL soln injection; 2 mL by IntraMUSCular route once for 1 dose. -     [place] THER/PROPH/DIAG INJECTION, SUBCUT/IM  -     traMADol (ULTRAM) 50 mg tablet; Take 1 Tab by mouth two (2) times daily as needed for Pain. Max Daily Amount: 100 mg.    2. Lumbar pain  -     KETOROLAC TROMETHAMINE INJ  -     ketorolac (TORADOL) 15 mg/mL soln injection; 2 mL by IntraMUSCular route once for 1 dose. -     IL THER/PROPH/DIAG INJECTION, SUBCUT/IM  -     traMADol (ULTRAM) 50 mg tablet; Take 1 Tab by mouth two (2) times daily as needed for Pain. Max Daily Amount: 100 mg.    3. Lumbar radiculopathy  -     KETOROLAC TROMETHAMINE INJ  -     ketorolac (TORADOL) 15 mg/mL soln injection; 2 mL by IntraMUSCular route once for 1 dose. -     IL THER/PROPH/DIAG INJECTION, SUBCUT/IM  -     traMADol (ULTRAM) 50 mg tablet; Take 1 Tab by mouth two (2) times daily as needed for Pain. Max Daily Amount: 100 mg. IMPRESSION/PLAN    1) Pt was given information on back care. 2) Toradol 30 mg today. 3) Tramadol, one week supply for acute severe pain. If she is unable to have the SI, she can FU with Dr. Kevin Hawthorne to discuss another bursa injection. 4) Ms. Dwight Johnson has a reminder for a \"due or due soon\" health maintenance. I have asked that she contact her primary care provider, Mayur Singer MD, for follow-up on this health maintenance. 5) We have informed patient to notify us for immediate appointment if he has any worsening neurogical symptoms or if an emergency situation presents, then call 911  6) Pt will follow-up in as scheduled. Risks and benefits of ongoing opiate therapy have been reviewed with the patient.  is appropriate. No pain behaviors. Denies thoughts of harming self or others. Pt has a good risk to benefit ratio which allows the pt to function in a home environment without side effects. PAST MEDICAL HISTORY  Past Medical History:   Diagnosis Date    Aortic valve stenosis, mild April 2014    EF 19-41%, Grade 1 diastolic dysfunction, mild aortic stenosis,  mean gradient 10 mm Hg    CAD (coronary artery disease)     aortic stenosis    Cardiac echocardiogram 06/2018    EF 55-60%. No RWMA. Mild-mod LVH. Severe LAE. Mild AS.  Cardiac nuclear imaging test 08/27/2008    No evidence of ischemia or prior scarring. EF 62%. No WMA. No significant change from study of 3/23/06.  Cardiovascular RLE venous duplex 12/01/2016    Right leg:  No DVT.  Carotid duplex 98/44/2957    Mild 6-09% LICA stenosis.     DM (diabetes mellitus) ([address]     Dyslipidemia     Heart failure ([address]     HTN (hypertension)     Mixed hyperlipidemia     Sciatica     Traumatic closed displaced fracture of acromial end of clavicle, left, initial encounter         MEDICATIONS  Current Outpatient Medications   Medication Sig Dispense Refill    ketorolac (TORADOL) 15 mg/mL soln injection 2 mL by IntraMUSCular route once for 1 dose. 1 Vial 0    traMADol (ULTRAM) 50 mg tablet Take 1 Tab by mouth two (2) times daily as needed for Pain. Max Daily Amount: 100 mg. 14 Tab 0    JANUVIA 100 mg tablet take 1 tablet by mouth once daily 30 Tab 5    lisinopril (PRINIVIL, ZESTRIL) 10 mg tablet take 1 tablet by mouth once daily . STOP LOSARTAN 30 Tab 1    furosemide (LASIX) 40 mg tablet take 1 tablet by mouth once daily 90 Tab 3    trimethoprim-sulfamethoxazole (BACTRIM DS, SEPTRA DS) 160-800 mg per tablet Take 1 Tab by mouth two (2) times a day. 14 Tab 0    gabapentin (NEURONTIN) 100 mg capsule Take 1 Cap by mouth two (2) times a day. 60 Cap 1    sertraline (ZOLOFT) 25 mg tablet take 1 tablet by mouth once daily 30 Tab 2    glipiZIDE-metFORMIN (METAGLIP) 5-500 mg per tablet TAKE 1 TABLET BY MOUTH BEFORE BREAKFAST AND 1 TABLET BY MOUTH BEFORE DINNER 120 Tab 10    RABEprazole (ACIPHEX) 20 mg tablet take 1 tablet by mouth once daily 30 Tab 5    lisinopril (PRINIVIL, ZESTRIL) 10 mg tablet take 1 tablet by mouth once daily /STOP LOSARTAN 30 Tab 1    ascorbic acid, vitamin C, (VITAMIN C) 500 mg tablet Take  by mouth.  ferrous sulfate (IRON) 325 mg (65 mg iron) tablet Take  by mouth Daily (before breakfast).  naproxen sodium (ALEVE PO) Take  by mouth as needed.  cyanocobalamin (VITAMIN B-12) 2,000 mcg TbER Take 3 Tabs by mouth daily.  loperamide (IMODIUM) 2 mg capsule Take 1 mg by mouth Three (3) times a week.  pravastatin (PRAVACHOL) 40 mg tablet Take 1 Tab by mouth nightly. 90 Tab 3    lidocaine (LIDODERM) 5 % Apply patch to the affected area for 12 hours a day and remove for 12 hours a day. 5 Each 0    aspirin delayed-release 81 mg tablet Take  by mouth daily.  cholecalciferol (VITAMIN D3) 1,000 unit tablet Take  by mouth daily.          ALLERGIES  No Known Allergies    SOCIAL HISTORY    Social History     Socioeconomic History    Marital status:      Spouse name: Not on file    Number of children: Not on file    Years of education: Not on file    Highest education level: Not on file   Social Needs    Financial resource strain: Not on file    Food insecurity - worry: Not on file    Food insecurity - inability: Not on file    Transportation needs - medical: Not on file   [employer] needs - non-medical: Not on file   Occupational History    Not on file   Tobacco Use    Smoking status: Never Smoker    Smokeless tobacco: Never Used   Substance and Sexual Activity    Alcohol use: No    Drug use: No    Sexual activity: No   Other Topics Concern    Not on file   Social History Narrative    Not on file       SUBJECTIVE      Pain Scale: /10    Pain Assessment  12/28/2018   Location of Pain Back; Hip   Location Modifiers Left   Severity of Pain 10   Quality of Pain Dull; Stewart [place]; Throbbing;Aching;Burning; Other (Comment)   Quality of Pain Comment Stabbing    Duration of Pain Persistent   Frequency of Pain Constant   Date Pain First Started -   Aggravating Factors Bending;Stretching;Standing;Walking;Kneeling;Stairs;Squatting;Straightening   Aggravating Factors Comment -   Limiting Behavior Yes   Relieving Factors Ice; Other (Comment)   Relieving Factors Comment Biofreeze   Result of Injury No   Work-Related Injury -   Type of Injury -   Type of Injury Comment -       Accompanied by family member. REVIEW OF SYSTEMS  ROS    Constitutional: Negative for fever, chills, or weight change. Respiratory: Negative for cough or shortness of breath. Cardiovascular: Negative for chest pain or palpitations. Gastrointestinal: Negative for acid reflux, change in bowel habits, or constipation. Genitourinary: Negative for incontinence, dysuria and flank pain. Musculoskeletal: Positive for lumbar and hip pain. Skin: Negative for rash. Neurological: Negative for headaches, dizziness, or numbness. Endo/Heme/Allergies: Negative . Psychiatric/Behavioral: Negative.        PHYSICAL EXAMINATION  Visit Vitals  /64   Pulse 71   Resp 16   Ht 5' 2\" (1.575 m)   BMI 25.79 kg/m²       Constitutional: Well developed,  well nourished,  awake, alert, and in no acute distress. Neurological:  Sensation to light touch is intact. Psychiatric: Affect and mood are appropriate. Integumentary: No rashes or abrasions noted on exposed areas,  warm, dry and intact. Cardiovascular/Peripheral Vascular:  No peripheral edema is noted. Lymphatic:  No evidence of lymphedema. No cervical lymphadenopathy. SPINE/MUSCULOSKELETAL EXAM      Lumbar spine:  No rash, ecchymosis, or gross obliquity. No fasciculations. No focal atrophy is noted. Range of motion is intact. Tenderness to palpation to her lumbar spine and left SI. Right SI joints non-tender. Trochanters non tender. Musculoskeletal:  No pain with extension, axial loading, or forward flexion. No pain with internal or external rotation of her hips. MOTOR     Hip Flex  Quads Hamstrings Ankle DF EHL Ankle PF   Right +4/5 +4/5 +4/5 +4/5 +4/5 +4/5   Left +4/5 +4/5 +4/5 +4/5 +4/5 +4/5     Straight Leg raise - bialterally.      normal gait and station    Ambulation in wheelchair    Abiodun Grubbs NP

## 2018-12-30 RX ORDER — GLIPIZIDE AND METFORMIN HCL 5; 500 MG/1; MG/1
TABLET, FILM COATED ORAL
Qty: 120 TAB | Refills: 10 | Status: ON HOLD | OUTPATIENT
Start: 2018-12-30 | End: 2019-02-08

## 2019-01-01 ENCOUNTER — PATIENT OUTREACH (OUTPATIENT)
Dept: CARDIOLOGY CLINIC | Age: 84
End: 2019-01-01

## 2019-01-01 ENCOUNTER — OFFICE VISIT (OUTPATIENT)
Dept: CARDIOLOGY CLINIC | Age: 84
End: 2019-01-01

## 2019-01-01 ENCOUNTER — HOSPITAL ENCOUNTER (OUTPATIENT)
Dept: CT IMAGING | Age: 84
Discharge: HOME | End: 2019-07-22
Attending: INTERNAL MEDICINE
Payer: MEDICARE

## 2019-01-01 VITALS
WEIGHT: 137 LBS | HEIGHT: 61 IN | HEART RATE: 73 BPM | OXYGEN SATURATION: 97 % | BODY MASS INDEX: 25.86 KG/M2 | SYSTOLIC BLOOD PRESSURE: 92 MMHG | DIASTOLIC BLOOD PRESSURE: 60 MMHG

## 2019-01-01 DIAGNOSIS — F32.A MILD DEPRESSION: ICD-10-CM

## 2019-01-01 DIAGNOSIS — M81.0 AGE RELATED OSTEOPOROSIS, UNSPECIFIED PATHOLOGICAL FRACTURE PRESENCE: ICD-10-CM

## 2019-01-01 DIAGNOSIS — E78.5 DYSLIPIDEMIA: ICD-10-CM

## 2019-01-01 DIAGNOSIS — I35.0 AORTIC VALVE STENOSIS, MILD: Primary | ICD-10-CM

## 2019-01-01 DIAGNOSIS — R60.9 DEPENDENT EDEMA: ICD-10-CM

## 2019-01-01 DIAGNOSIS — I10 ESSENTIAL HYPERTENSION: ICD-10-CM

## 2019-01-01 DIAGNOSIS — C22.9 LIVER CANCER (HCC): ICD-10-CM

## 2019-01-01 LAB — CREAT UR-MCNC: 0.8 MG/DL (ref 0.6–1.3)

## 2019-01-01 PROCEDURE — 74011636320 HC RX REV CODE- 636/320: Performed by: INTERNAL MEDICINE

## 2019-01-01 PROCEDURE — 74177 CT ABD & PELVIS W/CONTRAST: CPT

## 2019-01-01 PROCEDURE — 82565 ASSAY OF CREATININE: CPT

## 2019-01-01 RX ORDER — SITAGLIPTIN 100 MG/1
TABLET, FILM COATED ORAL
Qty: 30 TAB | Refills: 5 | Status: SHIPPED | OUTPATIENT
Start: 2019-01-01 | End: 2020-01-01 | Stop reason: SDUPTHER

## 2019-01-01 RX ORDER — BUSPIRONE HYDROCHLORIDE 10 MG/1
TABLET ORAL
Qty: 30 TAB | Refills: 2 | Status: SHIPPED | OUTPATIENT
Start: 2019-01-01 | End: 2020-01-01 | Stop reason: SDUPTHER

## 2019-01-01 RX ORDER — FUROSEMIDE 40 MG/1
TABLET ORAL
Qty: 90 TAB | Refills: 3 | Status: SHIPPED | OUTPATIENT
Start: 2019-01-01

## 2019-01-01 RX ORDER — RABEPRAZOLE SODIUM 20 MG/1
TABLET, DELAYED RELEASE ORAL
Qty: 30 TAB | Refills: 5 | Status: SHIPPED | OUTPATIENT
Start: 2019-01-01 | End: 2020-01-01

## 2019-01-01 RX ORDER — SERTRALINE HYDROCHLORIDE 25 MG/1
TABLET, FILM COATED ORAL
Qty: 30 TAB | Refills: 0 | Status: SHIPPED | OUTPATIENT
Start: 2019-01-01 | End: 2020-01-01

## 2019-01-01 RX ORDER — BUSPIRONE HYDROCHLORIDE 10 MG/1
TABLET ORAL
Qty: 30 TAB | Refills: 2 | Status: SHIPPED | OUTPATIENT
Start: 2019-01-01 | End: 2019-01-01 | Stop reason: SDUPTHER

## 2019-01-01 RX ORDER — SERTRALINE HYDROCHLORIDE 25 MG/1
TABLET, FILM COATED ORAL
Qty: 30 TAB | Refills: 2 | Status: SHIPPED | OUTPATIENT
Start: 2019-01-01 | End: 2019-01-01 | Stop reason: SDUPTHER

## 2019-01-01 RX ORDER — RABEPRAZOLE SODIUM 20 MG/1
TABLET, DELAYED RELEASE ORAL
Qty: 30 TAB | Refills: 5 | Status: SHIPPED | OUTPATIENT
Start: 2019-01-01 | End: 2019-01-01 | Stop reason: SDUPTHER

## 2019-01-01 RX ADMIN — IOPAMIDOL 80 ML: 612 INJECTION, SOLUTION INTRAVENOUS at 10:20

## 2019-01-03 ENCOUNTER — HOSPITAL ENCOUNTER (OUTPATIENT)
Age: 84
Setting detail: OUTPATIENT SURGERY
Discharge: HOME | End: 2019-01-03
Attending: PHYSICAL MEDICINE & REHABILITATION | Admitting: PHYSICAL MEDICINE & REHABILITATION
Payer: MEDICARE

## 2019-01-03 ENCOUNTER — OFFICE VISIT (OUTPATIENT)
Dept: VASCULAR SURGERY | Age: 84
End: 2019-01-03

## 2019-01-03 ENCOUNTER — APPOINTMENT (OUTPATIENT)
Dept: GENERAL RADIOLOGY | Age: 84
End: 2019-01-03
Attending: PHYSICAL MEDICINE & REHABILITATION
Payer: MEDICARE

## 2019-01-03 VITALS
DIASTOLIC BLOOD PRESSURE: 69 MMHG | HEIGHT: 62 IN | OXYGEN SATURATION: 99 % | RESPIRATION RATE: 20 BRPM | SYSTOLIC BLOOD PRESSURE: 124 MMHG | WEIGHT: 131 LBS | TEMPERATURE: 98.4 F | BODY MASS INDEX: 24.11 KG/M2 | HEART RATE: 73 BPM

## 2019-01-03 DIAGNOSIS — I87.2 VENOUS (PERIPHERAL) INSUFFICIENCY: Primary | ICD-10-CM

## 2019-01-03 LAB — GLUCOSE BLD STRIP.AUTO-MCNC: 98 MG/DL (ref 70–110)

## 2019-01-03 PROCEDURE — 77030003543 HC NDL EPDRL BBMI -A: Performed by: PHYSICAL MEDICINE & REHABILITATION

## 2019-01-03 PROCEDURE — 74011250636 HC RX REV CODE- 250/636: Performed by: PHYSICAL MEDICINE & REHABILITATION

## 2019-01-03 PROCEDURE — 77030014124 HC TY EPDRL BBMI -A: Performed by: PHYSICAL MEDICINE & REHABILITATION

## 2019-01-03 PROCEDURE — 74011250637 HC RX REV CODE- 250/637: Performed by: PHYSICAL MEDICINE & REHABILITATION

## 2019-01-03 PROCEDURE — 82962 GLUCOSE BLOOD TEST: CPT

## 2019-01-03 PROCEDURE — 76010000009 HC PAIN MGT 0 TO 30 MIN PROC: Performed by: PHYSICAL MEDICINE & REHABILITATION

## 2019-01-03 RX ORDER — LIDOCAINE HYDROCHLORIDE 10 MG/ML
INJECTION, SOLUTION EPIDURAL; INFILTRATION; INTRACAUDAL; PERINEURAL AS NEEDED
Status: DISCONTINUED | OUTPATIENT
Start: 2019-01-03 | End: 2019-01-03 | Stop reason: HOSPADM

## 2019-01-03 RX ORDER — DIAZEPAM 5 MG/1
5-20 TABLET ORAL ONCE
Status: COMPLETED | OUTPATIENT
Start: 2019-01-03 | End: 2019-01-03

## 2019-01-03 RX ORDER — DEXAMETHASONE SODIUM PHOSPHATE 100 MG/10ML
INJECTION INTRAMUSCULAR; INTRAVENOUS AS NEEDED
Status: DISCONTINUED | OUTPATIENT
Start: 2019-01-03 | End: 2019-01-03 | Stop reason: HOSPADM

## 2019-01-03 RX ADMIN — DIAZEPAM 5 MG: 5 TABLET ORAL at 12:51

## 2019-01-03 NOTE — PROCEDURES
Intralaminar Epidural Steroid Block Procedure Note Patient Name: Dion Cortez Date of Procedure: January 3, 2019 Preoperative Diagnosis: Lumbar radiculopathy [M54.16] Post Operative Diagnosis: Lumbar radiculopathy [M54.16] Procedure: L3-L4 Epidural Block PROCEDURE:  Lumbar Epidural Block Consent:  Informed  Consent was obtained prior to the procedure. The patient was given the opportunity to ask questions regarding the procedure and its associated risks. In addition to the potential risks associated with the procedure itself, the patient was informed both verbally and in writing of potential side effects of the use glucocorticoids. The patient appeared to comprehend the informed consent and desired to have the procedure performed. The patient was placed in the prone position on the fluoroscopy table and the back prepped and draped in the usual sterile manner. The interlaminar space was identified using fluoroscopy and the skin anesthetized with 1% Lidocaine. A #18 Tuohy Epidural needle was advanced under fluoroscopic control from a paramedian approach to the  epidural space as noted above, using the loss of resistance to fluid technique. Then, 30 mg of preservative free Dexamethasone and 4 cc of Lidocaine was slowly injected. The patient tolerated the procedure well. The injection area was cleaned and band aids applied. No excessive bleeding was noted. Patient dressed and was discharged to home with instructions.

## 2019-01-03 NOTE — PROGRESS NOTES
[facility]AN C [facility]          Chart reviewed for the following:   Viraj PARKINSON LPN, have reviewed the medications and updated the Allergic reactions for [address] performed immediately prior to start of procedure:   Viraj PARKINSON LPN, have performed the following reviews on Pedro Tan prior to the start of the procedure:            * Patient was identified by name and date of birth   * Agreement that [facility] boot removed and reapplied   * Procedure site verified   * Patient was positioned for comfort  * Verbal consent was given by patient     Time: 1430      Date of procedure: 1/3/2019    Procedure performed by:  VVS NURSE    How tolerated by patient: tolerated the procedure well with no complications    Comments:   Removed unna boot and coban from bilateral lower extremities, patient tolerated well. Left lower leg has no swelling and no open areas and so applied tubi  size D and patient will apply compression stocking when gets home. Reminded patient to wear during the day and remove at night, patient stated understood. Right lower leg continues to have small open area to anterior lower leg that is granulating with scant amount ss drainage, surrounding tissue WNL and only slight swelling noted. Applied calcium alginate with silver, gauze, unna boot and coban, patient tolerated well. Patient will return in one week for reassessment and dressing change. Patient looks very drained today and states in a lot of pain from left hip and has had recent cortisone injections as well and overall feels weak.

## 2019-01-03 NOTE — DISCHARGE INSTRUCTIONS
[facility]   (VICKIE)  Dr. Darnell Orlando, Dr. Isabel Matamoros, Dr. De Jesus Miguel Angel not drive a car, operate heavy machinery or dangerous equipment for 24 hours. * Activity as tolerated; rest for the remainder of the day. * Resume pre-block medications including those for your family doctor. * Do not drink alcoholic beverages for 24 hours. Alcohol and the medications you have received may interact and cause an adverse reaction. * You may feel better this evening and worse tomorrow, as the numbing medications wears off and the steroid has yet to begin to work. After 48 hrs the steroid should begin to release bringing you relief. * You may shower this evening and remove any bandages. * Avoid hot tubs and heating pads for 24 hours. You may use cold packs on the procedure site as tolerated for the first 24 hours. * If a headache develops, drink plenty of fluids and rest.  Take over the counter medications for headache if needed. If the headache continues longer than 24 hours, call MD at the [address] [place] [address]. [phone]    * Continue taking pain medications as needed. * You may resume your regular diet if tolerated. Otherwise, start with sips of water and advance slowly. * If Diabetic: check your blood sugar three times a day for the next 3 days. If your sugar is greater than 300 call your family doctor. If your sugar is greater than 400, have someone transport you to the nearest Emergency Room. * If you experience any of the following problems, Please Call the [address] [place] [address] at [phone].         * Shortness of Breath    * Fever of 101 or higher    * Nausea / Vomiting    * Severe Headache    * Weakness or numbness in arms or legs that is not      resolving    * Prolonged increase in pain greater than 4 days      DISCHARGE SUMMARY from Nurse      PATIENT INSTRUCTIONS:    After oral sedation, for 24 hours or while taking prescription Narcotics:  · Limit your activities  · Do not drive and operate hazardous machinery  · Do not make important personal or business decisions  · Do  not drink alcoholic beverages  · If you have not urinated within 8 hours after discharge, please contact your surgeon on call. Report the following to your surgeon:  · Excessive pain, swelling, redness or odor of or around the surgical area  · Temperature over 101  · Nausea and vomiting lasting longer than 4 hours or if unable to take medications  · Any signs of decreased circulation or nerve impairment to extremity: change in color, persistent  numbness, tingling, coldness or increase pain  · Any questions            What to do at Home:  Recommended activity: Activity as tolerated, NO DRIVING FOR 12 Hours post injection          *  Please give a list of your current medications to your Primary Care Provider. *  Please update this list whenever your medications are discontinued, doses are      changed, or new medications (including over-the-counter products) are added. *  Please carry medication information at all times in case of emergency situations. These are general instructions for a healthy lifestyle:    No smoking/ No tobacco products/ Avoid exposure to second hand smoke    Surgeon General's Warning:  Quitting smoking now greatly reduces serious risk to your health. Obesity, smoking, and sedentary lifestyle greatly increases your risk for illness    A healthy diet, regular physical exercise & weight monitoring are important for maintaining a healthy lifestyle    You may be retaining fluid if you have a history of heart failure or if you experience any of the following symptoms:  Weight gain of 3 pounds or more overnight or 5 pounds in a week, increased swelling in our hands or feet or shortness of breath while lying flat in bed.   Please call your doctor as soon as you notice any of these symptoms; do not wait until your next office visit. Recognize signs and symptoms of STROKE:    F-face looks uneven    A-arms unable to move or move unevenly    S-speech slurred or non-existent    T-time-call 911 as soon as signs and symptoms begin-DO NOT go       Back to bed or wait to see if you get better-TIME IS BRAIN.

## 2019-01-07 ENCOUNTER — TELEPHONE (OUTPATIENT)
Dept: ORTHOPEDIC SURGERY | Age: 84
End: 2019-01-07

## 2019-01-07 DIAGNOSIS — M54.16 LUMBAR RADICULOPATHY: Primary | ICD-10-CM

## 2019-01-07 NOTE — TELEPHONE ENCOUNTER
Patient's daughter, Lois Hayes (on [employer]) sent in a message via fax that the patient is having left sided, severe sciatic pain. The patient had a nerve block on 1/3/19. Justina stated the patient was screaming out when trying to move, in tears when trying to sit or stand, and had taken all the pain pills she was previously given. Patient is scheduled for a FU appt on 1/29/19 and Justina would like to know if she can get in sooner for an injection to the left side. Also, requests more pain medication. Please advise.

## 2019-01-08 NOTE — TELEPHONE ENCOUNTER
Late entry MD gave VO to order MRI Lumbar. Daughter notified. She wants to be contact on her cell to schedule the MRI due to her mother unable to hear over the phone.  Order printed and placed on  desk

## 2019-01-10 ENCOUNTER — OFFICE VISIT (OUTPATIENT)
Dept: VASCULAR SURGERY | Age: 84
End: 2019-01-10

## 2019-01-10 ENCOUNTER — HOSPITAL ENCOUNTER (OUTPATIENT)
Age: 84
Discharge: HOME | End: 2019-01-10
Attending: PHYSICAL MEDICINE & REHABILITATION
Payer: MEDICARE

## 2019-01-10 DIAGNOSIS — I87.2 VENOUS (PERIPHERAL) INSUFFICIENCY: Primary | ICD-10-CM

## 2019-01-10 DIAGNOSIS — M54.16 LUMBAR RADICULOPATHY: ICD-10-CM

## 2019-01-10 PROCEDURE — 72148 MRI LUMBAR SPINE W/O DYE: CPT

## 2019-01-10 NOTE — PROGRESS NOTES
Patient being seen for assessment of bilateral lower legs and wound care. Patient is in wheelchair and looks like feels terrible. She is having 10/10 hip pain and unable to sleep or really move around because of the pain. Patient had to remove unna boot because became too tight and unable to elevate lower extremities at this time. Both legs have swelling about +1 pitting edema. Anterior right lower leg wound is 1.6x1.0x0.1cm, granulating with scant amount ss drainage, surrounding tissue WNL. Applied calcium alginate with silver and covered with bandaid. Applied tubi  size F to right leg and tubi  size D for left leg. Patient is having MRI this afternoon due to hip pain and will see patient back in two weeks, she will have daughter change dressing on right leg every 3 days or so and wear tubi  during the day. Son will call if anything changes.

## 2019-01-11 ENCOUNTER — DOCUMENTATION ONLY (OUTPATIENT)
Dept: ORTHOPEDIC SURGERY | Age: 84
End: 2019-01-11

## 2019-01-11 ENCOUNTER — TELEPHONE (OUTPATIENT)
Dept: ORTHOPEDIC SURGERY | Age: 84
End: 2019-01-11

## 2019-01-11 DIAGNOSIS — M54.16 LUMBAR RADICULOPATHY: ICD-10-CM

## 2019-01-11 DIAGNOSIS — M54.50 LUMBAR PAIN: ICD-10-CM

## 2019-01-11 DIAGNOSIS — M53.3 SI (SACROILIAC) PAIN: ICD-10-CM

## 2019-01-11 DIAGNOSIS — M84.48XA SACRAL INSUFFICIENCY FRACTURE, INITIAL ENCOUNTER: Primary | ICD-10-CM

## 2019-01-11 RX ORDER — TRAMADOL HYDROCHLORIDE 50 MG/1
50 TABLET ORAL
Qty: 14 TAB | Refills: 0 | Status: SHIPPED | OUTPATIENT
Start: 2019-01-11 | End: 2019-03-07

## 2019-01-11 NOTE — PROGRESS NOTES
Spoke with Kinney Postal, daughter to patient and informed her of the appt with Dr. Reynolds Sender for consult for Sacroplasty, she verbalized understanding. She stated that patient would still need pain medication. There is a message for this for Dr. Escobar Rather to address.

## 2019-01-11 NOTE — TELEPHONE ENCOUNTER
Marcela, Referral to IR placed. Discussed with Dr. Dung Galeas. Please assist with IR referral.      Ermelinda Bowers, Also, patient needs FU in a few months for osteoporosis (or should discuss with PCP). Tramadol ready for .

## 2019-01-11 NOTE — TELEPHONE ENCOUNTER
Attempted to call in Rx Pharmacist is unavailable until after her lunch break around 340pm. Will have another nurse to call in RX.

## 2019-01-11 NOTE — TELEPHONE ENCOUNTER
Anny Gomez dtgr of patient called as patient had MRI done at [facility] yesterday. Mrs. Bridgett Gonzalez had block but she is still having severe pain and is only taking Aleve over the counter. Could a pain medication be prescribed for her? Mrs. Bridgett Gonzalez MRI f/u is not till 01/29/2019 She did take the Tramadol 14 pills 2 a day but she is out of them.   Please call Justina at [phone]

## 2019-01-11 NOTE — TELEPHONE ENCOUNTER
Looks like this pt has new sacral insufficiency fracture. She may be a candidate for a sacroplasty. Lesly, you saw her last. See if Dr. Jenny Roman would like IR consult for sacroplasty. I think in this case she could get refill of Tramadol.  Consistent.     Pt should also have f/o w/ NP in a few months to eval her osteoporosis

## 2019-01-15 ENCOUNTER — HOSPITAL ENCOUNTER (EMERGENCY)
Age: 84
Discharge: HOME | End: 2019-01-16
Attending: EMERGENCY MEDICINE
Payer: MEDICARE

## 2019-01-15 VITALS
WEIGHT: 133 LBS | TEMPERATURE: 98.2 F | OXYGEN SATURATION: 98 % | HEART RATE: 76 BPM | BODY MASS INDEX: 24.33 KG/M2 | SYSTOLIC BLOOD PRESSURE: 111 MMHG | DIASTOLIC BLOOD PRESSURE: 76 MMHG | RESPIRATION RATE: 16 BRPM

## 2019-01-15 DIAGNOSIS — R00.2 PALPITATIONS: Primary | ICD-10-CM

## 2019-01-15 PROCEDURE — 99283 EMERGENCY DEPT VISIT LOW MDM: CPT

## 2019-01-15 PROCEDURE — 93005 ELECTROCARDIOGRAM TRACING: CPT

## 2019-01-16 ENCOUNTER — OFFICE VISIT (OUTPATIENT)
Dept: VASCULAR SURGERY | Age: 84
End: 2019-01-16

## 2019-01-16 DIAGNOSIS — I87.2 VENOUS (PERIPHERAL) INSUFFICIENCY: Primary | ICD-10-CM

## 2019-01-16 LAB
ALBUMIN SERPL-MCNC: 2.8 G/DL (ref 3.4–5)
ALBUMIN/GLOB SERPL: 0.9 {RATIO} (ref 0.8–1.7)
ALP SERPL-CCNC: 184 U/L (ref 45–117)
ALT SERPL-CCNC: 14 U/L (ref 13–56)
ANION GAP SERPL CALC-SCNC: 3 MMOL/L (ref 3–18)
AST SERPL-CCNC: 14 U/L (ref 15–37)
BASOPHILS # BLD: 0 K/UL (ref 0–0.1)
BASOPHILS NFR BLD: 0 % (ref 0–2)
BILIRUB SERPL-MCNC: 0.4 MG/DL (ref 0.2–1)
BUN SERPL-MCNC: 17 MG/DL (ref 7–18)
BUN/CREAT SERPL: 24 (ref 12–20)
CALCIUM SERPL-MCNC: 8.6 MG/DL (ref 8.5–10.1)
CHLORIDE SERPL-SCNC: 106 MMOL/L (ref 100–108)
CO2 SERPL-SCNC: 32 MMOL/L (ref 21–32)
CREAT SERPL-MCNC: 0.72 MG/DL (ref 0.6–1.3)
DIFFERENTIAL METHOD BLD: ABNORMAL
EOSINOPHIL # BLD: 0.1 K/UL (ref 0–0.4)
EOSINOPHIL NFR BLD: 3 % (ref 0–5)
ERYTHROCYTE [DISTWIDTH] IN BLOOD BY AUTOMATED COUNT: 14.2 % (ref 11.6–14.5)
GLOBULIN SER CALC-MCNC: 3.2 G/DL (ref 2–4)
GLUCOSE SERPL-MCNC: 108 MG/DL (ref 74–99)
HCT VFR BLD AUTO: 31.9 % (ref 35–45)
HGB BLD-MCNC: 10.5 G/DL (ref 12–16)
LYMPHOCYTES # BLD: 1.1 K/UL (ref 0.9–3.6)
LYMPHOCYTES NFR BLD: 25 % (ref 21–52)
MCH RBC QN AUTO: 29.8 PG (ref 24–34)
MCHC RBC AUTO-ENTMCNC: 32.9 G/DL (ref 31–37)
MCV RBC AUTO: 90.6 FL (ref 74–97)
MONOCYTES # BLD: 0.4 K/UL (ref 0.05–1.2)
MONOCYTES NFR BLD: 8 % (ref 3–10)
NEUTS SEG # BLD: 2.9 K/UL (ref 1.8–8)
NEUTS SEG NFR BLD: 64 % (ref 40–73)
PLATELET # BLD AUTO: 88 K/UL (ref 135–420)
PMV BLD AUTO: 10.6 FL (ref 9.2–11.8)
POTASSIUM SERPL-SCNC: 3.4 MMOL/L (ref 3.5–5.5)
PROT SERPL-MCNC: 6 G/DL (ref 6.4–8.2)
RBC # BLD AUTO: 3.52 M/UL (ref 4.2–5.3)
SODIUM SERPL-SCNC: 141 MMOL/L (ref 136–145)
TSH SERPL DL<=0.05 MIU/L-ACNC: 1.41 UIU/ML (ref 0.36–3.74)
WBC # BLD AUTO: 4.4 K/UL (ref 4.6–13.2)

## 2019-01-16 PROCEDURE — 84443 ASSAY THYROID STIM HORMONE: CPT

## 2019-01-16 PROCEDURE — 85025 COMPLETE CBC W/AUTO DIFF WBC: CPT

## 2019-01-16 PROCEDURE — 80053 COMPREHEN METABOLIC PANEL: CPT

## 2019-01-16 NOTE — PROGRESS NOTES
[facility]          Chart reviewed for the following:   Danii PARKINSON LPN, have reviewed the medications and updated the Allergic reactions for [address] performed immediately prior to start of procedure:   Danii PARKINSON LPN, have performed the following reviews on Stanley Miners prior to the start of the procedure:            * Patient was identified by name and date of birth   * Agreement that Yung Love boot removed and reapplied   * Procedure site verified   * Patient was positioned for comfort  * Verbal consent was given by patient     Time: 1500      Date of procedure: 1/16/2019    Procedure performed by:  VVS NURSE    How tolerated by patient: tolerated the procedure well with no complications    Comments:  Patient being seen to assess bilateral lower legs due to increase in swelling and now some redness to right lower leg and weeping. Patient has a known sacral fracture now and will be seen by orthopedic MD tomorrow. She was in the ED last night due to heart palpatations. Patients right leg has about +1 pitting edema with redness noted and continues to have small open area to anterior right lower leg and left leg is swollen but not pitting and just very slight redness noted. Applied silvadene to both legs, wrapped with unna boot and coban, patient tolerated well. Patient will return in one week for reassessment but will call prior to that if needs to come back sooner for wound care.

## 2019-01-16 NOTE — ED TRIAGE NOTES
Pt arrives with complaint of feeling a ping in her heart about an hour ago. States she felt it 3 times, but not since. Also states she has a hairline sacral fracture known from and MRI recently.

## 2019-01-16 NOTE — ED PROVIDER NOTES
EMERGENCY DEPARTMENT HISTORY AND PHYSICAL EXAM 
 
12:50 AM 
 
 
Date: 1/15/2019 Patient Name: Joesph Oliver History of Presenting Illness No chief complaint on file. History Provided By: Patient and Patient's  Chief Complaint: Palpitations Duration:  PTA Timing:  Acute Location: Chest 
Quality: \"Pings\" Severity: Moderate Modifying Factors: No modifying or aggravating factors were reported. Associated Symptoms: Anxiety Additional History (Context): Joesph Oliver is a 80 y.o. female with a PMHx of irregular heart beat (takes Lasix), DM, HTN, sciatica, and CAD who presents with acute, moderate palpitations onset PTA. Patient states she was sitting on the cough when she felt \"3 pings\" in her chest that \"took her breath away. \" Patient appears anxious, but otherwise feels okay. She denies CP, SOB, and syncope. Patient's cardiologist is Dr. Kvng Vigil; they have never used a heart monitor or pacemaker on her. Patient also has a fractured sacrum, for which she will be seen on 1/17/19. No modifying or aggravating factors were reported. No other symptoms or concerns were expressed. PCP: Marie Joshua MD 
 
Current Outpatient Medications Medication Sig Dispense Refill  traMADol (ULTRAM) 50 mg tablet Take 1 Tab by mouth two (2) times daily as needed for Pain. Max Daily Amount: 100 mg. 14 Tab 0  
 glipiZIDE-metFORMIN (METAGLIP) 5-500 mg per tablet TAKE 1 TABLET BY MOUTH BEFORE BREAKFAST AND 1 TABLET BY MOUTH BEFORE DINNER 120 Tab 10  
 JANUVIA 100 mg tablet take 1 tablet by mouth once daily 30 Tab 5  furosemide (LASIX) 40 mg tablet take 1 tablet by mouth once daily 90 Tab 3  
 gabapentin (NEURONTIN) 100 mg capsule Take 1 Cap by mouth two (2) times a day.  60 Cap 1  
 sertraline (ZOLOFT) 25 mg tablet take 1 tablet by mouth once daily 30 Tab 2  
 glipiZIDE-metFORMIN (METAGLIP) 5-500 mg per tablet TAKE 1 TABLET BY MOUTH BEFORE BREAKFAST AND 1 TABLET BY MOUTH BEFORE DINNER 120 Tab 10  
  RABEprazole (ACIPHEX) 20 mg tablet take 1 tablet by mouth once daily 30 Tab 5  
 ascorbic acid, vitamin C, (VITAMIN C) 500 mg tablet Take  by mouth.  ferrous sulfate (IRON) 325 mg (65 mg iron) tablet Take  by mouth Daily (before breakfast).  naproxen sodium (ALEVE PO) Take  by mouth as needed.  cyanocobalamin (VITAMIN B-12) 2,000 mcg TbER Take 3 Tabs by mouth daily.  loperamide (IMODIUM) 2 mg capsule Take 1 mg by mouth Three (3) times a week.  pravastatin (PRAVACHOL) 40 mg tablet Take 1 Tab by mouth nightly. 90 Tab 3  
 lidocaine (LIDODERM) 5 % Apply patch to the affected area for 12 hours a day and remove for 12 hours a day. 5 Each 0  
 aspirin delayed-release 81 mg tablet Take  by mouth daily.  cholecalciferol (VITAMIN D3) 1,000 unit tablet Take  by mouth daily. Past History Past Medical History: 
Past Medical History:  
Diagnosis Date  Aortic valve stenosis, mild April 2014 EF 50-30%, Grade 1 diastolic dysfunction, mild aortic stenosis,  mean gradient 10 mm Hg  CAD (coronary artery disease) aortic stenosis  Cardiac echocardiogram 06/2018 EF 55-60%. No RWMA. Mild-mod LVH. Severe LAE. Mild AS.  Cardiac nuclear imaging test 08/27/2008 No evidence of ischemia or prior scarring. EF 62%. No WMA. No significant change from study of 3/23/06.  Cardiovascular RLE venous duplex 12/01/2016 Right leg:  No DVT.  Carotid duplex 04/24/2013 Mild 7-17% LICA stenosis.  DM (diabetes mellitus) ([address]  Dyslipidemia  Heart failure ([address]  HTN (hypertension)  Mixed hyperlipidemia  Sciatica  Traumatic closed displaced fracture of acromial end of clavicle, left, initial encounter Past Surgical History: 
Past Surgical History:  
Procedure Laterality Date  HX APPENDECTOMY [address]  HX OTHER SURGICAL Right 2010  
 ankle surgery  HX TUMOR REMOVAL Left   
 ovarian  HX UROLOGICAL kidney stone removal  
 
 
Family History: 
Family History Problem Relation Age of Onset  Stroke Mother  Heart Disease Mother  Lung Disease Father  Cancer Neg Hx  Diabetes Neg Hx  Hypertension Neg Hx Social History: 
Social History Tobacco Use  Smoking status: Never Smoker  Smokeless tobacco: Never Used Substance Use Topics  Alcohol use: No  
 Drug use: No  
 
 
Allergies: 
No Known Allergies Review of Systems Review of Systems Constitutional: Negative for activity change and appetite change. HENT: Negative for congestion. Eyes: Negative for visual disturbance. Respiratory: Positive for cough. Negative for shortness of breath. Cardiovascular: Positive for palpitations. Negative for chest pain. Gastrointestinal: Negative for abdominal pain, diarrhea, nausea and vomiting. Genitourinary: Negative for dysuria. Musculoskeletal: Negative for arthralgias and myalgias. Skin: Negative for rash. Neurological: Negative for syncope, weakness and numbness. Psychiatric/Behavioral: Positive for agitation. Physical Exam  
 
Visit Vitals /76 (BP 1 Location: Left arm, BP Patient Position: At rest) Pulse 76 Temp 98.2 °F (36.8 °C) Resp 16 Wt 60.3 kg (133 lb) SpO2 98% BMI 24.33 kg/m² Physical Exam  
Constitutional: She is oriented to person, place, and time. She appears well-developed and well-nourished. HENT:  
Head: Normocephalic and atraumatic. Mouth/Throat: Oropharynx is clear and moist.  
Eyes: Conjunctivae are normal.  
Neck: Normal range of motion. Neck supple. No JVD present. Cardiovascular: Normal rate, regular rhythm, normal heart sounds and intact distal pulses. No murmur heard. Pulmonary/Chest: Effort normal and breath sounds normal.  
Abdominal: Soft. Bowel sounds are normal. She exhibits no distension. There is no tenderness. Musculoskeletal: Normal range of motion. She exhibits no deformity. Lymphadenopathy:  
  She has no cervical adenopathy. Neurological: She is alert and oriented to person, place, and time. Coordination normal.  
Skin: Skin is warm and dry. No rash noted. Psychiatric: She has a normal mood and affect. Nursing note and vitals reviewed. Diagnostic Study Results Labs - Recent Results (from the past 12 hour(s)) EKG, 12 LEAD, INITIAL Collection Time: 01/15/19 10:09 PM  
Result Value Ref Range Ventricular Rate 72 BPM  
 Atrial Rate 72 BPM  
 P-R Interval 190 ms QRS Duration 102 ms Q-T Interval 396 ms QTC Calculation (Bezet) 433 ms Calculated P Axis 38 degrees Calculated R Axis -35 degrees Calculated T Axis 56 degrees Diagnosis Normal sinus rhythm Left axis deviation Moderate voltage criteria for LVH, may be normal variant Cannot rule out Septal infarct (cited on or before 29-JUL-2018) Abnormal ECG When compared with ECG of 29-JUL-2018 22:59, 
premature ventricular complexes are no longer present Questionable change in initial forces of Septal leads Radiologic Studies - No orders to display Medical Decision Making It should be noted that Mehul Fitzpatrick MD will be the provider of record for this patient. I reviewed the vital signs, available nursing notes, past medical history, past surgical history, family history and social history. Vital Signs-Reviewed the patient's vital signs. Pulse Oximetry Analysis -  98% on room air, WNL Cardiac Monitor: 
Rate: 76 bpm 
Rhythm:  Normal Sinus Rhythm EKG: Interpreted by the EP. Normal sinus rhythm, rate 72, , QTc 433. No acute ST or T wave changes, no STEMI. Records Reviewed: Nursing Notes (Time of Review: 12:50 AM) ED Course: Progress Notes, Reevaluation, and Consults: 
 
Provider Notes (Medical Decision Making): Sue Homans is a 80 y.o. female with a PMHx of irregular heart beat (takes Lasix), DM, HTN, sciatica, and CAD who presents with acute, moderate palpitations onset PTA. Patient states she was sitting on the cough when she felt \"3 pings\" in her chest that \"took her breath away. \" no chest pain, appears well, unremarkable ECG. Differential Diagnosis: suspect minor dysrhythmia as history not concerning for vtach or other significant dysrhythmia. Do not suspect ACS, PE, or other acute cardiopulmonary etiology. Testing: cbc, cmp, ecg, cardiac monitor in ED. Treatments: pending eval 
 
No events noted in ED. Patient asymptomatic. Labs unremarkabl.e recommended /fu with PCP for monitor. The patient will be discharged home. Findings were discussed at length and questions were answered. Information on all newly prescribed medications was given. the patient was instructed to follow-up with her PCP, or return to the Emergency Department with any worsened symptoms or concerns. Return precautions were given. Diagnosis Clinical Impression: 1. Palpitations Disposition: Discharge Follow-up Information Follow up With Specialties Details Why Contact Info Hood Mcintyre MD Internal Medicine Schedule an appointment as soon as possible for a visit  [address] [facility] [address] 
[phone] [facility] EMERGENCY DEPT Emergency Medicine  If symptoms worsen [address] [id number] 
[phone] Medication List  
  
ASK your doctor about these medications ALEVE PO 
  
aspirin delayed-release 81 mg tablet 
  
furosemide 40 mg tablet Commonly known as:  LASIX 
take 1 tablet by mouth once daily 
  
gabapentin 100 mg capsule Commonly known as:  NEURONTIN Take 1 Cap by mouth two (2) times a day. * glipiZIDE-metFORMIN 5-500 mg per tablet Commonly known as:  METAGLIP 
TAKE 1 TABLET BY MOUTH BEFORE BREAKFAST AND 1 TABLET BY MOUTH BEFORE DINNER 
  
* glipiZIDE-metFORMIN 5-500 mg per tablet Commonly known as:  METAGLIP 
 TAKE 1 TABLET BY MOUTH BEFORE BREAKFAST AND 1 TABLET BY MOUTH BEFORE DINNER Iron 325 mg (65 mg iron) tablet Generic drug:  ferrous sulfate JANUVIA 100 mg tablet Generic drug:  SITagliptin 
take 1 tablet by mouth once daily 
  
lidocaine 5 % Commonly known as:  Stanford Brinks Apply patch to the affected area for 12 hours a day and remove for 12 hours a day. loperamide 2 mg capsule Commonly known as:  IMODIUM 
  
pravastatin 40 mg tablet Commonly known as:  PRAVACHOL Take 1 Tab by mouth nightly. RABEprazole 20 mg tablet Commonly known as:  ACIPHEX 
take 1 tablet by mouth once daily 
  
sertraline 25 mg tablet Commonly known as:  ZOLOFT 
take 1 tablet by mouth once daily 
  
traMADol 50 mg tablet Commonly known as:  ULTRAM 
Take 1 Tab by mouth two (2) times daily as needed for Pain. Max Daily Amount: 100 mg. VITAMIN B-12 2,000 mcg Tber Generic drug:  cyanocobalamin VITAMIN C 500 mg tablet Generic drug:  ascorbic acid (vitamin C) VITAMIN D3 1,000 unit tablet Generic drug:  cholecalciferol * This list has 2 medication(s) that are the same as other medications prescribed for you. Read the directions carefully, and ask your doctor or other care provider to review them with you.  
  
  
  
 
_______________________________ Vickie oHlley acting as a scribe for and in the presence of Karma Rangel MD 
January 16, 2019 at 12:50 AM 
    
Provider Attestation:     
I personally performed the services described in the documentation, reviewed the documentation, as recorded by the scribe in my presence, and it accurately and completely records my words and actions. January 16, 2019 at 12:50 AM - Karma Rangle MD 
_______________________________

## 2019-01-16 NOTE — DISCHARGE INSTRUCTIONS
Patient Education        Palpitations: Care Instructions  Your Care Instructions    Heart palpitations are the uncomfortable sensation that your heart is beating fast or irregularly. You might feel pounding or fluttering in your chest. It might feel like your heart is skipping a beat. Although palpitations may be caused by a heart problem, they also occur because of stress, fatigue, or use of alcohol, caffeine, or nicotine. Many medicines, including diet pills, antihistamines, decongestants, and some herbal products, can cause heart palpitations. Nearly everyone has palpitations from time to time. Depending on your symptoms, your doctor may need to do more tests to try to find the cause of your palpitations. Follow-up care is a key part of your treatment and safety. Be sure to make and go to all appointments, and call your doctor if you are having problems. It's also a good idea to know your test results and keep a list of the medicines you take. How can you care for yourself at home? · Avoid caffeine, nicotine, and excess alcohol. · Do not take illegal drugs, such as methamphetamines and cocaine. · Do not take weight loss or diet medicines unless you talk with your doctor first.  · Get plenty of sleep. · Do not overeat. · If you have palpitations again, take deep breaths and try to relax. This may slow a racing heart. · If you start to feel lightheaded, lie down to avoid injuries that might result if you pass out and fall down. · Keep a record of your palpitations and bring it to your next doctor's appointment. Write down:  ? The date and time. ? Your pulse. (If your heart is beating fast, it may be hard to count your pulse.)  ? What you were doing when the palpitations started. ? How long the palpitations lasted. ? Any other symptoms. · If an activity causes palpitations, slow down or stop. Talk to your doctor before you do that activity again. · Take your medicines exactly as prescribed.  Call your doctor if you think you are having a problem with your medicine. When should you call for help? Call 911 anytime you think you may need emergency care. For example, call if:    · You passed out (lost consciousness).     · You have symptoms of a heart attack. These may include:  ? Chest pain or pressure, or a strange feeling in the chest.  ? Sweating. ? Shortness of breath. ? Pain, pressure, or a strange feeling in the back, neck, jaw, or upper belly or in one or both shoulders or arms. ? Lightheadedness or sudden weakness. ? A fast or irregular heartbeat. After you call 911, the  may tell you to chew 1 adult-strength or 2 to 4 low-dose aspirin. Wait for an ambulance. Do not try to drive yourself.     · You have symptoms of a stroke. These may include:  ? Sudden numbness, tingling, weakness, or loss of movement in your face, arm, or leg, especially on only one side of your body. ? Sudden vision changes. ? Sudden trouble speaking. ? Sudden confusion or trouble understanding simple statements. ? Sudden problems with walking or balance. ? A sudden, severe headache that is different from past headaches.    Call your doctor now or seek immediate medical care if:    · You have heart palpitations and:  ? Are dizzy or lightheaded, or you feel like you may faint. ? Have new or increased shortness of breath.    Watch closely for changes in your health, and be sure to contact your doctor if:    · You continue to have heart palpitations. Where can you learn more? Go to http://josé miguel-reid.info/. Enter R508 in the search box to learn more about \"Palpitations: Care Instructions. \"  Current as of: December 6, 2017  Content Version: 11.8  © 3224-3924 [employer] / [employer]. Care instructions adapted under license by [employer] (which disclaims liability or warranty for this information).  If you have questions about a medical condition or this instruction, always ask your healthcare professional. [address] any warranty or liability for your use of this information.

## 2019-01-17 LAB
ATRIAL RATE: 72 BPM
CALCULATED P AXIS, ECG09: 38 DEGREES
CALCULATED R AXIS, ECG10: -35 DEGREES
CALCULATED T AXIS, ECG11: 56 DEGREES
DIAGNOSIS, 93000: NORMAL
P-R INTERVAL, ECG05: 190 MS
Q-T INTERVAL, ECG07: 396 MS
QRS DURATION, ECG06: 102 MS
QTC CALCULATION (BEZET), ECG08: 433 MS
VENTRICULAR RATE, ECG03: 72 BPM

## 2019-01-23 ENCOUNTER — HOSPITAL ENCOUNTER (OUTPATIENT)
Dept: LAB | Age: 84
Discharge: HOME | End: 2019-01-23
Payer: MEDICARE

## 2019-01-23 ENCOUNTER — OFFICE VISIT (OUTPATIENT)
Dept: VASCULAR SURGERY | Age: 84
End: 2019-01-23

## 2019-01-23 DIAGNOSIS — I87.2 VENOUS (PERIPHERAL) INSUFFICIENCY: Primary | ICD-10-CM

## 2019-01-23 PROCEDURE — 87186 SC STD MICRODIL/AGAR DIL: CPT

## 2019-01-23 PROCEDURE — 87070 CULTURE OTHR SPECIMN AEROBIC: CPT

## 2019-01-23 PROCEDURE — 87077 CULTURE AEROBIC IDENTIFY: CPT

## 2019-01-23 RX ORDER — SULFAMETHOXAZOLE AND TRIMETHOPRIM 800; 160 MG/1; MG/1
1 TABLET ORAL 2 TIMES DAILY
Qty: 28 TAB | Refills: 0 | Status: SHIPPED | OUTPATIENT
Start: 2019-01-23 | End: 2019-03-01 | Stop reason: ALTCHOICE

## 2019-01-23 NOTE — PROGRESS NOTES
[facility]          Chart reviewed for the following:   Dirk PARKINSON LPN, have reviewed the medications and updated the Allergic reactions for [address] performed immediately prior to start of procedure:   Dirk PARKINSON LPN, have performed the following reviews on Bobby Hussein prior to the start of the procedure:            * Patient was identified by name and date of birth   * Agreement that [place] boot removed and reapplied   * Procedure site verified   * Patient was positioned for comfort  * Verbal consent was given by patient     Time: 1400      Date of procedure: 1/23/2019    Procedure performed by:  VVS NURSE    How tolerated by patient: tolerated the procedure well with no complications    Comments:  Removed bilateral lower leg unna boots. No redness and no open areas to left lower leg but slightly swollen so reapplied unna boot and coban, patient tolerated well. Right lower leg continues to have open  Ulcer but now has several small pinpoint open areas with purulent drainage, continues to have swelling and slight redness. Obtained wound  Culture and then cleansed leg with wound cleanser and gauze, applied silvadene, ABD's, unna boot and coban, patient tolerated well. Bactrim DS sent to [employer] [facility] per verbal order [place], [place]. Patient will return on Monday for reassessment and dressing change.

## 2019-01-25 LAB
BACTERIA SPEC CULT: ABNORMAL
GRAM STN SPEC: ABNORMAL
GRAM STN SPEC: ABNORMAL
SERVICE CMNT-IMP: ABNORMAL

## 2019-01-28 ENCOUNTER — OFFICE VISIT (OUTPATIENT)
Dept: VASCULAR SURGERY | Age: 84
End: 2019-01-28

## 2019-01-28 DIAGNOSIS — I87.2 VENOUS (PERIPHERAL) INSUFFICIENCY: Primary | ICD-10-CM

## 2019-01-28 NOTE — PROGRESS NOTES
[facility]          Chart reviewed for the following:   Dave PARKINSON LPN, have reviewed the medications and updated the Allergic reactions for [address] performed immediately prior to start of procedure:   Dave PARKINSON LPN, have performed the following reviews on Lizzie Hernandez prior to the start of the procedure:            * Patient was identified by name and date of birth   * Agreement that [facility] boot removed and reapplied   * Procedure site verified   * Patient was positioned for comfort  * Verbal consent was given by patient     Time: 1035      Date of procedure: 1/28/2019    Procedure performed by:  VVS NURSE    How tolerated by patient: tolerated the procedure well with no complications    Comments: Cleansed bilateral lower legs with wound cleanser and gauze, patient tolerated well. Patient has swelling noted to bilateral lower legs but non pitting and right leg continues to be larger than left. No open areas to left lower leg but right lower leg continues to have small pinpoint open areas over anterior lower leg, small amount ss drainage, surrounding tissue slightly red. Applied silvadene, ABD, unna boot and coban, patient tolerated well. Left lower leg applied unna boot and coban. Patient will return on Friday for reassessment and dressing change.

## 2019-01-29 ENCOUNTER — ANESTHESIA (OUTPATIENT)
Dept: CT IMAGING | Age: 84
End: 2019-01-29
Payer: MEDICARE

## 2019-01-29 ENCOUNTER — ANESTHESIA EVENT (OUTPATIENT)
Dept: CT IMAGING | Age: 84
End: 2019-01-29
Payer: MEDICARE

## 2019-01-29 ENCOUNTER — HOSPITAL ENCOUNTER (OUTPATIENT)
Dept: CT IMAGING | Age: 84
Discharge: HOME | End: 2019-01-29
Attending: RADIOLOGY | Admitting: RADIOLOGY
Payer: MEDICARE

## 2019-01-29 VITALS
BODY MASS INDEX: 26.34 KG/M2 | WEIGHT: 139.5 LBS | HEIGHT: 61 IN | SYSTOLIC BLOOD PRESSURE: 125 MMHG | HEART RATE: 72 BPM | RESPIRATION RATE: 12 BRPM | OXYGEN SATURATION: 96 % | DIASTOLIC BLOOD PRESSURE: 55 MMHG | TEMPERATURE: 97.5 F

## 2019-01-29 DIAGNOSIS — S32.10XA SACRAL FRACTURE (HCC): ICD-10-CM

## 2019-01-29 LAB
ANION GAP SERPL CALC-SCNC: 6 MMOL/L (ref 3–18)
APTT PPP: 31.9 SEC (ref 23–36.4)
BUN SERPL-MCNC: 14 MG/DL (ref 7–18)
BUN/CREAT SERPL: 15 (ref 12–20)
CALCIUM SERPL-MCNC: 8.6 MG/DL (ref 8.5–10.1)
CHLORIDE SERPL-SCNC: 109 MMOL/L (ref 100–108)
CO2 SERPL-SCNC: 27 MMOL/L (ref 21–32)
CREAT SERPL-MCNC: 0.92 MG/DL (ref 0.6–1.3)
ERYTHROCYTE [DISTWIDTH] IN BLOOD BY AUTOMATED COUNT: 14.5 % (ref 11.6–14.5)
GLUCOSE BLD STRIP.AUTO-MCNC: 100 MG/DL (ref 70–110)
GLUCOSE BLD STRIP.AUTO-MCNC: 136 MG/DL (ref 70–110)
GLUCOSE BLD STRIP.AUTO-MCNC: 51 MG/DL (ref 70–110)
GLUCOSE BLD STRIP.AUTO-MCNC: 56 MG/DL (ref 70–110)
GLUCOSE SERPL-MCNC: 46 MG/DL (ref 74–99)
HCT VFR BLD AUTO: 30.6 % (ref 35–45)
HGB BLD-MCNC: 10.1 G/DL (ref 12–16)
INR PPP: 1.1 (ref 0.8–1.2)
MCH RBC QN AUTO: 30 PG (ref 24–34)
MCHC RBC AUTO-ENTMCNC: 33 G/DL (ref 31–37)
MCV RBC AUTO: 90.8 FL (ref 74–97)
PLATELET # BLD AUTO: 79 K/UL (ref 135–420)
PMV BLD AUTO: 10.2 FL (ref 9.2–11.8)
POTASSIUM SERPL-SCNC: 4.1 MMOL/L (ref 3.5–5.5)
PROTHROMBIN TIME: 14.3 SEC (ref 11.5–15.2)
RBC # BLD AUTO: 3.37 M/UL (ref 4.2–5.3)
SODIUM SERPL-SCNC: 142 MMOL/L (ref 136–145)
WBC # BLD AUTO: 3.1 K/UL (ref 4.6–13.2)

## 2019-01-29 PROCEDURE — 76210000021 HC REC RM PH II 0.5 TO 1 HR

## 2019-01-29 PROCEDURE — 74011250636 HC RX REV CODE- 250/636

## 2019-01-29 PROCEDURE — 74011250636 HC RX REV CODE- 250/636: Performed by: RADIOLOGY

## 2019-01-29 PROCEDURE — 85730 THROMBOPLASTIN TIME PARTIAL: CPT

## 2019-01-29 PROCEDURE — 80048 BASIC METABOLIC PNL TOTAL CA: CPT

## 2019-01-29 PROCEDURE — 74011636320 HC RX REV CODE- 636/320: Performed by: RADIOLOGY

## 2019-01-29 PROCEDURE — 76060000034 HC ANESTHESIA 1.5 TO 2 HR

## 2019-01-29 PROCEDURE — 74011000250 HC RX REV CODE- 250: Performed by: ANESTHESIOLOGY

## 2019-01-29 PROCEDURE — 74011250636 HC RX REV CODE- 250/636: Performed by: NURSE ANESTHETIST, CERTIFIED REGISTERED

## 2019-01-29 PROCEDURE — 85027 COMPLETE CBC AUTOMATED: CPT

## 2019-01-29 PROCEDURE — 82962 GLUCOSE BLOOD TEST: CPT

## 2019-01-29 PROCEDURE — 85610 PROTHROMBIN TIME: CPT

## 2019-01-29 PROCEDURE — 74011250636 HC RX REV CODE- 250/636: Performed by: ANESTHESIOLOGY

## 2019-01-29 PROCEDURE — 77030007885 CT SACROPLASTY BI

## 2019-01-29 RX ORDER — FENTANYL CITRATE 50 UG/ML
INJECTION, SOLUTION INTRAMUSCULAR; INTRAVENOUS AS NEEDED
Status: DISCONTINUED | OUTPATIENT
Start: 2019-01-29 | End: 2019-01-29 | Stop reason: HOSPADM

## 2019-01-29 RX ORDER — SODIUM CHLORIDE 0.9 % (FLUSH) 0.9 %
5-40 SYRINGE (ML) INJECTION AS NEEDED
Status: DISCONTINUED | OUTPATIENT
Start: 2019-01-29 | End: 2019-01-29 | Stop reason: HOSPADM

## 2019-01-29 RX ORDER — DIPHENHYDRAMINE HYDROCHLORIDE 50 MG/ML
12.5 INJECTION, SOLUTION INTRAMUSCULAR; INTRAVENOUS
Status: DISCONTINUED | OUTPATIENT
Start: 2019-01-29 | End: 2019-01-29 | Stop reason: HOSPADM

## 2019-01-29 RX ORDER — PROPOFOL 10 MG/ML
INJECTION, EMULSION INTRAVENOUS
Status: DISCONTINUED | OUTPATIENT
Start: 2019-01-29 | End: 2019-01-29 | Stop reason: HOSPADM

## 2019-01-29 RX ORDER — HYDROMORPHONE HYDROCHLORIDE 2 MG/ML
0.2 INJECTION, SOLUTION INTRAMUSCULAR; INTRAVENOUS; SUBCUTANEOUS
Status: DISCONTINUED | OUTPATIENT
Start: 2019-01-29 | End: 2019-01-29 | Stop reason: HOSPADM

## 2019-01-29 RX ORDER — NALBUPHINE HYDROCHLORIDE 10 MG/ML
5 INJECTION, SOLUTION INTRAMUSCULAR; INTRAVENOUS; SUBCUTANEOUS
Status: DISCONTINUED | OUTPATIENT
Start: 2019-01-29 | End: 2019-01-29 | Stop reason: HOSPADM

## 2019-01-29 RX ORDER — TOBRAMYCIN 1.2 G/30ML
1.2 INJECTION, POWDER, LYOPHILIZED, FOR SOLUTION INTRAVENOUS ONCE
Status: COMPLETED | OUTPATIENT
Start: 2019-01-29 | End: 2019-01-29

## 2019-01-29 RX ORDER — INSULIN LISPRO 100 [IU]/ML
INJECTION, SOLUTION INTRAVENOUS; SUBCUTANEOUS ONCE
Status: DISCONTINUED | OUTPATIENT
Start: 2019-01-29 | End: 2019-01-29 | Stop reason: HOSPADM

## 2019-01-29 RX ORDER — ONDANSETRON 2 MG/ML
4 INJECTION INTRAMUSCULAR; INTRAVENOUS ONCE
Status: COMPLETED | OUTPATIENT
Start: 2019-01-29 | End: 2019-01-29

## 2019-01-29 RX ORDER — DEXTROSE 50 % IN WATER (D50W) INTRAVENOUS SYRINGE
25-50 AS NEEDED
Status: DISCONTINUED | OUTPATIENT
Start: 2019-01-29 | End: 2019-01-29 | Stop reason: HOSPADM

## 2019-01-29 RX ORDER — FENTANYL CITRATE 50 UG/ML
25 INJECTION, SOLUTION INTRAMUSCULAR; INTRAVENOUS AS NEEDED
Status: COMPLETED | OUTPATIENT
Start: 2019-01-29 | End: 2019-01-29

## 2019-01-29 RX ORDER — CEFAZOLIN SODIUM 2 G/50ML
2 SOLUTION INTRAVENOUS ONCE
Status: COMPLETED | OUTPATIENT
Start: 2019-01-29 | End: 2019-01-29

## 2019-01-29 RX ORDER — SODIUM CHLORIDE 0.9 % (FLUSH) 0.9 %
5-40 SYRINGE (ML) INJECTION EVERY 8 HOURS
Status: DISCONTINUED | OUTPATIENT
Start: 2019-01-29 | End: 2019-01-29 | Stop reason: HOSPADM

## 2019-01-29 RX ORDER — SODIUM CHLORIDE 9 MG/ML
75 INJECTION, SOLUTION INTRAVENOUS AS NEEDED
Status: DISCONTINUED | OUTPATIENT
Start: 2019-01-29 | End: 2019-01-29 | Stop reason: HOSPADM

## 2019-01-29 RX ORDER — SODIUM CHLORIDE 9 MG/ML
20 INJECTION, SOLUTION INTRAVENOUS CONTINUOUS
Status: DISCONTINUED | OUTPATIENT
Start: 2019-01-29 | End: 2019-01-29 | Stop reason: HOSPADM

## 2019-01-29 RX ORDER — MAGNESIUM SULFATE 100 %
4 CRYSTALS MISCELLANEOUS AS NEEDED
Status: DISCONTINUED | OUTPATIENT
Start: 2019-01-29 | End: 2019-01-29 | Stop reason: HOSPADM

## 2019-01-29 RX ORDER — SODIUM CHLORIDE, SODIUM LACTATE, POTASSIUM CHLORIDE, CALCIUM CHLORIDE 600; 310; 30; 20 MG/100ML; MG/100ML; MG/100ML; MG/100ML
75 INJECTION, SOLUTION INTRAVENOUS CONTINUOUS
Status: DISCONTINUED | OUTPATIENT
Start: 2019-01-29 | End: 2019-01-29 | Stop reason: HOSPADM

## 2019-01-29 RX ORDER — DEXTROSE 50 % IN WATER (D50W) INTRAVENOUS SYRINGE
25 ONCE
Status: COMPLETED | OUTPATIENT
Start: 2019-01-29 | End: 2019-01-29

## 2019-01-29 RX ORDER — FENTANYL CITRATE 50 UG/ML
INJECTION, SOLUTION INTRAMUSCULAR; INTRAVENOUS
Status: COMPLETED
Start: 2019-01-29 | End: 2019-01-29

## 2019-01-29 RX ORDER — HYDROMORPHONE HYDROCHLORIDE 2 MG/ML
INJECTION, SOLUTION INTRAMUSCULAR; INTRAVENOUS; SUBCUTANEOUS
Status: COMPLETED
Start: 2019-01-29 | End: 2019-01-29

## 2019-01-29 RX ORDER — LIDOCAINE HYDROCHLORIDE 10 MG/ML
30 INJECTION, SOLUTION EPIDURAL; INFILTRATION; INTRACAUDAL; PERINEURAL ONCE
Status: COMPLETED | OUTPATIENT
Start: 2019-01-29 | End: 2019-01-29

## 2019-01-29 RX ORDER — DEXTROSE, SODIUM CHLORIDE, SODIUM LACTATE, POTASSIUM CHLORIDE, AND CALCIUM CHLORIDE 5; .6; .31; .03; .02 G/100ML; G/100ML; G/100ML; G/100ML; G/100ML
25 INJECTION, SOLUTION INTRAVENOUS CONTINUOUS
Status: DISCONTINUED | OUTPATIENT
Start: 2019-01-29 | End: 2019-01-29 | Stop reason: HOSPADM

## 2019-01-29 RX ADMIN — LIDOCAINE HYDROCHLORIDE 30 ML: 10 INJECTION, SOLUTION EPIDURAL; INFILTRATION; INTRACAUDAL; PERINEURAL at 13:50

## 2019-01-29 RX ADMIN — PROPOFOL 150 MCG/KG/MIN: 10 INJECTION, EMULSION INTRAVENOUS at 13:31

## 2019-01-29 RX ADMIN — IOHEXOL 50 ML: 300 INJECTION, SOLUTION INTRAVENOUS at 14:05

## 2019-01-29 RX ADMIN — FENTANYL CITRATE 25 MCG: 50 INJECTION INTRAMUSCULAR; INTRAVENOUS at 15:06

## 2019-01-29 RX ADMIN — SODIUM CHLORIDE 20 ML/HR: 900 INJECTION, SOLUTION INTRAVENOUS at 12:17

## 2019-01-29 RX ADMIN — HYDROMORPHONE HYDROCHLORIDE 0.2 MG: 2 INJECTION, SOLUTION INTRAMUSCULAR; INTRAVENOUS; SUBCUTANEOUS at 15:42

## 2019-01-29 RX ADMIN — FENTANYL CITRATE 50 MCG: 50 INJECTION, SOLUTION INTRAMUSCULAR; INTRAVENOUS at 14:54

## 2019-01-29 RX ADMIN — FENTANYL CITRATE 25 MCG: 50 INJECTION INTRAMUSCULAR; INTRAVENOUS at 15:34

## 2019-01-29 RX ADMIN — TOBRAMYCIN 1200 MG: 1.2 INJECTION, POWDER, LYOPHILIZED, FOR SOLUTION INTRAVENOUS at 14:09

## 2019-01-29 RX ADMIN — CEFAZOLIN SODIUM 2 G: 2 SOLUTION INTRAVENOUS at 13:25

## 2019-01-29 RX ADMIN — FENTANYL CITRATE 25 MCG: 50 INJECTION INTRAMUSCULAR; INTRAVENOUS at 15:15

## 2019-01-29 RX ADMIN — HYDROMORPHONE HYDROCHLORIDE 0.2 MG: 2 INJECTION INTRAMUSCULAR; INTRAVENOUS; SUBCUTANEOUS at 15:43

## 2019-01-29 RX ADMIN — SODIUM CHLORIDE, SODIUM LACTATE, POTASSIUM CHLORIDE, CALCIUM CHLORIDE, AND DEXTROSE MONOHYDRATE 25 ML/HR: 600; 310; 30; 20; 5 INJECTION, SOLUTION INTRAVENOUS at 12:54

## 2019-01-29 RX ADMIN — ONDANSETRON 4 MG: 2 INJECTION, SOLUTION INTRAMUSCULAR; INTRAVENOUS at 15:32

## 2019-01-29 RX ADMIN — FENTANYL CITRATE 50 MCG: 50 INJECTION, SOLUTION INTRAMUSCULAR; INTRAVENOUS at 14:50

## 2019-01-29 RX ADMIN — FENTANYL CITRATE 25 MCG: 50 INJECTION INTRAMUSCULAR; INTRAVENOUS at 15:21

## 2019-01-29 RX ADMIN — DEXTROSE MONOHYDRATE 12.5 G: 25 INJECTION, SOLUTION INTRAVENOUS at 12:10

## 2019-01-29 RX ADMIN — HYDROMORPHONE HYDROCHLORIDE 0.2 MG: 2 INJECTION, SOLUTION INTRAMUSCULAR; INTRAVENOUS; SUBCUTANEOUS at 16:10

## 2019-01-29 NOTE — DISCHARGE INSTRUCTIONS
Patient Education     Kyphoplasty: What to Expect at Home  Your Recovery  After kyphoplasty to relieve pain from compression fractures, your back may feel sore where the hollow needle (trocar) went into your back. This should go away in a few days. Most people are able to return to their daily activities within a day after kyphoplasty. This care sheet gives you a general idea about how long it will take for you to recover. But each person recovers at a different pace. Follow the steps below to get better as quickly as possible. How can you care for yourself at home? Activity    · Take it easy for the first 24 hours. Rest when you feel tired. Getting enough sleep will help you recover.     · For the first day after the procedure, avoid lifting anything that would make you strain. This may include heavy grocery bags and milk containers, a heavy briefcase or backpack, cat litter or dog food bags, a vacuum , or a child. Diet    · You can eat your normal diet. If your stomach is upset, try bland, low-fat foods like plain rice, broiled chicken, toast, and yogurt. Medicines    · Your doctor will tell you if and when you can restart your medicines. He or she will also give you instructions about taking any new medicines.     · If you take blood thinners, such as warfarin (Coumadin), clopidogrel (Plavix), or aspirin, be sure to talk to your doctor. He or she will tell you if and when to start taking those medicines again. Make sure that you understand exactly what your doctor wants you to do.     · Be safe with medicines. Take pain medicines exactly as directed. ? If the doctor gave you a prescription medicine for pain, take it as prescribed. ? If you are not taking a prescription pain medicine, ask your doctor if you can take an over-the-counter medicine. ? Do not take two or more pain medicines at the same time unless your doctor told you to. Many pain medicines have acetaminophen, which is Tylenol.  Too much acetaminophen (Tylenol) can be harmful. Incision care    · You will have a dressing over the cut (incision). A dressing helps the incision heal and protects it. Your doctor will tell you how to take care of this.    Ice    · If you are sore where the needle was inserted, put ice or a cold pack on your back for 10 to 20 minutes at a time. Try to do this every 1 to 2 hours for the next 3 days (when you are awake) or until the swelling goes down. Put a thin cloth between the ice and your skin. Follow-up care is a key part of your treatment and safety. Be sure to make and go to all appointments, and call your doctor if you are having problems. It's also a good idea to know your test results and keep a list of the medicines you take. When should you call for help? Call 911 anytime you think you may need emergency care. For example, call if:    · You passed out (lost consciousness).     · You have severe trouble breathing.     · You have sudden chest pain and shortness of breath, or you cough up blood.     · You are unable to move a leg at all.   [facility] your doctor now or seek immediate medical care if:    · You have new or worse symptoms in your legs, belly, or buttocks. Symptoms may include:  ? Numbness or tingling. ? Weakness. ? Pain.     · You lose bladder or bowel control.     · You have signs of infection, such as:  ? Increased pain, swelling, warmth, or redness. ? Red streaks leading from the incision. ? Pus draining from the incision. ? Swollen lymph nodes in your neck, armpits, or groin. ? A fever.    Watch closely for any changes in your health, and be sure to contact your doctor if:    · You do not get better as expected. Where can you learn more? Go to http://josé miguel-reid.info/. Enter [phone] in the search box to learn more about \"Kyphoplasty: What to Expect at Home. \"  Current as of: September 20, 2018  Content Version: 11.9  © 2074-8850 [employer], [facility].  Care instructions adapted under license by [employer] (which disclaims liability or warranty for this information). If you have questions about a medical condition or this instruction, always ask your healthcare professional. [address] any warranty or liability for your use of this information. [facility]  DISCHARGE SUMMARY from Nurse    PATIENT INSTRUCTIONS:    After general anesthesia or intravenous sedation, for 24 hours or while taking prescription Narcotics:  · Limit your activities  · Do not drive and operate hazardous machinery  · Do not make important personal or business decisions  · Do  not drink alcoholic beverages  · If you have not urinated within 8 hours after discharge, please contact your surgeon on call. Report the following to your surgeon:  · Excessive pain, swelling, redness or odor of or around the surgical area  · Temperature over 100.5  · Nausea and vomiting lasting longer than 4 hours or if unable to take medications  · Any signs of decreased circulation or nerve impairment to extremity: change in color, persistent  numbness, tingling, coldness or increase pain  · Any questions    What to do at Home:  Recommended activity: Activity as tolerated and no driving for today, *. *  Please give a list of your current medications to your Primary Care Provider. *  Please update this list whenever your medications are discontinued, doses are      changed, or new medications (including over-the-counter products) are added. *  Please carry medication information at all times in case of emergency situations. These are general instructions for a healthy lifestyle:    No smoking/ No tobacco products/ Avoid exposure to second hand smoke  Surgeon General's Warning:  Quitting smoking now greatly reduces serious risk to your health.     Obesity, smoking, and sedentary lifestyle greatly increases your risk for illness    A healthy diet, regular physical exercise & weight monitoring are important for maintaining a healthy lifestyle    You may be retaining fluid if you have a history of heart failure or if you experience any of the following symptoms:  Weight gain of 3 pounds or more overnight or 5 pounds in a week, increased swelling in our hands or feet or shortness of breath while lying flat in bed. Please call your doctor as soon as you notice any of these symptoms; do not wait until your next office visit. Recognize signs and symptoms of STROKE:    F-face looks uneven    A-arms unable to move or move unevenly    S-speech slurred or non-existent    T-time-call 911 as soon as signs and symptoms begin-DO NOT go       Back to bed or wait to see if you get better-TIME IS BRAIN. Warning Signs of HEART ATTACK     Call 911 if you have these symptoms:   Chest discomfort. Most heart attacks involve discomfort in the center of the chest that lasts more than a few minutes, or that goes away and comes back. It can feel like uncomfortable pressure, squeezing, fullness, or pain.  Discomfort in other areas of the upper body. Symptoms can include pain or discomfort in one or both arms, the back, neck, jaw, or stomach.  Shortness of breath with or without chest discomfort.  Other signs may include breaking out in a cold sweat, nausea, or lightheadedness. Don't wait more than five minutes to call 911 - MINUTES MATTER! Fast action can save your life. Calling 911 is almost always the fastest way to get lifesaving treatment. Emergency Medical Services staff can begin treatment when they arrive -- up to an hour sooner than if someone gets to the hospital by car. The discharge information has been reviewed with the patient and caregiver. The patient and caregiver verbalized understanding.   Discharge medications reviewed with the patient and caregiver and appropriate educational materials and side effects teaching were provided.   ___________________________________________________________________________________________________________________________________

## 2019-01-29 NOTE — PROCEDURES
RADIOLOGY POST PROCEDURE NOTE     January 29, 2019       5:47 PM     Preoperative Diagnosis:   Bilateral sacral insufficiency fractures. Postoperative Diagnosis:  Same. :  Dr. Nancy Alfaro    Assistant:  None. Type of Anesthesia: 1% plain lidocaine and IV deep sedation per anaesthesia. Procedure/Description:  Image guided bilateral sacroplasty. Findings:   No bleeding. Estimated blood Loss:  Minimal    Specimen Removed:   no    Blood transfusions:  None. Implants:  None.     Complications: None    Condition: Stable    Discharge Plan:  discharge home     Fan Giron MD

## 2019-01-29 NOTE — ANESTHESIA PREPROCEDURE EVALUATION
Anesthetic History No history of anesthetic complications Review of Systems / Medical History Patient summary reviewed and pertinent labs reviewed Pulmonary Within defined limits Neuro/Psych Within defined limits Cardiovascular Hypertension Exercise tolerance: >4 METS 
  
GI/Hepatic/Renal 
Within defined limits Endo/Other Diabetes: type 2 Other Findings Comments:  
 
 
  
 
 
 
 
Physical Exam 
 
Airway Mallampati: II 
TM Distance: 4 - 6 cm Neck ROM: normal range of motion Mouth opening: Normal 
 
 Cardiovascular Regular rate and rhythm,  S1 and S2 normal,  no murmur, click, rub, or gallop Rhythm: regular Rate: normal 
 
 
 
 Dental 
No notable dental hx Pulmonary Breath sounds clear to auscultation Abdominal 
GI exam deferred Other Findings Anesthetic Plan ASA: 2 Anesthesia type: MAC and general - backup Induction: Intravenous Anesthetic plan and risks discussed with: Patient

## 2019-01-29 NOTE — H&P
OUTPATIENT HISTORY AND PHYSICAL      Today 1/29/2019     Indication/Symptoms:   Seth Harry is a 80 y.o. female here for bilateral sacroplasty. Bilateral sacral insufficiency fractures. Current Meds:    Prior to Admission medications    Medication Sig Start Date End Date Taking? Authorizing Provider   JANUVIA 100 mg tablet take 1 tablet by mouth once daily 12/23/18  Yes Steffi Rodriguez MD   furosemide (LASIX) 40 mg tablet take 1 tablet by mouth once daily 12/20/18  Yes Gunner Love MD   gabapentin (NEURONTIN) 100 mg capsule Take 1 Cap by mouth two (2) times a day. 11/27/18  Yes Opal Glynn NP   sertraline (ZOLOFT) 25 mg tablet take 1 tablet by mouth once daily 11/23/18  Yes Steffi Rodriguez MD   glipiZIDE-metFORMIN (METAGLIP) 5-500 mg per tablet TAKE 1 TABLET BY MOUTH BEFORE BREAKFAST AND 1 TABLET BY MOUTH BEFORE DINNER 11/16/18  Yes Steffi Rodriguez MD   RABEprazole (ACIPHEX) 20 mg tablet take 1 tablet by mouth once daily 11/16/18  Yes Steffi Rodriguez MD   ascorbic acid, vitamin C, (VITAMIN C) 500 mg tablet Take  by mouth. Yes Provider, Historical   ferrous sulfate (IRON) 325 mg (65 mg iron) tablet Take  by mouth Daily (before breakfast). Yes Provider, Historical   naproxen sodium (ALEVE PO) Take  by mouth as needed. Yes Provider, Historical   cyanocobalamin (VITAMIN B-12) 2,000 mcg TbER Take 3 Tabs by mouth daily. Yes Provider, Historical   loperamide (IMODIUM) 2 mg capsule Take 1 mg by mouth Three (3) times a week. Yes Provider, Historical   pravastatin (PRAVACHOL) 40 mg tablet Take 1 Tab by mouth nightly. 8/10/16  Yes Steffi Rodriguez MD   cholecalciferol (VITAMIN D3) 1,000 unit tablet Take  by mouth daily. Yes Provider, Historical   trimethoprim-sulfamethoxazole (BACTRIM DS, SEPTRA DS) 160-800 mg per tablet Take 1 Tab by mouth two (2) times a day. 1/23/19   Brandin [place], [place]   traMADol (ULTRAM) 50 mg tablet Take 1 Tab by mouth two (2) times daily as needed for Pain.  Max Daily Amount: 100 mg. 1/11/19   Christine James, MYAH   glipiZIDE-metFORMIN (METAGLIP) 5-500 mg per tablet TAKE 1 TABLET BY MOUTH BEFORE BREAKFAST AND 1 TABLET BY MOUTH BEFORE DINNER 12/30/18   Steffi Rodriguez MD   lidocaine (LIDODERM) 5 % Apply patch to the affected area for 12 hours a day and remove for 12 hours a day. 5/25/16   Abisai Baldwin PA-C   aspirin delayed-release 81 mg tablet Take  by mouth daily. Provider, Historical       Allergies:    No Known Allergies    Comorbid Conditions:    Past Medical History:   Diagnosis Date    Aortic valve stenosis, mild April 2014    EF 36-91%, Grade 1 diastolic dysfunction, mild aortic stenosis,  mean gradient 10 mm Hg    CAD (coronary artery disease)     aortic stenosis    Cardiac echocardiogram 06/2018    EF 55-60%. No RWMA. Mild-mod LVH. Severe LAE. Mild AS.  Cardiac nuclear imaging test 08/27/2008    No evidence of ischemia or prior scarring. EF 62%. No WMA. No significant change from study of 3/23/06.  Cardiovascular RLE venous duplex 12/01/2016    Right leg:  No DVT.  Carotid duplex 62/06/3766    Mild 7-08% LICA stenosis.  DM [facility]diabetes mellitus) ([facility] [address]     Dyslipidemia     Heart failure ([facility] [address]     HTN (hypertension)     Mixed hyperlipidemia     Sciatica     Traumatic closed displaced fracture of acromial end of clavicle, left, initial encounter           Past Surgical History:   Procedure Laterality Date    HX APPENDECTOMY      HX OOPHORECTOMY Left 1957    HX OTHER SURGICAL Right 2010    ankle surgery    HX TUMOR REMOVAL Left     ovarian    HX UROLOGICAL      kidney stone removal     Data:    Visit Vitals  /55   Pulse 72   Temp 97.5 °F (36.4 °C)   Resp 12   Ht 5' 1\" (1.549 m)   Wt 63.3 kg (139 lb 8 oz)   SpO2 97%   BMI 26.36 kg/m²   :  Recent Labs     01/29/19  1205   PLT 79*     Recent Labs     01/29/19  1205   INR 1.1   APTT 31.9       The H & P and/or progress notes and any available imaging were reviewed.   The risks, indications and possible alternatives to the procedure, including doing nothing, were discussed and informed consent was obtained. Physical Exam:      Mental status:   Alert and oriented. Examination specific to the procedure proposed to be performed and any co morbid conditions:   Heart:   RRR. Lungs:   CTAB. No wheezes, rales or rhonchi. The patient is an appropriate candidate to undergo the planned procedure.     Baldo Nicholson MD

## 2019-01-29 NOTE — ANESTHESIA POSTPROCEDURE EVALUATION
* No procedures listed *. Anesthesia Post Evaluation Multimodal analgesia: multimodal analgesia used between 6 hours prior to anesthesia start to PACU discharge Patient location during evaluation: bedside Patient participation: complete - patient participated Level of consciousness: awake Pain management: adequate Airway patency: patent Anesthetic complications: no 
Cardiovascular status: stable Respiratory status: acceptable Hydration status: acceptable Post anesthesia nausea and vomiting:  controlled Visit Vitals /45 Pulse 69 Temp 36.4 °C (97.5 °F) Resp 12 Ht 5' 1\" (1.549 m) Wt 63.3 kg (139 lb 8 oz) SpO2 94% BMI 26.36 kg/m²

## 2019-01-29 NOTE — PERIOP NOTES
Pt had glucose level on arrival checked and it was 56, pt was covered with dextrose and blood sugar was rechecked and it was at 100.  Lab called a critical glucose level of 46 but pt had already been treated with the dextrose

## 2019-01-31 DIAGNOSIS — M84.48XA SACRAL INSUFFICIENCY FRACTURE, INITIAL ENCOUNTER: ICD-10-CM

## 2019-01-31 DIAGNOSIS — G89.29 OTHER CHRONIC PAIN: ICD-10-CM

## 2019-01-31 DIAGNOSIS — M54.50 LUMBAR PAIN: ICD-10-CM

## 2019-01-31 DIAGNOSIS — M53.3 SI (SACROILIAC) PAIN: ICD-10-CM

## 2019-01-31 DIAGNOSIS — M53.3 SI (SACROILIAC) PAIN: Primary | ICD-10-CM

## 2019-01-31 DIAGNOSIS — M54.16 LUMBAR RADICULOPATHY: ICD-10-CM

## 2019-01-31 RX ORDER — TRAMADOL HYDROCHLORIDE 50 MG/1
50 TABLET ORAL
Qty: 14 TAB | Refills: 0 | OUTPATIENT
Start: 2019-01-31

## 2019-01-31 RX ORDER — PREDNISONE 10 MG/1
TABLET ORAL
Qty: 21 TAB | Refills: 0 | Status: SHIPPED | OUTPATIENT
Start: 2019-01-31 | End: 2019-03-01 | Stop reason: ALTCHOICE

## 2019-01-31 NOTE — TELEPHONE ENCOUNTER
the last rx was Tramadol, one week supply for acute severe pain. Due to state regulations, we can not refill this.

## 2019-01-31 NOTE — TELEPHONE ENCOUNTER
PHONE IN RX    Patient's daughter contacting the office requesting a refill on the patient's behalf. Last Visit: 12/28/2018 with MYAH Almaguer  Next Appointment: 04/08/2019 with MYAH Tierney; Pt canceled 01/29  Previous Refill Encounter(s): 01/11/2019 per NP Buttery #[id number]    Requested Prescriptions     Pending Prescriptions Disp Refills    traMADol (ULTRAM) 50 mg tablet 14 Tab 0     Sig: Take 1 Tab by mouth two (2) times daily as needed for Pain. Max Daily Amount: 100 mg.

## 2019-01-31 NOTE — TELEPHONE ENCOUNTER
Called patient, spoke with daughter (okay per HIP[employer]). Daughter verified patients name and . She was informed that pain medication was for only one weeks worth and due to state regulations we are unable to refill this RX at this present time. She was also advised to alternate between OTC medication, heat and ice.

## 2019-02-01 ENCOUNTER — TELEPHONE (OUTPATIENT)
Dept: VASCULAR SURGERY | Age: 84
End: 2019-02-01

## 2019-02-01 NOTE — TELEPHONE ENCOUNTER
Pb Low called and wanted you to call him concerning Mrs. Ifeanyi Blue. He would not give me any information and seemed like something was wrong.

## 2019-02-01 NOTE — TELEPHONE ENCOUNTER
Returned phone call and will cancel appointment for today due to patient in large amount of back pain and unable to leave the house at this time. Son states that his sister is on the phone with back doctor. He will call back Monday with an update.

## 2019-02-02 NOTE — PERIOP NOTES
Pt received Dilaudid 0.2mg x 2 doses. One dose at 1542 and another dose at 1610. A dose was also charted at 1543 in error.   Waste amount 1.6mg.

## 2019-02-04 ENCOUNTER — OFFICE VISIT (OUTPATIENT)
Dept: VASCULAR SURGERY | Age: 84
End: 2019-02-04

## 2019-02-04 ENCOUNTER — TELEPHONE (OUTPATIENT)
Dept: VASCULAR SURGERY | Age: 84
End: 2019-02-04

## 2019-02-04 DIAGNOSIS — I87.2 VENOUS (PERIPHERAL) INSUFFICIENCY: Primary | ICD-10-CM

## 2019-02-04 NOTE — PROGRESS NOTES
Patient being seen for assessment of bilateral lower extremities. Removed  Unna boot and coban, patient tolerated well. No swelling or open  Areas to left lower leg and applied double layer tubi  size E. Right lower leg only has one open area to anterior lower leg which is granulating with scant amount ss drainage, surrounding tissue WNL. Slight swelling noted but non pitting. Applied calcium alginate with silver, covered with gauze and secured with tape and applied double layer  Tubi  size F. Patient tolerated well. Patient will perform own wound care twice a week and return in one week for reassessment. Have encouraged patient again to try and apply compression stockings  When  Gets home. Patient continues to have back pain.

## 2019-02-04 NOTE — TELEPHONE ENCOUNTER
Son called and stated that you wanted to talk to him about his mother. I asked if he needed to schedule an appt to see you and he said that you were going to talk to him about that.

## 2019-02-08 ENCOUNTER — HOSPITAL ENCOUNTER (OUTPATIENT)
Dept: INTERVENTIONAL RADIOLOGY/VASCULAR | Age: 84
Discharge: HOME | End: 2019-02-08
Attending: RADIOLOGY | Admitting: RADIOLOGY
Payer: MEDICARE

## 2019-02-08 VITALS
HEIGHT: 61 IN | RESPIRATION RATE: 20 BRPM | SYSTOLIC BLOOD PRESSURE: 130 MMHG | OXYGEN SATURATION: 100 % | WEIGHT: 133 LBS | DIASTOLIC BLOOD PRESSURE: 67 MMHG | BODY MASS INDEX: 25.11 KG/M2 | HEART RATE: 68 BPM

## 2019-02-08 DIAGNOSIS — M54.9 BACK PAIN: ICD-10-CM

## 2019-02-08 DIAGNOSIS — M79.606 LEG PAIN: ICD-10-CM

## 2019-02-08 LAB — GLUCOSE BLD STRIP.AUTO-MCNC: 94 MG/DL (ref 70–110)

## 2019-02-08 PROCEDURE — 64483 NJX AA&/STRD TFRM EPI L/S 1: CPT

## 2019-02-08 PROCEDURE — 74011250636 HC RX REV CODE- 250/636

## 2019-02-08 PROCEDURE — 74011000250 HC RX REV CODE- 250: Performed by: RADIOLOGY

## 2019-02-08 PROCEDURE — 82962 GLUCOSE BLOOD TEST: CPT

## 2019-02-08 RX ORDER — SODIUM CHLORIDE 9 MG/ML
20 INJECTION, SOLUTION INTRAVENOUS CONTINUOUS
Status: DISCONTINUED | OUTPATIENT
Start: 2019-02-08 | End: 2019-02-08 | Stop reason: HOSPADM

## 2019-02-08 RX ORDER — LIDOCAINE HYDROCHLORIDE 10 MG/ML
INJECTION, SOLUTION EPIDURAL; INFILTRATION; INTRACAUDAL; PERINEURAL
Status: COMPLETED
Start: 2019-02-08 | End: 2019-02-08

## 2019-02-08 RX ORDER — TRIAMCINOLONE ACETONIDE 40 MG/ML
80 INJECTION, SUSPENSION INTRA-ARTICULAR; INTRAMUSCULAR ONCE
Status: COMPLETED | OUTPATIENT
Start: 2019-02-08 | End: 2019-02-08

## 2019-02-08 RX ORDER — TRIAMCINOLONE ACETONIDE 40 MG/ML
40 INJECTION, SUSPENSION INTRA-ARTICULAR; INTRAMUSCULAR ONCE
Status: DISCONTINUED | OUTPATIENT
Start: 2019-02-08 | End: 2019-02-08

## 2019-02-08 RX ORDER — TRIAMCINOLONE ACETONIDE 40 MG/ML
INJECTION, SUSPENSION INTRA-ARTICULAR; INTRAMUSCULAR
Status: COMPLETED
Start: 2019-02-08 | End: 2019-02-08

## 2019-02-08 RX ORDER — BUPIVACAINE HYDROCHLORIDE 5 MG/ML
10 INJECTION, SOLUTION EPIDURAL; INTRACAUDAL ONCE
Status: COMPLETED | OUTPATIENT
Start: 2019-02-08 | End: 2019-02-08

## 2019-02-08 RX ADMIN — BUPIVACAINE HYDROCHLORIDE 10 MG: 5 INJECTION, SOLUTION EPIDURAL; INTRACAUDAL; PERINEURAL at 09:05

## 2019-02-08 RX ADMIN — LIDOCAINE HYDROCHLORIDE 30 ML: 10 INJECTION, SOLUTION EPIDURAL; INFILTRATION; INTRACAUDAL; PERINEURAL at 09:00

## 2019-02-08 RX ADMIN — TRIAMCINOLONE ACETONIDE 80 MG: 40 INJECTION, SUSPENSION INTRA-ARTICULAR; INTRAMUSCULAR at 09:05

## 2019-02-08 NOTE — H&P
OUTPATIENT HISTORY AND PHYSICAL      Today 2/8/2019      Indication/Symptoms:   Joesph Oliver is a 80 y.o. female with a history of sacral fractures s/p sacroplasty on 1/29 who is still experiencing pain. She now presents for image-guided bilateral sacroiliac joint injections with moderate sedation. Patient has been NPO since midnight and takes no blood thinning medications. Current Meds:    Prior to Admission medications    Medication Sig Start Date End Date Taking? Authorizing Provider   predniSONE (STERAPRED DS) 10 mg dose pack See administration instruction per 10mg dose pack 1/31/19   Norbert Winston MD   trimethoprim-sulfamethoxazole (BACTRIM DS, SEPTRA DS) 160-800 mg per tablet Take 1 Tab by mouth two (2) times a day. 1/23/19   Brandin [place], [place]   traMADol (ULTRAM) 50 mg tablet Take 1 Tab by mouth two (2) times daily as needed for Pain. Max Daily Amount: 100 mg. 1/11/19   Sondra MORRISON NP   glipiZIDE-metFORMIN (METAGLIP) 5-500 mg per tablet TAKE 1 TABLET BY MOUTH BEFORE BREAKFAST AND 1 TABLET BY MOUTH BEFORE DINNER 12/30/18   Marie Joshua MD   JANUVIA 100 mg tablet take 1 tablet by mouth once daily 12/23/18   Marie Jsohua MD   furosemide (LASIX) 40 mg tablet take 1 tablet by mouth once daily 12/20/18   Placido Piper MD   gabapentin (NEURONTIN) 100 mg capsule Take 1 Cap by mouth two (2) times a day. 11/27/18   Mariusz Mitchell NP   sertraline (ZOLOFT) 25 mg tablet take 1 tablet by mouth once daily 11/23/18   Marie Joshua MD   glipiZIDE-metFORMIN (METAGLIP) 5-500 mg per tablet TAKE 1 TABLET BY MOUTH BEFORE BREAKFAST AND 1 TABLET BY MOUTH BEFORE DINNER 11/16/18   Marie Joshua MD   RABEprazole (ACIPHEX) 20 mg tablet take 1 tablet by mouth once daily 11/16/18   Marie Joshua MD   ascorbic acid, vitamin C, (VITAMIN C) 500 mg tablet Take  by mouth. Provider, Historical   ferrous sulfate (IRON) 325 mg (65 mg iron) tablet Take  by mouth Daily (before breakfast).     Provider, Historical   naproxen sodium (ALEVE PO) Take  by mouth as needed. Provider, Historical   cyanocobalamin (VITAMIN B-12) 2,000 mcg TbER Take 3 Tabs by mouth daily. Provider, Historical   loperamide (IMODIUM) 2 mg capsule Take 1 mg by mouth Three (3) times a week. Provider, Historical   pravastatin (PRAVACHOL) 40 mg tablet Take 1 Tab by mouth nightly. 8/10/16   Prema Catherine MD   lidocaine (LIDODERM) 5 % Apply patch to the affected area for 12 hours a day and remove for 12 hours a day. 5/25/16   Abisai Baldwin PA-C   aspirin delayed-release 81 mg tablet Take  by mouth daily. Provider, Historical   cholecalciferol (VITAMIN D3) 1,000 unit tablet Take  by mouth daily. Provider, Historical       Allergies:    No Known Allergies    Comorbid Conditions:    Past Medical History:   Diagnosis Date    Aortic valve stenosis, mild April 2014    EF 98-68%, Grade 1 diastolic dysfunction, mild aortic stenosis,  mean gradient 10 mm Hg    CAD (coronary artery disease)     aortic stenosis    Cardiac echocardiogram 06/2018    EF 55-60%. No RWMA. Mild-mod LVH. Severe LAE. Mild AS.  Cardiac nuclear imaging test 08/27/2008    No evidence of ischemia or prior scarring. EF 62%. No WMA. No significant change from study of 3/23/06.  Cardiovascular RLE venous duplex 12/01/2016    Right leg:  No DVT.  Carotid duplex 10/63/5821    Mild 7-36% LICA stenosis.     DM [facility] ([facility] [address]     Dyslipidemia     Heart failure ([facility] [address]     HTN (hypertension)     Mixed hyperlipidemia     Sciatica     Traumatic closed displaced fracture of acromial end of clavicle, left, initial encounter           Past Surgical History:   Procedure Laterality Date    HX APPENDECTOMY      HX OOPHORECTOMY Left 1957    HX OTHER SURGICAL Right 2010    ankle surgery    HX TUMOR REMOVAL Left     ovarian    HX UROLOGICAL      kidney stone removal     Data:    Visit Vitals  Ht 5' 1\" (1.549 m)   Wt 60.3 kg (133 lb) Breastfeeding? No   BMI 25.13 kg/m²   :  No results for input(s): PLT, PLTEXT, PLTEXT in the last 72 hours. No lab exists for component:  HCT  No results for input(s): INR, APTT in the last 72 hours. No lab exists for component: PT, INREXT, INREXT    The H & P and/or progress notes and any available imaging were reviewed. The risks, indications and possible alternatives to the procedure, including doing nothing, were discussed and informed consent was obtained. Physical Exam:      Mental status:   Alert and oriented. Examination specific to the procedure proposed to be performed and any co morbid conditions:      Heart:   Regular rate. Lungs:   Normal respiratory effort. Symmetrical rise and fall of chest    The patient is an appropriate candidate to undergo the planned procedure.     Jess Stoverw, [address]

## 2019-02-08 NOTE — PROGRESS NOTES
Cath holding summary    Patient escorted to cath holding from waiting area ambulatory, alert and oriented x 4, voicing no complaints. Changed into gown and placed on monitor. NPO since MN. Lab results, med rec and H&P reviewed on chart. PIV x 1 inserted without difficulty. Family to bedside. 0915 patient arrived to cath holding awake and alert, vital signs stable, lower back site clean dry and intact with no hematoma present, no C/O pain, will continue to monitor.      0940 patient discharged home with family,  vital signs stable, lower back site clean dry and intact with no hematoma present, no C/O pain,

## 2019-02-08 NOTE — PROCEDURES
RADIOLOGY POST PROCEDURE NOTE     February 8, 2019       9:09 AM     Preoperative Diagnosis:  Sacral insufficiency fractures. Back pain. Postoperative Diagnosis:  Same. :  Dr. Kushal Gurrola    Assistant:  None. Type of Anesthesia: 1% plain lidocaine    Procedure/Description:  Image guided bilateral SI joints steroid injection. Findings:   No bleeding. Estimated blood Loss:  Minimal    Specimen Removed:   no    Blood transfusions:  None. Implants:  See report.     Complications: None    Condition: Stable    Discharge Plan:  discharge home     Humphrey Coreas MD

## 2019-02-08 NOTE — DISCHARGE INSTRUCTIONS
DISCHARGE SUMMARY from Nurse    PATIENT INSTRUCTIONS:    After general anesthesia or intravenous sedation, for 24 hours or while taking prescription Narcotics:  · Limit your activities  · Do not drive and operate hazardous machinery  · Do not make important personal or business decisions  · Do  not drink alcoholic beverages  · If you have not urinated within 8 hours after discharge, please contact your surgeon on call. Report the following to your surgeon:  · Excessive pain, swelling, redness or odor of or around the surgical area  · Temperature over 100.5  · Nausea and vomiting lasting longer than 4 hours or if unable to take medications  · Any signs of decreased circulation or nerve impairment to extremity: change in color, persistent  numbness, tingling, coldness or increase pain  · Any questions    What to do at Home:    *  Please give a list of your current medications to your Primary Care Provider. *  Please update this list whenever your medications are discontinued, doses are      changed, or new medications (including over-the-counter products) are added. *  Please carry medication information at all times in case of emergency situations. These are general instructions for a healthy lifestyle:    No smoking/ No tobacco products/ Avoid exposure to second hand smoke  Surgeon General's Warning:  Quitting smoking now greatly reduces serious risk to your health. Obesity, smoking, and sedentary lifestyle greatly increases your risk for illness    A healthy diet, regular physical exercise & weight monitoring are important for maintaining a healthy lifestyle    You may be retaining fluid if you have a history of heart failure or if you experience any of the following symptoms:  Weight gain of 3 pounds or more overnight or 5 pounds in a week, increased swelling in our hands or feet or shortness of breath while lying flat in bed.   Please call your doctor as soon as you notice any of these symptoms; do not wait until your next office visit. Recognize signs and symptoms of STROKE:    F-face looks uneven    A-arms unable to move or move unevenly    S-speech slurred or non-existent    T-time-call 911 as soon as signs and symptoms begin-DO NOT go       Back to bed or wait to see if you get better-TIME IS BRAIN. Warning Signs of HEART ATTACK     Call 911 if you have these symptoms:   Chest discomfort. Most heart attacks involve discomfort in the center of the chest that lasts more than a few minutes, or that goes away and comes back. It can feel like uncomfortable pressure, squeezing, fullness, or pain.  Discomfort in other areas of the upper body. Symptoms can include pain or discomfort in one or both arms, the back, neck, jaw, or stomach.  Shortness of breath with or without chest discomfort.  Other signs may include breaking out in a cold sweat, nausea, or lightheadedness. Don't wait more than five minutes to call 911 - MINUTES MATTER! Fast action can save your life. Calling 911 is almost always the fastest way to get lifesaving treatment. Emergency Medical Services staff can begin treatment when they arrive -- up to an hour sooner than if someone gets to the hospital by car. The discharge information has been reviewed with the patient and caregiver. The patient and caregiver verbalized understanding. Discharge medications reviewed with the patient and caregiver and appropriate educational materials and side effects teaching were provided.   ___________________________________________________________________________________________________________________________________

## 2019-02-11 ENCOUNTER — OFFICE VISIT (OUTPATIENT)
Dept: VASCULAR SURGERY | Age: 84
End: 2019-02-11

## 2019-02-11 VITALS
HEIGHT: 61 IN | DIASTOLIC BLOOD PRESSURE: 62 MMHG | WEIGHT: 133 LBS | RESPIRATION RATE: 18 BRPM | HEART RATE: 68 BPM | BODY MASS INDEX: 25.11 KG/M2 | SYSTOLIC BLOOD PRESSURE: 120 MMHG

## 2019-02-11 DIAGNOSIS — I87.2 VENOUS (PERIPHERAL) INSUFFICIENCY: Primary | ICD-10-CM

## 2019-02-18 ENCOUNTER — OFFICE VISIT (OUTPATIENT)
Dept: VASCULAR SURGERY | Age: 84
End: 2019-02-18

## 2019-02-18 VITALS
WEIGHT: 133 LBS | HEART RATE: 70 BPM | RESPIRATION RATE: 18 BRPM | SYSTOLIC BLOOD PRESSURE: 124 MMHG | HEIGHT: 61 IN | BODY MASS INDEX: 25.11 KG/M2 | DIASTOLIC BLOOD PRESSURE: 70 MMHG

## 2019-02-18 DIAGNOSIS — I87.2 VENOUS (PERIPHERAL) INSUFFICIENCY: Primary | ICD-10-CM

## 2019-02-18 NOTE — PROGRESS NOTES
Patient brought to room via wheelchair , patient's legs are extremely swollen today left leg not as swollen, but skin dry, applied aquaphor and tubi  e to that leg . Right leg area still open but only small pin hole, silver nitrated the area , applied aquacell, and patch . Coated leg with aquaphor and tubie  size e . Pt will return in 1 week for reassessment.

## 2019-02-22 DIAGNOSIS — F32.A MILD DEPRESSION: ICD-10-CM

## 2019-02-23 RX ORDER — SERTRALINE HYDROCHLORIDE 25 MG/1
TABLET, FILM COATED ORAL
Qty: 30 TAB | Refills: 2 | Status: SHIPPED | OUTPATIENT
Start: 2019-02-23 | End: 2019-06-06 | Stop reason: SDUPTHER

## 2019-02-25 ENCOUNTER — OFFICE VISIT (OUTPATIENT)
Dept: VASCULAR SURGERY | Age: 84
End: 2019-02-25

## 2019-02-25 DIAGNOSIS — I87.2 VENOUS (PERIPHERAL) INSUFFICIENCY: Primary | ICD-10-CM

## 2019-02-25 NOTE — PROGRESS NOTES
Patient being seen for assessment of bilateral lower extremities. Right and left leg has about +1 pitting edema. One small open area to right anterior lower leg, granulating with scant amount ss drainage, surrounding tissue WNL, measures about 0.3x0.4x0.1 cm, applied silver nitrate and covered with gauze and secured with tape. Applied double layer tubi  size  F to right leg and double layer tubi  size E to left leg. Patient has severe back pain and so unable to elevate lower extremities except for  Very short periods of time. Patient will return in two weeks for reassessment. Patient will cleanse wound to right leg every 2-3 days and apply calcium alginate with silver  And cover with gauze and secure with tape until returns for reassessment.

## 2019-03-01 ENCOUNTER — OFFICE VISIT (OUTPATIENT)
Dept: ORTHOPEDIC SURGERY | Age: 84
End: 2019-03-01

## 2019-03-01 ENCOUNTER — HOME HEALTH ADMISSION (OUTPATIENT)
Dept: HOME HEALTH SERVICES | Facility: HOME HEALTH | Age: 84
End: 2019-03-01
Payer: MEDICARE

## 2019-03-01 ENCOUNTER — OFFICE VISIT (OUTPATIENT)
Dept: CARDIOLOGY CLINIC | Age: 84
End: 2019-03-01

## 2019-03-01 VITALS
WEIGHT: 131 LBS | HEART RATE: 66 BPM | DIASTOLIC BLOOD PRESSURE: 76 MMHG | OXYGEN SATURATION: 98 % | SYSTOLIC BLOOD PRESSURE: 124 MMHG | HEIGHT: 60 IN | BODY MASS INDEX: 25.72 KG/M2

## 2019-03-01 VITALS
DIASTOLIC BLOOD PRESSURE: 65 MMHG | HEART RATE: 67 BPM | SYSTOLIC BLOOD PRESSURE: 127 MMHG | BODY MASS INDEX: 25.84 KG/M2 | OXYGEN SATURATION: 99 % | TEMPERATURE: 98.3 F | RESPIRATION RATE: 16 BRPM | HEIGHT: 60 IN | WEIGHT: 131.6 LBS

## 2019-03-01 DIAGNOSIS — M79.18 MYOFASCIAL MUSCLE PAIN: ICD-10-CM

## 2019-03-01 DIAGNOSIS — M54.50 LUMBAR PAIN: Primary | ICD-10-CM

## 2019-03-01 DIAGNOSIS — E78.5 DYSLIPIDEMIA: ICD-10-CM

## 2019-03-01 DIAGNOSIS — I10 ESSENTIAL HYPERTENSION: ICD-10-CM

## 2019-03-01 DIAGNOSIS — I35.0 AORTIC VALVE STENOSIS, MILD: ICD-10-CM

## 2019-03-01 DIAGNOSIS — R60.9 DEPENDENT EDEMA: Primary | ICD-10-CM

## 2019-03-01 RX ORDER — BUPIVACAINE HYDROCHLORIDE 2.5 MG/ML
4 INJECTION, SOLUTION INFILTRATION; PERINEURAL ONCE
Qty: 4 ML | Refills: 0
Start: 2019-03-01 | End: 2019-03-01 | Stop reason: ALTCHOICE

## 2019-03-01 RX ORDER — LIDOCAINE HYDROCHLORIDE 20 MG/ML
4 INJECTION, SOLUTION EPIDURAL; INFILTRATION; INTRACAUDAL; PERINEURAL ONCE
Qty: 4 ML | Refills: 0
Start: 2019-03-01 | End: 2019-03-01 | Stop reason: ALTCHOICE
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
## 2019-03-01 NOTE — PROGRESS NOTES
[facility] presents today for   Chief Complaint   Patient presents with    Follow-up     6 month     Shortness of Breath     8 months        [facility] preferred language for health care discussion is english/other. Is someone accompanying this pt? Yes     Is the patient using any DME equipment during OV? Walker     Depression Screening:  3 most recent PHQ Screens 3/1/2019   Little interest or pleasure in doing things Not at all   Feeling down, depressed, irritable, or hopeless Not at all   Total Score PHQ 2 0   Trouble falling or staying asleep, or sleeping too much -   Feeling tired or having little energy -   Poor appetite, weight loss, or overeating -   Feeling bad about yourself - or that you are a failure or have let yourself or your family down -   Trouble concentrating on things such as school, work, reading, or watching TV -   Moving or speaking so slowly that other people could have noticed; or the opposite being so fidgety that others notice -   Thoughts of being better off dead, or hurting yourself in some way -   PHQ 9 Score -   How difficult have these problems made it for you to do your work, take care of your home and get along with others -       Learning Assessment:  Learning Assessment 2/11/2019   PRIMARY LEARNER Patient   HIGHEST LEVEL OF EDUCATION - PRIMARY LEARNER  -   CO-LEARNER CAREGIVER No   PRIMARY LANGUAGE ENGLISH   LEARNER PREFERENCE PRIMARY LISTENING   ANSWERED BY patient   RELATIONSHIP SELF       Abuse Screening:  Abuse Screening Questionnaire 7/26/2016   Do you ever feel afraid of your partner? N   Are you in a relationship with someone who physically or mentally threatens you? N   Is it safe for you to go home? Y       Fall Risk  Fall Risk Assessment, last 12 mths 3/1/2019   Able to walk? Yes   Fall in past 12 months? Yes   Fall with injury? No   Number of falls in past 12 months 1   Fall Risk Score 1       Pt currently taking Antiplatelet therapy?  ASA     Coordination of Care:  1. Have you been to the ER, urgent care clinic since your last visit? Hospitalized since your last visit? 1/115/19 for palpitations     2. Have you seen or consulted any other health care providers outside of the [address] [place] [address] since your last visit? Include any pap smears or colon screening.  Sela Goldberg

## 2019-03-01 NOTE — PROGRESS NOTES
HISTORY OF PRESENT ILLNESS  Jeff Cadena is a 80 y.o. female. Hypertension   Associated symptoms include shortness of breath. Pertinent negatives include no chest pain, no abdominal pain and no headaches. Shortness of Breath   Associated symptoms include leg swelling. Pertinent negatives include no fever, no headaches, no cough, no wheezing, no PND, no orthopnea, no chest pain, no vomiting, no abdominal pain, no rash and no claudication. Leg Swelling   Associated symptoms include shortness of breath. Pertinent negatives include no chest pain, no abdominal pain and no headaches. Follow-up   Associated symptoms include shortness of breath. Pertinent negatives include no chest pain, no abdominal pain and no headaches. Patient presents for a followup office visit. She has a past medical history significant for hypertension, dyslipidemia, diabetes mellitus, type II, and mild aortic valve stenosis. She also has a history of dependent edema in the past. She was documented with bilateral venous reflux disease on a duplex scan through a vascular surgeon's office in 2017. The patient recently underwent a follow-up echocardiogram in June 2018 which was essentially unchanged from her prior study from 2016, which showed mild aortic valve stenosis, EF 60%, with her mean valve gradient not significantly changed compared to the her prior. She had evidence of diastolic dysfunction with left atrial enlargement as well. The patient has since been ordered for her to vascular surgery for management of her dependent edema has been treated with Unna boots. This has significantly helped with her swelling as long as she wears them. More recently she has had issues with severe low back pain underwent a sacral plasty last month. She is now scheduled to get some spinal injections to help with her pain. She denies any worsening shortness of breath. No orthopnea or PND.   No chest pain, heart palpitations, dizziness or syncope. Past Medical History:   Diagnosis Date    Aortic valve stenosis, mild April 2014    EF 61-13%, Grade 1 diastolic dysfunction, mild aortic stenosis,  mean gradient 10 mm Hg    CAD (coronary artery disease)     aortic stenosis    Cardiac echocardiogram 06/2018    EF 55-60%. No RWMA. Mild-mod LVH. Severe LAE. Mild AS.  Cardiac nuclear imaging test 08/27/2008    No evidence of ischemia or prior scarring. EF 62%. No WMA. No significant change from study of 3/23/06.  Cardiovascular RLE venous duplex 12/01/2016    Right leg:  No DVT.  Carotid duplex 84/94/7449    Mild 4-29% LICA stenosis.  DM (diabetes mellitus) ([facility] [address]     Dyslipidemia     Heart failure (HCC)     HTN (hypertension)     Mixed hyperlipidemia     Sciatica     Traumatic closed displaced fracture of acromial end of clavicle, left, initial encounter       Current Outpatient Medications   Medication Sig Dispense Refill    sertraline (ZOLOFT) 25 mg tablet take 1 tablet by mouth once daily 30 Tab 2    JANUVIA 100 mg tablet take 1 tablet by mouth once daily 30 Tab 5    furosemide (LASIX) 40 mg tablet take 1 tablet by mouth once daily 90 Tab 3    glipiZIDE-metFORMIN (METAGLIP) 5-500 mg per tablet TAKE 1 TABLET BY MOUTH BEFORE BREAKFAST AND 1 TABLET BY MOUTH BEFORE DINNER 120 Tab 10    RABEprazole (ACIPHEX) 20 mg tablet take 1 tablet by mouth once daily 30 Tab 5    ascorbic acid, vitamin C, (VITAMIN C) 500 mg tablet Take  by mouth.  ferrous sulfate (IRON) 325 mg (65 mg iron) tablet Take  by mouth Daily (before breakfast).  naproxen sodium (ALEVE PO) Take  by mouth as needed.  cyanocobalamin (VITAMIN B-12) 2,000 mcg TbER Take 3 Tabs by mouth daily.  loperamide (IMODIUM) 2 mg capsule Take 1 mg by mouth Three (3) times a week.  pravastatin (PRAVACHOL) 40 mg tablet Take 1 Tab by mouth nightly. 90 Tab 3    aspirin delayed-release 81 mg tablet Take  by mouth daily.       cholecalciferol (VITAMIN D3) 1,000 unit tablet Take  by mouth daily.  traMADol (ULTRAM) 50 mg tablet Take 1 Tab by mouth two (2) times daily as needed for Pain. Max Daily Amount: 100 mg. 14 Tab 0    gabapentin (NEURONTIN) 100 mg capsule Take 1 Cap by mouth two (2) times a day. 60 Cap 1    lidocaine (LIDODERM) 5 % Apply patch to the affected area for 12 hours a day and remove for 12 hours a day. 5 Each 0       No Known Allergies     Social History     Tobacco Use    Smoking status: Never Smoker    Smokeless tobacco: Never Used   Substance Use Topics    Alcohol use: No    Drug use: No          Family History   Problem Relation Age of Onset    Stroke Mother     Heart Disease Mother     Lung Disease Father     Cancer Neg Hx     Diabetes Neg Hx     Hypertension Neg Hx          Review of Systems   Constitutional: Negative for chills, fever and weight loss. HENT: Negative for nosebleeds. Eyes: Negative for blurred vision and double vision. Respiratory: Positive for shortness of breath. Negative for cough and wheezing. Cardiovascular: Positive for leg swelling. Negative for chest pain, palpitations, orthopnea, claudication and PND. Gastrointestinal: Negative for abdominal pain, heartburn, nausea and vomiting. Genitourinary: Negative for dysuria and hematuria. Musculoskeletal: Negative for falls and myalgias. Skin: Negative for rash. Neurological: Negative for dizziness, focal weakness and headaches. Endo/Heme/Allergies: Does not bruise/bleed easily. Psychiatric/Behavioral: Negative for substance abuse. Visit Vitals  /76   Pulse 66   Ht 5' (1.524 m)   Wt 59.4 kg (131 lb)   SpO2 98%   BMI 25.58 kg/m²      Physical Exam   Constitutional: She is oriented to person, place, and time. She appears well-developed and well-nourished. HENT:   Head: Normocephalic and atraumatic. Eyes: Conjunctivae are normal.   Neck: Neck supple. No JVD present. Carotid bruit is not present.    Cardiovascular: Normal rate, regular rhythm, S1 normal, S2 normal and normal pulses. Exam reveals no gallop. Murmur heard. Harsh midsystolic murmur is present at the upper right sternal border radiating to the neck. Pulmonary/Chest: Effort normal and breath sounds normal. She has no wheezes. She has no rales. Abdominal: Soft. Bowel sounds are normal. There is no tenderness. Musculoskeletal: She exhibits edema (2+ bilateral lower extremity ). She exhibits no tenderness or deformity. Neurological: She is alert and oriented to person, place, and time. Skin: Skin is warm and dry. Psychiatric: She has a normal mood and affect. Her behavior is normal. Thought content normal.     EKG: Normal sinus rhythm, left axis deviation, voltage criteria for LVH, poor R wave progression, occasional atrial premature contractions, no ST or T wave changes concerning for ischemia. Compared to previous EKG, APCs are new. ASSESSMENT and PLAN    Lower extremity swelling. This is likely dependent in nature also due to venous insufficiency. She underwent an echocardiogram in June 2018 which was unchanged compared to her prior study from 2016. I still do not suspect decompensated heart failure. This did improve when she was fitted with the Unna boots. I recommend she follow-up close with vascular surgery for this. Aortic valve stenosis. This was only mild on her echocardiogram from June 2018. This is not the cause of any of her symptoms. Hypertension. Patient blood pressure appears reasonably well-controlled on her current medical regimen. Dyslipidemia. Patient is managed with pravastatin 40 mg daily. A reasonable goal LDL be less than 100. Diabetes mellitus, type II. Patient is managed with oral agents. This is followed by her PCP. Her goal A1c should be less than 7. Acceptable risk from a cardiac standpoint for any sort of back injections or procedures.         Follow-up in 6 months, sooner if needed

## 2019-03-02 ENCOUNTER — HOME CARE VISIT (OUTPATIENT)
Dept: SCHEDULING | Facility: HOME HEALTH | Age: 84
End: 2019-03-02
Payer: MEDICARE

## 2019-03-02 VITALS
SYSTOLIC BLOOD PRESSURE: 120 MMHG | HEART RATE: 70 BPM | TEMPERATURE: 97.8 F | DIASTOLIC BLOOD PRESSURE: 80 MMHG | RESPIRATION RATE: 17 BRPM

## 2019-03-02 PROCEDURE — 3331090002 HH PPS REVENUE DEBIT

## 2019-03-02 PROCEDURE — G0151 HHCP-SERV OF PT,EA 15 MIN: HCPCS

## 2019-03-02 PROCEDURE — 400013 HH SOC

## 2019-03-02 PROCEDURE — 3331090001 HH PPS REVENUE CREDIT

## 2019-03-03 PROCEDURE — 3331090001 HH PPS REVENUE CREDIT

## 2019-03-03 PROCEDURE — 3331090002 HH PPS REVENUE DEBIT

## 2019-03-04 ENCOUNTER — APPOINTMENT (OUTPATIENT)
Dept: GENERAL RADIOLOGY | Age: 84
End: 2019-03-04
Attending: EMERGENCY MEDICINE
Payer: MEDICARE

## 2019-03-04 ENCOUNTER — HOSPITAL ENCOUNTER (EMERGENCY)
Age: 84
Discharge: HOME | End: 2019-03-04
Attending: EMERGENCY MEDICINE
Payer: MEDICARE

## 2019-03-04 ENCOUNTER — HOME CARE VISIT (OUTPATIENT)
Dept: HOME HEALTH SERVICES | Facility: HOME HEALTH | Age: 84
End: 2019-03-04
Payer: MEDICARE

## 2019-03-04 VITALS
TEMPERATURE: 97.4 F | OXYGEN SATURATION: 100 % | RESPIRATION RATE: 17 BRPM | HEART RATE: 68 BPM | DIASTOLIC BLOOD PRESSURE: 72 MMHG | SYSTOLIC BLOOD PRESSURE: 124 MMHG

## 2019-03-04 DIAGNOSIS — M54.6 CHRONIC MIDLINE THORACIC BACK PAIN: Primary | ICD-10-CM

## 2019-03-04 DIAGNOSIS — G89.29 CHRONIC MIDLINE THORACIC BACK PAIN: Primary | ICD-10-CM

## 2019-03-04 DIAGNOSIS — L03.115 CELLULITIS OF RIGHT LOWER EXTREMITY: ICD-10-CM

## 2019-03-04 DIAGNOSIS — I89.0 LYMPHEDEMA OF RIGHT LOWER EXTREMITY: ICD-10-CM

## 2019-03-04 LAB
ALBUMIN SERPL-MCNC: 3.6 G/DL (ref 3.4–5)
ALBUMIN/GLOB SERPL: 1.1 {RATIO} (ref 0.8–1.7)
ALP SERPL-CCNC: 106 U/L (ref 45–117)
ALT SERPL-CCNC: 18 U/L (ref 13–56)
ANION GAP SERPL CALC-SCNC: 4 MMOL/L (ref 3–18)
APPEARANCE UR: CLEAR
AST SERPL-CCNC: 19 U/L (ref 15–37)
BASOPHILS # BLD: 0 K/UL (ref 0–0.1)
BASOPHILS NFR BLD: 0 % (ref 0–2)
BILIRUB SERPL-MCNC: 0.8 MG/DL (ref 0.2–1)
BILIRUB UR QL: NEGATIVE
BNP SERPL-MCNC: 412 PG/ML (ref 0–1800)
BUN SERPL-MCNC: 15 MG/DL (ref 7–18)
BUN/CREAT SERPL: 27 (ref 12–20)
CALCIUM SERPL-MCNC: 9.5 MG/DL (ref 8.5–10.1)
CHLORIDE SERPL-SCNC: 107 MMOL/L (ref 100–108)
CK MB CFR SERPL CALC: 5.9 % (ref 0–4)
CK MB SERPL-MCNC: 1.9 NG/ML (ref 5–25)
CK SERPL-CCNC: 32 U/L (ref 26–192)
CO2 SERPL-SCNC: 32 MMOL/L (ref 21–32)
COLOR UR: YELLOW
CREAT SERPL-MCNC: 0.55 MG/DL (ref 0.6–1.3)
DIFFERENTIAL METHOD BLD: ABNORMAL
EOSINOPHIL # BLD: 0 K/UL (ref 0–0.4)
EOSINOPHIL NFR BLD: 1 % (ref 0–5)
ERYTHROCYTE [DISTWIDTH] IN BLOOD BY AUTOMATED COUNT: 14.4 % (ref 11.6–14.5)
GLOBULIN SER CALC-MCNC: 3.2 G/DL (ref 2–4)
GLUCOSE SERPL-MCNC: 71 MG/DL (ref 74–99)
GLUCOSE UR STRIP.AUTO-MCNC: NEGATIVE MG/DL
HCT VFR BLD AUTO: 38.2 % (ref 35–45)
HGB BLD-MCNC: 13 G/DL (ref 12–16)
HGB UR QL STRIP: NEGATIVE
KETONES UR QL STRIP.AUTO: NEGATIVE MG/DL
LEUKOCYTE ESTERASE UR QL STRIP.AUTO: NEGATIVE
LYMPHOCYTES # BLD: 0.8 K/UL (ref 0.9–3.6)
LYMPHOCYTES NFR BLD: 18 % (ref 21–52)
MCH RBC QN AUTO: 30.7 PG (ref 24–34)
MCHC RBC AUTO-ENTMCNC: 34 G/DL (ref 31–37)
MCV RBC AUTO: 90.3 FL (ref 74–97)
MONOCYTES # BLD: 0.4 K/UL (ref 0.05–1.2)
MONOCYTES NFR BLD: 9 % (ref 3–10)
NEUTS SEG # BLD: 3.2 K/UL (ref 1.8–8)
NEUTS SEG NFR BLD: 72 % (ref 40–73)
NITRITE UR QL STRIP.AUTO: NEGATIVE
PH UR STRIP: 8 [PH] (ref 5–8)
PLATELET # BLD AUTO: 80 K/UL (ref 135–420)
PMV BLD AUTO: 10.6 FL (ref 9.2–11.8)
POTASSIUM SERPL-SCNC: 3.4 MMOL/L (ref 3.5–5.5)
PROT SERPL-MCNC: 6.8 G/DL (ref 6.4–8.2)
PROT UR STRIP-MCNC: NEGATIVE MG/DL
RBC # BLD AUTO: 4.23 M/UL (ref 4.2–5.3)
SODIUM SERPL-SCNC: 143 MMOL/L (ref 136–145)
SP GR UR REFRACTOMETRY: 1.01 (ref 1–1.03)
TROPONIN I SERPL-MCNC: 0.02 NG/ML (ref 0–0.04)
UROBILINOGEN UR QL STRIP.AUTO: 0.2 EU/DL (ref 0.2–1)
WBC # BLD AUTO: 4.4 K/UL (ref 4.6–13.2)

## 2019-03-04 PROCEDURE — 85025 COMPLETE CBC W/AUTO DIFF WBC: CPT

## 2019-03-04 PROCEDURE — 3331090002 HH PPS REVENUE DEBIT

## 2019-03-04 PROCEDURE — 72070 X-RAY EXAM THORAC SPINE 2VWS: CPT

## 2019-03-04 PROCEDURE — 83880 ASSAY OF NATRIURETIC PEPTIDE: CPT

## 2019-03-04 PROCEDURE — 96374 THER/PROPH/DIAG INJ IV PUSH: CPT

## 2019-03-04 PROCEDURE — 80053 COMPREHEN METABOLIC PANEL: CPT

## 2019-03-04 PROCEDURE — 3331090001 HH PPS REVENUE CREDIT

## 2019-03-04 PROCEDURE — 93005 ELECTROCARDIOGRAM TRACING: CPT

## 2019-03-04 PROCEDURE — 74011250636 HC RX REV CODE- 250/636: Performed by: EMERGENCY MEDICINE

## 2019-03-04 PROCEDURE — 71045 X-RAY EXAM CHEST 1 VIEW: CPT

## 2019-03-04 PROCEDURE — 99284 EMERGENCY DEPT VISIT MOD MDM: CPT

## 2019-03-04 PROCEDURE — 82550 ASSAY OF CK (CPK): CPT

## 2019-03-04 PROCEDURE — 96375 TX/PRO/DX INJ NEW DRUG ADDON: CPT

## 2019-03-04 PROCEDURE — 81003 URINALYSIS AUTO W/O SCOPE: CPT

## 2019-03-04 RX ORDER — FUROSEMIDE 10 MG/ML
80 INJECTION INTRAMUSCULAR; INTRAVENOUS
Status: COMPLETED | OUTPATIENT
Start: 2019-03-04 | End: 2019-03-04

## 2019-03-04 RX ORDER — SULFAMETHOXAZOLE AND TRIMETHOPRIM 800; 160 MG/1; MG/1
1 TABLET ORAL 2 TIMES DAILY
Qty: 14 TAB | Refills: 0 | Status: SHIPPED | OUTPATIENT
Start: 2019-03-04 | End: 2019-03-11

## 2019-03-04 RX ORDER — ONDANSETRON 2 MG/ML
4 INJECTION INTRAMUSCULAR; INTRAVENOUS
Status: COMPLETED | OUTPATIENT
Start: 2019-03-04 | End: 2019-03-04

## 2019-03-04 RX ORDER — MORPHINE SULFATE 2 MG/ML
2 INJECTION, SOLUTION INTRAMUSCULAR; INTRAVENOUS
Status: COMPLETED | OUTPATIENT
Start: 2019-03-04 | End: 2019-03-04

## 2019-03-04 RX ADMIN — ONDANSETRON 4 MG: 2 INJECTION INTRAMUSCULAR; INTRAVENOUS at 13:53

## 2019-03-04 RX ADMIN — FUROSEMIDE 80 MG: 10 INJECTION, SOLUTION INTRAMUSCULAR; INTRAVENOUS at 13:53

## 2019-03-04 RX ADMIN — MORPHINE SULFATE 2 MG: 2 INJECTION, SOLUTION INTRAMUSCULAR; INTRAVENOUS at 13:53

## 2019-03-04 NOTE — ED NOTES
Discharge instructions and prescription given to pt - verbalized understanding. Pt wheeled out of ED by family.

## 2019-03-04 NOTE — ED PROVIDER NOTES
EMERGENCY DEPARTMENT HISTORY AND PHYSICAL EXAM 
 
12:41 PM 
 
 
Date: 3/4/2019 Patient Name: Janak Conrad History of Presenting Illness Chief Complaint Patient presents with  Back Pain  Leg Swelling History Provided By: Patient Additional History (Context): Janak Conrad is a 80 y.o. female with CAD, DM, dyslipidemia, back pain, sciatica, sacral fx s/p repair who presents with presents via EMS c/o acute on chronic lumbar back pain. Of note, the pt is also c/o acute on chronic LE swelling. Per daughter, this morning after making breakfast they were going to take the pt for evaluation of her LE swelling. The pt was unable to get up due to her back pain prompting them to call EMS. The pt had a sacroplasty s/p sacral fx early last month and received injections for her chronic back pain. Follows with Dr. Henrique Barr. The pt was chronic LE swelling and is on 40 mg Lasix daily. The pt has a h/o MRSA infection in her distal RLE which has improved, pt no longer on abs. The pt has had trouble elevated her legs consistently due to her back pain. Pt follows with Dr. Savanah Gallagher, cardiology. PCP: Fredsi Reyes MD 
 
 
Chief Complaint: back pain Duration: days Timing:  Acute on chronic Location: lumbar Quality: Aching Severity: Moderate Modifying Factors: h/o sacral fx and sciatica Associated Symptoms: LE swelling Current Outpatient Medications Medication Sig Dispense Refill  trimethoprim-sulfamethoxazole (BACTRIM DS) 160-800 mg per tablet Take 1 Tab by mouth two (2) times a day for 7 days. 14 Tab 0  
 sertraline (ZOLOFT) 25 mg tablet take 1 tablet by mouth once daily 30 Tab 2  
 traMADol (ULTRAM) 50 mg tablet Take 1 Tab by mouth two (2) times daily as needed for Pain. Max Daily Amount: 100 mg. 14 Tab 0  JANUVIA 100 mg tablet take 1 tablet by mouth once daily 30 Tab 5  furosemide (LASIX) 40 mg tablet take 1 tablet by mouth once daily 90 Tab 3  
  gabapentin (NEURONTIN) 100 mg capsule Take 1 Cap by mouth two (2) times a day. 60 Cap 1  
 glipiZIDE-metFORMIN (METAGLIP) 5-500 mg per tablet TAKE 1 TABLET BY MOUTH BEFORE BREAKFAST AND 1 TABLET BY MOUTH BEFORE DINNER 120 Tab 10  
 RABEprazole (ACIPHEX) 20 mg tablet take 1 tablet by mouth once daily 30 Tab 5  
 ascorbic acid, vitamin C, (VITAMIN C) 500 mg tablet Take  by mouth.  ferrous sulfate (IRON) 325 mg (65 mg iron) tablet Take  by mouth Daily (before breakfast).  naproxen sodium (ALEVE PO) Take  by mouth as needed.  cyanocobalamin (VITAMIN B-12) 2,000 mcg TbER Take 3 Tabs by mouth daily.  loperamide (IMODIUM) 2 mg capsule Take 1 mg by mouth Three (3) times a week.  pravastatin (PRAVACHOL) 40 mg tablet Take 1 Tab by mouth nightly. 90 Tab 3  
 lidocaine (LIDODERM) 5 % Apply patch to the affected area for 12 hours a day and remove for 12 hours a day. 5 Each 0  
 aspirin delayed-release 81 mg tablet Take  by mouth daily.  cholecalciferol (VITAMIN D3) 1,000 unit tablet Take  by mouth daily. Past History Past Medical History: 
Past Medical History:  
Diagnosis Date  Aortic valve stenosis, mild April 2014 EF 42-91%, Grade 1 diastolic dysfunction, mild aortic stenosis,  mean gradient 10 mm Hg  CAD (coronary artery disease) aortic stenosis  Cardiac echocardiogram 06/2018 EF 55-60%. No RWMA. Mild-mod LVH. Severe LAE. Mild AS.  Cardiac nuclear imaging test 08/27/2008 No evidence of ischemia or prior scarring. EF 62%. No WMA. No significant change from study of 3/23/06.  Cardiovascular RLE venous duplex 12/01/2016 Right leg:  No DVT.  Carotid duplex 04/24/2013 Mild 2-23% LICA stenosis.  DM (diabetes mellitus) ([address]  Dyslipidemia  Heart failure ([address]  HTN (hypertension)  Mixed hyperlipidemia  Sciatica  Traumatic closed displaced fracture of acromial end of clavicle, left, initial encounter Past Surgical History: 
Past Surgical History:  
Procedure Laterality Date  HX APPENDECTOMY [address]  HX OTHER SURGICAL Right 2010  
 ankle surgery  HX TUMOR REMOVAL Left   
 ovarian  HX UROLOGICAL    
 kidney stone removal  
 
 
Family History: 
Family History Problem Relation Age of Onset  Stroke Mother  Heart Disease Mother  Lung Disease Father  Cancer Neg Hx  Diabetes Neg Hx  Hypertension Neg Hx Social History: 
Social History Tobacco Use  Smoking status: Never Smoker  Smokeless tobacco: Never Used Substance Use Topics  Alcohol use: No  
 Drug use: No  
 
 
Allergies: 
No Known Allergies Review of Systems Review of Systems Constitutional: Negative. Negative for chills, diaphoresis and fever. HENT: Negative. Negative for congestion, rhinorrhea and sore throat. Eyes: Negative. Negative for pain, discharge and redness. Respiratory: Negative. Negative for cough, chest tightness, shortness of breath and wheezing. Cardiovascular: Positive for leg swelling. Negative for chest pain. Gastrointestinal: Negative. Negative for abdominal pain, constipation, diarrhea, nausea and vomiting. Genitourinary: Negative. Negative for dysuria, flank pain, frequency, hematuria and urgency. Musculoskeletal: Positive for arthralgias (hip pain ) and back pain. Negative for neck pain. Skin: Negative. Negative for rash. Neurological: Negative. Negative for syncope, weakness, numbness and headaches. Psychiatric/Behavioral: Negative. All other systems reviewed and are negative. Physical Exam  
 
Visit Vitals /72 (BP 1 Location: Right arm, BP Patient Position: At rest) Pulse 68 Temp 97.4 °F (36.3 °C) Resp 17 SpO2 100% Physical Exam  
Constitutional: She appears well-developed and well-nourished. Non-toxic appearance. She does not have a sickly appearance.  She does not appear ill. No distress. HENT:  
Head: Normocephalic and atraumatic. Mouth/Throat: Oropharynx is clear and moist. No oropharyngeal exudate. Eyes: Conjunctivae and EOM are normal. Pupils are equal, round, and reactive to light. No scleral icterus. Neck: Trachea normal and normal range of motion. Neck supple. No hepatojugular reflux and no JVD present. No tracheal deviation present. No thyromegaly present. Cardiovascular: Normal rate, regular rhythm, S1 normal, S2 normal, normal heart sounds, intact distal pulses and normal pulses. Exam reveals no gallop, no S3 and no S4. No murmur heard. Pulses: 
     Radial pulses are 2+ on the right side, and 2+ on the left side. Dorsalis pedis pulses are 2+ on the right side, and 2+ on the left side. Pulmonary/Chest: Effort normal and breath sounds normal. No accessory muscle usage. No respiratory distress. She has no decreased breath sounds. She has no wheezes. She has no rhonchi. She has no rales. Abdominal: Soft. Normal appearance and bowel sounds are normal. She exhibits no distension, no fluid wave, no ascites and no mass. There is no hepatosplenomegaly. There is no tenderness. There is no rigidity, no rebound, no guarding, no CVA tenderness, no tenderness at McBurney's point and negative Villegas's sign. Musculoskeletal: Normal range of motion. She exhibits edema (3+ pitting edema, seeping up to lower thighs bilateral). Right lower leg: She exhibits tenderness, swelling and edema. She exhibits no bony tenderness, no deformity and no laceration. Legs: 
Lymphadenopathy:  
     Head (right side): No submental, no submandibular, no preauricular and no occipital adenopathy present. Head (left side): No submental, no submandibular, no preauricular and no occipital adenopathy present. She has no cervical adenopathy. Right: No supraclavicular adenopathy present. Left: No supraclavicular adenopathy present. Neurological: She is alert. She has normal strength and normal reflexes. She is not disoriented. No cranial nerve deficit or sensory deficit. Coordination and gait normal. GCS eye subscore is 4. GCS verbal subscore is 5. GCS motor subscore is 6. Grossly intact Skin: Skin is warm, dry and intact. No rash noted. She is not diaphoretic. Psychiatric: Her behavior is normal. Judgment and thought content normal. Her mood appears anxious. Her speech is rapid and/or pressured. Cognition and memory are normal.  
Nursing note and vitals reviewed. Diagnostic Study Results Labs - Recent Results (from the past 12 hour(s)) CBC WITH AUTOMATED DIFF Collection Time: 03/04/19 12:40 PM  
Result Value Ref Range WBC 4.4 (L) 4.6 - 13.2 K/uL  
 RBC 4.23 4.20 - 5.30 M/uL  
 HGB 13.0 12.0 - 16.0 g/dL HCT 38.2 35.0 - 45.0 % MCV 90.3 74.0 - 97.0 FL  
 MCH 30.7 24.0 - 34.0 PG  
 MCHC 34.0 31.0 - 37.0 g/dL  
 RDW 14.4 11.6 - 14.5 % PLATELET 80 (L) 348 - 420 K/uL MPV 10.6 9.2 - 11.8 FL  
 NEUTROPHILS 72 40 - 73 % LYMPHOCYTES 18 (L) 21 - 52 % MONOCYTES 9 3 - 10 % EOSINOPHILS 1 0 - 5 % BASOPHILS 0 0 - 2 %  
 ABS. NEUTROPHILS 3.2 1.8 - 8.0 K/UL  
 ABS. LYMPHOCYTES 0.8 (L) 0.9 - 3.6 K/UL  
 ABS. MONOCYTES 0.4 0.05 - 1.2 K/UL  
 ABS. EOSINOPHILS 0.0 0.0 - 0.4 K/UL  
 ABS. BASOPHILS 0.0 0.0 - 0.1 K/UL  
 DF AUTOMATED METABOLIC PANEL, COMPREHENSIVE Collection Time: 03/04/19 12:40 PM  
Result Value Ref Range Sodium 143 136 - 145 mmol/L Potassium 3.4 (L) 3.5 - 5.5 mmol/L Chloride 107 100 - 108 mmol/L  
 CO2 32 21 - 32 mmol/L Anion gap 4 3.0 - 18 mmol/L Glucose 71 (L) 74 - 99 mg/dL BUN 15 7.0 - 18 MG/DL Creatinine 0.55 (L) 0.6 - 1.3 MG/DL  
 BUN/Creatinine ratio 27 (H) 12 - 20 GFR est AA >60 >60 ml/min/1.73m2 GFR est non-AA >60 >60 ml/min/1.73m2 Calcium 9.5 8.5 - 10.1 MG/DL  Bilirubin, total 0.8 0.2 - 1.0 MG/DL  
 ALT (SGPT) 18 13 - 56 U/L  
 AST (SGOT) 19 15 - 37 U/L Alk. phosphatase 106 45 - 117 U/L Protein, total 6.8 6.4 - 8.2 g/dL Albumin 3.6 3.4 - 5.0 g/dL Globulin 3.2 2.0 - 4.0 g/dL A-G Ratio 1.1 0.8 - 1.7 CARDIAC PANEL,(CK, CKMB & TROPONIN) Collection Time: 03/04/19 12:40 PM  
Result Value Ref Range CK 32 26 - 192 U/L  
 CK - MB 1.9 <3.6 ng/ml CK-MB Index 5.9 (H) 0.0 - 4.0 % Troponin-I, QT 0.02 0.0 - 0.045 NG/ML  
NT-PRO BNP Collection Time: 03/04/19 12:40 PM  
Result Value Ref Range NT pro- 0 - 1,800 PG/ML  
URINALYSIS W/ RFLX MICROSCOPIC Collection Time: 03/04/19  2:40 PM  
Result Value Ref Range Color YELLOW Appearance CLEAR Specific gravity 1.007 1.005 - 1.030    
 pH (UA) 8.0 5.0 - 8.0 Protein NEGATIVE  NEG mg/dL Glucose NEGATIVE  NEG mg/dL Ketone NEGATIVE  NEG mg/dL Bilirubin NEGATIVE  NEG Blood NEGATIVE  NEG Urobilinogen 0.2 0.2 - 1.0 EU/dL Nitrites NEGATIVE  NEG Leukocyte Esterase NEGATIVE  NEG Radiologic Studies -  
XR SPINE THORAC 2 V Final Result IMPRESSION:  
  
See exam limitations described above. Thoracic spine degenerative changes as  
above. Incidentally noted lumbar degenerative changes as above. XR CHEST PORT Final Result IMPRESSION:  
  
No acute findings. Medical Decision Making Provider Notes (Medical Decision Making): MDM Number of Diagnoses or Management Options Cellulitis of right lower extremity:  
Chronic midline thoracic back pain:  
Lymphedema of right lower extremity:  
Diagnosis management comments: Shortness of breath etiologies include chronic obstructive pulmonary disease (COPD), acute asthma exacerbation, congestive heart failure, pneumonia, acute bronchitis, pulmonary embolism, upper respiratory infection, cardiac event to include acute coronary syndrome, acute myocardial infarction or a combination of the above (ex URI on top of COPD thus causing respiratory distress). Back pain evaluation for mets I am the first provider for this patient. I reviewed the vital signs, available nursing notes, past medical history, past surgical history, family history and social history. Vital Signs-Reviewed the patient's vital signs. Records Reviewed: Nursing Notes and Old Medical Records (Time of Review: 12:41 PM) 
 
ED Course: Progress Notes, Reevaluation, and Consults: 
1:52 PM 
Labs essentially normal. Cardiac enzymes negative. . Chest X-Ray showed No acute process. X-ray Thoracic spine shows thoracic spine degenerative changes as 
above. Incidentally noted lumbar degenerative changes as above. EKG showed Sinus rhythm with rate of 81 bpm. Occasional PVCs. With no ST elevations or depression and non specific T wave changes. 12:41 PM 3/4/2019 Consult:  Discussed care with [facility] NP-C of Dr. Abdiel Wallace discussion; including history of patients chief complaint, available diagnostic results, and treatment course. Reports the pt is speaking at his baseline. Lymph edema and back pain is chronic. She would like the pt to see Dr. Gladis Aase doesn't see immediate reason for admission at this time. I told her I was waiting for UA. Pt has an appointment with vascular tomorrow. 2:44 PM, 3/4/2019 Diagnosis I have reassessed the patient. Patient is feeling a lot better. Patient will be prescribed Bactrim. Patient was discharged in stable condition. Patient is to return to emergency department if any new or worsening condition. Clinical Impression: 1. Chronic midline thoracic back pain 2. Lymphedema of right lower extremity 3. Cellulitis of right lower extremity Disposition: d/c Follow-up Information Follow up With Specialties Details Why Contact Info Katharina Welch MD Internal Medicine Schedule an appointment as soon as possible for a visit in 1 day  [address] [facility] [address] [phone] [facility] EMERGENCY DEPT Emergency Medicine Go to If symptoms worsen, As needed [address] [place] 
[phone]  
  
  
 
_______________________________ Attestations: 
Scribe Attestation Ed Sylvia acting as a scribe for and in the presence of Rod Mendes DO March 04, 2019 at 12:41 PM 
    
Provider Attestation:     
I personally performed the services described in the documentation, reviewed the documentation, as recorded by the scribe in my presence, and it accurately and completely records my words and actions. March 04, 2019 at 12:41 PM - Rod Mendes DO   
_______________________________

## 2019-03-04 NOTE — DISCHARGE INSTRUCTIONS
Patient Education        Cellulitis: Care Instructions  Your Care Instructions    Cellulitis is a skin infection caused by bacteria, most often strep or staph. It often occurs after a break in the skin from a scrape, cut, bite, or puncture, or after a rash. Cellulitis may be treated without doing tests to find out what caused it. But your doctor may do tests, if needed, to look for a specific bacteria, like methicillin-resistant Staphylococcus aureus (MRSA). The doctor has checked you carefully, but problems can develop later. If you notice any problems or new symptoms, get medical treatment right away. Follow-up care is a key part of your treatment and safety. Be sure to make and go to all appointments, and call your doctor if you are having problems. It's also a good idea to know your test results and keep a list of the medicines you take. How can you care for yourself at home? · Take your antibiotics as directed. Do not stop taking them just because you feel better. You need to take the full course of antibiotics. · Prop up the infected area on pillows to reduce pain and swelling. Try to keep the area above the level of your heart as often as you can. · If your doctor told you how to care for your wound, follow your doctor's instructions. If you did not get instructions, follow this general advice:  ? Wash the wound with clean water 2 times a day. Don't use hydrogen peroxide or alcohol, which can slow healing. ? You may cover the wound with a thin layer of petroleum jelly, such as Vaseline, and a nonstick bandage. ? Apply more petroleum jelly and replace the bandage as needed. · Be safe with medicines. Take pain medicines exactly as directed. ? If the doctor gave you a prescription medicine for pain, take it as prescribed. ? If you are not taking a prescription pain medicine, ask your doctor if you can take an over-the-counter medicine.   To prevent cellulitis in the future  · Try to prevent cuts, scrapes, or other injuries to your skin. Cellulitis most often occurs where there is a break in the skin. · If you get a scrape, cut, mild burn, or bite, wash the wound with clean water as soon as you can to help avoid infection. Don't use hydrogen peroxide or alcohol, which can slow healing. · If you have swelling in your legs (edema), support stockings and good skin care may help prevent leg sores and cellulitis. · Take care of your feet, especially if you have diabetes or other conditions that increase the risk of infection. Wear shoes and socks. Do not go barefoot. If you have athlete's foot or other skin problems on your feet, talk to your doctor about how to treat them. When should you call for help? Call your doctor now or seek immediate medical care if:    · You have signs that your infection is getting worse, such as:  ? Increased pain, swelling, warmth, or redness. ? Red streaks leading from the area. ? Pus draining from the area. ? A fever.     · You get a rash.    Watch closely for changes in your health, and be sure to contact your doctor if:    · You do not get better as expected. Where can you learn more? Go to http://josé miguel-reid.info/. Pancho Dahl in the search box to learn more about \"Cellulitis: Care Instructions. \"  Current as of: April 17, 2018  Content Version: 11.9  © 5209-4973 [employer]. Care instructions adapted under license by [employer] (which disclaims liability or warranty for this information). If you have questions about a medical condition or this instruction, always ask your healthcare professional. [address] any warranty or liability for your use of this information. Lymphedema: Care Instructions  Your Care Instructions    Lymphedema is fluid that builds up in the arms or legs.  It is often caused by surgery to remove lymph nodes during cancer treatment, especially breast cancer surgery, which can cause fluid to build up in the arm. It can happen after radiation treatment to an area that involves lymph nodes. It also can be caused by a fractured bone or surgery to fix a fracture. And some medicines also can cause lymphedema. Some people get it for unknown reasons. Normally, lymph nodes trap bacteria and other substances as fluid flows through them. Then, the white cells in the body's defense, or immune, system can destroy the substances. But if there are few or no lymph nodes--or if the lymph system in an arm or leg has been damaged--fluid can build up in the affected arm or leg. You can take simple steps at home to help treat or prevent fluid buildup. Treatment may include raising the arm or leg to let gravity drain the fluid. You also can wear compression stockings or sleeves. Follow-up care is a key part of your treatment and safety. Be sure to make and go to all appointments, and call your doctor if you are having problems. It's also a good idea to know your test results and keep a list of the medicines you take. How can you care for yourself at home? · Wear a compression stocking or sleeve as your doctor suggests. It can help keep fluid from pooling in an arm or leg. Wear it during air travel. · Prop up the swollen arm or leg on a pillow anytime you sit or lie down. Try to keep it above the level of your heart. This will help reduce swelling. · Avoid crossing your legs if your legs are swollen. · Get some exercise on most days of the week. Increase the intensity of exercise slowly. Water aerobics can help reduce swelling by helping fluid move around. Wear your compression stocking or sleeve during exercise, but not during water exercise. · See a physical therapist. He or she can teach you how to do self-massage to help fluid move around. You also can learn what activities would be best for you. · Keep your feet clean and wear clean socks or stockings every day.  Check your feet often for signs of infection, such as redness or heat. Do not walk barefoot. · If you have had lymph nodes removed from under your arm:  ? Do not have blood drawn from the arm on the side of the lymph node surgery. ? Do not allow a blood pressure cuff to be placed on that arm. If you are in the hospital, make sure your nurse and other hospital staff know of your condition. ? Wear gloves when gardening or doing other activities that may lead to cuts on your fingers or hands. · If you have had lymph nodes removed from your groin:  ? Bathe your feet daily in lukewarm, not hot, water. Use a mild soap that has a moisturizer, or use a moisturizer separately. ? Check your feet for blisters or cuts. ? Wear comfortable and supportive shoes that fit properly. ? Wear the correct size of panty hose and stockings. Avoid garters or knee-high or thigh-high stockings. · Ask your doctor how to treat any cuts, scratches, insect bites, or other injuries that may occur. · Use sunscreen and insect repellent when outdoors to protect your skin from sunburn and insect bites. · Wear medical alert jewelry that says you have lymphedema. You can buy these at most drugs[employer]geia Personal Care Productses and on the Internet. When should you call for help? Call your doctor now or seek immediate medical care if:    · You have signs of infection, such as:  ? Increased pain, swelling, warmth, or redness. ? Red streaks leading from the area. ? Pus draining from the area. ? A fever.    Watch closely for changes in your health, and be sure to contact your doctor if:    · You have new or worse symptoms from lymphedema.     · You do not get better as expected. Where can you learn more? Go to http://josé miguel-reid.info/. Enter V398 in the search box to learn more about \"Lymphedema: Care Instructions. \"  Current as of: March 27, 2018  Content Version: 11.9  © 4649-5551 [employer].  Care instructions adapted under license by [facility] (which disclaims liability or warranty for this information). If you have questions about a medical condition or this instruction, always ask your healthcare professional. [address] any warranty or liability for your use of this information. Patient Education        Learning About Relief for Back Pain  What is back tension and strain? Back strain happens when you overstretch, or pull, a muscle in your back. You may hurt your back in an accident or when you exercise or lift something. Most back pain will get better with rest and time. You can take care of yourself at home to help your back heal.  What can you do first to relieve back pain? When you first feel back pain, try these steps:  · Walk. Take a short walk (10 to 20 minutes) on a level surface (no slopes, hills, or stairs) every 2 to 3 hours. Walk only distances you can manage without pain, especially leg pain. · Relax. Find a comfortable position for rest. Some people are comfortable on the floor or a medium-firm bed with a small pillow under their head and another under their knees. Some people prefer to lie on their side with a pillow between their knees. Don't stay in one position for too long. · Try heat or ice. Try using a heating pad on a low or medium setting, or take a warm shower, for 15 to 20 minutes every 2 to 3 hours. Or you can buy single-use heat wraps that last up to 8 hours. You can also try an ice pack for 10 to 15 minutes every 2 to 3 hours. You can use an ice pack or a bag of frozen vegetables wrapped in a thin towel. There is not strong evidence that either heat or ice will help, but you can try them to see if they help. You may also want to try switching between heat and cold. · Take pain medicine exactly as directed. ¨ If the doctor gave you a prescription medicine for pain, take it as prescribed.   ¨ If you are not taking a prescription pain medicine, ask your doctor if you can take an over-the-counter medicine. What else can you do? · Stretch and exercise. Exercises that increase flexibility may relieve your pain and make it easier for your muscles to keep your spine in a good, neutral position. And don't forget to keep walking. · Do self-massage. You can use self-massage to unwind after work or school or to energize yourself in the morning. You can easily massage your feet, hands, or neck. Self-massage works best if you are in comfortable clothes and are sitting or lying in a comfortable position. Use oil or lotion to massage bare skin. · Reduce stress. Back pain can lead to a vicious [place]: Distress about the pain tenses the muscles in your back, which in turn causes more pain. Learn how to relax your mind and your muscles to lower your stress. Where can you learn more? Go to http://josé miguel-reid.info/. Enter X306 in the search box to learn more about \"Learning About Relief for Back Pain. \"  Current as of: March 21, 2017  Content Version: 11.5  © 8856-2985 [employer]. Care instructions adapted under license by [employer] (which disclaims liability or warranty for this information). If you have questions about a medical condition or this instruction, always ask your healthcare professional. [address] any warranty or liability for your use of this information.

## 2019-03-05 ENCOUNTER — DOCUMENTATION ONLY (OUTPATIENT)
Dept: INTERNAL MEDICINE CLINIC | Age: 84
End: 2019-03-05

## 2019-03-05 ENCOUNTER — OFFICE VISIT (OUTPATIENT)
Dept: VASCULAR SURGERY | Age: 84
End: 2019-03-05

## 2019-03-05 VITALS
HEART RATE: 76 BPM | WEIGHT: 131 LBS | HEIGHT: 60 IN | SYSTOLIC BLOOD PRESSURE: 142 MMHG | BODY MASS INDEX: 25.72 KG/M2 | DIASTOLIC BLOOD PRESSURE: 80 MMHG | RESPIRATION RATE: 20 BRPM

## 2019-03-05 DIAGNOSIS — R60.0 BILATERAL LEG EDEMA: Primary | ICD-10-CM

## 2019-03-05 DIAGNOSIS — I87.2 VENOUS (PERIPHERAL) INSUFFICIENCY: ICD-10-CM

## 2019-03-05 LAB
ATRIAL RATE: 81 BPM
CALCULATED P AXIS, ECG09: 75 DEGREES
CALCULATED R AXIS, ECG10: -44 DEGREES
CALCULATED T AXIS, ECG11: 16 DEGREES
DIAGNOSIS, 93000: NORMAL
P-R INTERVAL, ECG05: 184 MS
Q-T INTERVAL, ECG07: 388 MS
QRS DURATION, ECG06: 90 MS
QTC CALCULATION (BEZET), ECG08: 450 MS
VENTRICULAR RATE, ECG03: 81 BPM

## 2019-03-05 PROCEDURE — 3331090001 HH PPS REVENUE CREDIT

## 2019-03-05 PROCEDURE — 3331090002 HH PPS REVENUE DEBIT

## 2019-03-05 NOTE — PROGRESS NOTES
Emily Tai is a 80 y.o. female who has been followed in the office for compression wraps due to issues associated with bilateral lower extremity edema. She does have history of chronic venous insufficiency with history of phlebitis, as well as diastolic heart failure. She does not appear to be in acute heart failure at this time. She does take lasix. But she is again experiencing increased leg edema with some open sores and drainage, prompting request for a return visit. After assessment and discussion, it was agreed to wrap in bilateral Unna boots in the office today and and transition this task to home health which has recently been started for her by another provider. She has typically been limited from elevating effectively due to issues related to her chronic back pain.   But if she has any means to do short intervals, then elevation is also beneficial.    I am hopeful that the Unna boots will only need to be very temporary and that we can transition her back to the standard compression modalities we have used for typically been the Tubigrip's    For further follow-up we determined based on feedback provided to us by home health

## 2019-03-05 NOTE — PROGRESS NOTES
Pharmacist Note: Immunization Update    Patient: Bobby Hussein (2 y. o., 1/5/1933)     Patient's immunization history was updated to reflect information contained in the [place] and/or outside immunization/pharmacy records were reconciled within [address] [facility]. Health Maintenance schedule updated when appropriate.     Current immunizations now reflect:       Immunization History   Administered Date(s) Administered    Influenza High Dose Vaccine PF 09/12/2017, 09/02/2018    Influenza Vaccine 10/01/2015, 09/01/2016    Pneumococcal Conjugate (PCV-13) 05/17/2017    Pneumococcal Polysaccharide (PPSV-23) 04/18/2013, 09/10/2014, 10/01/2015, 09/02/2018    Td 10/12/2018    Zoster Vaccine, Live 01/17/2012       Cori Mcgee, PharmD, BCACP

## 2019-03-05 NOTE — PROGRESS NOTES
[facility]          Chart reviewed for the following:   Nette PARKINSON LPN, have reviewed the medications and updated the Allergic reactions for [address] performed immediately prior to start of procedure:   Nette PARKINSON LPN, have performed the following reviews on Josué Rocha prior to the start of the procedure:            * Patient was identified by name and date of birth   * Agreement that Jasmyne Pour boot removed and reapplied   * Procedure site verified   * Patient was positioned for comfort  * Verbal consent was given by patient     Time: 1550      Date of procedure: 3/5/2019    Procedure performed by:  FERMIN Villalpando    How tolerated by patient: tolerated the procedure well with no complications    Comments:  Applied unna boot and coban, patient tolerated well. Will send home health orders to [facility].

## 2019-03-06 ENCOUNTER — HOME CARE VISIT (OUTPATIENT)
Dept: SCHEDULING | Facility: HOME HEALTH | Age: 84
End: 2019-03-06
Payer: MEDICARE

## 2019-03-06 PROCEDURE — 3331090002 HH PPS REVENUE DEBIT

## 2019-03-06 PROCEDURE — G0157 HHC PT ASSISTANT EA 15: HCPCS

## 2019-03-06 PROCEDURE — 3331090001 HH PPS REVENUE CREDIT

## 2019-03-06 PROCEDURE — 99285 EMERGENCY DEPT VISIT HI MDM: CPT

## 2019-03-07 ENCOUNTER — OFFICE VISIT (OUTPATIENT)
Dept: INTERNAL MEDICINE CLINIC | Age: 84
End: 2019-03-07

## 2019-03-07 ENCOUNTER — HOME CARE VISIT (OUTPATIENT)
Dept: SCHEDULING | Facility: HOME HEALTH | Age: 84
End: 2019-03-07
Payer: MEDICARE

## 2019-03-07 ENCOUNTER — HOME CARE VISIT (OUTPATIENT)
Dept: HOME HEALTH SERVICES | Facility: HOME HEALTH | Age: 84
End: 2019-03-07
Payer: MEDICARE

## 2019-03-07 ENCOUNTER — HOSPITAL ENCOUNTER (OUTPATIENT)
Dept: LAB | Age: 84
Discharge: HOME | End: 2019-03-07
Payer: MEDICARE

## 2019-03-07 ENCOUNTER — HOSPITAL ENCOUNTER (EMERGENCY)
Age: 84
Discharge: HOME | End: 2019-03-07
Attending: EMERGENCY MEDICINE
Payer: MEDICARE

## 2019-03-07 ENCOUNTER — APPOINTMENT (OUTPATIENT)
Dept: GENERAL RADIOLOGY | Age: 84
End: 2019-03-07
Attending: EMERGENCY MEDICINE
Payer: MEDICARE

## 2019-03-07 ENCOUNTER — APPOINTMENT (OUTPATIENT)
Dept: CT IMAGING | Age: 84
End: 2019-03-07
Attending: EMERGENCY MEDICINE
Payer: MEDICARE

## 2019-03-07 VITALS
HEART RATE: 80 BPM | OXYGEN SATURATION: 92 % | BODY MASS INDEX: 25.97 KG/M2 | WEIGHT: 133 LBS | RESPIRATION RATE: 20 BRPM | DIASTOLIC BLOOD PRESSURE: 53 MMHG | TEMPERATURE: 97.5 F | SYSTOLIC BLOOD PRESSURE: 112 MMHG

## 2019-03-07 VITALS
HEIGHT: 60 IN | DIASTOLIC BLOOD PRESSURE: 86 MMHG | RESPIRATION RATE: 16 BRPM | TEMPERATURE: 97 F | HEART RATE: 86 BPM | OXYGEN SATURATION: 95 % | WEIGHT: 133 LBS | SYSTOLIC BLOOD PRESSURE: 142 MMHG | BODY MASS INDEX: 26.11 KG/M2

## 2019-03-07 DIAGNOSIS — G89.29 CHRONIC LOW BACK PAIN, UNSPECIFIED BACK PAIN LATERALITY, WITH SCIATICA PRESENCE UNSPECIFIED: Primary | ICD-10-CM

## 2019-03-07 DIAGNOSIS — M47.819 ARTHRITIS OF SPINE: ICD-10-CM

## 2019-03-07 DIAGNOSIS — R60.9 DEPENDENT EDEMA: ICD-10-CM

## 2019-03-07 DIAGNOSIS — B37.0 ORAL CANDIDIASIS: ICD-10-CM

## 2019-03-07 DIAGNOSIS — M54.5 CHRONIC LOW BACK PAIN, UNSPECIFIED BACK PAIN LATERALITY, WITH SCIATICA PRESENCE UNSPECIFIED: Primary | ICD-10-CM

## 2019-03-07 DIAGNOSIS — R26.89 SHUFFLING GAIT: ICD-10-CM

## 2019-03-07 DIAGNOSIS — R25.1 OCCASIONAL TREMORS: Primary | ICD-10-CM

## 2019-03-07 DIAGNOSIS — I10 ESSENTIAL HYPERTENSION: ICD-10-CM

## 2019-03-07 LAB
ALBUMIN SERPL-MCNC: 3.4 G/DL (ref 3.4–5)
ALBUMIN/GLOB SERPL: 1.2 {RATIO} (ref 0.8–1.7)
ALP SERPL-CCNC: 99 U/L (ref 45–117)
ALT SERPL-CCNC: 17 U/L (ref 13–56)
ANION GAP SERPL CALC-SCNC: 9 MMOL/L (ref 3–18)
APPEARANCE UR: ABNORMAL
AST SERPL-CCNC: 18 U/L (ref 15–37)
ATRIAL RATE: 73 BPM
BACTERIA URNS QL MICRO: NEGATIVE /HPF
BASOPHILS # BLD: 0 K/UL (ref 0–0.1)
BASOPHILS NFR BLD: 0 % (ref 0–2)
BILIRUB SERPL-MCNC: 0.5 MG/DL (ref 0.2–1)
BILIRUB UR QL: NEGATIVE
BUN SERPL-MCNC: 26 MG/DL (ref 7–18)
BUN/CREAT SERPL: 24 (ref 12–20)
CALCIUM SERPL-MCNC: 8.7 MG/DL (ref 8.5–10.1)
CALCULATED P AXIS, ECG09: 36 DEGREES
CALCULATED R AXIS, ECG10: -33 DEGREES
CALCULATED T AXIS, ECG11: 76 DEGREES
CHLORIDE SERPL-SCNC: 104 MMOL/L (ref 100–108)
CK MB CFR SERPL CALC: 4.9 % (ref 0–4)
CK MB SERPL-MCNC: 2.2 NG/ML (ref 5–25)
CK SERPL-CCNC: 45 U/L (ref 26–192)
CO2 SERPL-SCNC: 28 MMOL/L (ref 21–32)
COLOR UR: YELLOW
CREAT SERPL-MCNC: 1.07 MG/DL (ref 0.6–1.3)
DIAGNOSIS, 93000: NORMAL
DIFFERENTIAL METHOD BLD: ABNORMAL
EOSINOPHIL # BLD: 0 K/UL (ref 0–0.4)
EOSINOPHIL NFR BLD: 0 % (ref 0–5)
EPITH CASTS URNS QL MICRO: ABNORMAL /LPF (ref 0–5)
ERYTHROCYTE [DISTWIDTH] IN BLOOD BY AUTOMATED COUNT: 14.6 % (ref 11.6–14.5)
ERYTHROCYTE [SEDIMENTATION RATE] IN BLOOD: 19 MM/HR (ref 0–30)
GLOBULIN SER CALC-MCNC: 2.9 G/DL (ref 2–4)
GLUCOSE SERPL-MCNC: 75 MG/DL (ref 74–99)
GLUCOSE UR STRIP.AUTO-MCNC: NEGATIVE MG/DL
HCT VFR BLD AUTO: 33.3 % (ref 35–45)
HGB BLD-MCNC: 11.4 G/DL (ref 12–16)
HGB UR QL STRIP: NEGATIVE
KETONES UR QL STRIP.AUTO: ABNORMAL MG/DL
LEUKOCYTE ESTERASE UR QL STRIP.AUTO: ABNORMAL
LYMPHOCYTES # BLD: 1 K/UL (ref 0.9–3.6)
LYMPHOCYTES NFR BLD: 22 % (ref 21–52)
MCH RBC QN AUTO: 30.5 PG (ref 24–34)
MCHC RBC AUTO-ENTMCNC: 34.2 G/DL (ref 31–37)
MCV RBC AUTO: 89 FL (ref 74–97)
MONOCYTES # BLD: 0.3 K/UL (ref 0.05–1.2)
MONOCYTES NFR BLD: 6 % (ref 3–10)
NEUTS SEG # BLD: 3.3 K/UL (ref 1.8–8)
NEUTS SEG NFR BLD: 72 % (ref 40–73)
NITRITE UR QL STRIP.AUTO: NEGATIVE
P-R INTERVAL, ECG05: 186 MS
PH UR STRIP: 5.5 [PH] (ref 5–8)
PLATELET # BLD AUTO: 71 K/UL (ref 135–420)
PMV BLD AUTO: 10.2 FL (ref 9.2–11.8)
POTASSIUM SERPL-SCNC: 3.8 MMOL/L (ref 3.5–5.5)
PROT SERPL-MCNC: 6.3 G/DL (ref 6.4–8.2)
PROT UR STRIP-MCNC: NEGATIVE MG/DL
Q-T INTERVAL, ECG07: 406 MS
QRS DURATION, ECG06: 96 MS
QTC CALCULATION (BEZET), ECG08: 447 MS
RBC # BLD AUTO: 3.74 M/UL (ref 4.2–5.3)
RBC #/AREA URNS HPF: ABNORMAL /HPF (ref 0–5)
SODIUM SERPL-SCNC: 141 MMOL/L (ref 136–145)
SP GR UR REFRACTOMETRY: 1.02 (ref 1–1.03)
TROPONIN I SERPL-MCNC: 0.02 NG/ML (ref 0–0.04)
TROPONIN I SERPL-MCNC: 0.03 NG/ML (ref 0–0.04)
URATE CRY URNS QL MICRO: ABNORMAL
UROBILINOGEN UR QL STRIP.AUTO: 1 EU/DL (ref 0.2–1)
VENTRICULAR RATE, ECG03: 73 BPM
WBC # BLD AUTO: 4.6 K/UL (ref 4.6–13.2)
WBC URNS QL MICRO: ABNORMAL /HPF (ref 0–4)

## 2019-03-07 PROCEDURE — 36415 COLL VENOUS BLD VENIPUNCTURE: CPT

## 2019-03-07 PROCEDURE — 80053 COMPREHEN METABOLIC PANEL: CPT

## 2019-03-07 PROCEDURE — 71045 X-RAY EXAM CHEST 1 VIEW: CPT

## 2019-03-07 PROCEDURE — 3331090002 HH PPS REVENUE DEBIT

## 2019-03-07 PROCEDURE — G0152 HHCP-SERV OF OT,EA 15 MIN: HCPCS

## 2019-03-07 PROCEDURE — 85025 COMPLETE CBC W/AUTO DIFF WBC: CPT

## 2019-03-07 PROCEDURE — 3331090001 HH PPS REVENUE CREDIT

## 2019-03-07 PROCEDURE — 93005 ELECTROCARDIOGRAM TRACING: CPT

## 2019-03-07 PROCEDURE — 85652 RBC SED RATE AUTOMATED: CPT

## 2019-03-07 PROCEDURE — 82550 ASSAY OF CK (CPK): CPT

## 2019-03-07 PROCEDURE — 70450 CT HEAD/BRAIN W/O DYE: CPT

## 2019-03-07 PROCEDURE — 74011250637 HC RX REV CODE- 250/637: Performed by: EMERGENCY MEDICINE

## 2019-03-07 PROCEDURE — 81001 URINALYSIS AUTO W/SCOPE: CPT

## 2019-03-07 RX ORDER — TRAMADOL HYDROCHLORIDE 50 MG/1
50 TABLET ORAL
Status: COMPLETED | OUTPATIENT
Start: 2019-03-07 | End: 2019-03-07

## 2019-03-07 RX ORDER — TRAMADOL HYDROCHLORIDE 50 MG/1
50 TABLET ORAL
Qty: 12 TAB | Refills: 0 | Status: SHIPPED | OUTPATIENT
Start: 2019-03-07 | End: 2019-03-10

## 2019-03-07 RX ORDER — NYSTATIN 100000 [USP'U]/ML
1 SUSPENSION ORAL 4 TIMES DAILY
Qty: 473 ML | Refills: 0 | Status: SHIPPED | OUTPATIENT
Start: 2019-03-07 | End: 2019-03-24 | Stop reason: ALTCHOICE

## 2019-03-07 RX ADMIN — TRAMADOL HYDROCHLORIDE 50 MG: 50 TABLET, FILM COATED ORAL at 03:35

## 2019-03-07 NOTE — PROGRESS NOTES
Brent Mulligan is a 80 y.o. female presenting today for Hospital Follow Up (back pain arm pain )  . HPI:  Brent Mulligan presents to the office today for hospital follow-up. Patient was seen in the emergency department March 4, 2019 for chronic lumbar back pain. She also presented for lower extremity swelling. Patient was supposed to go to an appointment regarding her lower extremity swelling and per her daughter was unable to get up due to severe back pain prompting them to call emergency medical services. Patient was status post sacroplasty, sacral fracture. This procedure was done by Dr. Edwin Harrington. She was also given an injection for her chronic back pain. Patient is followed by Dr. Rula Georges for her cardiology disease. Patient presents to the practice today status post emergency room follow-up for lower extremity swelling and chronic back pain. Patient is complaining of back pain today at a 2 out of 10. Patient sacroplasty procedure was done on January 29, 2018 and she follow-up with her orthopedic doctor on March 1, 2019. Patient has chronic lymphedema and was seen by the vascular on March 5, 2019. She has bilateral wrapping to her lower extremities. She is scheduled to be followed by home health for wrapping of the lower extremities. Per patient daughter  lower extremities  have weeping edema. Patient daughter also notes that her mom has a shuffle walk and bilateral hand tremors. Also when the patient speaks she sounds like she is gasping for air before she can get the words out of her mouth. She denies any dyspnea and her oxygen saturation is 95%. Review of Systems   Respiratory: Negative for cough. Cardiovascular: Positive for leg swelling. Negative for chest pain and palpitations. Neurological: Positive for tremors and speech change (Panting when talking).        No Known Allergies    Current Outpatient Medications   Medication Sig Dispense Refill    traMADol (ULTRAM) 50 mg tablet Take 1 Tab by mouth every six (6) hours as needed for Pain for up to 3 days. Max Daily Amount: 200 mg. 12 Tab 0    nystatin (MYCOSTATIN) 100,000 unit/mL suspension Take 5 mL by mouth four (4) times daily. swish and spit 473 mL 0    trimethoprim-sulfamethoxazole (BACTRIM DS) 160-800 mg per tablet Take 1 Tab by mouth two (2) times a day for 7 days. 14 Tab 0    sertraline (ZOLOFT) 25 mg tablet take 1 tablet by mouth once daily 30 Tab 2    JANUVIA 100 mg tablet take 1 tablet by mouth once daily 30 Tab 5    furosemide (LASIX) 40 mg tablet take 1 tablet by mouth once daily 90 Tab 3    glipiZIDE-metFORMIN (METAGLIP) 5-500 mg per tablet TAKE 1 TABLET BY MOUTH BEFORE BREAKFAST AND 1 TABLET BY MOUTH BEFORE DINNER 120 Tab 10    RABEprazole (ACIPHEX) 20 mg tablet take 1 tablet by mouth once daily 30 Tab 5    ascorbic acid, vitamin C, (VITAMIN C) 500 mg tablet Take 500 mg by mouth daily.  ferrous sulfate (IRON) 325 mg (65 mg iron) tablet Take  by mouth Daily (before breakfast).  naproxen sodium (ALEVE PO) Take 220 mg by mouth as needed for Pain.  cyanocobalamin (VITAMIN B-12) 2,000 mcg TbER Take 3 Tabs by mouth daily.  loperamide (IMODIUM) 2 mg capsule Take 1 mg by mouth Three (3) times a week.  pravastatin (PRAVACHOL) 40 mg tablet Take 1 Tab by mouth nightly. 90 Tab 3    lidocaine (LIDODERM) 5 % Apply patch to the affected area for 12 hours a day and remove for 12 hours a day. 5 Each 0    aspirin delayed-release 81 mg tablet Take  by mouth daily.  cholecalciferol (VITAMIN D3) 1,000 unit tablet Take  by mouth daily.  gabapentin (NEURONTIN) 100 mg capsule Take 1 Cap by mouth two (2) times a day. [address] [facility] 1       Past Medical History:   Diagnosis Date    Aortic valve stenosis, mild April 2014    EF 42-10%, Grade 1 diastolic dysfunction, mild aortic stenosis,  mean gradient 10 mm Hg    CAD (coronary artery disease)     aortic stenosis    Cardiac echocardiogram 06/2018    EF 55-60%.   No RWMA.  Mild-mod LVH. Severe LAE. Mild AS.  Cardiac nuclear imaging test 08/27/2008    No evidence of ischemia or prior scarring. EF 62%. No WMA. No significant change from study of 3/23/06.  Cardiovascular RLE venous duplex 12/01/2016    Right leg:  No DVT.  Carotid duplex 40/03/0399    Mild 3-27% LICA stenosis.  DM (diabetes mellitus) ([facility] [address]     Dyslipidemia     Heart failure (HCC)     HTN (hypertension)     Mixed hyperlipidemia     Sciatica     Traumatic closed displaced fracture of acromial end of clavicle, left, initial encounter        Past Surgical History:   Procedure Laterality Date    HX APPENDECTOMY      HX OOPHORECTOMY Left 1957    HX OTHER SURGICAL Right 2010    ankle surgery    HX TUMOR REMOVAL Left     ovarian    HX UROLOGICAL      kidney stone removal       Social History     Socioeconomic History    Marital status:      Spouse name: Not on file    Number of children: Not on file    Years of education: Not on file    Highest education level: Not on file   Social Needs    Financial resource strain: Not on file    Food insecurity - worry: Not on file    Food insecurity - inability: Not on file   [employer] needs - medical: Not on file   [employer] needs - non-medical: Not on file   Occupational History    Not on file   Tobacco Use    Smoking status: Never Smoker    Smokeless tobacco: Never Used   Substance and Sexual Activity    Alcohol use: No    Drug use: No    Sexual activity: No   Other Topics Concern    Not on file   Social History Narrative    Not on file       Patient does not have an advanced directive on file    Vitals:    03/07/19 1412   BP: 142/86   Pulse: 86   Resp: 16   Temp: 97 °F (36.1 °C)   TempSrc: Oral   SpO2: 95%   Weight: 133 lb (60.3 kg)   Height: 5' (1.524 m)   PainSc:   2   PainLoc: Back       Physical Exam   Constitutional: No distress. Cardiovascular: Normal rate and regular rhythm.    Pulmonary/Chest: Effort normal and breath sounds normal.   Abdominal: Soft. She exhibits no distension. Musculoskeletal: She exhibits edema ( Bilateral lower extremities with Coban dressing from the foot to below the knee bilaterally). She exhibits no tenderness. Neurological: She is alert. No tremors observed  Patient ambulating with cane and no obvious shuffle       Admission on 03/07/2019, Discharged on 03/07/2019   Component Date Value Ref Range Status    WBC 03/07/2019 4.6  4.6 - 13.2 K/uL Final    RBC 03/07/2019 3.74* 4.20 - 5.30 M/uL Final    HGB 03/07/2019 11.4* 12.0 - 16.0 g/dL Final    HCT 03/07/2019 33.3* 35.0 - 45.0 % Final    MCV 03/07/2019 89.0  74.0 - 97.0 FL Final    MCH 03/07/2019 30.5  24.0 - 34.0 PG Final    MCHC 03/07/2019 34.2  31.0 - 37.0 g/dL Final    RDW 03/07/2019 14.6* 11.6 - 14.5 % Final    PLATELET 71/68/8342 71* 135 - 420 K/uL Final    MPV 03/07/2019 10.2  9.2 - 11.8 FL Final    NEUTROPHILS 03/07/2019 72  40 - 73 % Final    LYMPHOCYTES 03/07/2019 22  21 - 52 % Final    MONOCYTES 03/07/2019 6  3 - 10 % Final    EOSINOPHILS 03/07/2019 0  0 - 5 % Final    BASOPHILS 03/07/2019 0  0 - 2 % Final    ABS. NEUTROPHILS 03/07/2019 3.3  1.8 - 8.0 K/UL Final    ABS. LYMPHOCYTES 03/07/2019 1.0  0.9 - 3.6 K/UL Final    ABS. MONOCYTES 03/07/2019 0.3  0.05 - 1.2 K/UL Final    ABS. EOSINOPHILS 03/07/2019 0.0  0.0 - 0.4 K/UL Final    ABS.  BASOPHILS 03/07/2019 0.0  0.0 - 0.1 K/UL Final    DF 03/07/2019 AUTOMATED    Final    Sodium 03/07/2019 141  136 - 145 mmol/L Final    Potassium 03/07/2019 3.8  3.5 - 5.5 mmol/L Final    Chloride 03/07/2019 104  100 - 108 mmol/L Final    CO2 03/07/2019 28  21 - 32 mmol/L Final    Anion gap 03/07/2019 9  3.0 - 18 mmol/L Final    Glucose 03/07/2019 75  74 - 99 mg/dL Final    BUN 03/07/2019 26* 7.0 - 18 MG/DL Final    Creatinine 03/07/2019 1.07  0.6 - 1.3 MG/DL Final    BUN/Creatinine ratio 03/07/2019 24* 12 - 20   Final    GFR est AA 03/07/2019 59* >60 ml/min/1.73m2 Final    GFR est non-AA 03/07/2019 49* >60 ml/min/1.73m2 Final    Comment: (NOTE)  Estimated GFR is calculated using the Modification of Diet in Renal   Disease (MDRD) Study equation, reported for both  Americans   (GFRAA) and non- Americans (GFRNA), and normalized to 1.73m2   body surface area. The physician must decide which value applies to   the patient. The MDRD study equation should only be used in   individuals age 25 or older. It has not been validated for the   following: pregnant women, patients with serious comorbid conditions,   or on certain medications, or persons with extremes of body size,   muscle mass, or nutritional status.  Calcium 03/07/2019 8.7  8.5 - 10.1 MG/DL Final    Bilirubin, total 03/07/2019 0.5  0.2 - 1.0 MG/DL Final    ALT (SGPT) 03/07/2019 17  13 - 56 U/L Final    AST (SGOT) 03/07/2019 18  15 - 37 U/L Final    Alk.  phosphatase 03/07/2019 99  45 - 117 U/L Final    Protein, total 03/07/2019 6.3* 6.4 - 8.2 g/dL Final    Albumin 03/07/2019 3.4  3.4 - 5.0 g/dL Final    Globulin 03/07/2019 2.9  2.0 - 4.0 g/dL Final    A-G Ratio 03/07/2019 1.2  0.8 - 1.7   Final    Color 03/07/2019 YELLOW    Final    Appearance 03/07/2019 TURBID    Final    Specific gravity 03/07/2019 1.025  1.005 - 1.030   Final    pH (UA) 03/07/2019 5.5  5.0 - 8.0   Final    Protein 03/07/2019 NEGATIVE   NEG mg/dL Final    Glucose 03/07/2019 NEGATIVE   NEG mg/dL Final    Ketone 03/07/2019 TRACE* NEG mg/dL Final    Bilirubin 03/07/2019 NEGATIVE   NEG   Final    Blood 03/07/2019 NEGATIVE   NEG   Final    Urobilinogen 03/07/2019 1.0  0.2 - 1.0 EU/dL Final    Nitrites 03/07/2019 NEGATIVE   NEG   Final    Leukocyte Esterase 03/07/2019 SMALL* NEG   Final    CK 03/07/2019 45  26 - 192 U/L Final    CK - MB 03/07/2019 2.2  <3.6 ng/ml Final    CK-MB Index 03/07/2019 4.9* 0.0 - 4.0 % Final    Troponin-I, QT 03/07/2019 0.02  0.0 - 0.045 NG/ML Final    Comment: The presence of detectable troponin above the reference range indicates myocardial injury which may be due to ischemia, myocarditis, trauma, etc.  Clinical correlation is necessary to establish the significance of this finding. Sequential testing is recommended to determine if the typical rise and fall of cTnI is demonstrated. Note:  Cardiac troponin I has a relatively long half life and may be present well after the CK MB has returned to baseline. The reference range is based on the 99th percentile of the referent population.  Ventricular Rate 03/07/2019 73  BPM Final    Atrial Rate 03/07/2019 73  BPM Final    P-R Interval 03/07/2019 186  ms Final    QRS Duration 03/07/2019 96  ms Final    Q-T Interval 03/07/2019 406  ms Final    QTC Calculation (Bezet) 03/07/2019 447  ms Final    Calculated P Axis 03/07/2019 36  degrees Final    Calculated R Axis 03/07/2019 -33  degrees Final    Calculated T Axis 03/07/2019 76  degrees Final    Diagnosis 03/07/2019    Final                    Value:Normal sinus rhythm  Left axis deviation  Left ventricular hypertrophy with repolarization abnormality  Cannot rule out Septal infarct , age undetermined  Abnormal ECG  When compared with ECG of 04-MAR-2019 14:58,  premature ventricular complexes are no longer present  Confirmed by Oramanda Eis ([id number]) on 3/7/2019 7:53:36 AM      Troponin-I, QT 03/07/2019 0.03  0.0 - 0.045 NG/ML Final    Comment: The presence of detectable troponin above the reference range indicates myocardial injury which may be due to ischemia, myocarditis, trauma, etc.  Clinical correlation is necessary to establish the significance of this finding. Sequential testing is recommended to determine if the typical rise and fall of cTnI is demonstrated. Note:  Cardiac troponin I has a relatively long half life and may be present well after the CK MB has returned to baseline. The reference range is based on the 99th percentile of the referent population.       WBC 03/07/2019 0 to 3  0 - 4 /hpf Final    RBC 03/07/2019 NONE  0 - 5 /hpf Final    Epithelial cells 03/07/2019 1+  0 - 5 /lpf Final    Bacteria 03/07/2019 NEGATIVE   NEG /hpf Final    Uric acid crystals 03/07/2019 2+* NEG Final   Admission on 03/04/2019, Discharged on 03/04/2019   Component Date Value Ref Range Status    WBC 03/04/2019 4.4* 4.6 - 13.2 K/uL Final    RBC 03/04/2019 4.23  4.20 - 5.30 M/uL Final    HGB 03/04/2019 13.0  12.0 - 16.0 g/dL Final    HCT 03/04/2019 38.2  35.0 - 45.0 % Final    MCV 03/04/2019 90.3  74.0 - 97.0 FL Final    MCH 03/04/2019 30.7  24.0 - 34.0 PG Final    MCHC 03/04/2019 34.0  31.0 - 37.0 g/dL Final    RDW 03/04/2019 14.4  11.6 - 14.5 % Final    PLATELET 65/95/9256 80* 135 - 420 K/uL Final    MPV 03/04/2019 10.6  9.2 - 11.8 FL Final    NEUTROPHILS 03/04/2019 72  40 - 73 % Final    LYMPHOCYTES 03/04/2019 18* 21 - 52 % Final    MONOCYTES 03/04/2019 9  3 - 10 % Final    EOSINOPHILS 03/04/2019 1  0 - 5 % Final    BASOPHILS 03/04/2019 0  0 - 2 % Final    ABS. NEUTROPHILS 03/04/2019 3.2  1.8 - 8.0 K/UL Final    ABS. LYMPHOCYTES 03/04/2019 0.8* 0.9 - 3.6 K/UL Final    ABS. MONOCYTES 03/04/2019 0.4  0.05 - 1.2 K/UL Final    ABS. EOSINOPHILS 03/04/2019 0.0  0.0 - 0.4 K/UL Final    ABS.  BASOPHILS 03/04/2019 0.0  0.0 - 0.1 K/UL Final    DF 03/04/2019 AUTOMATED    Final    Sodium 03/04/2019 143  136 - 145 mmol/L Final    Potassium 03/04/2019 3.4* 3.5 - 5.5 mmol/L Final    Chloride 03/04/2019 107  100 - 108 mmol/L Final    CO2 03/04/2019 32  21 - 32 mmol/L Final    Anion gap 03/04/2019 4  3.0 - 18 mmol/L Final    Glucose 03/04/2019 71* 74 - 99 mg/dL Final    BUN 03/04/2019 15  7.0 - 18 MG/DL Final    Creatinine 03/04/2019 0.55* 0.6 - 1.3 MG/DL Final    BUN/Creatinine ratio 03/04/2019 27* 12 - 20   Final    GFR est AA 03/04/2019 >60  >60 ml/min/1.73m2 Final    GFR est non-AA 03/04/2019 >60  >60 ml/min/1.73m2 Final    Comment: (NOTE)  Estimated GFR is calculated using the Modification of Diet in Renal   Disease (MDRD) Study equation, reported for both  Americans   (GFRAA) and non- Americans (GFRNA), and normalized to 1.73m2   body surface area. The physician must decide which value applies to   the patient. The MDRD study equation should only be used in   individuals age 25 or older. It has not been validated for the   following: pregnant women, patients with serious comorbid conditions,   or on certain medications, or persons with extremes of body size,   muscle mass, or nutritional status.  Calcium 03/04/2019 9.5  8.5 - 10.1 MG/DL Final    Bilirubin, total 03/04/2019 0.8  0.2 - 1.0 MG/DL Final    ALT (SGPT) 03/04/2019 18  13 - 56 U/L Final    AST (SGOT) 03/04/2019 19  15 - 37 U/L Final    Alk. phosphatase 03/04/2019 106  45 - 117 U/L Final    Protein, total 03/04/2019 6.8  6.4 - 8.2 g/dL Final    Albumin 03/04/2019 3.6  3.4 - 5.0 g/dL Final    Globulin 03/04/2019 3.2  2.0 - 4.0 g/dL Final    A-G Ratio 03/04/2019 1.1  0.8 - 1.7   Final    CK 03/04/2019 32  26 - 192 U/L Final    CK - MB 03/04/2019 1.9  <3.6 ng/ml Final    CK-MB Index 03/04/2019 5.9* 0.0 - 4.0 % Final    Troponin-I, QT 03/04/2019 0.02  0.0 - 0.045 NG/ML Final    Comment: The presence of detectable troponin above the reference range indicates myocardial injury which may be due to ischemia, myocarditis, trauma, etc.  Clinical correlation is necessary to establish the significance of this finding. Sequential testing is recommended to determine if the typical rise and fall of cTnI is demonstrated. Note:  Cardiac troponin I has a relatively long half life and may be present well after the CK MB has returned to baseline. The reference range is based on the 99th percentile of the referent population.  NT pro-BNP 03/04/2019 412  0 - 1,800 PG/ML Final    Comment:           For patients with dyspnea, NT proBNP is highly sensitive for detecting acute congestive heart failure.  Also, an NT proBNP <300 pg/mL effectively rules out acute congestive heart failure, with 99% negative predictive value. Our reference ranges are for acute dy        Age              Range (pg/ml)                           0-49                0-450        50-75               0-900        >75                 0-1800       For ambulatory office patients, the ranges below apply: For patients with dyspnea, NT proBNP is highly sensitive for detecting acute congestive heart failure. Also, an NT proBNP <300 pg/mL effectively rules out acute congestive heart failure, with 99% negative predictive value. Our reference ranges are for acute dyspnea.       Color 03/04/2019 YELLOW    Final    Appearance 03/04/2019 CLEAR    Final    Specific gravity 03/04/2019 1.007  1.005 - 1.030   Final    pH (UA) 03/04/2019 8.0  5.0 - 8.0   Final    Protein 03/04/2019 NEGATIVE   NEG mg/dL Final    Glucose 03/04/2019 NEGATIVE   NEG mg/dL Final    Ketone 03/04/2019 NEGATIVE   NEG mg/dL Final    Bilirubin 03/04/2019 NEGATIVE   NEG   Final    Blood 03/04/2019 NEGATIVE   NEG   Final    Urobilinogen 03/04/2019 0.2  0.2 - 1.0 EU/dL Final    Nitrites 03/04/2019 NEGATIVE   NEG   Final    Leukocyte Esterase 03/04/2019 NEGATIVE   NEG   Final    Ventricular Rate 03/04/2019 81  BPM Final    Atrial Rate 03/04/2019 81  BPM Final    P-R Interval 03/04/2019 184  ms Final    QRS Duration 03/04/2019 90  ms Final    Q-T Interval 03/04/2019 388  ms Final    QTC Calculation (Bezet) 03/04/2019 450  ms Final    Calculated P Axis 03/04/2019 75  degrees Final    Calculated R Axis 03/04/2019 -44  degrees Final    Calculated T Axis 03/04/2019 16  degrees Final    Diagnosis 03/04/2019    Final                    Value:Sinus rhythm with occasional premature ventricular complexes  Left axis deviation  Moderate voltage criteria for LVH, may be normal variant  Abnormal ECG  When compared with ECG of 15-RONNI-2019 22:09,  premature ventricular complexes are now present  Minimal criteria for Septal infarct are no longer present    Confirmed by Alex Mccurdy MD, ----- ([id number]) on 3/5/2019 4:16:11 PM     Admission on 02/08/2019, Discharged on 02/08/2019   Component Date Value Ref Range Status    Glucose (POC) 02/08/2019 94  70 - 110 mg/dL Final    Comment: (NOTE)  The FDA has indicated that no capillary point of care blood glucose   monitoring systems are approved for use in \"critically ill\" patients,   however they have not defined this population. The College of   American Pathologists has recommended that these devices should not   be used in cases such as severe hypotension, dehydration, shock, and   hyperglycemic-hyperosmolar state, amongst others. Venous or arterial   collection is the recommended specimen for testing these patients. Admission on 01/29/2019, Discharged on 01/29/2019   Component Date Value Ref Range Status    Sodium 01/29/2019 142  136 - 145 mmol/L Final    Potassium 01/29/2019 4.1  3.5 - 5.5 mmol/L Final    Chloride 01/29/2019 109* 100 - 108 mmol/L Final    CO2 01/29/2019 27  21 - 32 mmol/L Final    Anion gap 01/29/2019 6  3.0 - 18 mmol/L Final    Glucose 01/29/2019 46* 74 - 99 mg/dL Final    Comment: Critical value verified. Called to and read back by:  REJI DEAL TO PREOP AT [id number] ON 1/29/2019 BY SNL.  BUN 01/29/2019 14  7.0 - 18 MG/DL Final    Creatinine 01/29/2019 0.92  0.6 - 1.3 MG/DL Final    BUN/Creatinine ratio 01/29/2019 15  12 - 20   Final    GFR est AA 01/29/2019 >60  >60 ml/min/1.73m2 Final    GFR est non-AA 01/29/2019 58* >60 ml/min/1.73m2 Final    Comment: (NOTE)  Estimated GFR is calculated using the Modification of Diet in Renal   Disease (MDRD) Study equation, reported for both  Americans   (GFRAA) and non- Americans (GFRNA), and normalized to 1.73m2   body surface area. The physician must decide which value applies to   the patient.  The MDRD study equation should only be used in   individuals age 25 or older. It has not been validated for the   following: pregnant women, patients with serious comorbid conditions,   or on certain medications, or persons with extremes of body size,   muscle mass, or nutritional status.  Calcium 01/29/2019 8.6  8.5 - 10.1 MG/DL Final    WBC 01/29/2019 3.1* 4.6 - 13.2 K/uL Final    RBC 01/29/2019 3.37* 4.20 - 5.30 M/uL Final    HGB 01/29/2019 10.1* 12.0 - 16.0 g/dL Final    HCT 01/29/2019 30.6* 35.0 - 45.0 % Final    MCV 01/29/2019 90.8  74.0 - 97.0 FL Final    MCH 01/29/2019 30.0  24.0 - 34.0 PG Final    MCHC 01/29/2019 33.0  31.0 - 37.0 g/dL Final    RDW 01/29/2019 14.5  11.6 - 14.5 % Final    PLATELET 40/54/4793 79* 135 - 420 K/uL Final    MPV 01/29/2019 10.2  9.2 - 11.8 FL Final    Prothrombin time 01/29/2019 14.3  11.5 - 15.2 sec Final    INR 01/29/2019 1.1  0.8 - 1.2   Final    Comment:            INR Therapeutic Ranges         (on stable oral anticoagulant):     INDICATION                INR  DVT/PE/Atrial Fib          2.0-3.0  MI/Mechanical Heart Valve  2.5-3.5      aPTT 01/29/2019 31.9  23.0 - 36.4 SEC Final    Glucose (POC) 01/29/2019 51* 70 - 110 mg/dL Final    Comment: Notified RN or MD immediately by   (NOTE)  The FDA has indicated that no capillary point of care blood glucose   monitoring systems are approved for use in \"critically ill\" patients,   however they have not defined this population. The College of   American Pathologists has recommended that these devices should not   be used in cases such as severe hypotension, dehydration, shock, and   hyperglycemic-hyperosmolar state, amongst others. Venous or arterial   collection is the recommended specimen for testing these patients.       Glucose (POC) 01/29/2019 56* 70 - 110 mg/dL Final    Comment: Notified RN or MD immediately by   (NOTE)  The FDA has indicated that no capillary point of care blood glucose   monitoring systems are approved for use in \"critically ill\" patients, however they have not defined this population. The College of   American Pathologists has recommended that these devices should not   be used in cases such as severe hypotension, dehydration, shock, and   hyperglycemic-hyperosmolar state, amongst others. Venous or arterial   collection is the recommended specimen for testing these patients.  Glucose (POC) 01/29/2019 100  70 - 110 mg/dL Final    Comment: (NOTE)  The FDA has indicated that no capillary point of care blood glucose   monitoring systems are approved for use in \"critically ill\" patients,   however they have not defined this population. The College of   American Pathologists has recommended that these devices should not   be used in cases such as severe hypotension, dehydration, shock, and   hyperglycemic-hyperosmolar state, amongst others. Venous or arterial   collection is the recommended specimen for testing these patients.  Glucose (POC) 01/29/2019 136* 70 - 110 mg/dL Final    Comment: (NOTE)  The FDA has indicated that no capillary point of care blood glucose   monitoring systems are approved for use in \"critically ill\" patients,   however they have not defined this population. The College of   American Pathologists has recommended that these devices should not   be used in cases such as severe hypotension, dehydration, shock, and   hyperglycemic-hyperosmolar state, amongst others. Venous or arterial   collection is the recommended specimen for testing these patients.      Hospital Outpatient Visit on 01/23/2019   Component Date Value Ref Range Status    Special Requests: 01/23/2019 ULCER   Final    GRAM STAIN 01/23/2019 NO WBC'S SEEN    Final    GRAM STAIN 01/23/2019 FEW GRAM POSITIVE COCCI IN PAIRS IN GROUPS    Final    Culture result: 01/23/2019 MANY **METHICILLIN RESISTANT STAPHYLOCOCCUS AUREUS   Final   Admission on 01/15/2019, Discharged on 01/16/2019   Component Date Value Ref Range Status    Ventricular Rate 01/15/2019 72 BPM Final    Atrial Rate 01/15/2019 72  BPM Final    P-R Interval 01/15/2019 190  ms Final    QRS Duration 01/15/2019 102  ms Final    Q-T Interval 01/15/2019 396  ms Final    QTC Calculation (Bezet) 01/15/2019 433  ms Final    Calculated P Axis 01/15/2019 38  degrees Final    Calculated R Axis 01/15/2019 -35  degrees Final    Calculated T Axis 01/15/2019 56  degrees Final    Diagnosis 01/15/2019    Final                    Value:Normal sinus rhythm  Left axis deviation  Moderate voltage criteria for LVH, may be normal variant  Cannot rule out Septal infarct (cited on or before 29-JUL-2018)  Abnormal ECG  When compared with ECG of 29-JUL-2018 22:59,  premature ventricular complexes are no longer present  Questionable change in initial forces of Septal leads  Confirmed by Howard Chavis ([id number]) on 1/17/2019 9:08:20 PM      WBC 01/16/2019 4.4* 4.6 - 13.2 K/uL Final    RBC 01/16/2019 3.52* 4.20 - 5.30 M/uL Final    HGB 01/16/2019 10.5* 12.0 - 16.0 g/dL Final    HCT 01/16/2019 31.9* 35.0 - 45.0 % Final    MCV 01/16/2019 90.6  74.0 - 97.0 FL Final    MCH 01/16/2019 29.8  24.0 - 34.0 PG Final    MCHC 01/16/2019 32.9  31.0 - 37.0 g/dL Final    RDW 01/16/2019 14.2  11.6 - 14.5 % Final    PLATELET 08/70/9281 88* 135 - 420 K/uL Final    MPV 01/16/2019 10.6  9.2 - 11.8 FL Final    NEUTROPHILS 01/16/2019 64  40 - 73 % Final    LYMPHOCYTES 01/16/2019 25  21 - 52 % Final    MONOCYTES 01/16/2019 8  3 - 10 % Final    EOSINOPHILS 01/16/2019 3  0 - 5 % Final    BASOPHILS 01/16/2019 0  0 - 2 % Final    ABS. NEUTROPHILS 01/16/2019 2.9  1.8 - 8.0 K/UL Final    ABS. LYMPHOCYTES 01/16/2019 1.1  0.9 - 3.6 K/UL Final    ABS. MONOCYTES 01/16/2019 0.4  0.05 - 1.2 K/UL Final    ABS. EOSINOPHILS 01/16/2019 0.1  0.0 - 0.4 K/UL Final    ABS.  BASOPHILS 01/16/2019 0.0  0.0 - 0.1 K/UL Final    DF 01/16/2019 AUTOMATED    Final    Sodium 01/16/2019 141  136 - 145 mmol/L Final    Potassium 01/16/2019 3.4* 3.5 - 5.5 mmol/L Final    Chloride 01/16/2019 106  100 - 108 mmol/L Final    CO2 01/16/2019 32  21 - 32 mmol/L Final    Anion gap 01/16/2019 3  3.0 - 18 mmol/L Final    Glucose 01/16/2019 108* 74 - 99 mg/dL Final    BUN 01/16/2019 17  7.0 - 18 MG/DL Final    Creatinine 01/16/2019 0.72  0.6 - 1.3 MG/DL Final    BUN/Creatinine ratio 01/16/2019 24* 12 - 20   Final    GFR est AA 01/16/2019 >60  >60 ml/min/1.73m2 Final    GFR est non-AA 01/16/2019 >60  >60 ml/min/1.73m2 Final    Comment: (NOTE)  Estimated GFR is calculated using the Modification of Diet in Renal   Disease (MDRD) Study equation, reported for both  Americans   (GFRAA) and non- Americans (GFRNA), and normalized to 1.73m2   body surface area. The physician must decide which value applies to   the patient. The MDRD study equation should only be used in   individuals age 25 or older. It has not been validated for the   following: pregnant women, patients with serious comorbid conditions,   or on certain medications, or persons with extremes of body size,   muscle mass, or nutritional status.  Calcium 01/16/2019 8.6  8.5 - 10.1 MG/DL Final    Bilirubin, total 01/16/2019 0.4  0.2 - 1.0 MG/DL Final    ALT (SGPT) 01/16/2019 14  13 - 56 U/L Final    AST (SGOT) 01/16/2019 14* 15 - 37 U/L Final    Alk. phosphatase 01/16/2019 184* 45 - 117 U/L Final    Protein, total 01/16/2019 6.0* 6.4 - 8.2 g/dL Final    Albumin 01/16/2019 2.8* 3.4 - 5.0 g/dL Final    Globulin 01/16/2019 3.2  2.0 - 4.0 g/dL Final    A-G Ratio 01/16/2019 0.9  0.8 - 1.7   Final    TSH 01/16/2019 1.41  0.36 - 3.74 uIU/mL Final   Admission on 01/03/2019, Discharged on 01/03/2019   Component Date Value Ref Range Status    Glucose (POC) 01/03/2019 98  70 - 110 mg/dL Final    Comment: (NOTE)  The FDA has indicated that no capillary point of care blood glucose   monitoring systems are approved for use in \"critically ill\" patients,   however they have not defined this population.  The College of   American Pathologists has recommended that these devices should not   be used in cases such as severe hypotension, dehydration, shock, and   hyperglycemic-hyperosmolar state, amongst others. Venous or arterial   collection is the recommended specimen for testing these patients. Hospital Outpatient Visit on 12/12/2018   Component Date Value Ref Range Status    Special Requests: 12/12/2018     Final                    Value:RIGHT  LEG  LOWER      GRAM STAIN 12/12/2018 FEW WBC'S    Final    GRAM STAIN 12/12/2018 FEW GRAM POSITIVE COCCI IN PAIRS    Final    GRAM STAIN 12/12/2018 FEW GRAM POSITIVE COCCI IN GROUPS    Final    GRAM STAIN 12/12/2018 RARE GRAM NEGATIVE RODS    Final    Culture result: 12/12/2018 MODERATE ENTEROBACTER AEROGENES*   Final    Culture result: 12/12/2018 MODERATE ENTEROBACTER AEROGENES 2ND MORPHOTYPE*   Final    Culture result: 12/12/2018 MANY **METHICILLIN RESISTANT STAPHYLOCOCCUS AUREUS   Final       .  Results for orders placed or performed during the hospital encounter of 03/07/19   SED RATE (ESR)   Result Value Ref Range    Sed rate, automated 19 0 - 30 mm/hr   Results for orders placed or performed during the hospital encounter of 03/07/19   CT HEAD WO CONT    Narrative    CT head without IV contrast    HISTORY: Headaches and right arm pain. Comparison September 13, 2014    All CT scans at this facility are performed using dose optimization technique as  appropriate to a performed exam, to include automated exposure control,  adjustment of the mA and/or kV according to patient size (including appropriate  matching for site specific examination) or use of iterative reconstruction  technique. Global atrophy and presumed small vessel ischemic disease the white matter,  grossly stable. No midline shift or extra-axial collection. No intracranial  hemorrhage, mass lesions or cortical infarct involving a major vessel.   Visualized paranasal sinuses and mastoid air cells are clear. Impression    IMPRESSION: No acute intracranial abnormalities. XR CHEST PORT    Narrative    EXAM: CHEST ONE VIEW  portable 0156 hours    CLINICAL HISTORY/INDICATION:  AMS , sudden onset of right arm pain shaking all over, headache described as  tightness/swelling, severe back pain    COMPARISON: Chest x-ray March 4, 2019. TECHNIQUE: One view     FINDINGS:     The cardiac silhouette is normal. Calcified plaque is demonstrated within the  arch of the aorta which is mildly  tortuous. The lungs are clear. Pulmonary  vascularity is normal. The costophrenic angles are sharply defined. No bony  abnormalities are seen. Impression    IMPRESSION:    Atherosclerosis. CBC WITH AUTOMATED DIFF   Result Value Ref Range    WBC 4.6 4.6 - 13.2 K/uL    RBC 3.74 (L) 4.20 - 5.30 M/uL    HGB 11.4 (L) 12.0 - 16.0 g/dL    HCT 33.3 (L) 35.0 - 45.0 %    MCV 89.0 74.0 - 97.0 FL    MCH 30.5 24.0 - 34.0 PG    MCHC 34.2 31.0 - 37.0 g/dL    RDW 14.6 (H) 11.6 - 14.5 %    PLATELET 71 (L) 630 - 420 K/uL    MPV 10.2 9.2 - 11.8 FL    NEUTROPHILS 72 40 - 73 %    LYMPHOCYTES 22 21 - 52 %    MONOCYTES 6 3 - 10 %    EOSINOPHILS 0 0 - 5 %    BASOPHILS 0 0 - 2 %    ABS. NEUTROPHILS 3.3 1.8 - 8.0 K/UL    ABS. LYMPHOCYTES 1.0 0.9 - 3.6 K/UL    ABS. MONOCYTES 0.3 0.05 - 1.2 K/UL    ABS. EOSINOPHILS 0.0 0.0 - 0.4 K/UL    ABS.  BASOPHILS 0.0 0.0 - 0.1 K/UL    DF AUTOMATED     METABOLIC PANEL, COMPREHENSIVE   Result Value Ref Range    Sodium 141 136 - 145 mmol/L    Potassium 3.8 3.5 - 5.5 mmol/L    Chloride 104 100 - 108 mmol/L    CO2 28 21 - 32 mmol/L    Anion gap 9 3.0 - 18 mmol/L    Glucose 75 74 - 99 mg/dL    BUN 26 (H) 7.0 - 18 MG/DL    Creatinine 1.07 0.6 - 1.3 MG/DL    BUN/Creatinine ratio 24 (H) 12 - 20      GFR est AA 59 (L) >60 ml/min/1.73m2    GFR est non-AA 49 (L) >60 ml/min/1.73m2    Calcium 8.7 8.5 - 10.1 MG/DL    Bilirubin, total 0.5 0.2 - 1.0 MG/DL    ALT (SGPT) 17 13 - 56 U/L    AST (SGOT) 18 15 - 37 U/L Alk. phosphatase 99 45 - 117 U/L    Protein, total 6.3 (L) 6.4 - 8.2 g/dL    Albumin 3.4 3.4 - 5.0 g/dL    Globulin 2.9 2.0 - 4.0 g/dL    A-G Ratio 1.2 0.8 - 1.7     URINALYSIS W/ RFLX MICROSCOPIC   Result Value Ref Range    Color YELLOW      Appearance TURBID      Specific gravity 1.025 1.005 - 1.030      pH (UA) 5.5 5.0 - 8.0      Protein NEGATIVE  NEG mg/dL    Glucose NEGATIVE  NEG mg/dL    Ketone TRACE (A) NEG mg/dL    Bilirubin NEGATIVE  NEG      Blood NEGATIVE  NEG      Urobilinogen 1.0 0.2 - 1.0 EU/dL    Nitrites NEGATIVE  NEG      Leukocyte Esterase SMALL (A) NEG     CARDIAC PANEL,(CK, CKMB & TROPONIN)   Result Value Ref Range    CK 45 26 - 192 U/L    CK - MB 2.2 <3.6 ng/ml    CK-MB Index 4.9 (H) 0.0 - 4.0 %    Troponin-I, QT 0.02 0.0 - 0.045 NG/ML   TROPONIN I   Result Value Ref Range    Troponin-I, QT 0.03 0.0 - 0.045 NG/ML   URINE MICROSCOPIC ONLY   Result Value Ref Range    WBC 0 to 3 0 - 4 /hpf    RBC NONE 0 - 5 /hpf    Epithelial cells 1+ 0 - 5 /lpf    Bacteria NEGATIVE  NEG /hpf    Uric acid crystals 2+ (A) NEG   EKG, 12 LEAD, INITIAL   Result Value Ref Range    Ventricular Rate 73 BPM    Atrial Rate 73 BPM    P-R Interval 186 ms    QRS Duration 96 ms    Q-T Interval 406 ms    QTC Calculation (Bezet) 447 ms    Calculated P Axis 36 degrees    Calculated R Axis -33 degrees    Calculated T Axis 76 degrees    Diagnosis       Normal sinus rhythm  Left axis deviation  Left ventricular hypertrophy with repolarization abnormality  Cannot rule out Septal infarct , age undetermined  Abnormal ECG  When compared with ECG of 04-MAR-2019 14:58,  premature ventricular complexes are no longer present  Confirmed by Tom Escalante ([id number]) on 3/7/2019 7:53:36 AM         Assessment / Plan:      ICD-10-CM ICD-9-CM    1. Occasional tremors R25.1 781.0 REFERRAL TO NEUROLOGY   2. Shuffling gait R26.89 781.2 REFERRAL TO NEUROLOGY   3. Oral candidiasis B37.0 112.0 nystatin (MYCOSTATIN) 100,000 unit/mL suspension   4.  Arthritis of spine M47.819 721.90 SED RATE (ESR)   5. Essential hypertension I10 401.9    6. Dependent edema R60.9 782.3      Patient referred to neurologist as per daughter request for intermittent tremors and shuffling gait  Per patient daughter she was told that the mom has severe arthritis in her lumbar region. Sed rate ordered. Hypertension controlled  Dependent edema-seen by vascular. Wrapping to the lower extremities without drainage  Oral candidiasis-nystatin swish and spit  -  Follow-up Disposition:  Return in about 1 month (around 4/7/2019). I asked the patient if she  had any questions and answered her  questions.   The patient stated that she understands the treatment plan and agrees with the treatment plan

## 2019-03-07 NOTE — ED TRIAGE NOTES
Pt states she was doing taxes,  States she suddenly had feeling that that top of her head is swelling . Pt also had onset of right arm pain.   Patients son states pt also had episode of shaking all over

## 2019-03-07 NOTE — ED PROVIDER NOTES
EMERGENCY DEPARTMENT HISTORY AND PHYSICAL EXAM    12:54 AM      Date: 3/7/2019  Patient Name: Christiana Haque    History of Presenting Illness   Shaking      History Provided By: Patient and son      Additional History (Context): Christiana Haque is a 80 y.o. female with CVA and chronic back pain who presents with episode of not feeling right and shaking PTA. Patient was doing her taxes when she felt like her forehead was swelling. Had Rght arm pain and was shaking all over. No seizure  No fever  No cough  No N/V/D  No abd pain  No headache  No back pain    PCP: Stephania Calderón MD    Chief Complaint: Shaking  Duration:  Minutes  Timing:  Acute  Location: R arm  Quality: no pain  Severity: N/A  Modifying Factors: None  Associated Symptoms: denies any other associated signs or symptoms    Current Outpatient Medications   Medication Sig Dispense Refill    traMADol (ULTRAM) 50 mg tablet Take 1 Tab by mouth every six (6) hours as needed for Pain for up to 3 days. Max Daily Amount: 200 mg. 12 Tab 0    trimethoprim-sulfamethoxazole (BACTRIM DS) 160-800 mg per tablet Take 1 Tab by mouth two (2) times a day for 7 days. 14 Tab 0    sertraline (ZOLOFT) 25 mg tablet take 1 tablet by mouth once daily 30 Tab 2    JANUVIA 100 mg tablet take 1 tablet by mouth once daily 30 Tab 5    furosemide (LASIX) 40 mg tablet take 1 tablet by mouth once daily 90 Tab 3    gabapentin (NEURONTIN) 100 mg capsule Take 1 Cap by mouth two (2) times a day. 60 Cap 1    glipiZIDE-metFORMIN (METAGLIP) 5-500 mg per tablet TAKE 1 TABLET BY MOUTH BEFORE BREAKFAST AND 1 TABLET BY MOUTH BEFORE DINNER 120 Tab 10    RABEprazole (ACIPHEX) 20 mg tablet take 1 tablet by mouth once daily 30 Tab 5    ascorbic acid, vitamin C, (VITAMIN C) 500 mg tablet Take 500 mg by mouth daily.  ferrous sulfate (IRON) 325 mg (65 mg iron) tablet Take  by mouth Daily (before breakfast).  naproxen sodium (ALEVE PO) Take 220 mg by mouth as needed for Pain.       cyanocobalamin (VITAMIN B-12) 2,000 mcg TbER Take 3 Tabs by mouth daily.  loperamide (IMODIUM) 2 mg capsule Take 1 mg by mouth Three (3) times a week.  pravastatin (PRAVACHOL) 40 mg tablet Take 1 Tab by mouth nightly. 90 Tab 3    lidocaine (LIDODERM) 5 % Apply patch to the affected area for 12 hours a day and remove for 12 hours a day. 5 Each 0    aspirin delayed-release 81 mg tablet Take  by mouth daily.  cholecalciferol (VITAMIN D3) 1,000 unit tablet Take  by mouth daily. Past History     Past Medical History:  Past Medical History:   Diagnosis Date    Aortic valve stenosis, mild April 2014    EF 04-58%, Grade 1 diastolic dysfunction, mild aortic stenosis,  mean gradient 10 mm Hg    CAD (coronary artery disease)     aortic stenosis    Cardiac echocardiogram 06/2018    EF 55-60%. No RWMA. Mild-mod LVH. Severe LAE. Mild AS.  Cardiac nuclear imaging test 08/27/2008    No evidence of ischemia or prior scarring. EF 62%. No WMA. No significant change from study of 3/23/06.  Cardiovascular RLE venous duplex 12/01/2016    Right leg:  No DVT.  Carotid duplex 56/78/0828    Mild 0-33% LICA stenosis.     DM (diabetes mellitus) ([address]     Dyslipidemia     Heart failure ([address]     HTN (hypertension)     Mixed hyperlipidemia     Sciatica     Traumatic closed displaced fracture of acromial end of clavicle, left, initial encounter        Past Surgical History:  Past Surgical History:   Procedure Laterality Date    HX APPENDECTOMY      HX OOPHORECTOMY Left 1957    HX OTHER SURGICAL Right 2010    ankle surgery    HX TUMOR REMOVAL Left     ovarian    HX UROLOGICAL      kidney stone removal       Family History:  Family History   Problem Relation Age of Onset    Stroke Mother     Heart Disease Mother     Lung Disease Father     Cancer Neg Hx     Diabetes Neg Hx     Hypertension Neg Hx        Social History:  Social History     Tobacco Use    Smoking status: Never Smoker    Smokeless tobacco: Never Used   Substance Use Topics    Alcohol use: No    Drug use: No       Allergies:  No Known Allergies      Review of Systems       Review of Systems   All other systems reviewed and are negative. Physical Exam     Visit Vitals  /53   Pulse 80   Temp 97.5 °F (36.4 °C)   Resp 20   Wt 60.3 kg (133 lb)   SpO2 92%   BMI 25.97 kg/m²         Physical Exam   Constitutional: She is oriented to person, place, and time. She appears well-developed. No distress. Chronically ill-appearing   HENT:   Head: Normocephalic and atraumatic. Right Ear: External ear normal.   Left Ear: External ear normal.   Nose: Nose normal.   Mouth/Throat: Oropharynx is clear and moist.   Eyes: Conjunctivae and EOM are normal. Pupils are equal, round, and reactive to light. No scleral icterus. Neck: Normal range of motion. Neck supple. No tracheal deviation present. Cardiovascular: Normal rate, regular rhythm and intact distal pulses. Pulmonary/Chest: Effort normal and breath sounds normal. She exhibits no tenderness. Abdominal: Soft. Bowel sounds are normal. She exhibits no distension. There is no tenderness. There is no rebound and no guarding. Musculoskeletal: Normal range of motion. She exhibits edema. She exhibits no tenderness. LE edema b/l   Neurological: She is alert and oriented to person, place, and time. No cranial nerve deficit. Coordination normal.   At neurologic baseline   Skin: Skin is warm and dry. Psychiatric: She has a normal mood and affect. Her behavior is normal. Judgment and thought content normal.   Nursing note and vitals reviewed.         Diagnostic Study Results     Labs -  Recent Results (from the past 12 hour(s))   CBC WITH AUTOMATED DIFF    Collection Time: 03/07/19  1:34 AM   Result Value Ref Range    WBC 4.6 4.6 - 13.2 K/uL    RBC 3.74 (L) 4.20 - 5.30 M/uL    HGB 11.4 (L) 12.0 - 16.0 g/dL    HCT 33.3 (L) 35.0 - 45.0 %    MCV 89.0 74.0 - 97.0 FL    MCH 30.5 24.0 - 34.0 PG    MCHC 34.2 31.0 - 37.0 g/dL    RDW 14.6 (H) 11.6 - 14.5 %    PLATELET 71 (L) 394 - 420 K/uL    MPV 10.2 9.2 - 11.8 FL    NEUTROPHILS 72 40 - 73 %    LYMPHOCYTES 22 21 - 52 %    MONOCYTES 6 3 - 10 %    EOSINOPHILS 0 0 - 5 %    BASOPHILS 0 0 - 2 %    ABS. NEUTROPHILS 3.3 1.8 - 8.0 K/UL    ABS. LYMPHOCYTES 1.0 0.9 - 3.6 K/UL    ABS. MONOCYTES 0.3 0.05 - 1.2 K/UL    ABS. EOSINOPHILS 0.0 0.0 - 0.4 K/UL    ABS. BASOPHILS 0.0 0.0 - 0.1 K/UL    DF AUTOMATED     METABOLIC PANEL, COMPREHENSIVE    Collection Time: 03/07/19  1:34 AM   Result Value Ref Range    Sodium 141 136 - 145 mmol/L    Potassium 3.8 3.5 - 5.5 mmol/L    Chloride 104 100 - 108 mmol/L    CO2 28 21 - 32 mmol/L    Anion gap 9 3.0 - 18 mmol/L    Glucose 75 74 - 99 mg/dL    BUN 26 (H) 7.0 - 18 MG/DL    Creatinine 1.07 0.6 - 1.3 MG/DL    BUN/Creatinine ratio 24 (H) 12 - 20      GFR est AA 59 (L) >60 ml/min/1.73m2    GFR est non-AA 49 (L) >60 ml/min/1.73m2    Calcium 8.7 8.5 - 10.1 MG/DL    Bilirubin, total 0.5 0.2 - 1.0 MG/DL    ALT (SGPT) 17 13 - 56 U/L    AST (SGOT) 18 15 - 37 U/L    Alk.  phosphatase 99 45 - 117 U/L    Protein, total 6.3 (L) 6.4 - 8.2 g/dL    Albumin 3.4 3.4 - 5.0 g/dL    Globulin 2.9 2.0 - 4.0 g/dL    A-G Ratio 1.2 0.8 - 1.7     CARDIAC PANEL,(CK, CKMB & TROPONIN)    Collection Time: 03/07/19  1:34 AM   Result Value Ref Range    CK 45 26 - 192 U/L    CK - MB 2.2 <3.6 ng/ml    CK-MB Index 4.9 (H) 0.0 - 4.0 %    Troponin-I, QT 0.02 0.0 - 0.045 NG/ML   EKG, 12 LEAD, INITIAL    Collection Time: 03/07/19  2:17 AM   Result Value Ref Range    Ventricular Rate 73 BPM    Atrial Rate 73 BPM    P-R Interval 186 ms    QRS Duration 96 ms    Q-T Interval 406 ms    QTC Calculation (Bezet) 447 ms    Calculated P Axis 36 degrees    Calculated R Axis -33 degrees    Calculated T Axis 76 degrees    Diagnosis       Normal sinus rhythm  Left axis deviation  Left ventricular hypertrophy with repolarization abnormality  Cannot rule out Septal infarct , age undetermined  Abnormal ECG  When compared with ECG of 04-MAR-2019 14:58,  premature ventricular complexes are no longer present     URINALYSIS W/ RFLX MICROSCOPIC    Collection Time: 03/07/19  3:08 AM   Result Value Ref Range    Color YELLOW      Appearance TURBID      Specific gravity 1.025 1.005 - 1.030      pH (UA) 5.5 5.0 - 8.0      Protein NEGATIVE  NEG mg/dL    Glucose NEGATIVE  NEG mg/dL    Ketone TRACE (A) NEG mg/dL    Bilirubin NEGATIVE  NEG      Blood NEGATIVE  NEG      Urobilinogen 1.0 0.2 - 1.0 EU/dL    Nitrites NEGATIVE  NEG      Leukocyte Esterase SMALL (A) NEG     URINE MICROSCOPIC ONLY    Collection Time: 03/07/19  3:08 AM   Result Value Ref Range    WBC 0 to 3 0 - 4 /hpf    RBC NONE 0 - 5 /hpf    Epithelial cells 1+ 0 - 5 /lpf    Bacteria NEGATIVE  NEG /hpf    Uric acid crystals 2+ (A) NEG   TROPONIN I    Collection Time: 03/07/19  4:08 AM   Result Value Ref Range    Troponin-I, QT 0.03 0.0 - 0.045 NG/ML       Radiologic Studies -   CT HEAD WO CONT   Final Result   IMPRESSION: No acute intracranial abnormalities. XR CHEST PORT    (Results Pending)         Medical Decision Making     It should be noted that I, No att. providers found will be the provider of record for this patient. I reviewed the vital signs, available nursing notes, past medical history, past surgical history, family history and social history. Vital Signs-Reviewed the patient's vital signs. Pulse Oximetry Analysis -  98% on room air Wnl. Cardiac Monitor:  Rate: 80 bpm  Rhythm:  Normal Sinus Rhythm     EKG: Interpreted by the EP. Time Interpreted: 0217   Rate: 73   Rhythm: Normal Sinus Rhythm        Records Reviewed: Nursing Notes and Old Medical Records (Time of Review: 12:54 AM)    ED Course: Progress Notes, Reevaluation, and Consults:  12:45 PM Paged tele-neurology.   Called back--do not think CVA or neurologic origin of sxs    Provider Notes (Medical Decision Making):   Patient with episode of shaking tonight  No CP  No HA  No new neuro sxs or deficit  No further sxs in ED  No fever  Back examined, no evidence for infection  Patient c/o back pain, has been treated for this before  Son says no pain meds at home  Ultram ordered with relief of sxs. Will give Rx for  Labs, UA, EKG, wnl.  CT wnl  CXR wnl  Trop neg x 2  NAD  AVSS  Stable for discharge. Agrees with plan. Follow-up with PMD.  Return if further concerns. Diagnosis     Clinical Impression:   1. Chronic low back pain, unspecified back pain laterality, with sciatica presence unspecified        Disposition: Discharge    Follow-up Information     Follow up With Specialties Details Why Contact Info    Clarke Swann MD Internal Medicine Call in 1 day  [phone]                Medication List      CHANGE how you take these medications    traMADol 50 mg tablet  Commonly known as:  ULTRAM  Take 1 Tab by mouth every six (6) hours as needed for Pain for up to 3 days. Max Daily Amount: 200 mg. What changed:  when to take this        ASK your doctor about these medications    ALEVE PO     aspirin delayed-release 81 mg tablet     furosemide 40 mg tablet  Commonly known as:  LASIX  take 1 tablet by mouth once daily     gabapentin 100 mg capsule  Commonly known as:  NEURONTIN  Take 1 Cap by mouth two (2) times a day. glipiZIDE-metFORMIN 5-500 mg per tablet  Commonly known as:  METAGLIP  TAKE 1 TABLET BY MOUTH BEFORE BREAKFAST AND 1 TABLET BY MOUTH BEFORE DINNER     Iron 325 mg (65 mg iron) tablet  Generic drug:  ferrous sulfate     JANUVIA 100 mg tablet  Generic drug:  SITagliptin  take 1 tablet by mouth once daily     lidocaine 5 %  Commonly known as:  LIDODERM  Apply patch to the affected area for 12 hours a day and remove for 12 hours a day. loperamide 2 mg capsule  Commonly known as:  IMODIUM     pravastatin 40 mg tablet  Commonly known as:  PRAVACHOL  Take 1 Tab by mouth nightly.      RABEprazole 20 mg tablet  Commonly known as:  ACIPHEX  take 1 tablet by mouth once daily     sertraline 25 mg tablet  Commonly known as:  ZOLOFT  take 1 tablet by mouth once daily     trimethoprim-sulfamethoxazole 160-800 mg per tablet  Commonly known as:  BACTRIM DS  Take 1 Tab by mouth two (2) times a day for 7 days. VITAMIN B-12 2,000 mcg Tber  Generic drug:  cyanocobalamin     VITAMIN C 500 mg tablet  Generic drug:  ascorbic acid (vitamin C)     VITAMIN D3 1,000 unit tablet  Generic drug:  cholecalciferol           Where to Get Your Medications      Information about where to get these medications is not yet available    Ask your nurse or doctor about these medications  · traMADol 50 mg tablet       _______________________________       Shayna Dee acting as a scribe for and in the presence of Artis Tracy DO      March 07, 2019 at 12:54 AM       Provider Attestation:      I personally performed the services described in the documentation, reviewed the documentation, as recorded by the scribe in my presence, and it accurately and completely records my words and actions.  March 07, 2019 at 12:54 AM - Artis MOLINA DO        _______________________________

## 2019-03-07 NOTE — ED NOTES
Discharge instructions reviewed with patients son.  No questions asked,  Patient assisted to wheelchair and taken to car

## 2019-03-07 NOTE — DISCHARGE INSTRUCTIONS
Patient Education        Back Pain: Care Instructions  Your Care Instructions    Back pain has many possible causes. It is often related to problems with muscles and ligaments of the back. It may also be related to problems with the nerves, discs, or bones of the back. Moving, lifting, standing, sitting, or sleeping in an awkward way can strain the back. Sometimes you don't notice the injury until later. Arthritis is another common cause of back pain. Although it may hurt a lot, back pain usually improves on its own within several weeks. Most people recover in 12 weeks or less. Using good home treatment and being careful not to stress your back can help you feel better sooner. Follow-up care is a key part of your treatment and safety. Be sure to make and go to all appointments, and call your doctor if you are having problems. It's also a good idea to know your test results and keep a list of the medicines you take. How can you care for yourself at home? · Sit or lie in positions that are most comfortable and reduce your pain. Try one of these positions when you lie down:  ? Lie on your back with your knees bent and supported by large pillows. ? Lie on the floor with your legs on the seat of a sofa or chair. ? Lie on your side with your knees and hips bent and a pillow between your legs. ? Lie on your stomach if it does not make pain worse. · Do not sit up in bed, and avoid soft couches and twisted positions. Bed rest can help relieve pain at first, but it delays healing. Avoid bed rest after the first day of back pain. · Change positions every 30 minutes. If you must sit for long periods of time, take breaks from sitting. Get up and walk around, or lie in a comfortable position. · Try using a heating pad on a low or medium setting for 15 to 20 minutes every 2 or 3 hours. Try a warm shower in place of one session with the heating pad. · You can also try an ice pack for 10 to 15 minutes every 2 to 3 hours. Put a thin cloth between the ice pack and your skin. · Take pain medicines exactly as directed. ? If the doctor gave you a prescription medicine for pain, take it as prescribed. ? If you are not taking a prescription pain medicine, ask your doctor if you can take an over-the-counter medicine. · Take short walks several times a day. You can start with 5 to 10 minutes, 3 or 4 times a day, and work up to longer walks. Walk on level surfaces and avoid hills and stairs until your back is better. · Return to work and other activities as soon as you can. Continued rest without activity is usually not good for your back. · To prevent future back pain, do exercises to stretch and strengthen your back and stomach. Learn how to use good posture, safe lifting techniques, and proper body mechanics. When should you call for help? Call your doctor now or seek immediate medical care if:    · You have new or worsening numbness in your legs.     · You have new or worsening weakness in your legs. (This could make it hard to stand up.)     · You lose control of your bladder or bowels.    Watch closely for changes in your health, and be sure to contact your doctor if:    · You have a fever, lose weight, or don't feel well.     · You do not get better as expected. Where can you learn more? Go to http://josé miguel-reid.info/. Enter H439 in the search box to learn more about \"Back Pain: Care Instructions. \"  Current as of: September 20, 2018  Content Version: 11.9  © 9020-8715 [employer]. Care instructions adapted under license by [employer] (which disclaims liability or warranty for this information). If you have questions about a medical condition or this instruction, always ask your healthcare professional. [place] [address] any warranty or liability for your use of this information.

## 2019-03-08 ENCOUNTER — TELEPHONE (OUTPATIENT)
Dept: INTERNAL MEDICINE CLINIC | Age: 84
End: 2019-03-08

## 2019-03-08 ENCOUNTER — HOME CARE VISIT (OUTPATIENT)
Dept: SCHEDULING | Facility: HOME HEALTH | Age: 84
End: 2019-03-08
Payer: MEDICARE

## 2019-03-08 PROCEDURE — 3331090002 HH PPS REVENUE DEBIT

## 2019-03-08 PROCEDURE — 3331090001 HH PPS REVENUE CREDIT

## 2019-03-08 PROCEDURE — G0157 HHC PT ASSISTANT EA 15: HCPCS

## 2019-03-08 NOTE — TELEPHONE ENCOUNTER
I have attempted to contact Ms Darby Bailey by phone with the following results: left message to return my call on answering machine. In referencing clarifying who she wanted Ms Kym Dorman to see.

## 2019-03-09 ENCOUNTER — HOME CARE VISIT (OUTPATIENT)
Dept: HOME HEALTH SERVICES | Facility: HOME HEALTH | Age: 84
End: 2019-03-09
Payer: MEDICARE

## 2019-03-09 PROCEDURE — 3331090002 HH PPS REVENUE DEBIT

## 2019-03-09 PROCEDURE — 3331090001 HH PPS REVENUE CREDIT

## 2019-03-10 PROCEDURE — 3331090001 HH PPS REVENUE CREDIT

## 2019-03-10 PROCEDURE — 3331090002 HH PPS REVENUE DEBIT

## 2019-03-11 ENCOUNTER — HOME CARE VISIT (OUTPATIENT)
Dept: HOME HEALTH SERVICES | Facility: HOME HEALTH | Age: 84
End: 2019-03-11
Payer: MEDICARE

## 2019-03-11 VITALS
OXYGEN SATURATION: 92 % | DIASTOLIC BLOOD PRESSURE: 60 MMHG | HEART RATE: 82 BPM | TEMPERATURE: 97.4 F | SYSTOLIC BLOOD PRESSURE: 110 MMHG

## 2019-03-11 PROCEDURE — 3331090002 HH PPS REVENUE DEBIT

## 2019-03-11 PROCEDURE — 3331090001 HH PPS REVENUE CREDIT

## 2019-03-12 VITALS
HEART RATE: 82 BPM | DIASTOLIC BLOOD PRESSURE: 80 MMHG | SYSTOLIC BLOOD PRESSURE: 120 MMHG | OXYGEN SATURATION: 93 % | TEMPERATURE: 97.8 F

## 2019-03-12 PROCEDURE — 3331090002 HH PPS REVENUE DEBIT

## 2019-03-12 PROCEDURE — 3331090001 HH PPS REVENUE CREDIT

## 2019-03-13 PROCEDURE — 3331090001 HH PPS REVENUE CREDIT

## 2019-03-13 PROCEDURE — 3331090002 HH PPS REVENUE DEBIT

## 2019-03-13 NOTE — TELEPHONE ENCOUNTER
Ms Bridgett Gonzalez has been admitted to [facility] and would like to hold off on the referral to neurology and ordering the bed at this time she will contact the office when Ms Bridgett Gonzalez has been discharged.

## 2019-03-14 PROCEDURE — 3331090002 HH PPS REVENUE DEBIT

## 2019-03-14 PROCEDURE — 3331090001 HH PPS REVENUE CREDIT

## 2019-03-15 PROCEDURE — 3331090001 HH PPS REVENUE CREDIT

## 2019-03-15 PROCEDURE — 3331090002 HH PPS REVENUE DEBIT

## 2019-03-16 PROCEDURE — 3331090002 HH PPS REVENUE DEBIT

## 2019-03-16 PROCEDURE — 3331090001 HH PPS REVENUE CREDIT

## 2019-03-17 PROCEDURE — 3331090001 HH PPS REVENUE CREDIT

## 2019-03-17 PROCEDURE — 3331090002 HH PPS REVENUE DEBIT

## 2019-03-18 ENCOUNTER — TELEPHONE (OUTPATIENT)
Dept: FAMILY MEDICINE CLINIC | Facility: CLINIC | Age: 84
End: 2019-03-18

## 2019-03-18 DIAGNOSIS — R26.89 SHUFFLING GAIT: ICD-10-CM

## 2019-03-18 DIAGNOSIS — R25.1 TREMOR: Primary | ICD-10-CM

## 2019-03-18 PROCEDURE — 3331090001 HH PPS REVENUE CREDIT

## 2019-03-18 PROCEDURE — 3331090002 HH PPS REVENUE DEBIT

## 2019-03-18 RX ORDER — PRAVASTATIN SODIUM 40 MG/1
TABLET ORAL
Qty: 90 TAB | Refills: 5 | Status: SHIPPED | OUTPATIENT
Start: 2019-03-18 | End: 2019-03-24 | Stop reason: ALTCHOICE

## 2019-03-18 NOTE — TELEPHONE ENCOUNTER
Patient's daughter would like to have home health care extended. She also needs referral to Neurology Specialist, Dr. Carrillo Key.

## 2019-03-19 PROCEDURE — 3331090001 HH PPS REVENUE CREDIT

## 2019-03-19 PROCEDURE — 3331090002 HH PPS REVENUE DEBIT

## 2019-03-20 PROCEDURE — 3331090001 HH PPS REVENUE CREDIT

## 2019-03-20 PROCEDURE — 3331090002 HH PPS REVENUE DEBIT

## 2019-03-21 PROCEDURE — 3331090001 HH PPS REVENUE CREDIT

## 2019-03-21 PROCEDURE — 3331090002 HH PPS REVENUE DEBIT

## 2019-03-22 ENCOUNTER — OFFICE VISIT (OUTPATIENT)
Dept: FAMILY MEDICINE CLINIC | Facility: CLINIC | Age: 84
End: 2019-03-22

## 2019-03-22 VITALS
OXYGEN SATURATION: 95 % | RESPIRATION RATE: 16 BRPM | TEMPERATURE: 96.9 F | DIASTOLIC BLOOD PRESSURE: 82 MMHG | WEIGHT: 134 LBS | HEIGHT: 60 IN | HEART RATE: 80 BPM | BODY MASS INDEX: 26.31 KG/M2 | SYSTOLIC BLOOD PRESSURE: 120 MMHG

## 2019-03-22 DIAGNOSIS — K74.60 CIRRHOSIS OF LIVER WITHOUT ASCITES, UNSPECIFIED HEPATIC CIRRHOSIS TYPE (HCC): ICD-10-CM

## 2019-03-22 DIAGNOSIS — K76.89 LIVER NODULE: ICD-10-CM

## 2019-03-22 DIAGNOSIS — R60.0 BILATERAL LEG EDEMA: Primary | ICD-10-CM

## 2019-03-22 PROCEDURE — 3331090001 HH PPS REVENUE CREDIT

## 2019-03-22 PROCEDURE — 3331090002 HH PPS REVENUE DEBIT

## 2019-03-22 RX ORDER — BUSPIRONE HYDROCHLORIDE 5 MG/1
TABLET ORAL
Qty: 60 TAB | Refills: 2 | Status: SHIPPED | OUTPATIENT
Start: 2019-03-22 | End: 2020-01-01 | Stop reason: SDUPTHER

## 2019-03-22 RX ORDER — LIDOCAINE 50 MG/G
PATCH TOPICAL
Qty: 5 EACH | Refills: 0 | Status: SHIPPED | OUTPATIENT
Start: 2019-03-22 | End: 2020-01-01

## 2019-03-22 NOTE — PROGRESS NOTES
Chief Complaint Patient presents with  
[facility] Follow Up  
 Medication Refill

## 2019-03-23 PROCEDURE — 3331090001 HH PPS REVENUE CREDIT

## 2019-03-23 PROCEDURE — 3331090002 HH PPS REVENUE DEBIT

## 2019-03-24 PROCEDURE — 3331090001 HH PPS REVENUE CREDIT

## 2019-03-24 PROCEDURE — 3331090002 HH PPS REVENUE DEBIT

## 2019-03-24 NOTE — PROGRESS NOTES
The patient presents to the office today with the chief complaint of LE Edema HPI The patient had been hospitalized for worsening mental status. She has swelling of her lower extremities associated with stasis dermatitis. The patient was found to have cellulitis. This was treated with a good response. The swelling greatly improved when the patient was supine. The patient has chronic pain from osteoarthritis in her back. Recently the pain has gotten severe at the L1 level. The patient was found to have cirrhosis. There is also a liver nodule that is suspecious for carcinoma  This is being further evaluated by Dr Kamryn Bhatti of oncology. Review of Systems Respiratory: Negative for shortness of breath. Cardiovascular: Positive for leg swelling. Negative for chest pain. No Known Allergies Current Outpatient Medications Medication Sig Dispense Refill  lidocaine (LIDODERM) 5 % Apply patch to the affected area for 12 hours a day and remove for 12 hours a day. 5 Each 0  
 busPIRone (BUSPAR) 5 mg tablet 1 tablet twice per day 60 Tab 2  
 sertraline (ZOLOFT) 25 mg tablet take 1 tablet by mouth once daily 30 Tab 2  JANUVIA 100 mg tablet take 1 tablet by mouth once daily 30 Tab 5  furosemide (LASIX) 40 mg tablet take 1 tablet by mouth once daily 90 Tab 3  
 glipiZIDE-metFORMIN (METAGLIP) 5-500 mg per tablet TAKE 1 TABLET BY MOUTH BEFORE BREAKFAST AND 1 TABLET BY MOUTH BEFORE DINNER 120 Tab 10  
 RABEprazole (ACIPHEX) 20 mg tablet take 1 tablet by mouth once daily 30 Tab 5  
 ascorbic acid, vitamin C, (VITAMIN C) 500 mg tablet Take 500 mg by mouth daily.  ferrous sulfate (IRON) 325 mg (65 mg iron) tablet Take  by mouth Daily (before breakfast).  naproxen sodium (ALEVE PO) Take 220 mg by mouth as needed for Pain.  cyanocobalamin (VITAMIN B-12) 2,000 mcg TbER Take 3 Tabs by mouth daily.  loperamide (IMODIUM) 2 mg capsule Take 1 mg by mouth Three (3) times a week.  pravastatin (PRAVACHOL) 40 mg tablet Take 1 Tab by mouth nightly. 90 Tab 3  
 aspirin delayed-release 81 mg tablet Take  by mouth daily.  cholecalciferol (VITAMIN D3) 1,000 unit tablet Take  by mouth daily. Past Medical History:  
Diagnosis Date  Aortic valve stenosis, mild April 2014 EF 01-40%, Grade 1 diastolic dysfunction, mild aortic stenosis,  mean gradient 10 mm Hg  CAD (coronary artery disease) aortic stenosis  Cardiac echocardiogram 06/2018 EF 55-60%. No RWMA. Mild-mod LVH. Severe LAE. Mild AS.  Cardiac nuclear imaging test 08/27/2008 No evidence of ischemia or prior scarring. EF 62%. No WMA. No significant change from study of 3/23/06.  Cardiovascular RLE venous duplex 12/01/2016 Right leg:  No DVT.  Carotid duplex 04/24/2013 Mild 5-20% LICA stenosis.  DM (diabetes mellitus) ([facility]  Dyslipidemia  Heart failure ([facility]  HTN (hypertension)  Mixed hyperlipidemia  Sciatica  Traumatic closed displaced fracture of acromial end of clavicle, left, initial encounter Past Surgical History:  
Procedure Laterality Date  HX APPENDECTOMY [address]  HX OTHER SURGICAL Right 2010  
 ankle surgery  HX TUMOR REMOVAL Left   
 ovarian  HX UROLOGICAL    
 kidney stone removal  
 
 
Social History Socioeconomic History  Marital status:  Spouse name: Not on file  Number of children: Not on file  Years of education: Not on file  Highest education level: Not on file Occupational History  Not on file Social Needs  Financial resource strain: Not on file  Food insecurity:  
  Worry: Not on file Inability: Not on file  Transportation needs:  
  Medical: Not on file Non-medical: Not on file Tobacco Use  Smoking status: Never Smoker  Smokeless tobacco: Never Used Substance and Sexual Activity  Alcohol use: No  
 Drug use:  No  
  Sexual activity: Never Lifestyle  Physical activity:  
  Days per week: Not on file Minutes per session: Not on file  Stress: Not on file Relationships  Social connections:  
  Talks on phone: Not on file Gets together: Not on file Attends [employer] service: Not on file Active member of club or organization: Not on file Attends meetings of clubs or organizations: Not on file Relationship status: Not on file  Intimate partner violence:  
  Fear of current or ex partner: Not on file Emotionally abused: Not on file Physically abused: Not on file Forced sexual activity: Not on file Other Topics Concern  Not on file Social History Narrative  Not on file Patient does not have an advanced directive on file Visit Vitals /82 Pulse 80 Temp 96.9 °F (36.1 °C) (Oral) Resp 16 Ht 5' (1.524 m) Wt 134 lb (60.8 kg) SpO2 95% BMI 26.17 kg/m² Physical Exam  
Cardiovascular: Normal rate and regular rhythm. Exam reveals no gallop. No murmur heard. Pulmonary/Chest: She has no wheezes. She has no rales. Abdominal: Soft. She exhibits no distension. There is no tenderness. Musculoskeletal: She exhibits edema (3+ doughy edema). BMI:  OK Assessment / Plan ICD-10-CM ICD-9-CM 1. Bilateral leg edema R60.0 782.3 2. Cirrhosis of liver without ascites, unspecified hepatic cirrhosis type ([facility] K74.60 571.5 3. Liver nodule K76.89 573.8 Hospital Bed for relief of pain 
she was advised to continue her maintenance medications Follow-up and Dispositions · Return in about 2 months (around 5/22/2019). I asked Esvin Chin if she has any questions and I answered the questions. Esvin Chin states that she understands the treatment plan and agrees with the treatment plan

## 2019-03-25 ENCOUNTER — HOME CARE VISIT (OUTPATIENT)
Dept: HOME HEALTH SERVICES | Facility: HOME HEALTH | Age: 84
End: 2019-03-25
Payer: MEDICARE

## 2019-03-25 PROCEDURE — 3331090001 HH PPS REVENUE CREDIT

## 2019-03-25 PROCEDURE — 3331090002 HH PPS REVENUE DEBIT

## 2019-03-26 ENCOUNTER — HOME CARE VISIT (OUTPATIENT)
Dept: SCHEDULING | Facility: HOME HEALTH | Age: 84
End: 2019-03-26
Payer: MEDICARE

## 2019-03-26 VITALS
OXYGEN SATURATION: 98 % | TEMPERATURE: 99.1 F | HEART RATE: 78 BPM | DIASTOLIC BLOOD PRESSURE: 52 MMHG | SYSTOLIC BLOOD PRESSURE: 110 MMHG

## 2019-03-26 VITALS
HEART RATE: 81 BPM | OXYGEN SATURATION: 93 % | DIASTOLIC BLOOD PRESSURE: 50 MMHG | TEMPERATURE: 98.1 F | SYSTOLIC BLOOD PRESSURE: 110 MMHG | RESPIRATION RATE: 18 BRPM

## 2019-03-26 PROCEDURE — 3331090001 HH PPS REVENUE CREDIT

## 2019-03-26 PROCEDURE — G0151 HHCP-SERV OF PT,EA 15 MIN: HCPCS

## 2019-03-26 PROCEDURE — 3331090002 HH PPS REVENUE DEBIT

## 2019-03-26 PROCEDURE — G0299 HHS/HOSPICE OF RN EA 15 MIN: HCPCS

## 2019-03-27 ENCOUNTER — HOME CARE VISIT (OUTPATIENT)
Dept: HOME HEALTH SERVICES | Facility: HOME HEALTH | Age: 84
End: 2019-03-27
Payer: MEDICARE

## 2019-03-27 PROCEDURE — 3331090002 HH PPS REVENUE DEBIT

## 2019-03-27 PROCEDURE — 3331090001 HH PPS REVENUE CREDIT

## 2019-03-28 ENCOUNTER — HOME CARE VISIT (OUTPATIENT)
Dept: SCHEDULING | Facility: HOME HEALTH | Age: 84
End: 2019-03-28

## 2019-03-28 PROCEDURE — 3331090001 HH PPS REVENUE CREDIT

## 2019-03-28 PROCEDURE — 3331090002 HH PPS REVENUE DEBIT

## 2019-03-29 ENCOUNTER — HOME CARE VISIT (OUTPATIENT)
Dept: SCHEDULING | Facility: HOME HEALTH | Age: 84
End: 2019-03-29
Payer: MEDICARE

## 2019-03-29 PROCEDURE — 3331090002 HH PPS REVENUE DEBIT

## 2019-03-29 PROCEDURE — G0157 HHC PT ASSISTANT EA 15: HCPCS

## 2019-03-29 PROCEDURE — 3331090001 HH PPS REVENUE CREDIT

## 2019-03-29 PROCEDURE — G0299 HHS/HOSPICE OF RN EA 15 MIN: HCPCS

## 2019-03-30 PROCEDURE — 3331090002 HH PPS REVENUE DEBIT

## 2019-03-30 PROCEDURE — 3331090001 HH PPS REVENUE CREDIT

## 2019-03-31 ENCOUNTER — HOME CARE VISIT (OUTPATIENT)
Dept: HOME HEALTH SERVICES | Facility: HOME HEALTH | Age: 84
End: 2019-03-31
Payer: MEDICARE

## 2019-03-31 VITALS
OXYGEN SATURATION: 98 % | DIASTOLIC BLOOD PRESSURE: 68 MMHG | HEART RATE: 92 BPM | RESPIRATION RATE: 15 BRPM | TEMPERATURE: 98.1 F | SYSTOLIC BLOOD PRESSURE: 110 MMHG

## 2019-03-31 PROCEDURE — 3331090002 HH PPS REVENUE DEBIT

## 2019-03-31 PROCEDURE — 3331090001 HH PPS REVENUE CREDIT

## 2019-04-01 ENCOUNTER — HOME CARE VISIT (OUTPATIENT)
Dept: SCHEDULING | Facility: HOME HEALTH | Age: 84
End: 2019-04-01
Payer: MEDICARE

## 2019-04-01 ENCOUNTER — HOME CARE VISIT (OUTPATIENT)
Dept: HOME HEALTH SERVICES | Facility: HOME HEALTH | Age: 84
End: 2019-04-01
Payer: MEDICARE

## 2019-04-01 PROCEDURE — 3331090002 HH PPS REVENUE DEBIT

## 2019-04-01 PROCEDURE — G0157 HHC PT ASSISTANT EA 15: HCPCS

## 2019-04-01 PROCEDURE — G0152 HHCP-SERV OF OT,EA 15 MIN: HCPCS

## 2019-04-01 PROCEDURE — 3331090001 HH PPS REVENUE CREDIT

## 2019-04-02 ENCOUNTER — HOME CARE VISIT (OUTPATIENT)
Dept: HOME HEALTH SERVICES | Facility: HOME HEALTH | Age: 84
End: 2019-04-02
Payer: MEDICARE

## 2019-04-02 ENCOUNTER — HOSPITAL ENCOUNTER (OUTPATIENT)
Dept: CT IMAGING | Age: 84
Discharge: HOME | End: 2019-04-02
Attending: RADIOLOGY | Admitting: RADIOLOGY
Payer: MEDICARE

## 2019-04-02 VITALS
RESPIRATION RATE: 18 BRPM | BODY MASS INDEX: 27.61 KG/M2 | OXYGEN SATURATION: 92 % | DIASTOLIC BLOOD PRESSURE: 59 MMHG | HEART RATE: 80 BPM | TEMPERATURE: 98.3 F | WEIGHT: 146.25 LBS | HEIGHT: 61 IN | SYSTOLIC BLOOD PRESSURE: 116 MMHG

## 2019-04-02 VITALS
OXYGEN SATURATION: 90 % | SYSTOLIC BLOOD PRESSURE: 117 MMHG | HEART RATE: 72 BPM | DIASTOLIC BLOOD PRESSURE: 69 MMHG | TEMPERATURE: 97.2 F

## 2019-04-02 VITALS
OXYGEN SATURATION: 97 % | DIASTOLIC BLOOD PRESSURE: 68 MMHG | SYSTOLIC BLOOD PRESSURE: 110 MMHG | HEART RATE: 92 BPM | RESPIRATION RATE: 14 BRPM | TEMPERATURE: 98.1 F

## 2019-04-02 DIAGNOSIS — K76.6 PORTAL HYPERTENSION (HCC): ICD-10-CM

## 2019-04-02 DIAGNOSIS — D69.6 THROMBOCYTOPENIA (HCC): ICD-10-CM

## 2019-04-02 LAB
ANION GAP SERPL CALC-SCNC: 3 MMOL/L (ref 3–18)
APTT PPP: 29.8 SEC (ref 23–36.4)
BUN SERPL-MCNC: 15 MG/DL (ref 7–18)
BUN/CREAT SERPL: 26 (ref 12–20)
CALCIUM SERPL-MCNC: 9.6 MG/DL (ref 8.5–10.1)
CHLORIDE SERPL-SCNC: 106 MMOL/L (ref 100–108)
CO2 SERPL-SCNC: 31 MMOL/L (ref 21–32)
CREAT SERPL-MCNC: 0.58 MG/DL (ref 0.6–1.3)
ERYTHROCYTE [DISTWIDTH] IN BLOOD BY AUTOMATED COUNT: 15.5 % (ref 11.6–14.5)
GLUCOSE BLD STRIP.AUTO-MCNC: 100 MG/DL (ref 70–110)
GLUCOSE BLD STRIP.AUTO-MCNC: 90 MG/DL (ref 70–110)
GLUCOSE SERPL-MCNC: 85 MG/DL (ref 74–99)
HCT VFR BLD AUTO: 35.1 % (ref 35–45)
HGB BLD-MCNC: 11.6 G/DL (ref 12–16)
INR PPP: 1.1 (ref 0.8–1.2)
MCH RBC QN AUTO: 31.1 PG (ref 24–34)
MCHC RBC AUTO-ENTMCNC: 33 G/DL (ref 31–37)
MCV RBC AUTO: 94.1 FL (ref 74–97)
PLATELET # BLD AUTO: 106 K/UL (ref 135–420)
PMV BLD AUTO: 10.8 FL (ref 9.2–11.8)
POTASSIUM SERPL-SCNC: 4.1 MMOL/L (ref 3.5–5.5)
PROTHROMBIN TIME: 13.4 SEC (ref 11.5–15.2)
RBC # BLD AUTO: 3.73 M/UL (ref 4.2–5.3)
SODIUM SERPL-SCNC: 140 MMOL/L (ref 136–145)
WBC # BLD AUTO: 3.7 K/UL (ref 4.6–13.2)

## 2019-04-02 PROCEDURE — 80048 BASIC METABOLIC PNL TOTAL CA: CPT

## 2019-04-02 PROCEDURE — 74011250636 HC RX REV CODE- 250/636

## 2019-04-02 PROCEDURE — 85730 THROMBOPLASTIN TIME PARTIAL: CPT

## 2019-04-02 PROCEDURE — 88307 TISSUE EXAM BY PATHOLOGIST: CPT

## 2019-04-02 PROCEDURE — 47000 NEEDLE BIOPSY OF LIVER PERQ: CPT

## 2019-04-02 PROCEDURE — 3331090002 HH PPS REVENUE DEBIT

## 2019-04-02 PROCEDURE — 82962 GLUCOSE BLOOD TEST: CPT

## 2019-04-02 PROCEDURE — 88333 PATH CONSLTJ SURG CYTO XM 1: CPT

## 2019-04-02 PROCEDURE — 85610 PROTHROMBIN TIME: CPT

## 2019-04-02 PROCEDURE — 3331090001 HH PPS REVENUE CREDIT

## 2019-04-02 PROCEDURE — 88334 PATH CONSLTJ SURG CYTO XM EA: CPT

## 2019-04-02 PROCEDURE — 74011000272 HC RX REV CODE- 272

## 2019-04-02 PROCEDURE — 85027 COMPLETE CBC AUTOMATED: CPT

## 2019-04-02 RX ORDER — FLUMAZENIL 0.1 MG/ML
0.2 INJECTION INTRAVENOUS
Status: DISCONTINUED | OUTPATIENT
Start: 2019-04-02 | End: 2019-04-02 | Stop reason: HOSPADM

## 2019-04-02 RX ORDER — LIDOCAINE HYDROCHLORIDE 10 MG/ML
INJECTION, SOLUTION EPIDURAL; INFILTRATION; INTRACAUDAL; PERINEURAL
Status: DISCONTINUED
Start: 2019-04-02 | End: 2019-04-02 | Stop reason: HOSPADM

## 2019-04-02 RX ORDER — MIDAZOLAM HYDROCHLORIDE 1 MG/ML
1 INJECTION, SOLUTION INTRAMUSCULAR; INTRAVENOUS
Status: DISCONTINUED | OUTPATIENT
Start: 2019-04-02 | End: 2019-04-02

## 2019-04-02 RX ORDER — SODIUM CHLORIDE 0.9 % (FLUSH) 0.9 %
5-40 SYRINGE (ML) INJECTION AS NEEDED
Status: DISCONTINUED | OUTPATIENT
Start: 2019-04-02 | End: 2019-04-02 | Stop reason: HOSPADM

## 2019-04-02 RX ORDER — MIDAZOLAM HYDROCHLORIDE 1 MG/ML
INJECTION, SOLUTION INTRAMUSCULAR; INTRAVENOUS
Status: COMPLETED
Start: 2019-04-02 | End: 2019-04-02

## 2019-04-02 RX ORDER — NALOXONE HYDROCHLORIDE 0.4 MG/ML
0.1 INJECTION, SOLUTION INTRAMUSCULAR; INTRAVENOUS; SUBCUTANEOUS
Status: DISCONTINUED | OUTPATIENT
Start: 2019-04-02 | End: 2019-04-02 | Stop reason: HOSPADM

## 2019-04-02 RX ORDER — FENTANYL CITRATE 50 UG/ML
50 INJECTION, SOLUTION INTRAMUSCULAR; INTRAVENOUS
Status: DISCONTINUED | OUTPATIENT
Start: 2019-04-02 | End: 2019-04-02

## 2019-04-02 RX ORDER — FENTANYL CITRATE 50 UG/ML
INJECTION, SOLUTION INTRAMUSCULAR; INTRAVENOUS
Status: COMPLETED
Start: 2019-04-02 | End: 2019-04-02

## 2019-04-02 RX ORDER — SODIUM CHLORIDE 0.9 % (FLUSH) 0.9 %
5-40 SYRINGE (ML) INJECTION EVERY 8 HOURS
Status: DISCONTINUED | OUTPATIENT
Start: 2019-04-02 | End: 2019-04-02 | Stop reason: HOSPADM

## 2019-04-02 RX ORDER — SODIUM CHLORIDE 9 MG/ML
20 INJECTION, SOLUTION INTRAVENOUS CONTINUOUS
Status: DISCONTINUED | OUTPATIENT
Start: 2019-04-02 | End: 2019-04-02 | Stop reason: HOSPADM

## 2019-04-02 RX ADMIN — FENTANYL CITRATE 25 MCG: 50 INJECTION, SOLUTION INTRAMUSCULAR; INTRAVENOUS at 10:40

## 2019-04-02 RX ADMIN — MIDAZOLAM HYDROCHLORIDE 1 MG: 1 INJECTION, SOLUTION INTRAMUSCULAR; INTRAVENOUS at 10:25

## 2019-04-02 RX ADMIN — MIDAZOLAM HYDROCHLORIDE 1 MG: 2 INJECTION, SOLUTION INTRAMUSCULAR; INTRAVENOUS at 10:25

## 2019-04-02 RX ADMIN — FENTANYL CITRATE 50 MCG: 50 INJECTION INTRAMUSCULAR; INTRAVENOUS at 10:25

## 2019-04-02 RX ADMIN — MIDAZOLAM HYDROCHLORIDE 0.5 MG: 1 INJECTION, SOLUTION INTRAMUSCULAR; INTRAVENOUS at 10:40

## 2019-04-02 RX ADMIN — FENTANYL CITRATE 50 MCG: 50 INJECTION, SOLUTION INTRAMUSCULAR; INTRAVENOUS at 10:25

## 2019-04-02 RX ADMIN — GELATIN ABSORBABLE SPONGE 12-7 MM 1 EACH: 12-7 MISC at 10:46

## 2019-04-02 NOTE — H&P
OUTPATIENT HISTORY AND PHYSICAL      Today 4/2/2019     Indication/Symptoms:   Hailee Agee is a 80 y.o. female with a suspicious liver lesion who presents for an image-guided liver lesion biopsy with moderate sedation. Patient has been NPO since midnight and takes no blood thinning medications. Current Meds:    Prior to Admission medications    Medication Sig Start Date End Date Taking? Authorizing Provider   busPIRone (BUSPAR) 5 mg tablet 1 tablet twice per day 3/22/19  Yes Delvis Hartmann MD   sertraline (ZOLOFT) 25 mg tablet take 1 tablet by mouth once daily 2/23/19  Yes Delvis Hartmann MD   JANUVIA 100 mg tablet take 1 tablet by mouth once daily 12/23/18  Yes Delvis Hartmann MD   furosemide (LASIX) 40 mg tablet take 1 tablet by mouth once daily 12/20/18  Yes Toma Tirvedi MD   glipiZIDE-metFORMIN (METAGLIP) 5-500 mg per tablet TAKE 1 TABLET BY MOUTH BEFORE BREAKFAST AND 1 TABLET BY MOUTH BEFORE DINNER 11/16/18  Yes Delvis Hartmann MD   RABEprazole (ACIPHEX) 20 mg tablet take 1 tablet by mouth once daily 11/16/18  Yes Delvis Hartmann MD   ascorbic acid, vitamin C, (VITAMIN C) 500 mg tablet Take 500 mg by mouth daily. Yes Provider, Historical   ferrous sulfate (IRON) 325 mg (65 mg iron) tablet Take  by mouth Daily (before breakfast). Yes Provider, Historical   cyanocobalamin (VITAMIN B-12) 2,000 mcg TbER Take 3 Tabs by mouth daily. Yes Provider, Historical   loperamide (IMODIUM) 2 mg capsule Take 1 mg by mouth Three (3) times a week. Yes Provider, Historical   pravastatin (PRAVACHOL) 40 mg tablet Take 1 Tab by mouth nightly. 8/10/16  Yes Delvis Hartmann MD   cholecalciferol (VITAMIN D3) 1,000 unit tablet Take  by mouth daily. Yes Provider, Historical   lidocaine (LIDODERM) 5 % Apply patch to the affected area for 12 hours a day and remove for 12 hours a day. 3/22/19   Delvis Hartmann MD   naproxen sodium (ALEVE PO) Take 220 mg by mouth as needed for Pain.     Provider, Historical aspirin delayed-release 81 mg tablet Take  by mouth daily. Provider, Historical       Allergies:    No Known Allergies    Comorbid Conditions:    Past Medical History:   Diagnosis Date    Aortic valve stenosis, mild April 2014    EF 74-20%, Grade 1 diastolic dysfunction, mild aortic stenosis,  mean gradient 10 mm Hg    CAD (coronary artery disease)     aortic stenosis    Cardiac echocardiogram 06/2018    EF 55-60%. No RWMA. Mild-mod LVH. Severe LAE. Mild AS.  Cardiac nuclear imaging test 08/27/2008    No evidence of ischemia or prior scarring. EF 62%. No WMA. No significant change from study of 3/23/06.  Cardiovascular RLE venous duplex 12/01/2016    Right leg:  No DVT.  Carotid duplex 19/28/5797    Mild 2-80% LICA stenosis.  DM (diabetes mellitus) ([facility]     Dyslipidemia     Heart failure ([facility]     HTN (hypertension)     Mixed hyperlipidemia     Sciatica     Traumatic closed displaced fracture of acromial end of clavicle, left, initial encounter           Past Surgical History:   Procedure Laterality Date    HX APPENDECTOMY      HX OOPHORECTOMY Left 1957    HX OTHER SURGICAL Right 2010    ankle surgery    HX TUMOR REMOVAL Left     ovarian    HX UROLOGICAL      kidney stone removal     Data:    Visit Vitals  /71 (BP 1 Location: Right arm, BP Patient Position: At rest)   Pulse 78   Temp 98.3 °F (36.8 °C)   Resp 20   Ht 5' 1\" (1.549 m)   Wt 66.3 kg (146 lb 4 oz)   SpO2 100%   BMI 27.63 kg/m²   :  Recent Labs     04/02/19  0715   *     Recent Labs     04/02/19  0715   INR 1.1   APTT 29.8       The H & P and/or progress notes and any available imaging were reviewed. The risks, indications and possible alternatives to the procedure, including doing nothing, were discussed and informed consent was obtained. Physical Exam:      Mental status:   Alert and oriented.    Examination specific to the procedure proposed to be performed and any co morbid conditions: Mallampati classification 2 ,  ASA 2   Heart:   Regular rate. Lungs:   Normal respiratory effort. Symmetrical rise and fall of chest    The patient is an appropriate candidate to undergo the planned procedure and sedation.     Angela Fry

## 2019-04-02 NOTE — PROCEDURES
RADIOLOGY POST PROCEDURE NOTE     April 2, 2019       5:07 PM     Preoperative Diagnosis:   Liver mass. Postoperative Diagnosis:  Same. :  Dr. Westley Steward    Assistant:  None. Type of Anesthesia: 1% plain lidocaine and IV moderate sedation with Versed and Fentanyl. Procedure/Description:  Image guided segment 4 liver mass core needle Bx. Findings:   No bleeding. Estimated blood Loss:  Minimal    Specimen Removed:   yes    Blood transfusions:  None. Implants:  None.     Complications: None    Condition: Stable    Discharge Plan:  discharge home     Nayana Valentin MD

## 2019-04-02 NOTE — DISCHARGE INSTRUCTIONS
Percutaneous Liver Biopsy: What to Expect at [facility]  Percutaneous liver biopsy is a procedure to take a tiny sample (biopsy) of your liver tissue. Percutaneous (say \"per-yamilet-MARYLOU-jayda-) means \"through the skin. \" The procedure is also called aspiration biopsy or fine-needle aspiration. The tissue sample is looked at under a microscope. Your doctor can look for infection or other liver problems. You may have some pain where the biopsy needle entered your skin (the puncture site). You may also have pain in your shoulder. This is called referred pain. It is caused by pain traveling along a nerve near the biopsy site. The referred pain usually lasts less than 12 hours. You may have a small amount of bleeding from the puncture site. You will need to take it easy at home for 1 or 2 days after the procedure. You will probably be able to return to work and most of your usual activities after that. This care sheet gives you a general idea about how long it will take for you to recover. But each person recovers at a different pace. Follow the steps below to get better as quickly as possible. How can you care for yourself at home? Activity    · Rest when you feel tired. Getting enough sleep will help you recover.     · Try to walk each day. Start by walking a little more than you did the day before. Bit by bit, increase the amount you walk. Walking boosts blood flow and helps prevent pneumonia and constipation.     · Avoid exercises that use your belly muscles and strenuous activities, such as bicycle riding, jogging, weight lifting, or aerobic exercise, for 1 week or until your doctor says it is okay.     · Ask your doctor when you can drive again.     · You will probably need to take 1 or 2 days off from work. It depends on the type of work you do and how you feel.     · You will probably be able to shower the same day as the test, if your doctor says it is okay. Pat the puncture site dry.  Do not take a bath for at least 2 days after the test, or until your doctor tells you it is okay. Diet    · You can eat your normal diet. If your stomach is upset, try bland, low-fat foods like plain rice, broiled chicken, toast, and yogurt.     · Drink plenty of fluids (unless your doctor tells you not to). Medicines    · Your doctor will tell you if and when you can restart your medicines. He or she will also give you instructions about taking any new medicines.     · If you take blood thinners, such as warfarin (Coumadin), clopidogrel (Plavix), or aspirin, be sure to talk to your doctor. He or she will tell you if and when to start taking those medicines again. Make sure that you understand exactly what your doctor wants you to do.     · Be safe with medicines. Take pain medicines exactly as directed. ? If the doctor gave you a prescription medicine for pain, take it as prescribed. ? If you are not taking a prescription pain medicine, take an over-the-counter medicine that your doctor recommends. Read and follow all instructions on the label. ? Do not take aspirin, ibuprofen (Advil, Motrin), naproxen (Aleve), or other nonsteroidal anti-inflammatory drugs (NSAIDs) unless your doctor says it is okay.     · If you think your pain medicine is making you sick to your stomach:  ? Take your medicine after meals (unless your doctor has told you not to). ? Ask your doctor for a different kind of pain medicine.    Care of the puncture site    · Keep a bandage over the puncture site for the first 1 or 2 days. Follow-up care is a key part of your treatment and safety. Be sure to make and go to all appointments, and call your doctor if you are having problems. It's also a good idea to know your test results and keep a list of the medicines you take. When should you call for help? Call 911 anytime you think you may need emergency care.  For example, call if:    · You passed out (lost consciousness).     · You have severe trouble breathing.     · You have sudden chest pain and shortness of breath, or you cough up blood.     · You have severe pain in your chest, shoulder, or belly.    Call your doctor now or seek immediate medical care if:    · You have new or worse shortness of breath.     · Bright red blood has soaked through the bandage over the puncture site.     · You have pain that does not get better after you take your pain medicine.     · You are sick to your stomach or cannot keep fluids down.     · You have a fever, chills, or body aches.     · You have signs of infection, such as:  ? Increased pain, swelling, warmth, or redness. ? Red streaks leading from the puncture site. ? Pus draining from the puncture site. ? A fever.     · You have new or worse pain at the puncture site.     · You have new or worse belly swelling or bloating.     · You have trouble passing urine or stool.     · Your stools are black and tarlike or have streaks of blood.     · You have pale-colored stools along with dark urine and itching.    Watch closely for changes in your health, and be sure to contact your doctor if you have any problems. Where can you learn more? Go to http://josé miguel-reid.info/. Enter Y762 in the search box to learn more about \"Percutaneous Liver Biopsy: What to Expect at Home. \"  Current as of: June 25, 2018  Content Version: 11.9  © 9308-4258 [employer]. Care instructions adapted under license by [employer] (which disclaims liability or warranty for this information). If you have questions about a medical condition or this instruction, always ask your healthcare professional. [place] [address] any warranty or liability for your use of this information.   DISCHARGE SUMMARY from Nurse    PATIENT INSTRUCTIONS:    After general anesthesia or intravenous sedation, for 24 hours or while taking prescription Narcotics:  · Limit your activities  · Do not drive and operate hazardous machinery  · Do not make important personal or business decisions  · Do  not drink alcoholic beverages  · If you have not urinated within 8 hours after discharge, please contact your surgeon on call. Report the following to your surgeon:  · Excessive pain, swelling, redness or odor of or around the surgical area  · Temperature over 100.5  · Nausea and vomiting lasting longer than 4 hours or if unable to take medications  · Any signs of decreased circulation or nerve impairment to extremity: change in color, persistent  numbness, tingling, coldness or increase pain  · Any questions      *  Please give a list of your current medications to your Primary Care Provider. *  Please update this list whenever your medications are discontinued, doses are      changed, or new medications (including over-the-counter products) are added. *  Please carry medication information at all times in case of emergency situations. These are general instructions for a healthy lifestyle:    No smoking/ No tobacco products/ Avoid exposure to second hand smoke  Surgeon General's Warning:  Quitting smoking now greatly reduces serious risk to your health. Obesity, smoking, and sedentary lifestyle greatly increases your risk for illness    A healthy diet, regular physical exercise & weight monitoring are important for maintaining a healthy lifestyle    You may be retaining fluid if you have a history of heart failure or if you experience any of the following symptoms:  Weight gain of 3 pounds or more overnight or 5 pounds in a week, increased swelling in our hands or feet or shortness of breath while lying flat in bed. Please call your doctor as soon as you notice any of these symptoms; do not wait until your next office visit.     Recognize signs and symptoms of STROKE:    F-face looks uneven    A-arms unable to move or move unevenly    S-speech slurred or non-existent    T-time-call 911 as soon as signs and symptoms begin-DO NOT go       Back to bed or wait to see if you get better-TIME IS BRAIN. Warning Signs of HEART ATTACK     Call 911 if you have these symptoms:   Chest discomfort. Most heart attacks involve discomfort in the center of the chest that lasts more than a few minutes, or that goes away and comes back. It can feel like uncomfortable pressure, squeezing, fullness, or pain.  Discomfort in other areas of the upper body. Symptoms can include pain or discomfort in one or both arms, the back, neck, jaw, or stomach.  Shortness of breath with or without chest discomfort.  Other signs may include breaking out in a cold sweat, nausea, or lightheadedness. Don't wait more than five minutes to call 911 - MINUTES MATTER! Fast action can save your life. Calling 911 is almost always the fastest way to get lifesaving treatment. Emergency Medical Services staff can begin treatment when they arrive -- up to an hour sooner than if someone gets to the hospital by car. The discharge information has been reviewed with the patient/daughter. The patient/daughter verbalized understanding. Discharge medications reviewed with the patient/daughter and appropriate educational materials and side effects teaching were provided.   ___________________________________________________________________________________________________________________________________

## 2019-04-02 NOTE — PROGRESS NOTES
TRANSFER - OUT REPORT:    Verbal report given to Emigdio Beltran RN(name) on Carl Aguirre  being transferred to Phase 2(unit) for routine post - op       Report consisted of patients Situation, Background, Assessment and   Recommendations(SBAR). Information from the following report(s) SBAR, Kardex and MAR was reviewed with the receiving nurse. Lines:   Peripheral IV 04/02/19 Right Forearm (Active)   Site Assessment Clean, dry, & intact 4/2/2019  7:24 AM   Phlebitis Assessment 0 4/2/2019  7:24 AM   Infiltration Assessment 0 4/2/2019  7:24 AM   Dressing Status Clean, dry, & intact 4/2/2019  7:24 AM   Dressing Type Tape;Transparent 4/2/2019  7:24 AM   Hub Color/Line Status Blue; Infusing 4/2/2019  7:24 AM        Opportunity for questions and clarification was provided.       Patient transported with:   [employer]

## 2019-04-03 PROCEDURE — 3331090002 HH PPS REVENUE DEBIT

## 2019-04-03 PROCEDURE — 3331090001 HH PPS REVENUE CREDIT

## 2019-04-04 ENCOUNTER — HOME CARE VISIT (OUTPATIENT)
Dept: SCHEDULING | Facility: HOME HEALTH | Age: 84
End: 2019-04-04
Payer: MEDICARE

## 2019-04-04 PROCEDURE — 3331090001 HH PPS REVENUE CREDIT

## 2019-04-04 PROCEDURE — G0157 HHC PT ASSISTANT EA 15: HCPCS

## 2019-04-04 PROCEDURE — 3331090002 HH PPS REVENUE DEBIT

## 2019-04-04 PROCEDURE — G0158 HHC OT ASSISTANT EA 15: HCPCS

## 2019-04-05 VITALS
SYSTOLIC BLOOD PRESSURE: 132 MMHG | DIASTOLIC BLOOD PRESSURE: 84 MMHG | OXYGEN SATURATION: 96 % | HEART RATE: 87 BPM | TEMPERATURE: 98.2 F

## 2019-04-05 PROCEDURE — 3331090002 HH PPS REVENUE DEBIT

## 2019-04-05 PROCEDURE — 3331090001 HH PPS REVENUE CREDIT

## 2019-04-06 ENCOUNTER — HOME CARE VISIT (OUTPATIENT)
Dept: SCHEDULING | Facility: HOME HEALTH | Age: 84
End: 2019-04-06
Payer: MEDICARE

## 2019-04-06 VITALS
SYSTOLIC BLOOD PRESSURE: 140 MMHG | DIASTOLIC BLOOD PRESSURE: 78 MMHG | OXYGEN SATURATION: 99 % | HEART RATE: 78 BPM | RESPIRATION RATE: 16 BRPM | TEMPERATURE: 97 F

## 2019-04-06 PROCEDURE — 3331090001 HH PPS REVENUE CREDIT

## 2019-04-06 PROCEDURE — G0299 HHS/HOSPICE OF RN EA 15 MIN: HCPCS

## 2019-04-06 PROCEDURE — 3331090002 HH PPS REVENUE DEBIT

## 2019-04-07 PROCEDURE — 3331090002 HH PPS REVENUE DEBIT

## 2019-04-07 PROCEDURE — 3331090001 HH PPS REVENUE CREDIT

## 2019-04-08 ENCOUNTER — OFFICE VISIT (OUTPATIENT)
Dept: ORTHOPEDIC SURGERY | Age: 84
End: 2019-04-08

## 2019-04-08 VITALS
BODY MASS INDEX: 26.36 KG/M2 | DIASTOLIC BLOOD PRESSURE: 74 MMHG | TEMPERATURE: 97.8 F | HEIGHT: 61 IN | OXYGEN SATURATION: 99 % | RESPIRATION RATE: 16 BRPM | HEART RATE: 77 BPM | SYSTOLIC BLOOD PRESSURE: 126 MMHG | WEIGHT: 139.6 LBS

## 2019-04-08 DIAGNOSIS — M81.0 AGE RELATED OSTEOPOROSIS, UNSPECIFIED PATHOLOGICAL FRACTURE PRESENCE: Primary | ICD-10-CM

## 2019-04-08 PROCEDURE — 3331090002 HH PPS REVENUE DEBIT

## 2019-04-08 PROCEDURE — 3331090001 HH PPS REVENUE CREDIT

## 2019-04-08 NOTE — PROGRESS NOTES
Chief complaint/History of Present Illness: Chief Complaint Patient presents with  Back Pain Lumbar  Follow-up [facility] Carmencita Vasquez is a  80 y.o.  female Dictation #1 [id number]  [id number] Review of systems: 
 
Past Medical History:  
Diagnosis Date  Aortic valve stenosis, mild April 2014 EF 05-47%, Grade 1 diastolic dysfunction, mild aortic stenosis,  mean gradient 10 mm Hg  CAD (coronary artery disease) aortic stenosis  Cardiac echocardiogram 06/2018 EF 55-60%. No RWMA. Mild-mod LVH. Severe LAE. Mild AS.  Cardiac nuclear imaging test 08/27/2008 No evidence of ischemia or prior scarring. EF 62%. No WMA. No significant change from study of 3/23/06.  Cardiovascular RLE venous duplex 12/01/2016 Right leg:  No DVT.  Carotid duplex 04/24/2013 Mild 3-32% LICA stenosis.  DM (diabetes mellitus) ([facility] [address]  Dyslipidemia  Heart failure ([facility] [address]  HTN (hypertension)  Mixed hyperlipidemia  Sciatica  Traumatic closed displaced fracture of acromial end of clavicle, left, initial encounter Past Surgical History:  
Procedure Laterality Date  HX APPENDECTOMY [address]  HX OTHER SURGICAL Right 2010  
 ankle surgery  HX TUMOR REMOVAL Left   
 ovarian  HX UROLOGICAL    
 kidney stone removal  
 
Social History Socioeconomic History  Marital status:  Spouse name: Not on file  Number of children: Not on file  Years of education: Not on file  Highest education level: Not on file Occupational History  Not on file Social Needs  Financial resource strain: Not on file  Food insecurity:  
  Worry: Not on file Inability: Not on file  Transportation needs:  
  Medical: Not on file Non-medical: Not on file Tobacco Use  Smoking status: Never Smoker  Smokeless tobacco: Never Used Substance and Sexual Activity  Alcohol use:  No  
  Drug use: No  
 Sexual activity: Not on file Lifestyle  Physical activity:  
  Days per week: Not on file Minutes per session: Not on file  Stress: Not on file Relationships  Social connections:  
  Talks on phone: Not on file Gets together: Not on file Attends Latter day service: Not on file Active member of club or organization: Not on file Attends meetings of clubs or organizations: Not on file Relationship status: Not on file  Intimate partner violence:  
  Fear of current or ex partner: Not on file Emotionally abused: Not on file Physically abused: Not on file Forced sexual activity: Not on file Other Topics Concern  Not on file Social History Narrative  Not on file Family History Problem Relation Age of Onset  Stroke Mother  Heart Disease Mother  Lung Disease Father  Cancer Neg Hx  Diabetes Neg Hx  Hypertension Neg Hx Physical Exam: 
Visit Vitals /74 Pulse 77 Temp 97.8 °F (36.6 °C) Resp 16 Ht 5' 1\" (1.549 m) Wt 139 lb 9.6 oz (63.3 kg) SpO2 99% BMI 26.38 kg/m² Pain Scale: 2/10  has been . reviewed and is appropriate Diagnoses and all orders for this visit: 1. Age related osteoporosis, unspecified pathological fracture presence -     DEXA BONE DENSITY STUDY AXIAL; Future -     VITAMIN D, 25 HYDROXY; Future 
-     PHOSPHORUS; Future -     MAGNESIUM; Future -     CALCIUM; Future Follow-up and Dispositions · Return for will call pt's son or daughter with results per pt request. 
  
 
 
 
 
We have informed Kristen Nino to notify us for immediate appointment if she has any worsening neurogical symptoms or if an emergency situation presents, then call [phone]

## 2019-04-08 NOTE — Clinical Note
Chief complaint/History of Present Illness: Chief Complaint Patient presents with  Back Pain Lumbar  Follow-up [facility] Maykel Bradshaw is a  80 y.o.  female HISTORY OF PRESENT ILLNESS:  \The patient is here for an osteoporosis appointment. She suffered a sacral insufficiency fracture back in 01/2019. On 01/29/2019 she had bilateral sacroplasties which helped quite a bit. On 02/08/2019, she underwent bilateral SI joint injections which also helped. She states she is not having any back pain right now but she is here to get her osteoporosis treatment. The patient did undergo a liver biopsy on 04/02/2019. She is still waiting on the results of that. She sees a Dr. Nik Brown. The patient states she has had bone density tests in the past but does not know the results. She has not had any treatment for osteoporosis. The patient denies fever, bowel or bladder dysfunction. She does have bilateral lymphedema and she usually gets her legs wrapped for that weekly. She is here today with her son, Selvin Valenzuela. PHYSICAL EXAMINATION:  Ms. Emma Hanson is an 59-year-old female. She is alert and oriented. She has a tremor in her voice but is able to answer questions appropriately. She has bilateral  lymphedema with redness to her legs. ASSESSMENT/PLAN:  This is a patient with age-related osteoporosis who has already had bilateral sacral fractures. We are going to go ahead and get a new bone density. I have also ordered lab work to include a vitamin D, calcium, phosphorus and magnesium. She had a recent BMP when she had her liver biopsy. Those results are in the chart. I am not going to repeat them. Once I get the results back I will give either her daughter Bessie Leigh, a call at [phone] or her son Selvin Valenzuela at [phone]. These are their cell phone numbers and their requested means of relaying information for the patient as the patient is hard of hearing. Review of systems: Past Medical History:  
Diagnosis Date  Aortic valve stenosis, mild April 2014 EF 78-53%, Grade 1 diastolic dysfunction, mild aortic stenosis,  mean gradient 10 mm Hg  CAD (coronary artery disease) aortic stenosis  Cardiac echocardiogram 06/2018 EF 55-60%. No RWMA. Mild-mod LVH. Severe LAE. Mild AS.  Cardiac nuclear imaging test 08/27/2008 No evidence of ischemia or prior scarring. EF 62%. No WMA. No significant change from study of 3/23/06.  Cardiovascular RLE venous duplex 12/01/2016 Right leg:  No DVT.  Carotid duplex 04/24/2013 Mild 8-55% LICA stenosis.  DM (diabetes mellitus) ([facility] [address]  Dyslipidemia  Heart failure ([facility] [address]  HTN (hypertension)  Mixed hyperlipidemia  Sciatica  Traumatic closed displaced fracture of acromial end of clavicle, left, initial encounter Past Surgical History:  
Procedure Laterality Date  HX APPEN[address]CTOMY [address]  HX OTHER SURGICAL Right 2010  
 ankle surgery  HX TUMOR REMOVAL Left   
 ovarian  HX UROLOGICAL    
 kidney stone removal  
 
Social History Socioeconomic History  Marital status:  Spouse name: Not on file  Number of children: Not on file  Years of education: Not on file  Highest education level: Not on file Occupational History  Not on file Social Needs  Financial resource strain: Not on file  Food insecurity:  
  Worry: Not on file Inability: Not on file  Transportation needs:  
  Medical: Not on file Non-medical: Not on file Tobacco Use  Smoking status: Never Smoker  Smokeless tobacco: Never Used Substance and Sexual Activity  Alcohol use: No  
 Drug use: No  
 Sexual activity: Not on file Lifestyle  Physical activity:  
  Days per week: Not on file Minutes per session: Not on file  Stress: Not on file Relationships  Social connections:  
  Talks on phone: Not on file Gets together: Not on file Attends Scientologist service: Not on file Active member of club or organization: Not on file Attends meetings of clubs or organizations: Not on file Relationship status: Not on file  Intimate partner violence:  
  Fear of current or ex partner: Not on file Emotionally abused: Not on file Physically abused: Not on file Forced sexual activity: Not on file Other Topics Concern  Not on file Social History Narrative  Not on file Family History Problem Relation Age of Onset  Stroke Mother  Heart Disease Mother  Lung Disease Father  Cancer Neg Hx  Diabetes Neg Hx  Hypertension Neg Hx Physical Exam: 
Visit Vitals /74 Pulse 77 Temp 97.8 °F (36.6 °C) Resp 16 Ht 5' 1\" (1.549 m) Wt 139 lb 9.6 oz (63.3 kg) SpO2 99% BMI 26.38 kg/m² Pain Scale: 2/10  has been . reviewed and is appropriate Diagnoses and all orders for this visit: 1. Age related osteoporosis, unspecified pathological fracture presence -     DEXA BONE DENSITY STUDY AXIAL; Future -     VITAMIN D, 25 HYDROXY; Future 
-     PHOSPHORUS; Future -     MAGNESIUM; Future -     CALCIUM; Future Follow-up and Dispositions · Return for will call pt's son or daughter with results per pt request. 
  
 
 
 
 
We have informed Carmencita Vasquez to notify us for immediate appointment if she has any worsening neurogical symptoms or if an emergency situation presents, then call [phone]

## 2019-04-08 NOTE — PROGRESS NOTES
HISTORY OF PRESENT ILLNESS:  \The patient is here for an osteoporosis appointment. She suffered a sacral insufficiency fracture back in 01/2019. On 01/29/2019 she had bilateral sacroplasties which helped quite a bit. On 02/08/2019, she underwent bilateral SI joint injections which also helped. She states she is not having any back pain right now but she is here to get her osteoporosis treatment. The patient did undergo a liver biopsy on 04/02/2019. She is still waiting on the results of that. She sees a Dr. Ciaran Patton. The patient states she has had bone density tests in the past but does not know the results. She has not had any treatment for osteoporosis. The patient denies fever, bowel or bladder dysfunction. She does have bilateral lymphedema and she usually gets her legs wrapped for that weekly. She is here today with her son, Jermaine Del Valle. PHYSICAL EXAMINATION:  Ms. Gui Ponce is an 55-year-old female. She is alert and oriented. She has a tremor in her voice but is able to answer questions appropriately. She has bilateral  lymphedema with redness to her legs. ASSESSMENT/PLAN:  This is a patient with age-related osteoporosis who has already had bilateral sacral fractures. We are going to go ahead and get a new bone density. I have also ordered lab work to include a vitamin D, calcium, phosphorus and magnesium. She had a recent BMP when she had her liver biopsy. Those results are in the chart. I am not going to repeat them. Once I get the results back I will give either her daughter David Lee, a call at [phone] or her son Jermaine Del Valle at [phone]. These are their cell phone numbers and their requested means of relaying information for the patient as the patient is hard of hearing.

## 2019-04-09 ENCOUNTER — HOME CARE VISIT (OUTPATIENT)
Dept: SCHEDULING | Facility: HOME HEALTH | Age: 84
End: 2019-04-09
Payer: MEDICARE

## 2019-04-09 VITALS
SYSTOLIC BLOOD PRESSURE: 128 MMHG | RESPIRATION RATE: 13 BRPM | DIASTOLIC BLOOD PRESSURE: 76 MMHG | TEMPERATURE: 98.2 F | OXYGEN SATURATION: 98 % | HEART RATE: 87 BPM

## 2019-04-09 PROCEDURE — G0158 HHC OT ASSISTANT EA 15: HCPCS

## 2019-04-09 PROCEDURE — 3331090001 HH PPS REVENUE CREDIT

## 2019-04-09 PROCEDURE — 3331090002 HH PPS REVENUE DEBIT

## 2019-04-10 ENCOUNTER — HOME CARE VISIT (OUTPATIENT)
Dept: SCHEDULING | Facility: HOME HEALTH | Age: 84
End: 2019-04-10
Payer: MEDICARE

## 2019-04-10 ENCOUNTER — DOCUMENTATION ONLY (OUTPATIENT)
Dept: FAMILY MEDICINE CLINIC | Facility: CLINIC | Age: 84
End: 2019-04-10

## 2019-04-10 PROCEDURE — 3331090001 HH PPS REVENUE CREDIT

## 2019-04-10 PROCEDURE — 3331090002 HH PPS REVENUE DEBIT

## 2019-04-10 PROCEDURE — G0158 HHC OT ASSISTANT EA 15: HCPCS

## 2019-04-11 ENCOUNTER — HOME CARE VISIT (OUTPATIENT)
Dept: SCHEDULING | Facility: HOME HEALTH | Age: 84
End: 2019-04-11
Payer: MEDICARE

## 2019-04-11 VITALS
TEMPERATURE: 98.2 F | DIASTOLIC BLOOD PRESSURE: 78 MMHG | OXYGEN SATURATION: 98 % | HEART RATE: 87 BPM | SYSTOLIC BLOOD PRESSURE: 118 MMHG | RESPIRATION RATE: 16 BRPM

## 2019-04-11 VITALS
HEART RATE: 87 BPM | SYSTOLIC BLOOD PRESSURE: 98 MMHG | DIASTOLIC BLOOD PRESSURE: 56 MMHG | OXYGEN SATURATION: 97 % | TEMPERATURE: 98 F

## 2019-04-11 PROCEDURE — G0151 HHCP-SERV OF PT,EA 15 MIN: HCPCS

## 2019-04-11 PROCEDURE — G0299 HHS/HOSPICE OF RN EA 15 MIN: HCPCS

## 2019-04-11 PROCEDURE — 3331090002 HH PPS REVENUE DEBIT

## 2019-04-11 PROCEDURE — 3331090001 HH PPS REVENUE CREDIT

## 2019-04-12 ENCOUNTER — HOME CARE VISIT (OUTPATIENT)
Dept: HOME HEALTH SERVICES | Facility: HOME HEALTH | Age: 84
End: 2019-04-12
Payer: MEDICARE

## 2019-04-12 VITALS
TEMPERATURE: 98 F | HEART RATE: 87 BPM | RESPIRATION RATE: 18 BRPM | SYSTOLIC BLOOD PRESSURE: 98 MMHG | DIASTOLIC BLOOD PRESSURE: 56 MMHG | OXYGEN SATURATION: 97 %

## 2019-04-12 PROCEDURE — 3331090002 HH PPS REVENUE DEBIT

## 2019-04-12 PROCEDURE — 3331090001 HH PPS REVENUE CREDIT

## 2019-04-13 PROCEDURE — 3331090002 HH PPS REVENUE DEBIT

## 2019-04-13 PROCEDURE — 3331090001 HH PPS REVENUE CREDIT

## 2019-04-14 ENCOUNTER — HOME CARE VISIT (OUTPATIENT)
Dept: SCHEDULING | Facility: HOME HEALTH | Age: 84
End: 2019-04-14
Payer: MEDICARE

## 2019-04-14 VITALS
RESPIRATION RATE: 16 BRPM | SYSTOLIC BLOOD PRESSURE: 132 MMHG | TEMPERATURE: 98.2 F | DIASTOLIC BLOOD PRESSURE: 84 MMHG | OXYGEN SATURATION: 98 % | HEART RATE: 87 BPM

## 2019-04-14 PROCEDURE — 3331090001 HH PPS REVENUE CREDIT

## 2019-04-14 PROCEDURE — G0158 HHC OT ASSISTANT EA 15: HCPCS

## 2019-04-14 PROCEDURE — 3331090002 HH PPS REVENUE DEBIT

## 2019-04-15 ENCOUNTER — HOME CARE VISIT (OUTPATIENT)
Dept: HOME HEALTH SERVICES | Facility: HOME HEALTH | Age: 84
End: 2019-04-15
Payer: MEDICARE

## 2019-04-15 PROCEDURE — G0152 HHCP-SERV OF OT,EA 15 MIN: HCPCS

## 2019-04-15 PROCEDURE — 3331090001 HH PPS REVENUE CREDIT

## 2019-04-15 PROCEDURE — 3331090002 HH PPS REVENUE DEBIT

## 2019-04-16 ENCOUNTER — DOCUMENTATION ONLY (OUTPATIENT)
Dept: FAMILY MEDICINE CLINIC | Facility: CLINIC | Age: 84
End: 2019-04-16

## 2019-04-16 PROCEDURE — 3331090002 HH PPS REVENUE DEBIT

## 2019-04-16 PROCEDURE — 3331090001 HH PPS REVENUE CREDIT

## 2019-04-17 ENCOUNTER — HOME CARE VISIT (OUTPATIENT)
Dept: SCHEDULING | Facility: HOME HEALTH | Age: 84
End: 2019-04-17
Payer: MEDICARE

## 2019-04-17 PROCEDURE — 3331090002 HH PPS REVENUE DEBIT

## 2019-04-17 PROCEDURE — G0157 HHC PT ASSISTANT EA 15: HCPCS

## 2019-04-17 PROCEDURE — 3331090001 HH PPS REVENUE CREDIT

## 2019-04-18 VITALS — DIASTOLIC BLOOD PRESSURE: 61 MMHG | HEART RATE: 67 BPM | SYSTOLIC BLOOD PRESSURE: 104 MMHG

## 2019-04-18 PROCEDURE — 3331090002 HH PPS REVENUE DEBIT

## 2019-04-18 PROCEDURE — 3331090001 HH PPS REVENUE CREDIT

## 2019-04-19 ENCOUNTER — HOME CARE VISIT (OUTPATIENT)
Dept: SCHEDULING | Facility: HOME HEALTH | Age: 84
End: 2019-04-19
Payer: MEDICARE

## 2019-04-19 PROCEDURE — G0157 HHC PT ASSISTANT EA 15: HCPCS

## 2019-04-19 PROCEDURE — 3331090001 HH PPS REVENUE CREDIT

## 2019-04-19 PROCEDURE — 3331090002 HH PPS REVENUE DEBIT

## 2019-04-20 PROCEDURE — 3331090001 HH PPS REVENUE CREDIT

## 2019-04-20 PROCEDURE — 3331090002 HH PPS REVENUE DEBIT

## 2019-04-21 PROCEDURE — 3331090001 HH PPS REVENUE CREDIT

## 2019-04-21 PROCEDURE — 3331090002 HH PPS REVENUE DEBIT

## 2019-04-22 ENCOUNTER — HOME CARE VISIT (OUTPATIENT)
Dept: SCHEDULING | Facility: HOME HEALTH | Age: 84
End: 2019-04-22
Payer: MEDICARE

## 2019-04-22 PROCEDURE — 3331090003 HH PPS REVENUE ADJ

## 2019-04-22 PROCEDURE — G0151 HHCP-SERV OF PT,EA 15 MIN: HCPCS

## 2019-04-22 PROCEDURE — 3331090001 HH PPS REVENUE CREDIT

## 2019-04-22 PROCEDURE — 3331090002 HH PPS REVENUE DEBIT

## 2019-04-23 VITALS
OXYGEN SATURATION: 98 % | TEMPERATURE: 98.2 F | HEART RATE: 70 BPM | SYSTOLIC BLOOD PRESSURE: 120 MMHG | DIASTOLIC BLOOD PRESSURE: 60 MMHG

## 2019-04-25 ENCOUNTER — HOSPITAL ENCOUNTER (OUTPATIENT)
Dept: LAB | Age: 84
Discharge: HOME | End: 2019-04-25
Payer: MEDICARE

## 2019-04-25 ENCOUNTER — HOSPITAL ENCOUNTER (OUTPATIENT)
Dept: BONE DENSITY | Age: 84
Discharge: HOME | End: 2019-04-25
Attending: NURSE PRACTITIONER
Payer: MEDICARE

## 2019-04-25 DIAGNOSIS — M81.0 AGE RELATED OSTEOPOROSIS, UNSPECIFIED PATHOLOGICAL FRACTURE PRESENCE: ICD-10-CM

## 2019-04-25 LAB
25(OH)D3 SERPL-MCNC: 28.4 NG/ML (ref 30–100)
CALCIUM SERPL-MCNC: 9.6 MG/DL (ref 8.5–10.1)
MAGNESIUM SERPL-MCNC: 2 MG/DL (ref 1.6–2.6)
PHOSPHATE SERPL-MCNC: 3.4 MG/DL (ref 2.5–4.9)

## 2019-04-25 PROCEDURE — 82310 ASSAY OF CALCIUM: CPT

## 2019-04-25 PROCEDURE — 84100 ASSAY OF PHOSPHORUS: CPT

## 2019-04-25 PROCEDURE — 77080 DXA BONE DENSITY AXIAL: CPT

## 2019-04-25 PROCEDURE — 36415 COLL VENOUS BLD VENIPUNCTURE: CPT

## 2019-04-25 PROCEDURE — 82306 VITAMIN D 25 HYDROXY: CPT

## 2019-04-25 PROCEDURE — 83735 ASSAY OF MAGNESIUM: CPT

## 2019-04-29 ENCOUNTER — DOCUMENTATION ONLY (OUTPATIENT)
Dept: FAMILY MEDICINE CLINIC | Facility: CLINIC | Age: 84
End: 2019-04-29

## 2019-04-29 NOTE — PROGRESS NOTES
Bone density test is consistent with osteoporosis. Will start pt on Prolia at the infusion center once all her labs come in.

## 2019-04-29 NOTE — PROGRESS NOTES
Phosphorus, magnesium and calcium are WNL. Vit D is low 28.4. Have her start vit D 50,000 units weekly. #4 with 1 RF   Will need to recheck Vit D 25 OH in 8 weeks.

## 2019-05-02 ENCOUNTER — DOCUMENTATION ONLY (OUTPATIENT)
Dept: ORTHOPEDIC SURGERY | Age: 84
End: 2019-05-02

## 2019-05-02 DIAGNOSIS — M81.0 AGE-RELATED OSTEOPOROSIS WITHOUT CURRENT PATHOLOGICAL FRACTURE: Primary | ICD-10-CM

## 2019-05-02 NOTE — PROGRESS NOTES
Marcela, I put a prolia order for this patient in your in basket on your desk. Please process as soon as possible.

## 2019-05-03 ENCOUNTER — TELEPHONE (OUTPATIENT)
Dept: ORTHOPEDIC SURGERY | Age: 84
End: 2019-05-03

## 2019-05-03 DIAGNOSIS — E55.9 VITAMIN D DEFICIENCY: Primary | ICD-10-CM

## 2019-05-03 RX ORDER — ERGOCALCIFEROL 1.25 MG/1
50000 CAPSULE ORAL
Qty: 4 CAP | Refills: 1 | Status: SHIPPED | OUTPATIENT
Start: 2019-05-03 | End: 2020-01-01

## 2019-05-03 NOTE — PROGRESS NOTES
Orders placed, called patient, Reached voicemail identified as \"Cole\", left message, identified myself/facility/callback number, requested return call to facility.

## 2019-05-03 NOTE — TELEPHONE ENCOUNTER
Orders placed as per NP Renard's results note. Attempted to contact patient, Reached voicemail identified as \"Cole\", left message, identified myself/facility/callback number, requested return call to facility.

## 2019-05-10 ENCOUNTER — HOSPITAL ENCOUNTER (OUTPATIENT)
Dept: INFUSION THERAPY | Age: 84
Discharge: HOME | End: 2019-05-10
Payer: MEDICARE

## 2019-05-10 VITALS
SYSTOLIC BLOOD PRESSURE: 107 MMHG | TEMPERATURE: 98.4 F | OXYGEN SATURATION: 97 % | DIASTOLIC BLOOD PRESSURE: 64 MMHG | HEART RATE: 64 BPM | RESPIRATION RATE: 18 BRPM

## 2019-05-10 PROCEDURE — 96372 THER/PROPH/DIAG INJ SC/IM: CPT

## 2019-05-10 PROCEDURE — 74011250636 HC RX REV CODE- 250/636: Performed by: NURSE PRACTITIONER

## 2019-05-10 RX ADMIN — DENOSUMAB 60 MG: 60 INJECTION SUBCUTANEOUS at 14:26

## 2019-05-10 NOTE — PROGRESS NOTES
[facility] Progress Note Date: May 10, 2019 Name: Leatha Hilario MRN: [id number] : 1933 Ms. Yodit Hurst was assessed and education was provided. [place] reviewed with patient and family member. Ms. Sykes's vitals were reviewed and patient was observed for 5 minutes prior to treatment. Visit Vitals /64 (BP 1 Location: Right arm, BP Patient Position: At rest;Sitting) Pulse 64 Temp 98.4 °F (36.9 °C) Resp 18 SpO2 97% Breastfeeding? No  
 
 
Lab results were reviewed fro 19 and were within parameters for Prolia. Prolia 60mg was administered subcutaneous in  Left upper arm, bandaid applied. Patient observed for 30 mins and no reactions noted. Ms. Yodit Hurst tolerated well, and had no complaints. Patient armband removed and shredded. Ms. Yodit Hurst was discharged from [place] [address] in stable condition at 1500. She is to return on 2019 at 1400 for her next appointment. Becky Vang RN May 10, 2019 
3:44 PM

## 2019-05-29 NOTE — PROGRESS NOTES
Again contacted [phone] listed as patient's son \"Ryan Sykes\", Reached voicemail identified \"Cole\", left message, identified myself/facility/callback number, requested return call to facility. Called [phone], reached Selam Cordero, daughter on HIPAA, informed Ms. Stoner of vitamin D level, Rx being sent to pharmacy, informed of how patient should be taking her Vitamin D, informed of follow up lab work after last dose of Vitamin D is completed. Ms. Mirna Alexander verbalized agreement/understanding. No further action required at this time.

## 2019-06-06 DIAGNOSIS — F32.A MILD DEPRESSION: ICD-10-CM

## 2019-06-06 RX ORDER — SERTRALINE HYDROCHLORIDE 25 MG/1
TABLET, FILM COATED ORAL
Qty: 30 TAB | Refills: 2 | Status: SHIPPED | OUTPATIENT
Start: 2019-06-06 | End: 2019-01-01 | Stop reason: SDUPTHER

## 2019-10-14 NOTE — TELEPHONE ENCOUNTER
Requested Prescriptions     Pending Prescriptions Disp Refills    JANUVIA 100 mg tablet [Pharmacy Med Name: Billie Brower 100 MG TABLET] 30 Tab 5     Sig: take 1 tablet by mouth every morning

## 2019-11-01 PROBLEM — M81.0 AGE RELATED OSTEOPOROSIS: Status: ACTIVE | Noted: 2019-01-01

## 2019-11-20 NOTE — PROGRESS NOTES
HISTORY OF PRESENT ILLNESS Kristen Garcia is a 80 y.o. female. Follow-up Shortness of Breath Associated symptoms include leg swelling. Pertinent negatives include no fever, no cough, no wheezing, no PND, no orthopnea, no vomiting, no rash and no claudication. Leg Swelling Patient presents for a followup office visit. She has a past medical history significant for hypertension, dyslipidemia, diabetes mellitus, type II, and mild aortic valve stenosis. She also has a history of dependent edema in the past. She was documented with bilateral venous reflux disease on a duplex scan through a vascular surgeon's office in 2017. The patient recently underwent a follow-up echocardiogram in June 2018 which was essentially unchanged from her prior study from 2016, which showed mild aortic valve stenosis, EF 60%, with her mean valve gradient not significantly changed compared to the her prior. She had evidence of diastolic dysfunction with left atrial enlargement as well. Patient was last seen in our office approximately 8 months ago. Since last visit, her biggest complaint is her chronic leg swelling and difficulty getting into her compression device that was ordered by vascular surgery. She denies any worsening shortness of breath, orthopnea or PND. No heart palpitations, dizziness, nor syncope. Past Medical History:  
Diagnosis Date  Aortic valve stenosis, mild April 2014 EF 99-81%, Grade 1 diastolic dysfunction, mild aortic stenosis,  mean gradient 10 mm Hg  CAD (coronary artery disease) aortic stenosis  Cardiac echocardiogram 06/2018 EF 55-60%. No RWMA. Mild-mod LVH. Severe LAE. Mild AS.  Cardiac nuclear imaging test 08/27/2008 No evidence of ischemia or prior scarring. EF 62%. No WMA. No significant change from study of 3/23/06.  Cardiovascular RLE venous duplex 12/01/2016 Right leg:  No DVT.  Carotid duplex 04/24/2013 Mild 0-61% LICA stenosis.  DM (diabetes mellitus) ([facility] [address]  Dyslipidemia  Heart failure ([facility] [address]  HTN (hypertension)  Mixed hyperlipidemia  Sciatica  Traumatic closed displaced fracture of acromial end of clavicle, left, initial encounter Current Outpatient Medications Medication Sig Dispense Refill  busPIRone (BUSPAR) 10 mg tablet take 1/2 tablet by mouth twice a day 30 Tab 2  JANUVIA 100 mg tablet take 1 tablet by mouth every morning 30 Tab 5  furosemide (LASIX) 40 mg tablet take 1 tablet by mouth once daily (Patient taking differently: Take 20 mg by mouth two (2) times a day.) 90 Tab 3  
 sertraline (ZOLOFT) 25 mg tablet take 1 tablet by mouth once daily 30 Tab 2  
 RABEprazole (ACIPHEX) 20 mg tablet take 1 tablet by mouth once daily 30 Tab 5  
 ergocalciferol (VITAMIN D2) 50,000 unit capsule Take 1 Cap by mouth every seven (7) days. 4 Cap 1  
 lidocaine (LIDODERM) 5 % Apply patch to the affected area for 12 hours a day and remove for 12 hours a day. 5 Each 0  
 busPIRone (BUSPAR) 5 mg tablet 1 tablet twice per day 60 Tab 2  
 glipiZIDE-metFORMIN (METAGLIP) 5-500 mg per tablet TAKE 1 TABLET BY MOUTH BEFORE BREAKFAST AND 1 TABLET BY MOUTH BEFORE DINNER 120 Tab 10  
 ascorbic acid, vitamin C, (VITAMIN C) 500 mg tablet Take 500 mg by mouth daily.  ferrous sulfate (IRON) 325 mg (65 mg iron) tablet Take 65 mg by mouth Daily (before breakfast).  naproxen sodium (ALEVE PO) Take 220 mg by mouth two (2) times daily as needed for Pain (For pain). PRN Pain  cyanocobalamin (VITAMIN B-12) 2,000 mcg TbER Take 3 Tabs by mouth daily. 1000 mcg tabs  loperamide (IMODIUM) 2 mg capsule Take 1 mg by mouth Three (3) times a week.  pravastatin (PRAVACHOL) 40 mg tablet Take 1 Tab by mouth nightly. 90 Tab 3  
 aspirin delayed-release 81 mg tablet Take 81 mg by mouth daily.  cholecalciferol (VITAMIN D3) 1,000 unit tablet Take 1,000 Units by mouth daily.  denosumab (PROLIA) 60 mg/mL injection 1 mL by SubCUTAneous route once for 1 dose. 1 Syringe 1 No Known Allergies Social History Tobacco Use  Smoking status: Never Smoker  Smokeless tobacco: Never Used Substance Use Topics  Alcohol use: No  
 Drug use: No  
   
  
Family History Problem Relation Age of Onset  Stroke Mother  Heart Disease Mother  Lung Disease Father  Cancer Neg Hx  Diabetes Neg Hx  Hypertension Neg Hx Review of Systems Constitutional: Negative for chills, fever and weight loss. HENT: Negative for nosebleeds. Eyes: Negative for blurred vision and double vision. Respiratory: Negative for cough and wheezing. Cardiovascular: Positive for leg swelling. Negative for palpitations, orthopnea, claudication and PND. Gastrointestinal: Negative for heartburn, nausea and vomiting. Genitourinary: Negative for dysuria and hematuria. Musculoskeletal: Negative for falls and myalgias. Skin: Negative for rash. Neurological: Negative for dizziness and focal weakness. Endo/Heme/Allergies: Does not bruise/bleed easily. Psychiatric/Behavioral: Negative for substance abuse. Visit Vitals BP 92/60 (BP 1 Location: Left arm, BP Patient Position: Sitting) Pulse 73 Ht 5' 1\" (1.549 m) Wt 62.1 kg (137 lb) SpO2 97% BMI 25.89 kg/m² Physical Exam  
Constitutional: She is oriented to person, place, and time. She appears well-developed and well-nourished. HENT:  
Head: Normocephalic and atraumatic. Eyes: Conjunctivae are normal.  
Neck: Neck supple. No JVD present. Carotid bruit is not present. Cardiovascular: Normal rate, regular rhythm, S1 normal, S2 normal and normal pulses. Exam reveals no gallop. Murmur heard. Harsh midsystolic murmur is present with a grade of 2/6 at the upper right sternal border radiating to the neck. Pulmonary/Chest: Effort normal and breath sounds normal. She has no wheezes. She has no rales. Abdominal: Soft. Bowel sounds are normal. There is no tenderness. Musculoskeletal:     
   General: Edema (2+ bilateral lower extremity ) present. No tenderness or deformity. Comments: Bilateral lower extremities wrapped in Ace bandages to the knees Neurological: She is alert and oriented to person, place, and time. Skin: Skin is warm and dry. Psychiatric: She has a normal mood and affect. Her behavior is normal. Thought content normal.  
 
EKG: Normal sinus rhythm, left axis deviation, voltage criteria for LVH, poor R wave progression, occasional atrial premature contractions, no ST or T wave changes concerning for ischemia. Compared to previous EKG, no significant interval change. ASSESSMENT and PLAN Lower extremity swelling. This is likely dependent in nature and from venous insufficiency. She underwent an echocardiogram in June 2018 which was unchanged compared to her prior study from 2016. I still do not suspect decompensated heart failure. This did improve when she was fitted with the Unna boots. I recommend she follow-up close with vascular surgery for this. Aortic valve stenosis. This was only mild on her echocardiogram from June 2018. This is not the cause of any of her symptoms. No need for serial echocardiograms at this time. Essential hypertension. Patient blood pressure appears reasonably well-controlled on her current medical regimen. Dyslipidemia. Patient is managed with pravastatin 40 mg daily. A reasonable goal LDL be less than 100. Diabetes mellitus, type II. Patient is managed with oral agents. This is followed by her PCP. Her goal A1c should be less than 7. Follow-up in 9 months, sooner if needed Follow-up in 6 months, sooner if needed

## 2019-12-17 NOTE — PROGRESS NOTES
Complex Case Management Date/Time:  2019 1:38 PM 
 
Method of communication with patient:phone Nurse Navigator (NN) contacted the family by telephone to perform Ambulatory Care Coordination. Verified name and  with family as identifiers. Provided introduction to self, and explanation of the Ambulatory Care Manager's role. Reviewed most recent clinic visit w/ family who verbalized understanding. Family given an opportunity to ask questions. Top Challenges reviewed with the patient 1. Pt w/ hx of Aortic Valve stenosis, HTN, HLD, Edema 2. Per dtr, pt doing well, no s/s at this time. 3. No f/u scheduled for PCP, assisted w/ appt. The family agrees to contact the PCP office or the [address] [place] [address] for questions related to their healthcare. Provided contact information for future reference. Disease Specific:   N/A Home Health Active: No 
 
DME Active: No 
 
Barriers to care? none Advance Care Planning:  
Does patient have an Advance Directive:  not on file; education provided Medication(s):  
Medication reconciliation was not performed with family, who declined, but verbalizes understanding of administration of home medications. There were no barriers to obtaining medications identified at this time. Referral to Pharm D needed: no  
 
Current Outpatient Medications Medication Sig  sertraline (ZOLOFT) 25 mg tablet take 1 tablet by mouth once daily  busPIRone (BUSPAR) 10 mg tablet take 1/2 tablet by mouth twice a day  JANUVIA 100 mg tablet take 1 tablet by mouth every morning  furosemide (LASIX) 40 mg tablet take 1 tablet by mouth once daily (Patient taking differently: Take 20 mg by mouth two (2) times a day.)  RABEprazole (ACIPHEX) 20 mg tablet take 1 tablet by mouth once daily  ergocalciferol (VITAMIN D2) 50,000 unit capsule Take 1 Cap by mouth every seven (7) days.   
 denosumab (PROLIA) 60 mg/mL injection 1 mL by SubCUTAneous route once for 1 dose.  lidocaine (LIDODERM) 5 % Apply patch to the affected area for 12 hours a day and remove for 12 hours a day.  busPIRone (BUSPAR) 5 mg tablet 1 tablet twice per day  glipiZIDE-metFORMIN (METAGLIP) 5-500 mg per tablet TAKE 1 TABLET BY MOUTH BEFORE BREAKFAST AND 1 TABLET BY MOUTH BEFORE DINNER  
 ascorbic acid, vitamin C, (VITAMIN C) 500 mg tablet Take 500 mg by mouth daily.  ferrous sulfate (IRON) 325 mg (65 mg iron) tablet Take 65 mg by mouth Daily (before breakfast).  naproxen sodium (ALEVE PO) Take 220 mg by mouth two (2) times daily as needed for Pain (For pain). PRN Pain  cyanocobalamin (VITAMIN B-12) 2,000 mcg TbER Take 3 Tabs by mouth daily. 1000 mcg tabs  loperamide (IMODIUM) 2 mg capsule Take 1 mg by mouth Three (3) times a week.  pravastatin (PRAVACHOL) 40 mg tablet Take 1 Tab by mouth nightly.  aspirin delayed-release 81 mg tablet Take 81 mg by mouth daily.  cholecalciferol (VITAMIN D3) 1,000 unit tablet Take 1,000 Units by mouth daily. No current facility-administered medications for this visit. [facility] follow up appointment(s):  
Future Appointments Date Time Provider Cecil Desouza 2/12/2020  1:30 PM MYAH Myers  
5/8/2020  2:00 PM [facility] INFUSION NURSE 2 [facility]  
8/21/2020  2:20 PM Samy Johnson  [facility] follow up appointment(s):  
 
Goals  Attends follow up appointments on schedule 12/17/19 Patient will attend all scheduled appointments through 02/17/20  Knowledge and adherence of prescribed medication (ie. action, side effects, missed dose, etc.).   
  12/17/19 Pt will take all medications prescribed to be evaluated on each outreach through  02/17/20  Prepare patients and caregivers for end of life decisions (ie. need for hospice, pain management, symptom relief, advance directives etc.) 12/17/19 Pt will complete an ACP and have it scanned into their EMR by 02/17/20

## 2020-01-01 ENCOUNTER — HOME CARE VISIT (OUTPATIENT)
Dept: SCHEDULING | Facility: HOME HEALTH | Age: 85
End: 2020-01-01
Payer: MEDICARE

## 2020-01-01 ENCOUNTER — PATIENT OUTREACH (OUTPATIENT)
Dept: FAMILY MEDICINE CLINIC | Facility: CLINIC | Age: 85
End: 2020-01-01

## 2020-01-01 ENCOUNTER — HOME CARE VISIT (OUTPATIENT)
Dept: HOME HEALTH SERVICES | Facility: HOME HEALTH | Age: 85
End: 2020-01-01
Payer: MEDICARE

## 2020-01-01 ENCOUNTER — OFFICE VISIT (OUTPATIENT)
Dept: FAMILY MEDICINE CLINIC | Facility: CLINIC | Age: 85
End: 2020-01-01

## 2020-01-01 ENCOUNTER — HOSPITAL ENCOUNTER (OUTPATIENT)
Dept: LAB | Age: 85
Discharge: HOME | End: 2020-04-13
Payer: MEDICARE

## 2020-01-01 ENCOUNTER — APPOINTMENT (OUTPATIENT)
Dept: GENERAL RADIOLOGY | Age: 85
DRG: 300 | End: 2020-01-01
Attending: EMERGENCY MEDICINE
Payer: MEDICARE

## 2020-01-01 ENCOUNTER — APPOINTMENT (OUTPATIENT)
Dept: GENERAL RADIOLOGY | Age: 85
DRG: 264 | End: 2020-01-01
Attending: INTERNAL MEDICINE
Payer: MEDICARE

## 2020-01-01 ENCOUNTER — APPOINTMENT (OUTPATIENT)
Dept: VASCULAR SURGERY | Age: 85
DRG: 264 | End: 2020-01-01
Attending: EMERGENCY MEDICINE
Payer: MEDICARE

## 2020-01-01 ENCOUNTER — HOSPITAL ENCOUNTER (EMERGENCY)
Age: 85
Discharge: ANOTHER HOSPITAL | End: 2020-05-16
Attending: EMERGENCY MEDICINE | Admitting: EMERGENCY MEDICINE
Payer: MEDICARE

## 2020-01-01 ENCOUNTER — APPOINTMENT (OUTPATIENT)
Dept: GENERAL RADIOLOGY | Age: 85
DRG: 264 | End: 2020-01-01
Attending: PHYSICIAN ASSISTANT
Payer: MEDICARE

## 2020-01-01 ENCOUNTER — PATIENT OUTREACH (OUTPATIENT)
Dept: CASE MANAGEMENT | Age: 85
End: 2020-01-01

## 2020-01-01 ENCOUNTER — TELEPHONE (OUTPATIENT)
Dept: VASCULAR SURGERY | Age: 85
End: 2020-01-01

## 2020-01-01 ENCOUNTER — HOSPITAL ENCOUNTER (INPATIENT)
Age: 85
LOS: 5 days | Discharge: HOME HEALTH | DRG: 300 | End: 2020-05-03
Attending: EMERGENCY MEDICINE | Admitting: INTERNAL MEDICINE
Payer: MEDICARE

## 2020-01-01 ENCOUNTER — OFFICE VISIT (OUTPATIENT)
Dept: VASCULAR SURGERY | Age: 85
End: 2020-01-01

## 2020-01-01 ENCOUNTER — HOSPITAL ENCOUNTER (OUTPATIENT)
Dept: INFUSION THERAPY | Age: 85
End: 2020-01-01

## 2020-01-01 ENCOUNTER — HOME HEALTH ADMISSION (OUTPATIENT)
Dept: HOME HEALTH SERVICES | Facility: HOME HEALTH | Age: 85
End: 2020-01-01
Payer: MEDICARE

## 2020-01-01 ENCOUNTER — ANESTHESIA EVENT (OUTPATIENT)
Dept: CARDIOTHORACIC SURGERY | Age: 85
DRG: 264 | End: 2020-01-01
Payer: MEDICARE

## 2020-01-01 ENCOUNTER — DOCUMENTATION ONLY (OUTPATIENT)
Dept: ORTHOPEDIC SURGERY | Age: 85
End: 2020-01-01

## 2020-01-01 ENCOUNTER — ANESTHESIA (OUTPATIENT)
Dept: CARDIOTHORACIC SURGERY | Age: 85
DRG: 264 | End: 2020-01-01
Payer: MEDICARE

## 2020-01-01 ENCOUNTER — PATIENT OUTREACH (OUTPATIENT)
Dept: INTERNAL MEDICINE CLINIC | Age: 85
End: 2020-01-01

## 2020-01-01 ENCOUNTER — APPOINTMENT (OUTPATIENT)
Dept: GENERAL RADIOLOGY | Age: 85
DRG: 300 | End: 2020-01-01
Attending: STUDENT IN AN ORGANIZED HEALTH CARE EDUCATION/TRAINING PROGRAM
Payer: MEDICARE

## 2020-01-01 ENCOUNTER — PATIENT OUTREACH (OUTPATIENT)
Dept: CARDIOLOGY CLINIC | Age: 85
End: 2020-01-01

## 2020-01-01 ENCOUNTER — TELEPHONE (OUTPATIENT)
Dept: FAMILY MEDICINE CLINIC | Facility: CLINIC | Age: 85
End: 2020-01-01

## 2020-01-01 ENCOUNTER — HOSPITAL ENCOUNTER (EMERGENCY)
Age: 85
Discharge: HOME | End: 2020-05-08
Attending: EMERGENCY MEDICINE
Payer: MEDICARE

## 2020-01-01 ENCOUNTER — HOSPITAL ENCOUNTER (INPATIENT)
Age: 85
LOS: 8 days | Discharge: HOME HEALTH | DRG: 264 | End: 2020-02-19
Attending: EMERGENCY MEDICINE | Admitting: EMERGENCY MEDICINE
Payer: MEDICARE

## 2020-01-01 VITALS
RESPIRATION RATE: 20 BRPM | TEMPERATURE: 98 F | HEART RATE: 80 BPM | OXYGEN SATURATION: 98 % | DIASTOLIC BLOOD PRESSURE: 72 MMHG | SYSTOLIC BLOOD PRESSURE: 110 MMHG

## 2020-01-01 VITALS
TEMPERATURE: 98.3 F | OXYGEN SATURATION: 99 % | DIASTOLIC BLOOD PRESSURE: 68 MMHG | SYSTOLIC BLOOD PRESSURE: 115 MMHG | HEART RATE: 60 BPM

## 2020-01-01 VITALS
OXYGEN SATURATION: 98 % | TEMPERATURE: 98.4 F | HEART RATE: 73 BPM | RESPIRATION RATE: 17 BRPM | SYSTOLIC BLOOD PRESSURE: 106 MMHG | DIASTOLIC BLOOD PRESSURE: 62 MMHG

## 2020-01-01 VITALS
DIASTOLIC BLOOD PRESSURE: 58 MMHG | TEMPERATURE: 98.7 F | RESPIRATION RATE: 16 BRPM | HEART RATE: 62 BPM | SYSTOLIC BLOOD PRESSURE: 102 MMHG | OXYGEN SATURATION: 96 %

## 2020-01-01 VITALS
OXYGEN SATURATION: 97 % | DIASTOLIC BLOOD PRESSURE: 68 MMHG | TEMPERATURE: 98.7 F | SYSTOLIC BLOOD PRESSURE: 105 MMHG | HEART RATE: 77 BPM

## 2020-01-01 VITALS
SYSTOLIC BLOOD PRESSURE: 105 MMHG | DIASTOLIC BLOOD PRESSURE: 60 MMHG | OXYGEN SATURATION: 98 % | TEMPERATURE: 98.9 F | HEART RATE: 79 BPM

## 2020-01-01 VITALS
TEMPERATURE: 98.6 F | OXYGEN SATURATION: 95 % | RESPIRATION RATE: 17 BRPM | DIASTOLIC BLOOD PRESSURE: 55 MMHG | HEART RATE: 64 BPM | SYSTOLIC BLOOD PRESSURE: 105 MMHG

## 2020-01-01 VITALS
HEART RATE: 69 BPM | OXYGEN SATURATION: 96 % | SYSTOLIC BLOOD PRESSURE: 105 MMHG | DIASTOLIC BLOOD PRESSURE: 64 MMHG | TEMPERATURE: 98.7 F

## 2020-01-01 VITALS
DIASTOLIC BLOOD PRESSURE: 62 MMHG | HEART RATE: 86 BPM | BODY MASS INDEX: 24.35 KG/M2 | OXYGEN SATURATION: 96 % | WEIGHT: 129 LBS | HEIGHT: 61 IN | SYSTOLIC BLOOD PRESSURE: 110 MMHG | RESPIRATION RATE: 17 BRPM

## 2020-01-01 VITALS
HEART RATE: 90 BPM | DIASTOLIC BLOOD PRESSURE: 60 MMHG | TEMPERATURE: 97.7 F | SYSTOLIC BLOOD PRESSURE: 120 MMHG | OXYGEN SATURATION: 98 %

## 2020-01-01 VITALS
SYSTOLIC BLOOD PRESSURE: 110 MMHG | HEART RATE: 60 BPM | DIASTOLIC BLOOD PRESSURE: 68 MMHG | OXYGEN SATURATION: 97 % | TEMPERATURE: 97.9 F

## 2020-01-01 VITALS
WEIGHT: 138.6 LBS | SYSTOLIC BLOOD PRESSURE: 109 MMHG | HEART RATE: 82 BPM | DIASTOLIC BLOOD PRESSURE: 61 MMHG | BODY MASS INDEX: 26.17 KG/M2 | OXYGEN SATURATION: 100 % | TEMPERATURE: 99 F | RESPIRATION RATE: 19 BRPM | HEIGHT: 61 IN

## 2020-01-01 VITALS
HEART RATE: 60 BPM | DIASTOLIC BLOOD PRESSURE: 81 MMHG | OXYGEN SATURATION: 99 % | SYSTOLIC BLOOD PRESSURE: 105 MMHG | TEMPERATURE: 98.1 F

## 2020-01-01 VITALS
DIASTOLIC BLOOD PRESSURE: 60 MMHG | SYSTOLIC BLOOD PRESSURE: 104 MMHG | HEART RATE: 79 BPM | OXYGEN SATURATION: 95 % | TEMPERATURE: 97.6 F

## 2020-01-01 VITALS
HEIGHT: 62 IN | DIASTOLIC BLOOD PRESSURE: 73 MMHG | TEMPERATURE: 97.4 F | WEIGHT: 146 LBS | SYSTOLIC BLOOD PRESSURE: 113 MMHG | BODY MASS INDEX: 26.87 KG/M2 | OXYGEN SATURATION: 97 % | HEART RATE: 97 BPM | RESPIRATION RATE: 19 BRPM

## 2020-01-01 VITALS
DIASTOLIC BLOOD PRESSURE: 58 MMHG | HEART RATE: 86 BPM | RESPIRATION RATE: 17 BRPM | SYSTOLIC BLOOD PRESSURE: 102 MMHG | OXYGEN SATURATION: 97 % | TEMPERATURE: 98.7 F

## 2020-01-01 VITALS
DIASTOLIC BLOOD PRESSURE: 59 MMHG | TEMPERATURE: 96.9 F | OXYGEN SATURATION: 96 % | HEART RATE: 84 BPM | SYSTOLIC BLOOD PRESSURE: 106 MMHG

## 2020-01-01 VITALS
SYSTOLIC BLOOD PRESSURE: 116 MMHG | OXYGEN SATURATION: 98 % | TEMPERATURE: 97.3 F | DIASTOLIC BLOOD PRESSURE: 78 MMHG | HEART RATE: 60 BPM

## 2020-01-01 VITALS
DIASTOLIC BLOOD PRESSURE: 60 MMHG | SYSTOLIC BLOOD PRESSURE: 100 MMHG | TEMPERATURE: 98.4 F | HEART RATE: 79 BPM | OXYGEN SATURATION: 95 %

## 2020-01-01 VITALS
HEART RATE: 74 BPM | OXYGEN SATURATION: 96 % | TEMPERATURE: 98.6 F | SYSTOLIC BLOOD PRESSURE: 110 MMHG | DIASTOLIC BLOOD PRESSURE: 60 MMHG

## 2020-01-01 VITALS
OXYGEN SATURATION: 95 % | DIASTOLIC BLOOD PRESSURE: 62 MMHG | SYSTOLIC BLOOD PRESSURE: 116 MMHG | SYSTOLIC BLOOD PRESSURE: 105 MMHG | OXYGEN SATURATION: 96 % | DIASTOLIC BLOOD PRESSURE: 63 MMHG | HEART RATE: 62 BPM | TEMPERATURE: 97.4 F | RESPIRATION RATE: 18 BRPM | HEART RATE: 77 BPM | TEMPERATURE: 98.9 F

## 2020-01-01 VITALS
DIASTOLIC BLOOD PRESSURE: 68 MMHG | TEMPERATURE: 98.6 F | HEART RATE: 71 BPM | OXYGEN SATURATION: 98 % | SYSTOLIC BLOOD PRESSURE: 115 MMHG

## 2020-01-01 VITALS
HEART RATE: 68 BPM | TEMPERATURE: 98.4 F | DIASTOLIC BLOOD PRESSURE: 84 MMHG | SYSTOLIC BLOOD PRESSURE: 112 MMHG | OXYGEN SATURATION: 98 %

## 2020-01-01 VITALS
TEMPERATURE: 98.5 F | OXYGEN SATURATION: 97 % | DIASTOLIC BLOOD PRESSURE: 63 MMHG | SYSTOLIC BLOOD PRESSURE: 110 MMHG | HEART RATE: 92 BPM

## 2020-01-01 VITALS — HEART RATE: 75 BPM | OXYGEN SATURATION: 98 %

## 2020-01-01 VITALS
WEIGHT: 137 LBS | SYSTOLIC BLOOD PRESSURE: 100 MMHG | RESPIRATION RATE: 16 BRPM | BODY MASS INDEX: 25.86 KG/M2 | DIASTOLIC BLOOD PRESSURE: 52 MMHG | HEART RATE: 58 BPM | HEIGHT: 61 IN | OXYGEN SATURATION: 97 %

## 2020-01-01 VITALS — SYSTOLIC BLOOD PRESSURE: 105 MMHG | DIASTOLIC BLOOD PRESSURE: 64 MMHG | HEART RATE: 77 BPM

## 2020-01-01 VITALS
TEMPERATURE: 97.3 F | SYSTOLIC BLOOD PRESSURE: 104 MMHG | OXYGEN SATURATION: 96 % | RESPIRATION RATE: 16 BRPM | HEART RATE: 80 BPM | DIASTOLIC BLOOD PRESSURE: 45 MMHG

## 2020-01-01 VITALS
SYSTOLIC BLOOD PRESSURE: 105 MMHG | HEART RATE: 70 BPM | OXYGEN SATURATION: 98 % | TEMPERATURE: 97.9 F | DIASTOLIC BLOOD PRESSURE: 70 MMHG

## 2020-01-01 VITALS
HEART RATE: 77 BPM | DIASTOLIC BLOOD PRESSURE: 90 MMHG | RESPIRATION RATE: 28 BRPM | OXYGEN SATURATION: 98 % | WEIGHT: 133 LBS | BODY MASS INDEX: 22.71 KG/M2 | SYSTOLIC BLOOD PRESSURE: 104 MMHG | HEIGHT: 64 IN | TEMPERATURE: 98 F

## 2020-01-01 VITALS
DIASTOLIC BLOOD PRESSURE: 60 MMHG | SYSTOLIC BLOOD PRESSURE: 112 MMHG | TEMPERATURE: 98.2 F | OXYGEN SATURATION: 97 % | HEART RATE: 66 BPM

## 2020-01-01 VITALS — OXYGEN SATURATION: 97 % | SYSTOLIC BLOOD PRESSURE: 79 MMHG | HEART RATE: 105 BPM | DIASTOLIC BLOOD PRESSURE: 47 MMHG

## 2020-01-01 VITALS
DIASTOLIC BLOOD PRESSURE: 73 MMHG | RESPIRATION RATE: 16 BRPM | SYSTOLIC BLOOD PRESSURE: 135 MMHG | HEART RATE: 61 BPM | OXYGEN SATURATION: 98 %

## 2020-01-01 VITALS
DIASTOLIC BLOOD PRESSURE: 51 MMHG | OXYGEN SATURATION: 97 % | RESPIRATION RATE: 12 BRPM | WEIGHT: 129 LBS | TEMPERATURE: 96.8 F | HEIGHT: 61 IN | BODY MASS INDEX: 24.35 KG/M2 | SYSTOLIC BLOOD PRESSURE: 107 MMHG | HEART RATE: 80 BPM

## 2020-01-01 VITALS
HEART RATE: 73 BPM | SYSTOLIC BLOOD PRESSURE: 100 MMHG | OXYGEN SATURATION: 96 % | RESPIRATION RATE: 16 BRPM | DIASTOLIC BLOOD PRESSURE: 60 MMHG | TEMPERATURE: 98 F

## 2020-01-01 VITALS — DIASTOLIC BLOOD PRESSURE: 86 MMHG | SYSTOLIC BLOOD PRESSURE: 100 MMHG | TEMPERATURE: 98.7 F | HEART RATE: 74 BPM

## 2020-01-01 VITALS
TEMPERATURE: 98.8 F | SYSTOLIC BLOOD PRESSURE: 100 MMHG | RESPIRATION RATE: 16 BRPM | HEART RATE: 72 BPM | DIASTOLIC BLOOD PRESSURE: 55 MMHG | OXYGEN SATURATION: 98 %

## 2020-01-01 VITALS
DIASTOLIC BLOOD PRESSURE: 58 MMHG | HEART RATE: 84 BPM | SYSTOLIC BLOOD PRESSURE: 108 MMHG | OXYGEN SATURATION: 95 % | RESPIRATION RATE: 17 BRPM | TEMPERATURE: 98.5 F

## 2020-01-01 VITALS — HEART RATE: 81 BPM | RESPIRATION RATE: 20 BRPM | TEMPERATURE: 98.6 F | OXYGEN SATURATION: 99 %

## 2020-01-01 VITALS
DIASTOLIC BLOOD PRESSURE: 86 MMHG | SYSTOLIC BLOOD PRESSURE: 105 MMHG | OXYGEN SATURATION: 95 % | TEMPERATURE: 98.7 F | HEART RATE: 65 BPM

## 2020-01-01 DIAGNOSIS — E78.5 DYSLIPIDEMIA: ICD-10-CM

## 2020-01-01 DIAGNOSIS — I83.229 INFECTED STASIS ULCER OF LEFT LOWER EXTREMITY (HCC): ICD-10-CM

## 2020-01-01 DIAGNOSIS — L03.116 CELLULITIS OF BOTH LOWER EXTREMITIES: Primary | ICD-10-CM

## 2020-01-01 DIAGNOSIS — S81.802S WOUND OF LEFT LEG, SEQUELA: ICD-10-CM

## 2020-01-01 DIAGNOSIS — H10.9 CONJUNCTIVITIS, UNSPECIFIED CONJUNCTIVITIS TYPE, UNSPECIFIED LATERALITY: Primary | ICD-10-CM

## 2020-01-01 DIAGNOSIS — L97.929 VENOUS STASIS ULCERS OF BOTH LOWER EXTREMITIES (HCC): Primary | ICD-10-CM

## 2020-01-01 DIAGNOSIS — I83.219 INFECTED STASIS ULCER OF RIGHT LOWER EXTREMITY (HCC): ICD-10-CM

## 2020-01-01 DIAGNOSIS — L97.919 INFECTED STASIS ULCER OF RIGHT LOWER EXTREMITY (HCC): ICD-10-CM

## 2020-01-01 DIAGNOSIS — R23.4 ESCHAR OF MULTIPLE SITES: ICD-10-CM

## 2020-01-01 DIAGNOSIS — L97.919 VENOUS STASIS ULCERS OF BOTH LOWER EXTREMITIES (HCC): Primary | ICD-10-CM

## 2020-01-01 DIAGNOSIS — R60.0 BILATERAL LEG EDEMA: Primary | ICD-10-CM

## 2020-01-01 DIAGNOSIS — M79.605 PAIN IN BOTH LOWER EXTREMITIES: Primary | ICD-10-CM

## 2020-01-01 DIAGNOSIS — R21 RASH AND OTHER NONSPECIFIC SKIN ERUPTION: ICD-10-CM

## 2020-01-01 DIAGNOSIS — I87.2 VENOUS (PERIPHERAL) INSUFFICIENCY: ICD-10-CM

## 2020-01-01 DIAGNOSIS — Z00.00 MEDICARE ANNUAL WELLNESS VISIT, SUBSEQUENT: Primary | ICD-10-CM

## 2020-01-01 DIAGNOSIS — E11.21 TYPE 2 DIABETES WITH NEPHROPATHY (HCC): ICD-10-CM

## 2020-01-01 DIAGNOSIS — F32.A MILD DEPRESSION: ICD-10-CM

## 2020-01-01 DIAGNOSIS — I83.029 VENOUS STASIS ULCERS OF BOTH LOWER EXTREMITIES (HCC): Primary | ICD-10-CM

## 2020-01-01 DIAGNOSIS — I83.019 VENOUS STASIS ULCERS OF BOTH LOWER EXTREMITIES (HCC): Primary | ICD-10-CM

## 2020-01-01 DIAGNOSIS — L97.901 VENOUS ULCER, LIMITED TO BREAKDOWN OF SKIN, UNSPECIFIED SITE, UNSPECIFIED WHETHER VARICOSE VEINS PRESENT (HCC): ICD-10-CM

## 2020-01-01 DIAGNOSIS — I10 ESSENTIAL HYPERTENSION: ICD-10-CM

## 2020-01-01 DIAGNOSIS — S81.801S WOUND OF RIGHT LEG, SEQUELA: ICD-10-CM

## 2020-01-01 DIAGNOSIS — L03.115 CELLULITIS OF BOTH LOWER EXTREMITIES: Primary | ICD-10-CM

## 2020-01-01 DIAGNOSIS — I83.009 VENOUS ULCER, LIMITED TO BREAKDOWN OF SKIN, UNSPECIFIED SITE, UNSPECIFIED WHETHER VARICOSE VEINS PRESENT (HCC): ICD-10-CM

## 2020-01-01 DIAGNOSIS — N17.9 ACUTE KIDNEY INJURY (HCC): ICD-10-CM

## 2020-01-01 DIAGNOSIS — L97.929 INFECTED STASIS ULCER OF LEFT LOWER EXTREMITY (HCC): ICD-10-CM

## 2020-01-01 DIAGNOSIS — E78.2 MIXED HYPERLIPIDEMIA: ICD-10-CM

## 2020-01-01 DIAGNOSIS — M79.604 PAIN IN BOTH LOWER EXTREMITIES: Primary | ICD-10-CM

## 2020-01-01 DIAGNOSIS — E87.5 ACUTE HYPERKALEMIA: Primary | ICD-10-CM

## 2020-01-01 LAB
ABO + RH BLD: NORMAL
ALBUMIN SERPL-MCNC: 1.5 G/DL (ref 3.4–5)
ALBUMIN SERPL-MCNC: 2.8 G/DL (ref 3.4–5)
ALBUMIN/GLOB SERPL: 0.3 {RATIO} (ref 0.8–1.7)
ALBUMIN/GLOB SERPL: 0.6 {RATIO} (ref 0.8–1.7)
ALP SERPL-CCNC: 155 U/L (ref 45–117)
ALP SERPL-CCNC: 181 U/L (ref 45–117)
ALT SERPL-CCNC: 13 U/L (ref 13–56)
ALT SERPL-CCNC: 16 U/L (ref 13–56)
AMORPH CRY URNS QL MICRO: ABNORMAL
ANION GAP SERPL CALC-SCNC: 10 MMOL/L (ref 3–18)
ANION GAP SERPL CALC-SCNC: 4 MMOL/L (ref 3–18)
ANION GAP SERPL CALC-SCNC: 5 MMOL/L (ref 3–18)
ANION GAP SERPL CALC-SCNC: 6 MMOL/L (ref 3–18)
ANION GAP SERPL CALC-SCNC: 8 MMOL/L (ref 3–18)
ANION GAP SERPL CALC-SCNC: 8 MMOL/L (ref 3–18)
ANION GAP SERPL CALC-SCNC: 9 MMOL/L (ref 3–18)
APPEARANCE UR: ABNORMAL
APPEARANCE UR: CLEAR
APTT PPP: 26.6 SEC (ref 23–36.4)
APTT PPP: 37.5 SEC (ref 23–36.4)
AST SERPL-CCNC: 20 U/L (ref 10–38)
AST SERPL-CCNC: 46 U/L (ref 10–38)
ATRIAL RATE: 72 BPM
BACTERIA SPEC CULT: ABNORMAL
BACTERIA SPEC CULT: NORMAL
BACTERIA URNS QL MICRO: NEGATIVE /HPF
BASOPHILS # BLD: 0 K/UL (ref 0–0.1)
BASOPHILS NFR BLD: 0 % (ref 0–2)
BILIRUB SERPL-MCNC: 0.6 MG/DL (ref 0.2–1)
BILIRUB SERPL-MCNC: 0.7 MG/DL (ref 0.2–1)
BILIRUB UR QL: NEGATIVE
BILIRUB UR QL: NEGATIVE
BLOOD GROUP ANTIBODIES SERPL: NORMAL
BNP SERPL-MCNC: 5549 PG/ML (ref 0–1800)
BUN SERPL-MCNC: 13 MG/DL (ref 7–18)
BUN SERPL-MCNC: 13 MG/DL (ref 7–18)
BUN SERPL-MCNC: 14 MG/DL (ref 7–18)
BUN SERPL-MCNC: 16 MG/DL (ref 7–18)
BUN SERPL-MCNC: 18 MG/DL (ref 7–18)
BUN SERPL-MCNC: 20 MG/DL (ref 7–18)
BUN SERPL-MCNC: 23 MG/DL (ref 7–18)
BUN SERPL-MCNC: 28 MG/DL (ref 7–18)
BUN SERPL-MCNC: 30 MG/DL (ref 7–18)
BUN SERPL-MCNC: 31 MG/DL (ref 7–18)
BUN SERPL-MCNC: 31 MG/DL (ref 7–18)
BUN SERPL-MCNC: 32 MG/DL (ref 7–18)
BUN SERPL-MCNC: 33 MG/DL (ref 7–18)
BUN SERPL-MCNC: 33 MG/DL (ref 7–18)
BUN SERPL-MCNC: 36 MG/DL (ref 7–18)
BUN SERPL-MCNC: 40 MG/DL (ref 7–18)
BUN SERPL-MCNC: 41 MG/DL (ref 7–18)
BUN SERPL-MCNC: 42 MG/DL (ref 7–18)
BUN/CREAT SERPL: 17 (ref 12–20)
BUN/CREAT SERPL: 17 (ref 12–20)
BUN/CREAT SERPL: 18 (ref 12–20)
BUN/CREAT SERPL: 19 (ref 12–20)
BUN/CREAT SERPL: 20 (ref 12–20)
BUN/CREAT SERPL: 20 (ref 12–20)
BUN/CREAT SERPL: 21 (ref 12–20)
BUN/CREAT SERPL: 21 (ref 12–20)
BUN/CREAT SERPL: 22 (ref 12–20)
BUN/CREAT SERPL: 26 (ref 12–20)
BUN/CREAT SERPL: 28 (ref 12–20)
BUN/CREAT SERPL: 28 (ref 12–20)
BUN/CREAT SERPL: 32 (ref 12–20)
CALCIUM SERPL-MCNC: 7.3 MG/DL (ref 8.5–10.1)
CALCIUM SERPL-MCNC: 8.1 MG/DL (ref 8.5–10.1)
CALCIUM SERPL-MCNC: 8.2 MG/DL (ref 8.5–10.1)
CALCIUM SERPL-MCNC: 8.2 MG/DL (ref 8.5–10.1)
CALCIUM SERPL-MCNC: 8.3 MG/DL (ref 8.5–10.1)
CALCIUM SERPL-MCNC: 8.4 MG/DL (ref 8.5–10.1)
CALCIUM SERPL-MCNC: 8.5 MG/DL (ref 8.5–10.1)
CALCIUM SERPL-MCNC: 8.7 MG/DL (ref 8.5–10.1)
CALCIUM SERPL-MCNC: 9 MG/DL (ref 8.5–10.1)
CALCIUM SERPL-MCNC: 9.1 MG/DL (ref 8.5–10.1)
CALCIUM SERPL-MCNC: 9.6 MG/DL (ref 8.5–10.1)
CALCULATED P AXIS, ECG09: 63 DEGREES
CALCULATED R AXIS, ECG10: -30 DEGREES
CALCULATED T AXIS, ECG11: 65 DEGREES
CHLORIDE SERPL-SCNC: 100 MMOL/L (ref 100–111)
CHLORIDE SERPL-SCNC: 100 MMOL/L (ref 100–111)
CHLORIDE SERPL-SCNC: 102 MMOL/L (ref 100–111)
CHLORIDE SERPL-SCNC: 102 MMOL/L (ref 100–111)
CHLORIDE SERPL-SCNC: 103 MMOL/L (ref 100–111)
CHLORIDE SERPL-SCNC: 104 MMOL/L (ref 100–111)
CHLORIDE SERPL-SCNC: 106 MMOL/L (ref 100–111)
CHLORIDE SERPL-SCNC: 106 MMOL/L (ref 100–111)
CHLORIDE SERPL-SCNC: 107 MMOL/L (ref 100–111)
CHLORIDE SERPL-SCNC: 107 MMOL/L (ref 100–111)
CHLORIDE SERPL-SCNC: 108 MMOL/L (ref 100–111)
CHLORIDE SERPL-SCNC: 109 MMOL/L (ref 100–111)
CHLORIDE SERPL-SCNC: 110 MMOL/L (ref 100–111)
CHLORIDE SERPL-SCNC: 112 MMOL/L (ref 100–111)
CO2 SERPL-SCNC: 19 MMOL/L (ref 21–32)
CO2 SERPL-SCNC: 22 MMOL/L (ref 21–32)
CO2 SERPL-SCNC: 23 MMOL/L (ref 21–32)
CO2 SERPL-SCNC: 24 MMOL/L (ref 21–32)
CO2 SERPL-SCNC: 25 MMOL/L (ref 21–32)
CO2 SERPL-SCNC: 26 MMOL/L (ref 21–32)
CO2 SERPL-SCNC: 27 MMOL/L (ref 21–32)
COLOR UR: ABNORMAL
COLOR UR: YELLOW
CREAT SERPL-MCNC: 0.67 MG/DL (ref 0.6–1.3)
CREAT SERPL-MCNC: 0.75 MG/DL (ref 0.6–1.3)
CREAT SERPL-MCNC: 0.77 MG/DL (ref 0.6–1.3)
CREAT SERPL-MCNC: 0.81 MG/DL (ref 0.6–1.3)
CREAT SERPL-MCNC: 0.87 MG/DL (ref 0.6–1.3)
CREAT SERPL-MCNC: 0.92 MG/DL (ref 0.6–1.3)
CREAT SERPL-MCNC: 0.99 MG/DL (ref 0.6–1.3)
CREAT SERPL-MCNC: 1.03 MG/DL (ref 0.6–1.3)
CREAT SERPL-MCNC: 1.17 MG/DL (ref 0.6–1.3)
CREAT SERPL-MCNC: 1.19 MG/DL (ref 0.6–1.3)
CREAT SERPL-MCNC: 1.2 MG/DL (ref 0.6–1.3)
CREAT SERPL-MCNC: 1.29 MG/DL (ref 0.6–1.3)
CREAT SERPL-MCNC: 1.37 MG/DL (ref 0.6–1.3)
CREAT SERPL-MCNC: 1.48 MG/DL (ref 0.6–1.3)
CREAT SERPL-MCNC: 1.6 MG/DL (ref 0.6–1.3)
CREAT SERPL-MCNC: 2.25 MG/DL (ref 0.6–1.3)
CREAT SERPL-MCNC: 2.31 MG/DL (ref 0.6–1.3)
CREAT SERPL-MCNC: 2.52 MG/DL (ref 0.6–1.3)
CRP SERPL-MCNC: 18.5 MG/DL (ref 0–0.3)
DATE LAST DOSE: ABNORMAL
DATE LAST DOSE: NORMAL
DIAGNOSIS, 93000: NORMAL
DIFFERENTIAL METHOD BLD: ABNORMAL
EOSINOPHIL # BLD: 0.1 K/UL (ref 0–0.4)
EOSINOPHIL NFR BLD: 0 % (ref 0–5)
EOSINOPHIL NFR BLD: 1 % (ref 0–5)
EOSINOPHIL NFR BLD: 2 % (ref 0–5)
EOSINOPHIL NFR BLD: 2 % (ref 0–5)
EPITH CASTS URNS QL MICRO: ABNORMAL /LPF (ref 0–5)
EPITH CASTS URNS QL MICRO: NORMAL /LPF (ref 0–5)
ERYTHROCYTE [DISTWIDTH] IN BLOOD BY AUTOMATED COUNT: 14.5 % (ref 11.6–14.5)
ERYTHROCYTE [DISTWIDTH] IN BLOOD BY AUTOMATED COUNT: 14.6 % (ref 11.6–14.5)
ERYTHROCYTE [DISTWIDTH] IN BLOOD BY AUTOMATED COUNT: 14.8 % (ref 11.6–14.5)
ERYTHROCYTE [DISTWIDTH] IN BLOOD BY AUTOMATED COUNT: 14.9 % (ref 11.6–14.5)
ERYTHROCYTE [DISTWIDTH] IN BLOOD BY AUTOMATED COUNT: 14.9 % (ref 11.6–14.5)
ERYTHROCYTE [DISTWIDTH] IN BLOOD BY AUTOMATED COUNT: 15 % (ref 11.6–14.5)
ERYTHROCYTE [DISTWIDTH] IN BLOOD BY AUTOMATED COUNT: 15.5 % (ref 11.6–14.5)
ERYTHROCYTE [DISTWIDTH] IN BLOOD BY AUTOMATED COUNT: 16.2 % (ref 11.6–14.5)
ERYTHROCYTE [DISTWIDTH] IN BLOOD BY AUTOMATED COUNT: 16.3 % (ref 11.6–14.5)
ERYTHROCYTE [DISTWIDTH] IN BLOOD BY AUTOMATED COUNT: 17 % (ref 11.6–14.5)
ERYTHROCYTE [DISTWIDTH] IN BLOOD BY AUTOMATED COUNT: 18.6 % (ref 11.6–14.5)
ERYTHROCYTE [SEDIMENTATION RATE] IN BLOOD: 46 MM/HR (ref 0–30)
EST. AVERAGE GLUCOSE BLD GHB EST-MCNC: 126 MG/DL
EST. AVERAGE GLUCOSE BLD GHB EST-MCNC: 166 MG/DL
GLOBULIN SER CALC-MCNC: 4.3 G/DL (ref 2–4)
GLOBULIN SER CALC-MCNC: 4.8 G/DL (ref 2–4)
GLUCOSE BLD STRIP.AUTO-MCNC: 120 MG/DL (ref 70–110)
GLUCOSE BLD STRIP.AUTO-MCNC: 133 MG/DL (ref 70–110)
GLUCOSE BLD STRIP.AUTO-MCNC: 138 MG/DL (ref 70–110)
GLUCOSE BLD STRIP.AUTO-MCNC: 138 MG/DL (ref 70–110)
GLUCOSE BLD STRIP.AUTO-MCNC: 139 MG/DL (ref 70–110)
GLUCOSE BLD STRIP.AUTO-MCNC: 139 MG/DL (ref 70–110)
GLUCOSE BLD STRIP.AUTO-MCNC: 140 MG/DL (ref 70–110)
GLUCOSE BLD STRIP.AUTO-MCNC: 149 MG/DL (ref 70–110)
GLUCOSE BLD STRIP.AUTO-MCNC: 164 MG/DL (ref 70–110)
GLUCOSE BLD STRIP.AUTO-MCNC: 165 MG/DL (ref 70–110)
GLUCOSE BLD STRIP.AUTO-MCNC: 167 MG/DL (ref 70–110)
GLUCOSE BLD STRIP.AUTO-MCNC: 168 MG/DL (ref 70–110)
GLUCOSE BLD STRIP.AUTO-MCNC: 175 MG/DL (ref 70–110)
GLUCOSE BLD STRIP.AUTO-MCNC: 182 MG/DL (ref 70–110)
GLUCOSE BLD STRIP.AUTO-MCNC: 185 MG/DL (ref 70–110)
GLUCOSE BLD STRIP.AUTO-MCNC: 186 MG/DL (ref 70–110)
GLUCOSE BLD STRIP.AUTO-MCNC: 199 MG/DL (ref 70–110)
GLUCOSE BLD STRIP.AUTO-MCNC: 204 MG/DL (ref 70–110)
GLUCOSE BLD STRIP.AUTO-MCNC: 204 MG/DL (ref 70–110)
GLUCOSE BLD STRIP.AUTO-MCNC: 207 MG/DL (ref 70–110)
GLUCOSE BLD STRIP.AUTO-MCNC: 211 MG/DL (ref 70–110)
GLUCOSE BLD STRIP.AUTO-MCNC: 215 MG/DL (ref 70–110)
GLUCOSE BLD STRIP.AUTO-MCNC: 217 MG/DL (ref 70–110)
GLUCOSE BLD STRIP.AUTO-MCNC: 218 MG/DL (ref 70–110)
GLUCOSE BLD STRIP.AUTO-MCNC: 220 MG/DL (ref 70–110)
GLUCOSE BLD STRIP.AUTO-MCNC: 239 MG/DL (ref 70–110)
GLUCOSE BLD STRIP.AUTO-MCNC: 244 MG/DL (ref 70–110)
GLUCOSE BLD STRIP.AUTO-MCNC: 245 MG/DL (ref 70–110)
GLUCOSE BLD STRIP.AUTO-MCNC: 253 MG/DL (ref 70–110)
GLUCOSE BLD STRIP.AUTO-MCNC: 254 MG/DL (ref 70–110)
GLUCOSE BLD STRIP.AUTO-MCNC: 256 MG/DL (ref 70–110)
GLUCOSE BLD STRIP.AUTO-MCNC: 257 MG/DL (ref 70–110)
GLUCOSE BLD STRIP.AUTO-MCNC: 257 MG/DL (ref 70–110)
GLUCOSE BLD STRIP.AUTO-MCNC: 258 MG/DL (ref 70–110)
GLUCOSE BLD STRIP.AUTO-MCNC: 262 MG/DL (ref 70–110)
GLUCOSE BLD STRIP.AUTO-MCNC: 275 MG/DL (ref 70–110)
GLUCOSE BLD STRIP.AUTO-MCNC: 277 MG/DL (ref 70–110)
GLUCOSE BLD STRIP.AUTO-MCNC: 283 MG/DL (ref 70–110)
GLUCOSE BLD STRIP.AUTO-MCNC: 286 MG/DL (ref 70–110)
GLUCOSE BLD STRIP.AUTO-MCNC: 295 MG/DL (ref 70–110)
GLUCOSE BLD STRIP.AUTO-MCNC: 62 MG/DL (ref 70–110)
GLUCOSE BLD STRIP.AUTO-MCNC: 76 MG/DL (ref 70–110)
GLUCOSE SERPL-MCNC: 104 MG/DL (ref 74–99)
GLUCOSE SERPL-MCNC: 115 MG/DL (ref 74–99)
GLUCOSE SERPL-MCNC: 116 MG/DL (ref 74–99)
GLUCOSE SERPL-MCNC: 121 MG/DL (ref 74–99)
GLUCOSE SERPL-MCNC: 129 MG/DL (ref 74–99)
GLUCOSE SERPL-MCNC: 132 MG/DL (ref 74–99)
GLUCOSE SERPL-MCNC: 144 MG/DL (ref 74–99)
GLUCOSE SERPL-MCNC: 148 MG/DL (ref 74–99)
GLUCOSE SERPL-MCNC: 150 MG/DL (ref 74–99)
GLUCOSE SERPL-MCNC: 158 MG/DL (ref 74–99)
GLUCOSE SERPL-MCNC: 168 MG/DL (ref 74–99)
GLUCOSE SERPL-MCNC: 169 MG/DL (ref 74–99)
GLUCOSE SERPL-MCNC: 172 MG/DL (ref 74–99)
GLUCOSE SERPL-MCNC: 183 MG/DL (ref 74–99)
GLUCOSE SERPL-MCNC: 184 MG/DL (ref 74–99)
GLUCOSE SERPL-MCNC: 257 MG/DL (ref 74–99)
GLUCOSE SERPL-MCNC: 49 MG/DL (ref 74–99)
GLUCOSE SERPL-MCNC: 82 MG/DL (ref 74–99)
GLUCOSE UR STRIP.AUTO-MCNC: NEGATIVE MG/DL
GLUCOSE UR STRIP.AUTO-MCNC: NEGATIVE MG/DL
GRAM STN SPEC: ABNORMAL
HBA1C MFR BLD: 6 % (ref 4.2–5.6)
HBA1C MFR BLD: 7.4 % (ref 4.2–5.6)
HCT VFR BLD AUTO: 12.2 % (ref 35–45)
HCT VFR BLD AUTO: 22.3 % (ref 35–45)
HCT VFR BLD AUTO: 25.6 % (ref 35–45)
HCT VFR BLD AUTO: 26 % (ref 35–45)
HCT VFR BLD AUTO: 26.1 % (ref 35–45)
HCT VFR BLD AUTO: 26.2 % (ref 35–45)
HCT VFR BLD AUTO: 26.6 % (ref 35–45)
HCT VFR BLD AUTO: 26.6 % (ref 35–45)
HCT VFR BLD AUTO: 27.4 % (ref 35–45)
HCT VFR BLD AUTO: 28 % (ref 35–45)
HCT VFR BLD AUTO: 32.7 % (ref 35–45)
HEMOCCULT STL QL: NEGATIVE
HGB BLD-MCNC: 10.4 G/DL (ref 12–16)
HGB BLD-MCNC: 4.1 G/DL (ref 12–16)
HGB BLD-MCNC: 7.2 G/DL (ref 12–16)
HGB BLD-MCNC: 8.3 G/DL (ref 12–16)
HGB BLD-MCNC: 8.3 G/DL (ref 12–16)
HGB BLD-MCNC: 8.4 G/DL (ref 12–16)
HGB BLD-MCNC: 8.4 G/DL (ref 12–16)
HGB BLD-MCNC: 8.5 G/DL (ref 12–16)
HGB BLD-MCNC: 8.5 G/DL (ref 12–16)
HGB BLD-MCNC: 8.7 G/DL (ref 12–16)
HGB BLD-MCNC: 8.9 G/DL (ref 12–16)
HGB UR QL STRIP: ABNORMAL
HGB UR QL STRIP: NEGATIVE
INR PPP: 1.2 (ref 0.8–1.2)
INR PPP: 1.3 (ref 0.8–1.2)
KETONES UR QL STRIP.AUTO: NEGATIVE MG/DL
KETONES UR QL STRIP.AUTO: NEGATIVE MG/DL
LACTATE SERPL-SCNC: 1.9 MMOL/L (ref 0.4–2)
LEUKOCYTE ESTERASE UR QL STRIP.AUTO: ABNORMAL
LEUKOCYTE ESTERASE UR QL STRIP.AUTO: ABNORMAL
LYMPHOCYTES # BLD: 0.6 K/UL (ref 0.9–3.6)
LYMPHOCYTES # BLD: 0.7 K/UL (ref 0.9–3.6)
LYMPHOCYTES # BLD: 0.8 K/UL (ref 0.9–3.6)
LYMPHOCYTES # BLD: 0.8 K/UL (ref 0.9–3.6)
LYMPHOCYTES # BLD: 0.9 K/UL (ref 0.9–3.6)
LYMPHOCYTES # BLD: 1.1 K/UL (ref 0.9–3.6)
LYMPHOCYTES NFR BLD: 11 % (ref 21–52)
LYMPHOCYTES NFR BLD: 11 % (ref 21–52)
LYMPHOCYTES NFR BLD: 6 % (ref 21–52)
LYMPHOCYTES NFR BLD: 6 % (ref 21–52)
LYMPHOCYTES NFR BLD: 8 % (ref 21–52)
LYMPHOCYTES NFR BLD: 9 % (ref 21–52)
MAGNESIUM SERPL-MCNC: 1.6 MG/DL (ref 1.6–2.6)
MCH RBC QN AUTO: 25.8 PG (ref 24–34)
MCH RBC QN AUTO: 25.9 PG (ref 24–34)
MCH RBC QN AUTO: 26.3 PG (ref 24–34)
MCH RBC QN AUTO: 26.9 PG (ref 24–34)
MCH RBC QN AUTO: 27 PG (ref 24–34)
MCH RBC QN AUTO: 27.1 PG (ref 24–34)
MCH RBC QN AUTO: 27.2 PG (ref 24–34)
MCH RBC QN AUTO: 27.5 PG (ref 24–34)
MCH RBC QN AUTO: 27.6 PG (ref 24–34)
MCHC RBC AUTO-ENTMCNC: 31.8 G/DL (ref 31–37)
MCHC RBC AUTO-ENTMCNC: 31.9 G/DL (ref 31–37)
MCHC RBC AUTO-ENTMCNC: 32 G/DL (ref 31–37)
MCHC RBC AUTO-ENTMCNC: 32 G/DL (ref 31–37)
MCHC RBC AUTO-ENTMCNC: 32.1 G/DL (ref 31–37)
MCHC RBC AUTO-ENTMCNC: 32.3 G/DL (ref 31–37)
MCHC RBC AUTO-ENTMCNC: 32.8 G/DL (ref 31–37)
MCHC RBC AUTO-ENTMCNC: 33.6 G/DL (ref 31–37)
MCV RBC AUTO: 80.2 FL (ref 74–97)
MCV RBC AUTO: 80.4 FL (ref 74–97)
MCV RBC AUTO: 80.8 FL (ref 74–97)
MCV RBC AUTO: 82.8 FL (ref 74–97)
MCV RBC AUTO: 83.9 FL (ref 74–97)
MCV RBC AUTO: 84.4 FL (ref 74–97)
MCV RBC AUTO: 85 FL (ref 74–97)
MCV RBC AUTO: 85.3 FL (ref 74–97)
MCV RBC AUTO: 85.3 FL (ref 74–97)
MCV RBC AUTO: 85.4 FL (ref 74–97)
MCV RBC AUTO: 86.7 FL (ref 74–97)
MONOCYTES # BLD: 0.3 K/UL (ref 0.05–1.2)
MONOCYTES # BLD: 0.4 K/UL (ref 0.05–1.2)
MONOCYTES # BLD: 0.5 K/UL (ref 0.05–1.2)
MONOCYTES # BLD: 0.5 K/UL (ref 0.05–1.2)
MONOCYTES # BLD: 0.6 K/UL (ref 0.05–1.2)
MONOCYTES # BLD: 0.7 K/UL (ref 0.05–1.2)
MONOCYTES NFR BLD: 4 % (ref 3–10)
MONOCYTES NFR BLD: 7 % (ref 3–10)
MONOCYTES NFR BLD: 7 % (ref 3–10)
MUCOUS THREADS URNS QL MICRO: ABNORMAL /LPF
NEUTS SEG # BLD: 10.9 K/UL (ref 1.8–8)
NEUTS SEG # BLD: 11 K/UL (ref 1.8–8)
NEUTS SEG # BLD: 12.1 K/UL (ref 1.8–8)
NEUTS SEG # BLD: 4.4 K/UL (ref 1.8–8)
NEUTS SEG # BLD: 5.9 K/UL (ref 1.8–8)
NEUTS SEG # BLD: 8.1 K/UL (ref 1.8–8)
NEUTS SEG NFR BLD: 80 % (ref 40–73)
NEUTS SEG NFR BLD: 83 % (ref 40–73)
NEUTS SEG NFR BLD: 83 % (ref 40–73)
NEUTS SEG NFR BLD: 88 % (ref 40–73)
NEUTS SEG NFR BLD: 89 % (ref 40–73)
NEUTS SEG NFR BLD: 89 % (ref 40–73)
NITRITE UR QL STRIP.AUTO: NEGATIVE
NITRITE UR QL STRIP.AUTO: NEGATIVE
P-R INTERVAL, ECG05: 174 MS
PH UR STRIP: 5 [PH] (ref 5–8)
PH UR STRIP: 5.5 [PH] (ref 5–8)
PLATELET # BLD AUTO: 122 K/UL (ref 135–420)
PLATELET # BLD AUTO: 123 K/UL (ref 135–420)
PLATELET # BLD AUTO: 124 K/UL (ref 135–420)
PLATELET # BLD AUTO: 128 K/UL (ref 135–420)
PLATELET # BLD AUTO: 129 K/UL (ref 135–420)
PLATELET # BLD AUTO: 131 K/UL (ref 135–420)
PLATELET # BLD AUTO: 136 K/UL (ref 135–420)
PLATELET # BLD AUTO: 162 K/UL (ref 135–420)
PLATELET # BLD AUTO: 181 K/UL (ref 135–420)
PLATELET # BLD AUTO: 193 K/UL (ref 135–420)
PLATELET # BLD AUTO: 40 K/UL (ref 135–420)
PMV BLD AUTO: 10 FL (ref 9.2–11.8)
PMV BLD AUTO: 10.6 FL (ref 9.2–11.8)
PMV BLD AUTO: 10.9 FL (ref 9.2–11.8)
PMV BLD AUTO: 11.1 FL (ref 9.2–11.8)
PMV BLD AUTO: 11.3 FL (ref 9.2–11.8)
PMV BLD AUTO: 11.3 FL (ref 9.2–11.8)
PMV BLD AUTO: 11.6 FL (ref 9.2–11.8)
PMV BLD AUTO: 12.1 FL (ref 9.2–11.8)
PMV BLD AUTO: 12.1 FL (ref 9.2–11.8)
POTASSIUM SERPL-SCNC: 3.3 MMOL/L (ref 3.5–5.5)
POTASSIUM SERPL-SCNC: 3.4 MMOL/L (ref 3.5–5.5)
POTASSIUM SERPL-SCNC: 3.6 MMOL/L (ref 3.5–5.5)
POTASSIUM SERPL-SCNC: 3.8 MMOL/L (ref 3.5–5.5)
POTASSIUM SERPL-SCNC: 4.1 MMOL/L (ref 3.5–5.5)
POTASSIUM SERPL-SCNC: 4.1 MMOL/L (ref 3.5–5.5)
POTASSIUM SERPL-SCNC: 4.2 MMOL/L (ref 3.5–5.5)
POTASSIUM SERPL-SCNC: 4.3 MMOL/L (ref 3.5–5.5)
POTASSIUM SERPL-SCNC: 4.4 MMOL/L (ref 3.5–5.5)
POTASSIUM SERPL-SCNC: 4.5 MMOL/L (ref 3.5–5.5)
POTASSIUM SERPL-SCNC: 4.5 MMOL/L (ref 3.5–5.5)
POTASSIUM SERPL-SCNC: 4.9 MMOL/L (ref 3.5–5.5)
POTASSIUM SERPL-SCNC: 4.9 MMOL/L (ref 3.5–5.5)
POTASSIUM SERPL-SCNC: 6 MMOL/L (ref 3.5–5.5)
PROT SERPL-MCNC: 5.8 G/DL (ref 6.4–8.2)
PROT SERPL-MCNC: 7.6 G/DL (ref 6.4–8.2)
PROT UR STRIP-MCNC: 30 MG/DL
PROT UR STRIP-MCNC: NEGATIVE MG/DL
PROTHROMBIN TIME: 14.6 SEC (ref 11.5–15.2)
PROTHROMBIN TIME: 16.4 SEC (ref 11.5–15.2)
Q-T INTERVAL, ECG07: 400 MS
QRS DURATION, ECG06: 102 MS
QTC CALCULATION (BEZET), ECG08: 438 MS
RBC # BLD AUTO: 1.51 M/UL (ref 4.2–5.3)
RBC # BLD AUTO: 2.78 M/UL (ref 4.2–5.3)
RBC # BLD AUTO: 3.05 M/UL (ref 4.2–5.3)
RBC # BLD AUTO: 3.07 M/UL (ref 4.2–5.3)
RBC # BLD AUTO: 3.08 M/UL (ref 4.2–5.3)
RBC # BLD AUTO: 3.12 M/UL (ref 4.2–5.3)
RBC # BLD AUTO: 3.12 M/UL (ref 4.2–5.3)
RBC # BLD AUTO: 3.26 M/UL (ref 4.2–5.3)
RBC # BLD AUTO: 3.28 M/UL (ref 4.2–5.3)
RBC # BLD AUTO: 3.31 M/UL (ref 4.2–5.3)
RBC # BLD AUTO: 3.77 M/UL (ref 4.2–5.3)
RBC #/AREA URNS HPF: ABNORMAL /HPF (ref 0–5)
RBC #/AREA URNS HPF: NORMAL /HPF (ref 0–5)
REPORTED DOSE,DOSE: ABNORMAL UNITS
REPORTED DOSE,DOSE: NORMAL UNITS
REPORTED DOSE/TIME,TMG: 1700
REPORTED DOSE/TIME,TMG: 600
SERVICE CMNT-IMP: ABNORMAL
SERVICE CMNT-IMP: ABNORMAL
SERVICE CMNT-IMP: NORMAL
SODIUM SERPL-SCNC: 132 MMOL/L (ref 136–145)
SODIUM SERPL-SCNC: 133 MMOL/L (ref 136–145)
SODIUM SERPL-SCNC: 134 MMOL/L (ref 136–145)
SODIUM SERPL-SCNC: 134 MMOL/L (ref 136–145)
SODIUM SERPL-SCNC: 135 MMOL/L (ref 136–145)
SODIUM SERPL-SCNC: 135 MMOL/L (ref 136–145)
SODIUM SERPL-SCNC: 137 MMOL/L (ref 136–145)
SODIUM SERPL-SCNC: 138 MMOL/L (ref 136–145)
SODIUM SERPL-SCNC: 139 MMOL/L (ref 136–145)
SODIUM SERPL-SCNC: 139 MMOL/L (ref 136–145)
SODIUM SERPL-SCNC: 140 MMOL/L (ref 136–145)
SODIUM SERPL-SCNC: 141 MMOL/L (ref 136–145)
SP GR UR REFRACTOMETRY: 1.01 (ref 1–1.03)
SP GR UR REFRACTOMETRY: 1.02 (ref 1–1.03)
SPECIMEN EXP DATE BLD: NORMAL
UROBILINOGEN UR QL STRIP.AUTO: 0.2 EU/DL (ref 0.2–1)
UROBILINOGEN UR QL STRIP.AUTO: 1 EU/DL (ref 0.2–1)
VANCOMYCIN SERPL-MCNC: 11 UG/ML (ref 5–40)
VANCOMYCIN SERPL-MCNC: 11.2 UG/ML (ref 5–40)
VANCOMYCIN SERPL-MCNC: 13.3 UG/ML (ref 5–40)
VANCOMYCIN TROUGH SERPL-MCNC: 15.8 UG/ML (ref 10–20)
VANCOMYCIN TROUGH SERPL-MCNC: 8.8 UG/ML (ref 10–20)
VENTRICULAR RATE, ECG03: 72 BPM
WBC # BLD AUTO: 12.3 K/UL (ref 4.6–13.2)
WBC # BLD AUTO: 12.7 K/UL (ref 4.6–13.2)
WBC # BLD AUTO: 13.5 K/UL (ref 4.6–13.2)
WBC # BLD AUTO: 5.5 K/UL (ref 4.6–13.2)
WBC # BLD AUTO: 5.6 K/UL (ref 4.6–13.2)
WBC # BLD AUTO: 5.6 K/UL (ref 4.6–13.2)
WBC # BLD AUTO: 5.7 K/UL (ref 4.6–13.2)
WBC # BLD AUTO: 6.2 K/UL (ref 4.6–13.2)
WBC # BLD AUTO: 6.2 K/UL (ref 4.6–13.2)
WBC # BLD AUTO: 7 K/UL (ref 4.6–13.2)
WBC # BLD AUTO: 9.8 K/UL (ref 4.6–13.2)
WBC URNS QL MICRO: ABNORMAL /HPF (ref 0–4)
WBC URNS QL MICRO: NORMAL /HPF (ref 0–4)

## 2020-01-01 PROCEDURE — 80053 COMPREHEN METABOLIC PANEL: CPT

## 2020-01-01 PROCEDURE — 3331090001 HH PPS REVENUE CREDIT

## 2020-01-01 PROCEDURE — 82962 GLUCOSE BLOOD TEST: CPT

## 2020-01-01 PROCEDURE — 3331090002 HH PPS REVENUE DEBIT

## 2020-01-01 PROCEDURE — 74011250636 HC RX REV CODE- 250/636: Performed by: EMERGENCY MEDICINE

## 2020-01-01 PROCEDURE — G0300 HHS/HOSPICE OF LPN EA 15 MIN: HCPCS

## 2020-01-01 PROCEDURE — 99284 EMERGENCY DEPT VISIT MOD MDM: CPT

## 2020-01-01 PROCEDURE — G0152 HHCP-SERV OF OT,EA 15 MIN: HCPCS

## 2020-01-01 PROCEDURE — 74011250636 HC RX REV CODE- 250/636: Performed by: INTERNAL MEDICINE

## 2020-01-01 PROCEDURE — 97535 SELF CARE MNGMENT TRAINING: CPT

## 2020-01-01 PROCEDURE — 85025 COMPLETE CBC W/AUTO DIFF WBC: CPT

## 2020-01-01 PROCEDURE — 74011250637 HC RX REV CODE- 250/637: Performed by: SURGERY

## 2020-01-01 PROCEDURE — 80202 ASSAY OF VANCOMYCIN: CPT

## 2020-01-01 PROCEDURE — G0157 HHC PT ASSISTANT EA 15: HCPCS

## 2020-01-01 PROCEDURE — 65660000000 HC RM CCU STEPDOWN

## 2020-01-01 PROCEDURE — 73590 X-RAY EXAM OF LOWER LEG: CPT

## 2020-01-01 PROCEDURE — 74011250637 HC RX REV CODE- 250/637: Performed by: INTERNAL MEDICINE

## 2020-01-01 PROCEDURE — 77030041247 HC PROTECTOR HEEL HEELMEDIX MDII -B

## 2020-01-01 PROCEDURE — 74011000258 HC RX REV CODE- 258: Performed by: INTERNAL MEDICINE

## 2020-01-01 PROCEDURE — 77030040392 HC DRSG OPTIFOAM MDII -A

## 2020-01-01 PROCEDURE — 85610 PROTHROMBIN TIME: CPT

## 2020-01-01 PROCEDURE — 83036 HEMOGLOBIN GLYCOSYLATED A1C: CPT

## 2020-01-01 PROCEDURE — 400013 HH SOC

## 2020-01-01 PROCEDURE — 74011000250 HC RX REV CODE- 250: Performed by: ANESTHESIOLOGY

## 2020-01-01 PROCEDURE — 77030038269 HC DRN EXT URIN PURWCK BARD -A

## 2020-01-01 PROCEDURE — 74011250636 HC RX REV CODE- 250/636: Performed by: SURGERY

## 2020-01-01 PROCEDURE — 74011250636 HC RX REV CODE- 250/636: Performed by: NURSE PRACTITIONER

## 2020-01-01 PROCEDURE — 97165 OT EVAL LOW COMPLEX 30 MIN: CPT

## 2020-01-01 PROCEDURE — 74011250636 HC RX REV CODE- 250/636: Performed by: PHYSICIAN ASSISTANT

## 2020-01-01 PROCEDURE — 36415 COLL VENOUS BLD VENIPUNCTURE: CPT

## 2020-01-01 PROCEDURE — 80048 BASIC METABOLIC PNL TOTAL CA: CPT

## 2020-01-01 PROCEDURE — 87077 CULTURE AEROBIC IDENTIFY: CPT

## 2020-01-01 PROCEDURE — 83735 ASSAY OF MAGNESIUM: CPT

## 2020-01-01 PROCEDURE — 77030027138 HC INCENT SPIROMETER -A

## 2020-01-01 PROCEDURE — 74011000258 HC RX REV CODE- 258: Performed by: NURSE PRACTITIONER

## 2020-01-01 PROCEDURE — G0299 HHS/HOSPICE OF RN EA 15 MIN: HCPCS

## 2020-01-01 PROCEDURE — 87205 SMEAR GRAM STAIN: CPT

## 2020-01-01 PROCEDURE — 74011250637 HC RX REV CODE- 250/637: Performed by: EMERGENCY MEDICINE

## 2020-01-01 PROCEDURE — 74011000258 HC RX REV CODE- 258: Performed by: ANESTHESIOLOGY

## 2020-01-01 PROCEDURE — 77030018836 HC SOL IRR NACL ICUM -A: Performed by: SURGERY

## 2020-01-01 PROCEDURE — 83605 ASSAY OF LACTIC ACID: CPT

## 2020-01-01 PROCEDURE — 77030038554 HC DRSG WND THERAHONEY MDII -Z

## 2020-01-01 PROCEDURE — 81001 URINALYSIS AUTO W/SCOPE: CPT

## 2020-01-01 PROCEDURE — 77030018836 HC SOL IRR NACL ICUM -A

## 2020-01-01 PROCEDURE — G0151 HHCP-SERV OF PT,EA 15 MIN: HCPCS

## 2020-01-01 PROCEDURE — 96375 TX/PRO/DX INJ NEW DRUG ADDON: CPT

## 2020-01-01 PROCEDURE — 74011250636 HC RX REV CODE- 250/636: Performed by: STUDENT IN AN ORGANIZED HEALTH CARE EDUCATION/TRAINING PROGRAM

## 2020-01-01 PROCEDURE — 83880 ASSAY OF NATRIURETIC PEPTIDE: CPT

## 2020-01-01 PROCEDURE — 51702 INSERT TEMP BLADDER CATH: CPT

## 2020-01-01 PROCEDURE — 74011000258 HC RX REV CODE- 258: Performed by: EMERGENCY MEDICINE

## 2020-01-01 PROCEDURE — 74011636637 HC RX REV CODE- 636/637: Performed by: SURGERY

## 2020-01-01 PROCEDURE — 85730 THROMBOPLASTIN TIME PARTIAL: CPT

## 2020-01-01 PROCEDURE — 77030012890

## 2020-01-01 PROCEDURE — 74011000250 HC RX REV CODE- 250: Performed by: PHYSICIAN ASSISTANT

## 2020-01-01 PROCEDURE — 87086 URINE CULTURE/COLONY COUNT: CPT

## 2020-01-01 PROCEDURE — 87040 BLOOD CULTURE FOR BACTERIA: CPT

## 2020-01-01 PROCEDURE — 77030010509 HC AIRWY LMA MSK TELE -A: Performed by: ANESTHESIOLOGY

## 2020-01-01 PROCEDURE — 77030018846 HC SOL IRR STRL H20 ICUM -A

## 2020-01-01 PROCEDURE — 97116 GAIT TRAINING THERAPY: CPT

## 2020-01-01 PROCEDURE — 96374 THER/PROPH/DIAG INJ IV PUSH: CPT

## 2020-01-01 PROCEDURE — 87186 SC STD MICRODIL/AGAR DIL: CPT

## 2020-01-01 PROCEDURE — 74011000258 HC RX REV CODE- 258: Performed by: PHYSICIAN ASSISTANT

## 2020-01-01 PROCEDURE — 76010000112 HC CV SURG 0.5 TO 1 HR: Performed by: SURGERY

## 2020-01-01 PROCEDURE — 85027 COMPLETE CBC AUTOMATED: CPT

## 2020-01-01 PROCEDURE — 74011250637 HC RX REV CODE- 250/637: Performed by: NURSE PRACTITIONER

## 2020-01-01 PROCEDURE — 74011636637 HC RX REV CODE- 636/637: Performed by: FAMILY MEDICINE

## 2020-01-01 PROCEDURE — 77030040393 HC DRSG OPTIFOAM GENT MDII -B

## 2020-01-01 PROCEDURE — 74011636637 HC RX REV CODE- 636/637: Performed by: INTERNAL MEDICINE

## 2020-01-01 PROCEDURE — 74011250637 HC RX REV CODE- 250/637

## 2020-01-01 PROCEDURE — 29581 APPL MULTLAYER CMPRN SYS LEG: CPT

## 2020-01-01 PROCEDURE — G0158 HHC OT ASSISTANT EA 15: HCPCS

## 2020-01-01 PROCEDURE — 96361 HYDRATE IV INFUSION ADD-ON: CPT

## 2020-01-01 PROCEDURE — 74011250636 HC RX REV CODE- 250/636: Performed by: ANESTHESIOLOGY

## 2020-01-01 PROCEDURE — 93005 ELECTROCARDIOGRAM TRACING: CPT

## 2020-01-01 PROCEDURE — 65270000029 HC RM PRIVATE

## 2020-01-01 PROCEDURE — 74011000250 HC RX REV CODE- 250: Performed by: STUDENT IN AN ORGANIZED HEALTH CARE EDUCATION/TRAINING PROGRAM

## 2020-01-01 PROCEDURE — 0JBP0ZZ EXCISION OF LEFT LOWER LEG SUBCUTANEOUS TISSUE AND FASCIA, OPEN APPROACH: ICD-10-PCS | Performed by: SURGERY

## 2020-01-01 PROCEDURE — 74011636637 HC RX REV CODE- 636/637: Performed by: NURSE PRACTITIONER

## 2020-01-01 PROCEDURE — 99285 EMERGENCY DEPT VISIT HI MDM: CPT

## 2020-01-01 PROCEDURE — 76210000016 HC OR PH I REC 1 TO 1.5 HR: Performed by: SURGERY

## 2020-01-01 PROCEDURE — 92521 EVALUATION OF SPEECH FLUENCY: CPT

## 2020-01-01 PROCEDURE — 86900 BLOOD TYPING SEROLOGIC ABO: CPT

## 2020-01-01 PROCEDURE — 92610 EVALUATE SWALLOWING FUNCTION: CPT

## 2020-01-01 PROCEDURE — 96365 THER/PROPH/DIAG IV INF INIT: CPT

## 2020-01-01 PROCEDURE — 97162 PT EVAL MOD COMPLEX 30 MIN: CPT

## 2020-01-01 PROCEDURE — 77030037878 HC DRSG MEPILEX >48IN BORD MOLN -B

## 2020-01-01 PROCEDURE — 76060000032 HC ANESTHESIA 0.5 TO 1 HR: Performed by: SURGERY

## 2020-01-01 PROCEDURE — 71046 X-RAY EXAM CHEST 2 VIEWS: CPT

## 2020-01-01 PROCEDURE — 74011636637 HC RX REV CODE- 636/637: Performed by: STUDENT IN AN ORGANIZED HEALTH CARE EDUCATION/TRAINING PROGRAM

## 2020-01-01 PROCEDURE — 85652 RBC SED RATE AUTOMATED: CPT

## 2020-01-01 PROCEDURE — 82272 OCCULT BLD FECES 1-3 TESTS: CPT

## 2020-01-01 PROCEDURE — 400014 HH F/U

## 2020-01-01 PROCEDURE — 86140 C-REACTIVE PROTEIN: CPT

## 2020-01-01 PROCEDURE — 94762 N-INVAS EAR/PLS OXIMTRY CONT: CPT

## 2020-01-01 PROCEDURE — 97530 THERAPEUTIC ACTIVITIES: CPT

## 2020-01-01 PROCEDURE — 97161 PT EVAL LOW COMPLEX 20 MIN: CPT

## 2020-01-01 PROCEDURE — 51798 US URINE CAPACITY MEASURE: CPT

## 2020-01-01 PROCEDURE — 71045 X-RAY EXAM CHEST 1 VIEW: CPT

## 2020-01-01 PROCEDURE — 0JBN0ZZ EXCISION OF RIGHT LOWER LEG SUBCUTANEOUS TISSUE AND FASCIA, OPEN APPROACH: ICD-10-PCS | Performed by: SURGERY

## 2020-01-01 RX ORDER — SERTRALINE HYDROCHLORIDE 25 MG/1
TABLET, FILM COATED ORAL
Qty: 30 TAB | Refills: 0 | Status: SHIPPED | OUTPATIENT
Start: 2020-01-01 | End: 2020-01-01

## 2020-01-01 RX ORDER — ACETAMINOPHEN 325 MG/1
650 TABLET ORAL
Status: DISCONTINUED | OUTPATIENT
Start: 2020-01-01 | End: 2020-01-01 | Stop reason: HOSPADM

## 2020-01-01 RX ORDER — CIPROFLOXACIN HYDROCHLORIDE 3.5 MG/ML
SOLUTION/ DROPS TOPICAL
Qty: 5 ML | Refills: 0 | Status: SHIPPED | OUTPATIENT
Start: 2020-01-01 | End: 2020-01-01

## 2020-01-01 RX ORDER — SODIUM CHLORIDE 0.9 % (FLUSH) 0.9 %
5-40 SYRINGE (ML) INJECTION EVERY 8 HOURS
Status: DISCONTINUED | OUTPATIENT
Start: 2020-01-01 | End: 2020-01-01 | Stop reason: HOSPADM

## 2020-01-01 RX ORDER — PSEUDOEPHED/ACETAMINOPHEN/CPM 30-500-2MG
1 TABLET ORAL 3 TIMES WEEKLY
Status: DISCONTINUED | OUTPATIENT
Start: 2020-01-01 | End: 2020-01-01 | Stop reason: SDUPTHER

## 2020-01-01 RX ORDER — DEXTROSE 50 % IN WATER (D50W) INTRAVENOUS SYRINGE
25
Status: COMPLETED | OUTPATIENT
Start: 2020-01-01 | End: 2020-01-01

## 2020-01-01 RX ORDER — MAGNESIUM SULFATE 100 %
4 CRYSTALS MISCELLANEOUS AS NEEDED
Status: DISCONTINUED | OUTPATIENT
Start: 2020-01-01 | End: 2020-01-01 | Stop reason: HOSPADM

## 2020-01-01 RX ORDER — MORPHINE SULFATE 2 MG/ML
2 INJECTION, SOLUTION INTRAMUSCULAR; INTRAVENOUS
Status: COMPLETED | OUTPATIENT
Start: 2020-01-01 | End: 2020-01-01

## 2020-01-01 RX ORDER — POLYETHYLENE GLYCOL 3350 17 G/17G
17 POWDER, FOR SOLUTION ORAL DAILY
Qty: 30 PACKET | Refills: 0 | Status: SHIPPED
Start: 2020-01-01 | End: 2020-01-01

## 2020-01-01 RX ORDER — ENOXAPARIN SODIUM 100 MG/ML
40 INJECTION SUBCUTANEOUS EVERY 24 HOURS
Status: DISCONTINUED | OUTPATIENT
Start: 2020-01-01 | End: 2020-01-01 | Stop reason: HOSPADM

## 2020-01-01 RX ORDER — THERA TABS 400 MCG
1 TAB ORAL DAILY
Status: DISCONTINUED | OUTPATIENT
Start: 2020-01-01 | End: 2020-01-01 | Stop reason: HOSPADM

## 2020-01-01 RX ORDER — PSEUDOEPHED/ACETAMINOPHEN/CPM 30-500-2MG
1 TABLET ORAL 3 TIMES WEEKLY
Status: DISCONTINUED | OUTPATIENT
Start: 2020-01-01 | End: 2020-01-01 | Stop reason: HOSPADM

## 2020-01-01 RX ORDER — POLYETHYLENE GLYCOL 3350 17 G/17G
17 POWDER, FOR SOLUTION ORAL DAILY
Status: DISCONTINUED | OUTPATIENT
Start: 2020-01-01 | End: 2020-01-01 | Stop reason: HOSPADM

## 2020-01-01 RX ORDER — ALOGLIPTIN 12.5 MG/1
12.5 TABLET, FILM COATED ORAL DAILY
Status: DISCONTINUED | OUTPATIENT
Start: 2020-01-01 | End: 2020-01-01 | Stop reason: HOSPADM

## 2020-01-01 RX ORDER — BUSPIRONE HYDROCHLORIDE 5 MG/1
5 TABLET ORAL 2 TIMES DAILY
Status: DISCONTINUED | OUTPATIENT
Start: 2020-01-01 | End: 2020-01-01 | Stop reason: HOSPADM

## 2020-01-01 RX ORDER — INSULIN LISPRO 100 [IU]/ML
INJECTION, SOLUTION INTRAVENOUS; SUBCUTANEOUS
Status: DISCONTINUED | OUTPATIENT
Start: 2020-01-01 | End: 2020-01-01 | Stop reason: HOSPADM

## 2020-01-01 RX ORDER — BISMUTH SUBSALICYLATE 262 MG
1 TABLET,CHEWABLE ORAL DAILY
COMMUNITY

## 2020-01-01 RX ORDER — FAMOTIDINE 20 MG/1
TABLET, FILM COATED ORAL
Status: COMPLETED
Start: 2020-01-01 | End: 2020-01-01

## 2020-01-01 RX ORDER — SPIRONOLACTONE 25 MG/1
25 TABLET ORAL DAILY
Status: DISCONTINUED | OUTPATIENT
Start: 2020-01-01 | End: 2020-01-01 | Stop reason: HOSPADM

## 2020-01-01 RX ORDER — SODIUM BICARBONATE 1 MEQ/ML
50 SYRINGE (ML) INTRAVENOUS
Status: COMPLETED | OUTPATIENT
Start: 2020-01-01 | End: 2020-01-01

## 2020-01-01 RX ORDER — RABEPRAZOLE SODIUM 20 MG/1
TABLET, DELAYED RELEASE ORAL
Qty: 30 TAB | Refills: 5 | OUTPATIENT
Start: 2020-01-01

## 2020-01-01 RX ORDER — SODIUM CHLORIDE 9 MG/ML
250 INJECTION, SOLUTION INTRAVENOUS AS NEEDED
Status: DISCONTINUED | OUTPATIENT
Start: 2020-01-01 | End: 2020-01-01 | Stop reason: HOSPADM

## 2020-01-01 RX ORDER — MIDAZOLAM HYDROCHLORIDE 1 MG/ML
INJECTION, SOLUTION INTRAMUSCULAR; INTRAVENOUS AS NEEDED
Status: DISCONTINUED | OUTPATIENT
Start: 2020-01-01 | End: 2020-01-01 | Stop reason: HOSPADM

## 2020-01-01 RX ORDER — GUAIFENESIN 100 MG/5ML
81 LIQUID (ML) ORAL DAILY
Status: COMPLETED | OUTPATIENT
Start: 2020-01-01 | End: 2020-01-01

## 2020-01-01 RX ORDER — PANTOPRAZOLE SODIUM 40 MG/1
40 TABLET, DELAYED RELEASE ORAL
Status: DISCONTINUED | OUTPATIENT
Start: 2020-01-01 | End: 2020-01-01 | Stop reason: HOSPADM

## 2020-01-01 RX ORDER — PRAVASTATIN SODIUM 20 MG/1
40 TABLET ORAL
Status: DISPENSED | OUTPATIENT
Start: 2020-01-01 | End: 2020-01-01

## 2020-01-01 RX ORDER — BUSPIRONE HYDROCHLORIDE 5 MG/1
TABLET ORAL
Qty: 60 TAB | Refills: 2 | Status: SHIPPED | OUTPATIENT
Start: 2020-01-01

## 2020-01-01 RX ORDER — FUROSEMIDE 10 MG/ML
20 INJECTION INTRAMUSCULAR; INTRAVENOUS ONCE
Status: COMPLETED | OUTPATIENT
Start: 2020-01-01 | End: 2020-01-01

## 2020-01-01 RX ORDER — KETAMINE HCL 50MG/ML(1)
SYRINGE (ML) INTRAVENOUS AS NEEDED
Status: DISCONTINUED | OUTPATIENT
Start: 2020-01-01 | End: 2020-01-01 | Stop reason: HOSPADM

## 2020-01-01 RX ORDER — MORPHINE SULFATE 2 MG/ML
1 INJECTION, SOLUTION INTRAMUSCULAR; INTRAVENOUS
Status: DISCONTINUED | OUTPATIENT
Start: 2020-01-01 | End: 2020-01-01

## 2020-01-01 RX ORDER — LEVOFLOXACIN 5 MG/ML
250 INJECTION, SOLUTION INTRAVENOUS EVERY 24 HOURS
Status: DISCONTINUED | OUTPATIENT
Start: 2020-01-01 | End: 2020-01-01 | Stop reason: HOSPADM

## 2020-01-01 RX ORDER — ACETAMINOPHEN 325 MG/1
650 TABLET ORAL
Status: COMPLETED | OUTPATIENT
Start: 2020-01-01 | End: 2020-01-01

## 2020-01-01 RX ORDER — FUROSEMIDE 40 MG/1
40 TABLET ORAL DAILY
Status: DISCONTINUED | OUTPATIENT
Start: 2020-01-01 | End: 2020-01-01 | Stop reason: HOSPADM

## 2020-01-01 RX ORDER — SULFAMETHOXAZOLE AND TRIMETHOPRIM 800; 160 MG/1; MG/1
1 TABLET ORAL 2 TIMES DAILY
Qty: 28 TAB | Refills: 0 | Status: SHIPPED
Start: 2020-01-01 | End: 2020-01-01

## 2020-01-01 RX ORDER — ASPIRIN 81 MG/1
81 TABLET ORAL DAILY
Status: DISCONTINUED | OUTPATIENT
Start: 2020-01-01 | End: 2020-01-01 | Stop reason: HOSPADM

## 2020-01-01 RX ORDER — FAMOTIDINE 20 MG/1
20 TABLET, FILM COATED ORAL ONCE
Status: COMPLETED | OUTPATIENT
Start: 2020-01-01 | End: 2020-01-01

## 2020-01-01 RX ORDER — PRAVASTATIN SODIUM 20 MG/1
40 TABLET ORAL
Status: DISCONTINUED | OUTPATIENT
Start: 2020-01-01 | End: 2020-01-01 | Stop reason: HOSPADM

## 2020-01-01 RX ORDER — GABAPENTIN 100 MG/1
100 CAPSULE ORAL 3 TIMES DAILY
Status: DISCONTINUED | OUTPATIENT
Start: 2020-01-01 | End: 2020-01-01

## 2020-01-01 RX ORDER — SERTRALINE HYDROCHLORIDE 25 MG/1
25 TABLET, FILM COATED ORAL DAILY
Status: DISCONTINUED | OUTPATIENT
Start: 2020-01-01 | End: 2020-01-01 | Stop reason: HOSPADM

## 2020-01-01 RX ORDER — MORPHINE SULFATE 2 MG/ML
2 INJECTION, SOLUTION INTRAMUSCULAR; INTRAVENOUS
Status: DISCONTINUED | OUTPATIENT
Start: 2020-01-01 | End: 2020-01-01 | Stop reason: HOSPADM

## 2020-01-01 RX ORDER — VANCOMYCIN HYDROCHLORIDE
1250 ONCE
Status: COMPLETED | OUTPATIENT
Start: 2020-01-01 | End: 2020-01-01

## 2020-01-01 RX ORDER — LANOLIN ALCOHOL/MO/W.PET/CERES
400 CREAM (GRAM) TOPICAL DAILY
Status: DISCONTINUED | OUTPATIENT
Start: 2020-01-01 | End: 2020-01-01 | Stop reason: HOSPADM

## 2020-01-01 RX ORDER — FENTANYL CITRATE 50 UG/ML
INJECTION, SOLUTION INTRAMUSCULAR; INTRAVENOUS
Status: DISPENSED
Start: 2020-01-01 | End: 2020-01-01

## 2020-01-01 RX ORDER — SODIUM CHLORIDE 9 MG/ML
100 INJECTION, SOLUTION INTRAVENOUS CONTINUOUS
Status: DISCONTINUED | OUTPATIENT
Start: 2020-01-01 | End: 2020-01-01 | Stop reason: HOSPADM

## 2020-01-01 RX ORDER — BUSPIRONE HYDROCHLORIDE 10 MG/1
TABLET ORAL
Qty: 30 TAB | Refills: 2 | Status: SHIPPED | OUTPATIENT
Start: 2020-01-01 | End: 2020-01-01

## 2020-01-01 RX ORDER — SERTRALINE HYDROCHLORIDE 25 MG/1
25 TABLET, FILM COATED ORAL EVERY EVENING
Status: DISCONTINUED | OUTPATIENT
Start: 2020-01-01 | End: 2020-01-01 | Stop reason: HOSPADM

## 2020-01-01 RX ORDER — FENTANYL CITRATE 50 UG/ML
50 INJECTION, SOLUTION INTRAMUSCULAR; INTRAVENOUS
Status: DISCONTINUED | OUTPATIENT
Start: 2020-01-01 | End: 2020-01-01 | Stop reason: ALTCHOICE

## 2020-01-01 RX ORDER — POTASSIUM CHLORIDE 20 MEQ/1
20 TABLET, EXTENDED RELEASE ORAL 2 TIMES DAILY
Status: DISCONTINUED | OUTPATIENT
Start: 2020-01-01 | End: 2020-01-01

## 2020-01-01 RX ORDER — MORPHINE SULFATE 2 MG/ML
2 INJECTION, SOLUTION INTRAMUSCULAR; INTRAVENOUS
Status: DISCONTINUED | OUTPATIENT
Start: 2020-01-01 | End: 2020-01-01

## 2020-01-01 RX ORDER — DEXTROSE 50 % IN WATER (D50W) INTRAVENOUS SYRINGE
25-50 AS NEEDED
Status: DISCONTINUED | OUTPATIENT
Start: 2020-01-01 | End: 2020-01-01 | Stop reason: SDUPTHER

## 2020-01-01 RX ORDER — LANOLIN ALCOHOL/MO/W.PET/CERES
325 CREAM (GRAM) TOPICAL
Status: DISCONTINUED | OUTPATIENT
Start: 2020-01-01 | End: 2020-01-01 | Stop reason: HOSPADM

## 2020-01-01 RX ORDER — OXYCODONE AND ACETAMINOPHEN 5; 325 MG/1; MG/1
1 TABLET ORAL
Status: DISCONTINUED | OUTPATIENT
Start: 2020-01-01 | End: 2020-01-01 | Stop reason: HOSPADM

## 2020-01-01 RX ORDER — CLINDAMYCIN HYDROCHLORIDE 150 MG/1
300 CAPSULE ORAL EVERY 6 HOURS
Status: DISCONTINUED | OUTPATIENT
Start: 2020-01-01 | End: 2020-01-01 | Stop reason: HOSPADM

## 2020-01-01 RX ORDER — RABEPRAZOLE SODIUM 20 MG/1
TABLET, DELAYED RELEASE ORAL
Qty: 30 TAB | Refills: 5 | Status: SHIPPED | OUTPATIENT
Start: 2020-01-01

## 2020-01-01 RX ORDER — LEVOFLOXACIN 5 MG/ML
500 INJECTION, SOLUTION INTRAVENOUS EVERY 24 HOURS
Status: COMPLETED | OUTPATIENT
Start: 2020-01-01 | End: 2020-01-01

## 2020-01-01 RX ORDER — SERTRALINE HYDROCHLORIDE 25 MG/1
TABLET, FILM COATED ORAL
Qty: 30 TAB | Refills: 0 | Status: SHIPPED | OUTPATIENT
Start: 2020-01-01 | End: 2020-01-01 | Stop reason: SDUPTHER

## 2020-01-01 RX ORDER — CALCIUM GLUCONATE 94 MG/ML
1 INJECTION, SOLUTION INTRAVENOUS ONCE
Status: COMPLETED | OUTPATIENT
Start: 2020-01-01 | End: 2020-01-01

## 2020-01-01 RX ORDER — SODIUM CHLORIDE, SODIUM LACTATE, POTASSIUM CHLORIDE, CALCIUM CHLORIDE 600; 310; 30; 20 MG/100ML; MG/100ML; MG/100ML; MG/100ML
125 INJECTION, SOLUTION INTRAVENOUS CONTINUOUS
Status: DISCONTINUED | OUTPATIENT
Start: 2020-01-01 | End: 2020-01-01

## 2020-01-01 RX ORDER — SULFAMETHOXAZOLE AND TRIMETHOPRIM 800; 160 MG/1; MG/1
1 TABLET ORAL EVERY 12 HOURS
Status: DISCONTINUED | OUTPATIENT
Start: 2020-01-01 | End: 2020-01-01 | Stop reason: HOSPADM

## 2020-01-01 RX ORDER — SERTRALINE HYDROCHLORIDE 25 MG/1
TABLET, FILM COATED ORAL
Qty: 90 TAB | Refills: 3 | Status: SHIPPED | OUTPATIENT
Start: 2020-01-01

## 2020-01-01 RX ORDER — ONDANSETRON 2 MG/ML
4 INJECTION INTRAMUSCULAR; INTRAVENOUS
Status: DISCONTINUED | OUTPATIENT
Start: 2020-01-01 | End: 2020-01-01 | Stop reason: HOSPADM

## 2020-01-01 RX ORDER — OXYCODONE AND ACETAMINOPHEN 5; 325 MG/1; MG/1
1 TABLET ORAL
Status: DISCONTINUED | OUTPATIENT
Start: 2020-01-01 | End: 2020-01-01

## 2020-01-01 RX ORDER — DOXYCYCLINE 100 MG/1
100 CAPSULE ORAL 2 TIMES DAILY
Qty: 28 CAP | Refills: 0 | Status: SHIPPED | OUTPATIENT
Start: 2020-01-01 | End: 2020-01-01

## 2020-01-01 RX ORDER — LIDOCAINE HYDROCHLORIDE 20 MG/ML
INJECTION, SOLUTION EPIDURAL; INFILTRATION; INTRACAUDAL; PERINEURAL AS NEEDED
Status: DISCONTINUED | OUTPATIENT
Start: 2020-01-01 | End: 2020-01-01 | Stop reason: HOSPADM

## 2020-01-01 RX ORDER — FENTANYL CITRATE 50 UG/ML
25 INJECTION, SOLUTION INTRAMUSCULAR; INTRAVENOUS AS NEEDED
Status: DISCONTINUED | OUTPATIENT
Start: 2020-01-01 | End: 2020-01-01 | Stop reason: HOSPADM

## 2020-01-01 RX ORDER — DEXTROSE MONOHYDRATE 100 MG/ML
125-250 INJECTION, SOLUTION INTRAVENOUS AS NEEDED
Status: DISCONTINUED | OUTPATIENT
Start: 2020-01-01 | End: 2020-01-01 | Stop reason: HOSPADM

## 2020-01-01 RX ORDER — LANOLIN ALCOHOL/MO/W.PET/CERES
325 CREAM (GRAM) TOPICAL
Qty: 30 TAB | Refills: 0 | Status: SHIPPED
Start: 2020-01-01

## 2020-01-01 RX ORDER — GABAPENTIN 100 MG/1
200 CAPSULE ORAL 3 TIMES DAILY
Status: DISCONTINUED | OUTPATIENT
Start: 2020-01-01 | End: 2020-01-01 | Stop reason: HOSPADM

## 2020-01-01 RX ORDER — OXYCODONE AND ACETAMINOPHEN 5; 325 MG/1; MG/1
1 TABLET ORAL
Qty: 12 TAB | Refills: 0 | Status: SHIPPED | OUTPATIENT
Start: 2020-01-01 | End: 2020-01-01

## 2020-01-01 RX ORDER — BUSPIRONE HYDROCHLORIDE 5 MG/1
5 TABLET ORAL 2 TIMES DAILY
Status: DISPENSED | OUTPATIENT
Start: 2020-01-01 | End: 2020-01-01

## 2020-01-01 RX ORDER — ONDANSETRON 2 MG/ML
4 INJECTION INTRAMUSCULAR; INTRAVENOUS ONCE
Status: ACTIVE | OUTPATIENT
Start: 2020-01-01 | End: 2020-01-01

## 2020-01-01 RX ORDER — LANOLIN ALCOHOL/MO/W.PET/CERES
1 CREAM (GRAM) TOPICAL
Status: DISCONTINUED | OUTPATIENT
Start: 2020-01-01 | End: 2020-01-01 | Stop reason: HOSPADM

## 2020-01-01 RX ORDER — SODIUM CHLORIDE 0.9 % (FLUSH) 0.9 %
5-40 SYRINGE (ML) INJECTION AS NEEDED
Status: DISCONTINUED | OUTPATIENT
Start: 2020-01-01 | End: 2020-01-01 | Stop reason: HOSPADM

## 2020-01-01 RX ORDER — AMOXICILLIN AND CLAVULANATE POTASSIUM 875; 125 MG/1; MG/1
1 TABLET, FILM COATED ORAL EVERY 12 HOURS
Qty: 14 TAB | Refills: 0 | Status: SHIPPED | OUTPATIENT
Start: 2020-01-01 | End: 2020-01-01

## 2020-01-01 RX ORDER — PROPOFOL 10 MG/ML
INJECTION, EMULSION INTRAVENOUS AS NEEDED
Status: DISCONTINUED | OUTPATIENT
Start: 2020-01-01 | End: 2020-01-01 | Stop reason: HOSPADM

## 2020-01-01 RX ORDER — KETOROLAC TROMETHAMINE 15 MG/ML
15 INJECTION, SOLUTION INTRAMUSCULAR; INTRAVENOUS
Status: COMPLETED | OUTPATIENT
Start: 2020-01-01 | End: 2020-01-01

## 2020-01-01 RX ORDER — CIPROFLOXACIN 500 MG/1
500 TABLET ORAL 2 TIMES DAILY
Qty: 14 TAB | Refills: 0 | Status: SHIPPED | OUTPATIENT
Start: 2020-01-01 | End: 2020-01-01

## 2020-01-01 RX ORDER — PRAVASTATIN SODIUM 40 MG/1
TABLET ORAL
Qty: 90 TAB | Refills: 3 | Status: SHIPPED | OUTPATIENT
Start: 2020-01-01

## 2020-01-01 RX ORDER — FENTANYL CITRATE 50 UG/ML
INJECTION, SOLUTION INTRAMUSCULAR; INTRAVENOUS AS NEEDED
Status: DISCONTINUED | OUTPATIENT
Start: 2020-01-01 | End: 2020-01-01 | Stop reason: HOSPADM

## 2020-01-01 RX ORDER — DEXTROSE MONOHYDRATE 100 MG/ML
25-50 INJECTION, SOLUTION INTRAVENOUS AS NEEDED
Status: DISCONTINUED | OUTPATIENT
Start: 2020-01-01 | End: 2020-01-01 | Stop reason: HOSPADM

## 2020-01-01 RX ORDER — LANOLIN ALCOHOL/MO/W.PET/CERES
400 CREAM (GRAM) TOPICAL DAILY
Qty: 30 TAB | Refills: 0 | Status: SHIPPED
Start: 2020-01-01

## 2020-01-01 RX ORDER — VANCOMYCIN/0.9 % SOD CHLORIDE 750 MG/250
750 PLASTIC BAG, INJECTION (ML) INTRAVENOUS
Status: DISCONTINUED | OUTPATIENT
Start: 2020-01-01 | End: 2020-01-01

## 2020-01-01 RX ADMIN — Medication 10 ML: at 14:55

## 2020-01-01 RX ADMIN — PIPERACILLIN AND TAZOBACTAM 2.25 G: 2; .25 INJECTION, POWDER, FOR SOLUTION INTRAVENOUS at 17:16

## 2020-01-01 RX ADMIN — PRAVASTATIN SODIUM 40 MG: 20 TABLET ORAL at 23:28

## 2020-01-01 RX ADMIN — BUSPIRONE HYDROCHLORIDE 5 MG: 5 TABLET ORAL at 17:40

## 2020-01-01 RX ADMIN — SERTRALINE HYDROCHLORIDE 25 MG: 25 TABLET ORAL at 11:01

## 2020-01-01 RX ADMIN — Medication 10 ML: at 22:17

## 2020-01-01 RX ADMIN — GABAPENTIN 200 MG: 100 CAPSULE ORAL at 17:31

## 2020-01-01 RX ADMIN — FERROUS SULFATE TAB 325 MG (65 MG ELEMENTAL FE) 325 MG: 325 (65 FE) TAB at 09:44

## 2020-01-01 RX ADMIN — PANTOPRAZOLE SODIUM 40 MG: 40 TABLET, DELAYED RELEASE ORAL at 06:08

## 2020-01-01 RX ADMIN — PIPERACILLIN AND TAZOBACTAM 2.25 G: 2; .25 INJECTION, POWDER, LYOPHILIZED, FOR SOLUTION INTRAVENOUS at 17:13

## 2020-01-01 RX ADMIN — PIPERACILLIN AND TAZOBACTAM 2.25 G: 2; .25 INJECTION, POWDER, FOR SOLUTION INTRAVENOUS at 06:05

## 2020-01-01 RX ADMIN — INSULIN LISPRO 4 UNITS: 100 INJECTION, SOLUTION INTRAVENOUS; SUBCUTANEOUS at 22:29

## 2020-01-01 RX ADMIN — KETOROLAC TROMETHAMINE 15 MG: 15 INJECTION, SOLUTION INTRAMUSCULAR; INTRAVENOUS at 01:05

## 2020-01-01 RX ADMIN — SERTRALINE HYDROCHLORIDE 25 MG: 25 TABLET ORAL at 09:24

## 2020-01-01 RX ADMIN — SODIUM BICARBONATE 50 MEQ: 84 INJECTION, SOLUTION INTRAVENOUS at 20:23

## 2020-01-01 RX ADMIN — MORPHINE SULFATE 2 MG: 2 INJECTION, SOLUTION INTRAMUSCULAR; INTRAVENOUS at 12:32

## 2020-01-01 RX ADMIN — INSULIN LISPRO 4 UNITS: 100 INJECTION, SOLUTION INTRAVENOUS; SUBCUTANEOUS at 15:27

## 2020-01-01 RX ADMIN — FUROSEMIDE 40 MG: 40 TABLET ORAL at 11:05

## 2020-01-01 RX ADMIN — GABAPENTIN 100 MG: 100 CAPSULE ORAL at 21:37

## 2020-01-01 RX ADMIN — Medication 10 ML: at 06:12

## 2020-01-01 RX ADMIN — BUSPIRONE HYDROCHLORIDE 5 MG: 5 TABLET ORAL at 09:03

## 2020-01-01 RX ADMIN — CLINDAMYCIN HYDROCHLORIDE 300 MG: 150 CAPSULE ORAL at 00:37

## 2020-01-01 RX ADMIN — THERA TABS 1 TABLET: TAB at 09:03

## 2020-01-01 RX ADMIN — INSULIN LISPRO 6 UNITS: 100 INJECTION, SOLUTION INTRAVENOUS; SUBCUTANEOUS at 18:11

## 2020-01-01 RX ADMIN — INSULIN LISPRO 2 UNITS: 100 INJECTION, SOLUTION INTRAVENOUS; SUBCUTANEOUS at 21:41

## 2020-01-01 RX ADMIN — PANTOPRAZOLE SODIUM 40 MG: 40 TABLET, DELAYED RELEASE ORAL at 09:43

## 2020-01-01 RX ADMIN — POLYETHYLENE GLYCOL 3350 17 G: 17 POWDER, FOR SOLUTION ORAL at 09:58

## 2020-01-01 RX ADMIN — MORPHINE SULFATE 1 MG: 2 INJECTION, SOLUTION INTRAMUSCULAR; INTRAVENOUS at 20:03

## 2020-01-01 RX ADMIN — PIPERACILLIN AND TAZOBACTAM 2.25 G: 2; .25 INJECTION, POWDER, FOR SOLUTION INTRAVENOUS at 15:05

## 2020-01-01 RX ADMIN — PIPERACILLIN AND TAZOBACTAM 2.25 G: 2; .25 INJECTION, POWDER, FOR SOLUTION INTRAVENOUS at 00:42

## 2020-01-01 RX ADMIN — OXYCODONE HYDROCHLORIDE AND ACETAMINOPHEN 1 TABLET: 5; 325 TABLET ORAL at 09:00

## 2020-01-01 RX ADMIN — VANCOMYCIN HYDROCHLORIDE 1250 MG: 10 INJECTION, POWDER, LYOPHILIZED, FOR SOLUTION INTRAVENOUS at 21:29

## 2020-01-01 RX ADMIN — GABAPENTIN 200 MG: 100 CAPSULE ORAL at 09:02

## 2020-01-01 RX ADMIN — ASPIRIN 81 MG: 81 TABLET, COATED ORAL at 08:52

## 2020-01-01 RX ADMIN — INSULIN LISPRO 4 UNITS: 100 INJECTION, SOLUTION INTRAVENOUS; SUBCUTANEOUS at 21:34

## 2020-01-01 RX ADMIN — ENOXAPARIN SODIUM 40 MG: 40 INJECTION SUBCUTANEOUS at 17:31

## 2020-01-01 RX ADMIN — THERA TABS 1 TABLET: TAB at 08:52

## 2020-01-01 RX ADMIN — MORPHINE SULFATE 2 MG: 2 INJECTION, SOLUTION INTRAMUSCULAR; INTRAVENOUS at 01:05

## 2020-01-01 RX ADMIN — ALOGLIPTIN 12.5 MG: 12.5 TABLET, FILM COATED ORAL at 08:52

## 2020-01-01 RX ADMIN — Medication 1 CAPSULE: at 11:06

## 2020-01-01 RX ADMIN — PIPERACILLIN AND TAZOBACTAM 2.25 G: 2; .25 INJECTION, POWDER, FOR SOLUTION INTRAVENOUS at 12:46

## 2020-01-01 RX ADMIN — POTASSIUM CHLORIDE 20 MEQ: 20 TABLET, EXTENDED RELEASE ORAL at 19:24

## 2020-01-01 RX ADMIN — GABAPENTIN 200 MG: 100 CAPSULE ORAL at 21:33

## 2020-01-01 RX ADMIN — PRAVASTATIN SODIUM 40 MG: 20 TABLET ORAL at 21:39

## 2020-01-01 RX ADMIN — Medication 10 ML: at 06:10

## 2020-01-01 RX ADMIN — GABAPENTIN 200 MG: 100 CAPSULE ORAL at 18:12

## 2020-01-01 RX ADMIN — SODIUM CHLORIDE, SODIUM LACTATE, POTASSIUM CHLORIDE, AND CALCIUM CHLORIDE 500 ML: 600; 310; 30; 20 INJECTION, SOLUTION INTRAVENOUS at 20:30

## 2020-01-01 RX ADMIN — PANTOPRAZOLE SODIUM 40 MG: 40 TABLET, DELAYED RELEASE ORAL at 08:54

## 2020-01-01 RX ADMIN — SPIRONOLACTONE 25 MG: 25 TABLET ORAL at 11:18

## 2020-01-01 RX ADMIN — MORPHINE SULFATE 2 MG: 2 INJECTION, SOLUTION INTRAMUSCULAR; INTRAVENOUS at 01:00

## 2020-01-01 RX ADMIN — FUROSEMIDE 40 MG: 40 TABLET ORAL at 09:56

## 2020-01-01 RX ADMIN — MORPHINE SULFATE 2 MG: 2 INJECTION, SOLUTION INTRAMUSCULAR; INTRAVENOUS at 11:14

## 2020-01-01 RX ADMIN — DEXTROSE MONOHYDRATE 25 G: 25 INJECTION, SOLUTION INTRAVENOUS at 20:25

## 2020-01-01 RX ADMIN — Medication 1 CAPSULE: at 09:14

## 2020-01-01 RX ADMIN — Medication 10 ML: at 22:29

## 2020-01-01 RX ADMIN — SERTRALINE HYDROCHLORIDE 25 MG: 25 TABLET ORAL at 17:40

## 2020-01-01 RX ADMIN — PIPERACILLIN AND TAZOBACTAM 2.25 G: 2; .25 INJECTION, POWDER, FOR SOLUTION INTRAVENOUS at 09:30

## 2020-01-01 RX ADMIN — SITAGLIPTIN 25 MG: 25 TABLET, FILM COATED ORAL at 15:42

## 2020-01-01 RX ADMIN — PIPERACILLIN AND TAZOBACTAM 2.25 G: 2; .25 INJECTION, POWDER, LYOPHILIZED, FOR SOLUTION INTRAVENOUS at 05:32

## 2020-01-01 RX ADMIN — INSULIN LISPRO 4 UNITS: 100 INJECTION, SOLUTION INTRAVENOUS; SUBCUTANEOUS at 12:39

## 2020-01-01 RX ADMIN — PIPERACILLIN AND TAZOBACTAM 3.38 G: 3; .375 INJECTION, POWDER, LYOPHILIZED, FOR SOLUTION INTRAVENOUS at 20:39

## 2020-01-01 RX ADMIN — FENTANYL CITRATE 25 MCG: 50 INJECTION, SOLUTION INTRAMUSCULAR; INTRAVENOUS at 09:46

## 2020-01-01 RX ADMIN — INSULIN LISPRO 6 UNITS: 100 INJECTION, SOLUTION INTRAVENOUS; SUBCUTANEOUS at 17:44

## 2020-01-01 RX ADMIN — Medication 400 MG: at 11:01

## 2020-01-01 RX ADMIN — FUROSEMIDE 40 MG: 40 TABLET ORAL at 15:42

## 2020-01-01 RX ADMIN — BUSPIRONE HYDROCHLORIDE 5 MG: 5 TABLET ORAL at 18:10

## 2020-01-01 RX ADMIN — BUSPIRONE HYDROCHLORIDE 5 MG: 5 TABLET ORAL at 08:00

## 2020-01-01 RX ADMIN — Medication 1 CAPSULE: at 08:55

## 2020-01-01 RX ADMIN — GABAPENTIN 100 MG: 100 CAPSULE ORAL at 11:06

## 2020-01-01 RX ADMIN — Medication 10 ML: at 06:22

## 2020-01-01 RX ADMIN — Medication 750 MG: at 01:22

## 2020-01-01 RX ADMIN — Medication 400 MG: at 11:21

## 2020-01-01 RX ADMIN — Medication 10 ML: at 21:34

## 2020-01-01 RX ADMIN — PANTOPRAZOLE SODIUM 40 MG: 40 TABLET, DELAYED RELEASE ORAL at 08:52

## 2020-01-01 RX ADMIN — PIPERACILLIN AND TAZOBACTAM 2.25 G: 2; .25 INJECTION, POWDER, LYOPHILIZED, FOR SOLUTION INTRAVENOUS at 11:25

## 2020-01-01 RX ADMIN — FAMOTIDINE 20 MG: 20 TABLET, FILM COATED ORAL at 08:43

## 2020-01-01 RX ADMIN — SERTRALINE HYDROCHLORIDE 25 MG: 25 TABLET ORAL at 18:10

## 2020-01-01 RX ADMIN — FENTANYL CITRATE 25 MCG: 50 INJECTION, SOLUTION INTRAMUSCULAR; INTRAVENOUS at 09:48

## 2020-01-01 RX ADMIN — FUROSEMIDE 20 MG: 20 INJECTION, SOLUTION INTRAMUSCULAR; INTRAVENOUS at 16:11

## 2020-01-01 RX ADMIN — MORPHINE SULFATE 2 MG: 2 INJECTION, SOLUTION INTRAMUSCULAR; INTRAVENOUS at 16:11

## 2020-01-01 RX ADMIN — Medication 1 CAPSULE: at 09:24

## 2020-01-01 RX ADMIN — GABAPENTIN 200 MG: 100 CAPSULE ORAL at 11:01

## 2020-01-01 RX ADMIN — Medication 1 CAPSULE: at 11:01

## 2020-01-01 RX ADMIN — MORPHINE SULFATE 2 MG: 2 INJECTION, SOLUTION INTRAMUSCULAR; INTRAVENOUS at 10:14

## 2020-01-01 RX ADMIN — ASPIRIN 81 MG: 81 TABLET, COATED ORAL at 09:03

## 2020-01-01 RX ADMIN — Medication 750 MG: at 13:14

## 2020-01-01 RX ADMIN — BUSPIRONE HYDROCHLORIDE 5 MG: 5 TABLET ORAL at 17:45

## 2020-01-01 RX ADMIN — MORPHINE SULFATE 2 MG: 2 INJECTION, SOLUTION INTRAMUSCULAR; INTRAVENOUS at 15:40

## 2020-01-01 RX ADMIN — BUSPIRONE HYDROCHLORIDE 5 MG: 5 TABLET ORAL at 08:55

## 2020-01-01 RX ADMIN — PANTOPRAZOLE SODIUM 40 MG: 40 TABLET, DELAYED RELEASE ORAL at 09:13

## 2020-01-01 RX ADMIN — ASPIRIN 81 MG: 81 TABLET, COATED ORAL at 08:00

## 2020-01-01 RX ADMIN — CLINDAMYCIN HYDROCHLORIDE 300 MG: 150 CAPSULE ORAL at 11:34

## 2020-01-01 RX ADMIN — FERROUS SULFATE TAB 325 MG (65 MG ELEMENTAL FE) 325 MG: 325 (65 FE) TAB at 11:01

## 2020-01-01 RX ADMIN — THERA TABS 1 TABLET: TAB at 11:19

## 2020-01-01 RX ADMIN — BUSPIRONE HYDROCHLORIDE 5 MG: 5 TABLET ORAL at 20:09

## 2020-01-01 RX ADMIN — PIPERACILLIN AND TAZOBACTAM 2.25 G: 2; .25 INJECTION, POWDER, FOR SOLUTION INTRAVENOUS at 00:24

## 2020-01-01 RX ADMIN — GABAPENTIN 200 MG: 100 CAPSULE ORAL at 22:28

## 2020-01-01 RX ADMIN — PIPERACILLIN AND TAZOBACTAM 2.25 G: 2; .25 INJECTION, POWDER, FOR SOLUTION INTRAVENOUS at 21:18

## 2020-01-01 RX ADMIN — OXYCODONE HYDROCHLORIDE AND ACETAMINOPHEN 1 TABLET: 5; 325 TABLET ORAL at 12:56

## 2020-01-01 RX ADMIN — INSULIN LISPRO 9 UNITS: 100 INJECTION, SOLUTION INTRAVENOUS; SUBCUTANEOUS at 13:22

## 2020-01-01 RX ADMIN — ENOXAPARIN SODIUM 40 MG: 40 INJECTION SUBCUTANEOUS at 16:11

## 2020-01-01 RX ADMIN — SERTRALINE HYDROCHLORIDE 25 MG: 25 TABLET ORAL at 11:05

## 2020-01-01 RX ADMIN — CLINDAMYCIN HYDROCHLORIDE 300 MG: 150 CAPSULE ORAL at 18:58

## 2020-01-01 RX ADMIN — INSULIN LISPRO 6 UNITS: 100 INJECTION, SOLUTION INTRAVENOUS; SUBCUTANEOUS at 13:14

## 2020-01-01 RX ADMIN — INSULIN LISPRO 9 UNITS: 100 INJECTION, SOLUTION INTRAVENOUS; SUBCUTANEOUS at 16:54

## 2020-01-01 RX ADMIN — SODIUM CHLORIDE 1000 MG: 900 INJECTION, SOLUTION INTRAVENOUS at 15:26

## 2020-01-01 RX ADMIN — PHENYLEPHRINE HYDROCHLORIDE 100 MCG: 10 INJECTION INTRAVENOUS at 09:40

## 2020-01-01 RX ADMIN — LIDOCAINE HYDROCHLORIDE 50 MG: 20 INJECTION, SOLUTION INTRAVENOUS at 09:33

## 2020-01-01 RX ADMIN — Medication 10 ML: at 15:27

## 2020-01-01 RX ADMIN — FERROUS SULFATE TAB 325 MG (65 MG ELEMENTAL FE) 325 MG: 325 (65 FE) TAB at 09:01

## 2020-01-01 RX ADMIN — PIPERACILLIN AND TAZOBACTAM 2.25 G: 2; .25 INJECTION, POWDER, FOR SOLUTION INTRAVENOUS at 06:12

## 2020-01-01 RX ADMIN — POLYETHYLENE GLYCOL 3350 17 G: 17 POWDER, FOR SOLUTION ORAL at 11:05

## 2020-01-01 RX ADMIN — ASPIRIN 81 MG 81 MG: 81 TABLET ORAL at 09:19

## 2020-01-01 RX ADMIN — INSULIN LISPRO 4 UNITS: 100 INJECTION, SOLUTION INTRAVENOUS; SUBCUTANEOUS at 12:13

## 2020-01-01 RX ADMIN — OXYCODONE HYDROCHLORIDE AND ACETAMINOPHEN 1 TABLET: 5; 325 TABLET ORAL at 17:16

## 2020-01-01 RX ADMIN — ONDANSETRON 4 MG: 2 INJECTION INTRAMUSCULAR; INTRAVENOUS at 15:40

## 2020-01-01 RX ADMIN — INSULIN HUMAN 10 UNITS: 100 INJECTION, SOLUTION PARENTERAL at 20:28

## 2020-01-01 RX ADMIN — INSULIN LISPRO 6 UNITS: 100 INJECTION, SOLUTION INTRAVENOUS; SUBCUTANEOUS at 17:32

## 2020-01-01 RX ADMIN — PRAVASTATIN SODIUM 40 MG: 20 TABLET ORAL at 22:32

## 2020-01-01 RX ADMIN — SODIUM CHLORIDE 1000 MG: 900 INJECTION, SOLUTION INTRAVENOUS at 23:22

## 2020-01-01 RX ADMIN — INSULIN LISPRO 2 UNITS: 100 INJECTION, SOLUTION INTRAVENOUS; SUBCUTANEOUS at 18:11

## 2020-01-01 RX ADMIN — Medication 400 MG: at 09:44

## 2020-01-01 RX ADMIN — INSULIN LISPRO 3 UNITS: 100 INJECTION, SOLUTION INTRAVENOUS; SUBCUTANEOUS at 10:48

## 2020-01-01 RX ADMIN — ALOGLIPTIN 12.5 MG: 12.5 TABLET, FILM COATED ORAL at 08:54

## 2020-01-01 RX ADMIN — PIPERACILLIN AND TAZOBACTAM 2.25 G: 2; .25 INJECTION, POWDER, LYOPHILIZED, FOR SOLUTION INTRAVENOUS at 23:34

## 2020-01-01 RX ADMIN — PIPERACILLIN AND TAZOBACTAM 2.25 G: 2; .25 INJECTION, POWDER, LYOPHILIZED, FOR SOLUTION INTRAVENOUS at 23:28

## 2020-01-01 RX ADMIN — GABAPENTIN 100 MG: 100 CAPSULE ORAL at 15:48

## 2020-01-01 RX ADMIN — PIPERACILLIN AND TAZOBACTAM 2.25 G: 2; .25 INJECTION, POWDER, FOR SOLUTION INTRAVENOUS at 21:26

## 2020-01-01 RX ADMIN — OXYCODONE HYDROCHLORIDE AND ACETAMINOPHEN 1 TABLET: 5; 325 TABLET ORAL at 08:01

## 2020-01-01 RX ADMIN — FENTANYL CITRATE 25 MCG: 50 INJECTION, SOLUTION INTRAMUSCULAR; INTRAVENOUS at 09:33

## 2020-01-01 RX ADMIN — PANTOPRAZOLE SODIUM 40 MG: 40 TABLET, DELAYED RELEASE ORAL at 09:24

## 2020-01-01 RX ADMIN — OXYCODONE HYDROCHLORIDE AND ACETAMINOPHEN 1 TABLET: 5; 325 TABLET ORAL at 17:15

## 2020-01-01 RX ADMIN — FERROUS SULFATE TAB 325 MG (65 MG ELEMENTAL FE) 325 MG: 325 (65 FE) TAB at 08:52

## 2020-01-01 RX ADMIN — INSULIN LISPRO 9 UNITS: 100 INJECTION, SOLUTION INTRAVENOUS; SUBCUTANEOUS at 21:49

## 2020-01-01 RX ADMIN — SODIUM CHLORIDE 1000 MG: 900 INJECTION, SOLUTION INTRAVENOUS at 17:28

## 2020-01-01 RX ADMIN — MORPHINE SULFATE 2 MG: 2 INJECTION, SOLUTION INTRAMUSCULAR; INTRAVENOUS at 13:40

## 2020-01-01 RX ADMIN — FUROSEMIDE 40 MG: 40 TABLET ORAL at 09:44

## 2020-01-01 RX ADMIN — ASPIRIN 81 MG 81 MG: 81 TABLET ORAL at 09:36

## 2020-01-01 RX ADMIN — Medication 10 ML: at 18:13

## 2020-01-01 RX ADMIN — ENOXAPARIN SODIUM 40 MG: 40 INJECTION SUBCUTANEOUS at 16:53

## 2020-01-01 RX ADMIN — Medication 10 ML: at 15:07

## 2020-01-01 RX ADMIN — ASPIRIN 81 MG: 81 TABLET, COATED ORAL at 09:58

## 2020-01-01 RX ADMIN — ACETAMINOPHEN 650 MG: 325 TABLET ORAL at 17:44

## 2020-01-01 RX ADMIN — THERA TABS 1 TABLET: TAB at 09:44

## 2020-01-01 RX ADMIN — ENOXAPARIN SODIUM 40 MG: 40 INJECTION SUBCUTANEOUS at 17:39

## 2020-01-01 RX ADMIN — Medication 10 ML: at 21:38

## 2020-01-01 RX ADMIN — PIPERACILLIN AND TAZOBACTAM 2.25 G: 2; .25 INJECTION, POWDER, FOR SOLUTION INTRAVENOUS at 11:19

## 2020-01-01 RX ADMIN — INSULIN LISPRO 4 UNITS: 100 INJECTION, SOLUTION INTRAVENOUS; SUBCUTANEOUS at 17:15

## 2020-01-01 RX ADMIN — SITAGLIPTIN 25 MG: 25 TABLET, FILM COATED ORAL at 09:35

## 2020-01-01 RX ADMIN — SODIUM CHLORIDE 1000 MG: 900 INJECTION, SOLUTION INTRAVENOUS at 07:44

## 2020-01-01 RX ADMIN — BUSPIRONE HYDROCHLORIDE 5 MG: 5 TABLET ORAL at 10:17

## 2020-01-01 RX ADMIN — PRAVASTATIN SODIUM 40 MG: 20 TABLET ORAL at 21:28

## 2020-01-01 RX ADMIN — Medication 10 ML: at 06:17

## 2020-01-01 RX ADMIN — Medication 750 MG: at 06:18

## 2020-01-01 RX ADMIN — ENOXAPARIN SODIUM 40 MG: 40 INJECTION SUBCUTANEOUS at 16:00

## 2020-01-01 RX ADMIN — INSULIN LISPRO 4 UNITS: 100 INJECTION, SOLUTION INTRAVENOUS; SUBCUTANEOUS at 09:25

## 2020-01-01 RX ADMIN — PANTOPRAZOLE SODIUM 40 MG: 40 TABLET, DELAYED RELEASE ORAL at 15:43

## 2020-01-01 RX ADMIN — PIPERACILLIN AND TAZOBACTAM 2.25 G: 2; .25 INJECTION, POWDER, FOR SOLUTION INTRAVENOUS at 00:47

## 2020-01-01 RX ADMIN — GABAPENTIN 100 MG: 100 CAPSULE ORAL at 09:24

## 2020-01-01 RX ADMIN — OXYCODONE HYDROCHLORIDE AND ACETAMINOPHEN 1 TABLET: 5; 325 TABLET ORAL at 08:00

## 2020-01-01 RX ADMIN — FUROSEMIDE 40 MG: 40 TABLET ORAL at 11:18

## 2020-01-01 RX ADMIN — GABAPENTIN 200 MG: 100 CAPSULE ORAL at 09:54

## 2020-01-01 RX ADMIN — PANTOPRAZOLE SODIUM 40 MG: 40 TABLET, DELAYED RELEASE ORAL at 11:18

## 2020-01-01 RX ADMIN — MORPHINE SULFATE 2 MG: 2 INJECTION, SOLUTION INTRAMUSCULAR; INTRAVENOUS at 10:30

## 2020-01-01 RX ADMIN — INSULIN LISPRO 4 UNITS: 100 INJECTION, SOLUTION INTRAVENOUS; SUBCUTANEOUS at 14:00

## 2020-01-01 RX ADMIN — ASPIRIN 81 MG 81 MG: 81 TABLET ORAL at 15:43

## 2020-01-01 RX ADMIN — LOPERAMIDE HYDROCHLORIDE 1 MG: 1 SUSPENSION ORAL at 17:41

## 2020-01-01 RX ADMIN — MORPHINE SULFATE 2 MG: 2 INJECTION, SOLUTION INTRAMUSCULAR; INTRAVENOUS at 18:29

## 2020-01-01 RX ADMIN — ACETAMINOPHEN 650 MG: 325 TABLET ORAL at 13:16

## 2020-01-01 RX ADMIN — GABAPENTIN 200 MG: 100 CAPSULE ORAL at 16:54

## 2020-01-01 RX ADMIN — LEVOFLOXACIN 250 MG: 5 INJECTION, SOLUTION INTRAVENOUS at 17:44

## 2020-01-01 RX ADMIN — BUSPIRONE HYDROCHLORIDE 5 MG: 5 TABLET ORAL at 17:26

## 2020-01-01 RX ADMIN — ACETAMINOPHEN 650 MG: 325 TABLET ORAL at 17:31

## 2020-01-01 RX ADMIN — FUROSEMIDE 40 MG: 40 TABLET ORAL at 08:54

## 2020-01-01 RX ADMIN — INSULIN LISPRO 6 UNITS: 100 INJECTION, SOLUTION INTRAVENOUS; SUBCUTANEOUS at 11:39

## 2020-01-01 RX ADMIN — SERTRALINE HYDROCHLORIDE 25 MG: 25 TABLET ORAL at 09:02

## 2020-01-01 RX ADMIN — SODIUM CHLORIDE, SODIUM LACTATE, POTASSIUM CHLORIDE, AND CALCIUM CHLORIDE: 600; 310; 30; 20 INJECTION, SOLUTION INTRAVENOUS at 09:26

## 2020-01-01 RX ADMIN — SPIRONOLACTONE 25 MG: 25 TABLET ORAL at 08:55

## 2020-01-01 RX ADMIN — PANTOPRAZOLE SODIUM 40 MG: 40 TABLET, DELAYED RELEASE ORAL at 11:01

## 2020-01-01 RX ADMIN — SODIUM CHLORIDE 1000 MG: 900 INJECTION, SOLUTION INTRAVENOUS at 09:20

## 2020-01-01 RX ADMIN — Medication 1 CAPSULE: at 09:02

## 2020-01-01 RX ADMIN — MORPHINE SULFATE 1 MG: 2 INJECTION, SOLUTION INTRAMUSCULAR; INTRAVENOUS at 01:16

## 2020-01-01 RX ADMIN — GABAPENTIN 200 MG: 100 CAPSULE ORAL at 17:38

## 2020-01-01 RX ADMIN — INSULIN LISPRO 2 UNITS: 100 INJECTION, SOLUTION INTRAVENOUS; SUBCUTANEOUS at 18:51

## 2020-01-01 RX ADMIN — SPIRONOLACTONE 25 MG: 25 TABLET ORAL at 09:03

## 2020-01-01 RX ADMIN — PIPERACILLIN AND TAZOBACTAM 2.25 G: 2; .25 INJECTION, POWDER, LYOPHILIZED, FOR SOLUTION INTRAVENOUS at 05:04

## 2020-01-01 RX ADMIN — SERTRALINE HYDROCHLORIDE 25 MG: 25 TABLET ORAL at 17:45

## 2020-01-01 RX ADMIN — FENTANYL CITRATE 25 MCG: 50 INJECTION, SOLUTION INTRAMUSCULAR; INTRAVENOUS at 09:52

## 2020-01-01 RX ADMIN — ALOGLIPTIN 12.5 MG: 12.5 TABLET, FILM COATED ORAL at 09:04

## 2020-01-01 RX ADMIN — INSULIN LISPRO 9 UNITS: 100 INJECTION, SOLUTION INTRAVENOUS; SUBCUTANEOUS at 11:30

## 2020-01-01 RX ADMIN — POLYETHYLENE GLYCOL 3350 17 G: 17 POWDER, FOR SOLUTION ORAL at 09:23

## 2020-01-01 RX ADMIN — MIDAZOLAM HYDROCHLORIDE 1 MG: 2 INJECTION, SOLUTION INTRAMUSCULAR; INTRAVENOUS at 09:26

## 2020-01-01 RX ADMIN — CLINDAMYCIN HYDROCHLORIDE 300 MG: 150 CAPSULE ORAL at 11:09

## 2020-01-01 RX ADMIN — Medication 400 MG: at 08:52

## 2020-01-01 RX ADMIN — INSULIN LISPRO 6 UNITS: 100 INJECTION, SOLUTION INTRAVENOUS; SUBCUTANEOUS at 21:33

## 2020-01-01 RX ADMIN — FUROSEMIDE 40 MG: 40 TABLET ORAL at 08:52

## 2020-01-01 RX ADMIN — ALOGLIPTIN 12.5 MG: 12.5 TABLET, FILM COATED ORAL at 09:44

## 2020-01-01 RX ADMIN — SODIUM CHLORIDE, SODIUM LACTATE, POTASSIUM CHLORIDE, AND CALCIUM CHLORIDE 1000 ML: 600; 310; 30; 20 INJECTION, SOLUTION INTRAVENOUS at 00:30

## 2020-01-01 RX ADMIN — MIDAZOLAM HYDROCHLORIDE 1 MG: 2 INJECTION, SOLUTION INTRAMUSCULAR; INTRAVENOUS at 09:33

## 2020-01-01 RX ADMIN — BUSPIRONE HYDROCHLORIDE 5 MG: 5 TABLET ORAL at 08:52

## 2020-01-01 RX ADMIN — PANTOPRAZOLE SODIUM 40 MG: 40 TABLET, DELAYED RELEASE ORAL at 06:12

## 2020-01-01 RX ADMIN — SITAGLIPTIN 25 MG: 25 TABLET, FILM COATED ORAL at 10:17

## 2020-01-01 RX ADMIN — SPIRONOLACTONE 25 MG: 25 TABLET ORAL at 08:53

## 2020-01-01 RX ADMIN — BUSPIRONE HYDROCHLORIDE 5 MG: 5 TABLET ORAL at 09:44

## 2020-01-01 RX ADMIN — OXYCODONE HYDROCHLORIDE AND ACETAMINOPHEN 1 TABLET: 5; 325 TABLET ORAL at 15:09

## 2020-01-01 RX ADMIN — SODIUM CHLORIDE 1000 MG: 900 INJECTION, SOLUTION INTRAVENOUS at 05:12

## 2020-01-01 RX ADMIN — ENOXAPARIN SODIUM 40 MG: 40 INJECTION SUBCUTANEOUS at 18:11

## 2020-01-01 RX ADMIN — OXYCODONE HYDROCHLORIDE AND ACETAMINOPHEN 1 TABLET: 5; 325 TABLET ORAL at 13:30

## 2020-01-01 RX ADMIN — CLINDAMYCIN HYDROCHLORIDE 300 MG: 150 CAPSULE ORAL at 06:23

## 2020-01-01 RX ADMIN — INSULIN LISPRO 4 UNITS: 100 INJECTION, SOLUTION INTRAVENOUS; SUBCUTANEOUS at 21:28

## 2020-01-01 RX ADMIN — PROPOFOL 60 MG: 10 INJECTION, EMULSION INTRAVENOUS at 09:33

## 2020-01-01 RX ADMIN — MORPHINE SULFATE 2 MG: 2 INJECTION, SOLUTION INTRAMUSCULAR; INTRAVENOUS at 11:19

## 2020-01-01 RX ADMIN — PIPERACILLIN AND TAZOBACTAM 2.25 G: 2; .25 INJECTION, POWDER, LYOPHILIZED, FOR SOLUTION INTRAVENOUS at 10:30

## 2020-01-01 RX ADMIN — SERTRALINE HYDROCHLORIDE 25 MG: 25 TABLET ORAL at 18:58

## 2020-01-01 RX ADMIN — PIPERACILLIN AND TAZOBACTAM 2.25 G: 2; .25 INJECTION, POWDER, FOR SOLUTION INTRAVENOUS at 17:38

## 2020-01-01 RX ADMIN — MORPHINE SULFATE 1 MG: 2 INJECTION, SOLUTION INTRAMUSCULAR; INTRAVENOUS at 07:40

## 2020-01-01 RX ADMIN — PIPERACILLIN AND TAZOBACTAM 2.25 G: 2; .25 INJECTION, POWDER, LYOPHILIZED, FOR SOLUTION INTRAVENOUS at 18:10

## 2020-01-01 RX ADMIN — INSULIN LISPRO 6 UNITS: 100 INJECTION, SOLUTION INTRAVENOUS; SUBCUTANEOUS at 17:38

## 2020-01-01 RX ADMIN — FUROSEMIDE 40 MG: 40 TABLET ORAL at 09:24

## 2020-01-01 RX ADMIN — PIPERACILLIN AND TAZOBACTAM 2.25 G: 2; .25 INJECTION, POWDER, FOR SOLUTION INTRAVENOUS at 06:16

## 2020-01-01 RX ADMIN — INSULIN LISPRO 4 UNITS: 100 INJECTION, SOLUTION INTRAVENOUS; SUBCUTANEOUS at 09:06

## 2020-01-01 RX ADMIN — MORPHINE SULFATE 2 MG: 2 INJECTION, SOLUTION INTRAMUSCULAR; INTRAVENOUS at 18:00

## 2020-01-01 RX ADMIN — ACETAMINOPHEN 650 MG: 325 TABLET ORAL at 10:00

## 2020-01-01 RX ADMIN — FUROSEMIDE 40 MG: 40 TABLET ORAL at 11:02

## 2020-01-01 RX ADMIN — PIPERACILLIN AND TAZOBACTAM 2.25 G: 2; .25 INJECTION, POWDER, FOR SOLUTION INTRAVENOUS at 23:50

## 2020-01-01 RX ADMIN — SODIUM CHLORIDE 1000 MG: 900 INJECTION, SOLUTION INTRAVENOUS at 16:49

## 2020-01-01 RX ADMIN — BUSPIRONE HYDROCHLORIDE 5 MG: 5 TABLET ORAL at 17:07

## 2020-01-01 RX ADMIN — CALCIUM GLUCONATE 1 G: 98 INJECTION, SOLUTION INTRAVENOUS at 20:21

## 2020-01-01 RX ADMIN — PRAVASTATIN SODIUM 40 MG: 20 TABLET ORAL at 21:19

## 2020-01-01 RX ADMIN — INSULIN LISPRO 2 UNITS: 100 INJECTION, SOLUTION INTRAVENOUS; SUBCUTANEOUS at 21:19

## 2020-01-01 RX ADMIN — BUSPIRONE HYDROCHLORIDE 5 MG: 5 TABLET ORAL at 09:33

## 2020-01-01 RX ADMIN — FUROSEMIDE 40 MG: 40 TABLET ORAL at 09:02

## 2020-01-01 RX ADMIN — FERROUS SULFATE TAB 325 MG (65 MG ELEMENTAL FE) 325 MG: 325 (65 FE) TAB at 11:21

## 2020-01-01 RX ADMIN — PIPERACILLIN AND TAZOBACTAM 2.25 G: 2; .25 INJECTION, POWDER, FOR SOLUTION INTRAVENOUS at 10:59

## 2020-01-01 RX ADMIN — ASPIRIN 81 MG: 81 TABLET, COATED ORAL at 09:44

## 2020-01-01 RX ADMIN — SPIRONOLACTONE 25 MG: 25 TABLET ORAL at 09:44

## 2020-01-01 RX ADMIN — INSULIN LISPRO 6 UNITS: 100 INJECTION, SOLUTION INTRAVENOUS; SUBCUTANEOUS at 21:39

## 2020-01-01 RX ADMIN — CLINDAMYCIN HYDROCHLORIDE 300 MG: 150 CAPSULE ORAL at 06:07

## 2020-01-01 RX ADMIN — Medication 400 MG: at 09:03

## 2020-01-01 RX ADMIN — PIPERACILLIN AND TAZOBACTAM 2.25 G: 2; .25 INJECTION, POWDER, FOR SOLUTION INTRAVENOUS at 16:10

## 2020-01-01 RX ADMIN — PIPERACILLIN AND TAZOBACTAM 2.25 G: 2; .25 INJECTION, POWDER, LYOPHILIZED, FOR SOLUTION INTRAVENOUS at 05:40

## 2020-01-01 RX ADMIN — OXYCODONE HYDROCHLORIDE AND ACETAMINOPHEN 1 TABLET: 5; 325 TABLET ORAL at 15:29

## 2020-01-01 RX ADMIN — POTASSIUM CHLORIDE: 2 INJECTION, SOLUTION, CONCENTRATE INTRAVENOUS at 22:00

## 2020-01-01 RX ADMIN — Medication 10 ML: at 21:35

## 2020-01-01 RX ADMIN — Medication 10 ML: at 16:00

## 2020-01-01 RX ADMIN — Medication 1 CAPSULE: at 11:19

## 2020-01-01 RX ADMIN — SODIUM CHLORIDE 1000 MG: 900 INJECTION, SOLUTION INTRAVENOUS at 16:44

## 2020-01-01 RX ADMIN — OXYCODONE HYDROCHLORIDE AND ACETAMINOPHEN 1 TABLET: 5; 325 TABLET ORAL at 14:15

## 2020-01-01 RX ADMIN — PANTOPRAZOLE SODIUM 40 MG: 40 TABLET, DELAYED RELEASE ORAL at 09:03

## 2020-01-01 RX ADMIN — FERROUS SULFATE TAB 325 MG (65 MG ELEMENTAL FE) 325 MG: 325 (65 FE) TAB at 09:03

## 2020-01-01 RX ADMIN — PIPERACILLIN AND TAZOBACTAM 2.25 G: 2; .25 INJECTION, POWDER, FOR SOLUTION INTRAVENOUS at 12:14

## 2020-01-01 RX ADMIN — Medication 25 MG: at 10:06

## 2020-01-01 RX ADMIN — PRAVASTATIN SODIUM 40 MG: 20 TABLET ORAL at 21:44

## 2020-01-01 RX ADMIN — BUSPIRONE HYDROCHLORIDE 5 MG: 5 TABLET ORAL at 15:37

## 2020-01-01 RX ADMIN — PIPERACILLIN AND TAZOBACTAM 2.25 G: 2; .25 INJECTION, POWDER, FOR SOLUTION INTRAVENOUS at 17:15

## 2020-01-01 RX ADMIN — INSULIN LISPRO 6 UNITS: 100 INJECTION, SOLUTION INTRAVENOUS; SUBCUTANEOUS at 13:40

## 2020-01-01 RX ADMIN — PIPERACILLIN AND TAZOBACTAM 2.25 G: 2; .25 INJECTION, POWDER, FOR SOLUTION INTRAVENOUS at 17:24

## 2020-01-01 RX ADMIN — SULFAMETHOXAZOLE AND TRIMETHOPRIM 1 TABLET: 800; 160 TABLET ORAL at 14:54

## 2020-01-01 RX ADMIN — DEXTROSE MONOHYDRATE 25 G: 25 INJECTION, SOLUTION INTRAVENOUS at 00:25

## 2020-01-01 RX ADMIN — SODIUM CHLORIDE 500 ML: 900 INJECTION, SOLUTION INTRAVENOUS at 01:08

## 2020-01-01 RX ADMIN — GABAPENTIN 200 MG: 100 CAPSULE ORAL at 22:16

## 2020-01-01 RX ADMIN — SERTRALINE HYDROCHLORIDE 25 MG: 25 TABLET ORAL at 09:14

## 2020-01-01 RX ADMIN — SODIUM CHLORIDE 1000 ML: 900 INJECTION, SOLUTION INTRAVENOUS at 02:50

## 2020-01-01 RX ADMIN — ACETAMINOPHEN 650 MG: 325 TABLET ORAL at 10:14

## 2020-01-01 RX ADMIN — Medication 400 MG: at 08:55

## 2020-01-01 RX ADMIN — FUROSEMIDE 40 MG: 40 TABLET ORAL at 09:03

## 2020-01-01 RX ADMIN — FERROUS SULFATE TAB 325 MG (65 MG ELEMENTAL FE) 325 MG: 325 (65 FE) TAB at 08:55

## 2020-01-01 RX ADMIN — SODIUM CHLORIDE 100 ML/HR: 900 INJECTION, SOLUTION INTRAVENOUS at 09:30

## 2020-01-01 RX ADMIN — OXYCODONE HYDROCHLORIDE AND ACETAMINOPHEN 1 TABLET: 5; 325 TABLET ORAL at 17:00

## 2020-01-01 RX ADMIN — INSULIN LISPRO 3 UNITS: 100 INJECTION, SOLUTION INTRAVENOUS; SUBCUTANEOUS at 21:43

## 2020-01-01 RX ADMIN — Medication 400 MG: at 09:56

## 2020-01-01 RX ADMIN — CLINDAMYCIN HYDROCHLORIDE 300 MG: 150 CAPSULE ORAL at 17:44

## 2020-01-01 RX ADMIN — POTASSIUM CHLORIDE 20 MEQ: 20 TABLET, EXTENDED RELEASE ORAL at 11:02

## 2020-01-01 RX ADMIN — THERA TABS 1 TABLET: TAB at 08:54

## 2020-01-01 RX ADMIN — INSULIN LISPRO 2 UNITS: 100 INJECTION, SOLUTION INTRAVENOUS; SUBCUTANEOUS at 22:17

## 2020-01-01 RX ADMIN — ENOXAPARIN SODIUM 40 MG: 40 INJECTION SUBCUTANEOUS at 15:07

## 2020-01-01 RX ADMIN — POTASSIUM CHLORIDE 20 MEQ: 20 TABLET, EXTENDED RELEASE ORAL at 09:57

## 2020-01-01 RX ADMIN — PIPERACILLIN SODIUM AND TAZOBACTAM SODIUM 3.38 G: 3; .375 INJECTION, POWDER, LYOPHILIZED, FOR SOLUTION INTRAVENOUS at 03:54

## 2020-01-01 RX ADMIN — BUSPIRONE HYDROCHLORIDE 5 MG: 5 TABLET ORAL at 18:58

## 2020-01-01 RX ADMIN — LEVOFLOXACIN 500 MG: 5 INJECTION, SOLUTION INTRAVENOUS at 17:40

## 2020-01-01 RX ADMIN — FENTANYL CITRATE 50 MCG: 50 INJECTION, SOLUTION INTRAMUSCULAR; INTRAVENOUS at 10:41

## 2020-01-01 RX ADMIN — POTASSIUM CHLORIDE: 2 INJECTION, SOLUTION, CONCENTRATE INTRAVENOUS at 20:31

## 2020-01-01 RX ADMIN — ACETAMINOPHEN 650 MG: 325 TABLET ORAL at 07:30

## 2020-01-01 RX ADMIN — PIPERACILLIN AND TAZOBACTAM 2.25 G: 2; .25 INJECTION, POWDER, FOR SOLUTION INTRAVENOUS at 03:11

## 2020-01-01 RX ADMIN — Medication 25 MG: at 09:56

## 2020-01-01 RX ADMIN — SERTRALINE HYDROCHLORIDE 25 MG: 25 TABLET ORAL at 15:42

## 2020-01-01 RX ADMIN — SERTRALINE HYDROCHLORIDE 25 MG: 25 TABLET ORAL at 17:07

## 2020-01-01 RX ADMIN — FERROUS SULFATE TAB 325 MG (65 MG ELEMENTAL FE) 325 MG: 325 (65 FE) TAB at 09:13

## 2020-01-01 RX ADMIN — PRAVASTATIN SODIUM 40 MG: 20 TABLET ORAL at 21:43

## 2020-01-01 RX ADMIN — PIPERACILLIN AND TAZOBACTAM 2.25 G: 2; .25 INJECTION, POWDER, FOR SOLUTION INTRAVENOUS at 12:01

## 2020-01-01 RX ADMIN — ALOGLIPTIN 12.5 MG: 12.5 TABLET, FILM COATED ORAL at 11:18

## 2020-01-01 RX ADMIN — Medication 10 ML: at 06:42

## 2020-01-01 RX ADMIN — INSULIN LISPRO 3 UNITS: 100 INJECTION, SOLUTION INTRAVENOUS; SUBCUTANEOUS at 08:01

## 2020-01-01 RX ADMIN — PIPERACILLIN AND TAZOBACTAM 2.25 G: 2; .25 INJECTION, POWDER, LYOPHILIZED, FOR SOLUTION INTRAVENOUS at 12:20

## 2020-01-01 RX ADMIN — SODIUM CHLORIDE 1000 MG: 900 INJECTION, SOLUTION INTRAVENOUS at 18:33

## 2020-01-01 RX ADMIN — ACETAMINOPHEN 650 MG: 325 TABLET ORAL at 11:05

## 2020-01-01 RX ADMIN — OXYCODONE HYDROCHLORIDE AND ACETAMINOPHEN 1 TABLET: 5; 325 TABLET ORAL at 08:30

## 2020-01-13 NOTE — LETTER
1/13/2020 11:04 AM 
 
Ms. Kristen Nino [facility] [id number] Yvonne Olivares To Whom It May Concern: 
 
I am a Nurse Navigator working with your Primary Care Dr. Terri Benson of my job is to follow up with patients who followed by [facility] doctors and to see how they are feeling, answer any questions they may have about their recent visits and review upcoming follow-up appointments to see your primary care doctor as well as specialists. I have been unable to reach you by telephone and wanted to make sure that you were doing well and if you needed help to schedule a follow-up appointment to come in and talk to your doctor for any medical problems you may be having. If you have any questions or concerns, please feel free to call me at the number listed above. Thank you for allowing [facility] to participate in your care. Sincerely, Madhu Contreras RN

## 2020-02-03 NOTE — TELEPHONE ENCOUNTER
Requested Prescriptions     Pending Prescriptions Disp Refills    busPIRone (BUSPAR) 10 mg tablet 30 Tab 2     PATIENT HAS APPOINTMENT IN [facility]

## 2020-02-07 NOTE — TELEPHONE ENCOUNTER
Requested Prescriptions     Pending Prescriptions Disp Refills    RABEprazole (ACIPHEX) 20 mg tablet 30 Tab 5

## 2020-02-11 PROBLEM — L03.116 BILATERAL LOWER LEG CELLULITIS: Status: ACTIVE | Noted: 2020-01-01

## 2020-02-11 PROBLEM — R23.4 ESCHAR OF LOWER LEG: Status: ACTIVE | Noted: 2020-01-01

## 2020-02-11 PROBLEM — L03.115 BILATERAL LOWER LEG CELLULITIS: Status: ACTIVE | Noted: 2020-01-01

## 2020-02-11 NOTE — ED PROVIDER NOTES
EMERGENCY DEPARTMENT HISTORY AND PHYSICAL EXAM 
 
 
Date: 2/11/2020 Patient Name: Olena Badillo History of Presenting Illness Chief Complaint Patient presents with  
 Skin Problem  Leg Swelling  Hip Pain HPI: Olena Badillo, 80 y.o. female  With hx of HTN, CAD, CHF, DM, hyperlipidemia and venous insufficiency, presents to the ED sent from the vascular surgery clinic for admission due to bilat LE cellulitis and ulcerations. Pt was seen by NP for wound care who noticed rapidly progressing signs of infection including ulcerations with necrotic areas/echar. Vascular surgery plan to take pt to OR for wound debridement. Pt reports increased pain to legs, decreased appetite and decreased water intake. She states her throat feels dry. Pt's daughter states she noticed significantly worsening of swelling, redness and wounds over the past 4 days. She also reports some flaking of skin of LEs. She denies fevers, chills. Stats pt takes Aleve at home for pain. There are no other complaints, changes, or physical findings at this time. PCP: Janki Howard NP Past History Past Medical History: 
Past Medical History:  
Diagnosis Date  Aortic valve stenosis, mild April 2014 EF 15-22%, Grade 1 diastolic dysfunction, mild aortic stenosis,  mean gradient 10 mm Hg  CAD (coronary artery disease) aortic stenosis  Cardiac echocardiogram 06/2018 EF 55-60%. No RWMA. Mild-mod LVH. Severe LAE. Mild AS.  Cardiac nuclear imaging test 08/27/2008 No evidence of ischemia or prior scarring. EF 62%. No WMA. No significant change from study of 3/23/06.  Cardiovascular RLE venous duplex 12/01/2016 Right leg:  No DVT.  Carotid duplex 04/24/2013 Mild 4-28% LICA stenosis.  DM (diabetes mellitus) ([facility] [address]  Dyslipidemia  Heart failure ([facility] [address]  HTN (hypertension)  Mixed hyperlipidemia  Sciatica  Traumatic closed displaced fracture of acromial end of clavicle, left, initial encounter Medications: No current facility-administered medications on file prior to encounter. Current Outpatient Medications on File Prior to Encounter Medication Sig Dispense Refill  multivitamin (ONE A DAY) tablet Take 1 Tab by mouth daily.  RABEprazole (ACIPHEX) 20 mg tablet take 1 tablet by mouth once daily 30 Tab 5  sertraline (ZOLOFT) 25 mg tablet take 1 tablet by mouth once daily 30 Tab 0  JANUVIA 100 mg tablet take 1 tablet by mouth every morning 30 Tab 5  furosemide (LASIX) 40 mg tablet take 1 tablet by mouth once daily (Patient taking differently: Take 40 mg by mouth daily.) 90 Tab 3  
 ergocalciferol (VITAMIN D2) 50,000 unit capsule Take 1 Cap by mouth every seven (7) days. 4 Cap 1  
 lidocaine (LIDODERM) 5 % Apply patch to the affected area for 12 hours a day and remove for 12 hours a day. 5 Each 0  
 glipiZIDE-metFORMIN (METAGLIP) 5-500 mg per tablet TAKE 1 TABLET BY MOUTH BEFORE BREAKFAST AND 1 TABLET BY MOUTH BEFORE DINNER 120 Tab 10  
 naproxen sodium (ALEVE PO) Take 220 mg by mouth two (2) times daily as needed for Pain (For pain). PRN Pain  loperamide (IMODIUM) 2 mg capsule Take 1 mg by mouth Three (3) times a week.  pravastatin (PRAVACHOL) 40 mg tablet Take 1 Tab by mouth nightly. 90 Tab 3  
 aspirin delayed-release 81 mg tablet Take 81 mg by mouth daily.  [DISCONTINUED] busPIRone (BUSPAR) 10 mg tablet take 1/2 tablet by mouth twice a day 30 Tab 2  
 [DISCONTINUED] denosumab (PROLIA) 60 mg/mL injection 1 mL by SubCUTAneous route once for 1 dose. 1 Syringe 1  
 busPIRone (BUSPAR) 5 mg tablet 1 tablet twice per day 60 Tab 2  
 [DISCONTINUED] ascorbic acid, vitamin C, (VITAMIN C) 500 mg tablet Take 500 mg by mouth daily.  [DISCONTINUED] ferrous sulfate (IRON) 325 mg (65 mg iron) tablet Take 65 mg by mouth Daily (before breakfast).  [DISCONTINUED] cyanocobalamin (VITAMIN B-12) 2,000 mcg TbER Take 3 Tabs by mouth daily. 1000 mcg tabs  [DISCONTINUED] cholecalciferol (VITAMIN D3) 1,000 unit tablet Take 1,000 Units by mouth daily. Past Surgical History: 
Past Surgical History:  
Procedure Laterality Date  HX APPENDECTOMY [address]  HX OTHER SURGICAL Right 2010  
 ankle surgery  HX TUMOR REMOVAL Left   
 ovarian  HX UROLOGICAL    
 kidney stone removal  
 
 
Family History: 
Family History Problem Relation Age of Onset  Stroke Mother  Heart Disease Mother  Lung Disease Father  Cancer Neg Hx  Diabetes Neg Hx  Hypertension Neg Hx Social History: 
Social History Tobacco Use  Smoking status: Never Smoker  Smokeless tobacco: Never Used Substance Use Topics  Alcohol use: No  
 Drug use: No  
 
 
Allergies: 
No Known Allergies Review of Systems Review of Systems Constitutional: Positive for activity change and appetite change. Negative for chills and fever. Respiratory: Negative for cough and shortness of breath. Cardiovascular: Positive for leg swelling. Negative for chest pain and palpitations. Gastrointestinal: Negative for abdominal pain, nausea and vomiting. Genitourinary: Negative. Musculoskeletal: Positive for arthralgias. Negative for back pain, joint swelling and myalgias. Skin: Positive for color change and wound. Allergic/Immunologic: Negative. Neurological: Negative for dizziness, light-headedness and headaches. Physical Exam  
Physical Exam 
Vitals signs and nursing note reviewed. Constitutional:   
   General: She is awake. She is not in acute distress. Appearance: She is well-developed and underweight. She is not toxic-appearing or diaphoretic. HENT:  
   Head: Normocephalic and atraumatic.   
   Right Ear: External ear normal.  
   Left Ear: External ear normal.  
   Nose: Nose normal.  
 Mouth/Throat:  
   Mouth: Mucous membranes are dry. Eyes:  
   General: Lids are normal.  
   Extraocular Movements: Extraocular movements intact. Conjunctiva/sclera: Conjunctivae normal.  
Neck: Musculoskeletal: Normal range of motion and neck supple. Cardiovascular:  
   Rate and Rhythm: Normal rate and regular rhythm. Pulses:     
     Dorsalis pedis pulses are 2+ on the right side and 2+ on the left side. Heart sounds: Normal heart sounds. Pulmonary:  
   Effort: Pulmonary effort is normal. No respiratory distress. Breath sounds: Normal breath sounds. Abdominal:  
   Palpations: Abdomen is soft. Tenderness: There is no abdominal tenderness. There is no rebound. Musculoskeletal: Normal range of motion. Skin: 
   General: Skin is warm. Capillary Refill: Capillary refill takes less than 2 seconds. Findings: Erythema present. Comments: bilat LE with circumferential erythema, warmth, induration with multiple small 1-2 cm diameter ulcerations, few with central black eschar Neurological:  
   Mental Status: She is alert and oriented to person, place, and time. Motor: No abnormal muscle tone. Psychiatric:     
   Speech: Speech normal.     
   Behavior: Behavior normal. Behavior is cooperative. Thought Content: Thought content normal.     
   Judgment: Judgment normal.  
 
 
 
Diagnostic Study Results Labs - Recent Results (from the past 12 hour(s)) METABOLIC PANEL, COMPREHENSIVE Collection Time: 02/11/20  5:08 PM  
Result Value Ref Range Sodium 137 136 - 145 mmol/L Potassium 4.2 3.5 - 5.5 mmol/L Chloride 100 100 - 111 mmol/L  
 CO2 27 21 - 32 mmol/L Anion gap 10 3.0 - 18 mmol/L Glucose 184 (H) 74 - 99 mg/dL BUN 31 (H) 7.0 - 18 MG/DL Creatinine 1.20 0.6 - 1.3 MG/DL  
 BUN/Creatinine ratio 26 (H) 12 - 20 GFR est AA 52 (L) >60 ml/min/1.73m2 GFR est non-AA 42 (L) >60 ml/min/1.73m2 Calcium 9.6 8.5 - 10.1 MG/DL Bilirubin, total 0.7 0.2 - 1.0 MG/DL  
 ALT (SGPT) 16 13 - 56 U/L  
 AST (SGOT) 20 10 - 38 U/L Alk. phosphatase 155 (H) 45 - 117 U/L Protein, total 7.6 6.4 - 8.2 g/dL Albumin 2.8 (L) 3.4 - 5.0 g/dL Globulin 4.8 (H) 2.0 - 4.0 g/dL A-G Ratio 0.6 (L) 0.8 - 1.7    
CBC WITH AUTOMATED DIFF Collection Time: 02/11/20  5:08 PM  
Result Value Ref Range WBC 9.8 4.6 - 13.2 K/uL  
 RBC 3.77 (L) 4.20 - 5.30 M/uL  
 HGB 10.4 (L) 12.0 - 16.0 g/dL HCT 32.7 (L) 35.0 - 45.0 % MCV 86.7 74.0 - 97.0 FL  
 MCH 27.6 24.0 - 34.0 PG  
 MCHC 31.8 31.0 - 37.0 g/dL  
 RDW 14.5 11.6 - 14.5 % PLATELET 475 339 - 431 K/uL MPV 10.9 9.2 - 11.8 FL  
 NEUTROPHILS 83 (H) 40 - 73 % LYMPHOCYTES 9 (L) 21 - 52 % MONOCYTES 7 3 - 10 % EOSINOPHILS 1 0 - 5 % BASOPHILS 0 0 - 2 %  
 ABS. NEUTROPHILS 8.1 (H) 1.8 - 8.0 K/UL  
 ABS. LYMPHOCYTES 0.9 0.9 - 3.6 K/UL  
 ABS. MONOCYTES 0.7 0.05 - 1.2 K/UL  
 ABS. EOSINOPHILS 0.1 0.0 - 0.4 K/UL  
 ABS. BASOPHILS 0.0 0.0 - 0.1 K/UL  
 DF AUTOMATED    
LACTIC ACID Collection Time: 02/11/20  5:08 PM  
Result Value Ref Range Lactic acid 1.9 0.4 - 2.0 MMOL/L  
URINALYSIS W/ RFLX MICROSCOPIC Collection Time: 02/11/20  5:19 PM  
Result Value Ref Range Color YELLOW Appearance CLEAR Specific gravity 1.013 1.005 - 1.030    
 pH (UA) 5.5 5.0 - 8.0 Protein NEGATIVE  NEG mg/dL Glucose NEGATIVE  NEG mg/dL Ketone NEGATIVE  NEG mg/dL Bilirubin NEGATIVE  NEG Blood NEGATIVE  NEG Urobilinogen 1.0 0.2 - 1.0 EU/dL Nitrites NEGATIVE  NEG Leukocyte Esterase TRACE (A) NEG URINE MICROSCOPIC ONLY Collection Time: 02/11/20  5:19 PM  
Result Value Ref Range WBC 4 to 10 0 - 4 /hpf  
 RBC 0 to 3 0 - 5 /hpf Epithelial cells 2+ 0 - 5 /lpf Radiologic Studies -  
XR TIB/FIB LT    (Results Pending) XR TIB/FIB RT    (Results Pending) CT Results  (Last 48 hours) None CXR Results  (Last 48 hours) None Medical Decision Making Vital Signs-Reviewed the patient's vital signs. Patient Vitals for the past 12 hrs: 
 Temp Pulse Resp BP SpO2  
02/11/20 1845    108/54 96 % 02/11/20 1838     99 % 02/11/20 1837    98/45   
02/11/20 1615 97.8 °F (36.6 °C) 77 20 130/71 100 % I reviewed the vital signs, available nursing notes, past medical history, past surgical history, family history and social history. Provider Notes (Medical Decision Making): Hx of venous insufficiency with hx of bilat LE ulcers. Pt sent from vascular clinic after wound care today, pt with increased erythema, warmth as well as ulcerations with necrosis/eschar, significant changes in appearance in past 4 days. Start IV antibiotics, pt without signs of sepsis. Admit for surgical debridement of wounds. Consult/Progress note: 
18:15 Dr. Soumya Villa, Xrays of bilat LEs to r/o gas before accepting admission. 18:20 Dr. Nadine Hodge will consult on pt. 
19:48 Dr. Soumya Villa accepted admission after bilat tib/fib Xrays show no gas, Xrays reviewed by myself, Dr. Kris Arnold and Dr. Cole Zacarias, awaiting official reports ED Course:  
Initial assessment performed. The patients presenting problems have been discussed, and they are in agreement with the care plan formulated and outlined with them. I have encouraged them to ask questions as they arise throughout their visit. Diagnosis Clinical Impression: 1. Cellulitis of both lower extremities 2. Eschar of multiple sites Disposition: 
admit Laura Kaur PA-C

## 2020-02-11 NOTE — ED TRIAGE NOTES
Patient presents with c/o ulcers to bilateral lower legs. Daughter states rapid progression of uclers worsening. Please see PCP noted. C/o right hip pain

## 2020-02-11 NOTE — PROGRESS NOTES
Hasmukh De Guzman a 80 y. o. female who has been followed in the office for compression wraps due to issues associated with bilateral lower extremity edema.  it was nearly a year ago since she was last seen. She does have history of chronic venous insufficiency with history of phlebitis, as well as diastolic heart failure.  But she is again experiencing increased leg edema with some open sores and drainage, prompting request for a return visit.     The appearance of her legs today though was quite alarming compared to what we have seen of her in the past and her daughter who is with her who had not actually seen the wound since Thursday said this is very rapid progression based on the appearance just a few days ago.   Her level of pain is also significant compared to spells that she has had in the past    She definitely has the significant edema but she has multiple ulcerations of both lower extremities, some with that necrotic eschar and just with the quick progression and amount of pain any recent case of this as well I am concerned that she could have a staph infection or even necrotizing fasciitis that is best managed within the hospital setting    I did assess her arterial status which she has multiphasic flow so not concerned about arterial compromise    I think if we can direct her toward the emergency room for admission where we can establish some antibiotic and pain control, along with wound care, infectious disease consult, and then we can attempt possible debridement under sedation in the next day or 2

## 2020-02-11 NOTE — PROGRESS NOTES
Rinsed bilateral lower extremities with NS and patted dry with gauze, patient tolerated well. Applied adaptic gauze to open areas, covered with ABD's, wrapped with kerlix and coban, patient tolerated well.

## 2020-02-12 NOTE — ED NOTES
Bedside and Verbal shift change report given by Ibrahima Gilman RN (offgoing nurse). Report included the following information SBAR, Kardex, ED Summary, Procedure Summary, Intake/Output, MAR, Accordion and Recent Results.

## 2020-02-12 NOTE — ED NOTES
Purposeful rounding completed: 
 
Side rails up x 2:  YES Bed in low position and wheels locked: YES Call bell within reach: YES Comfort addressed: YES Toileting needs addressed: YES Plan of care reviewed/updated with patient and or family members: YES 
IV site assessed: YES Pain assessed and addressed: YES, 0

## 2020-02-12 NOTE — ED NOTES
Bedside and Verbal shift change report given to Kavitha (oncoming nurse) by Sharron Linn RN (offgoing nurse). Report included the following information SBAR, Kardex, ED Summary, Procedure Summary, Intake/Output, MAR and Accordion.

## 2020-02-13 NOTE — WOUND CARE
Physical Exam  
Room HER05 to room 406: discussed with primary receiving nurse Magnus Mckeon RN. Primary nurse to assess legs & leave open to air for now. Will turn over care to nursing staff at this time.  
Tricia RAMIREZN, RN, [facility] [employer], [address]

## 2020-02-13 NOTE — ED NOTES
The patient was signed out to me at [address] [place] [address] by Dr. Irma Barraza. The patient is being treated for cellulitis and has her legs wrapped. Will continue to follow the patient until a bed is available at [facility].  Edwin Sandoval DO 7:45 AM

## 2020-02-13 NOTE — ED NOTES
Patient sleeping soundly. Family now gone and request call to daughter, Leroy Wilkerson, with bed placement.

## 2020-02-13 NOTE — ROUTINE PROCESS
TRANSFER - IN REPORT: 
 
Verbal report received from ida on Partha Havers  being received from [facility] for routine progression of care Report consisted of patients Situation, Background, Assessment and  
Recommendations(SBAR). Information from the following report(s) SBAR, Kardex, ED Summary, Procedure Summary, Intake/Output, MAR, Recent Results and Med Rec Status was reviewed with the receiving nurse. Opportunity for questions and clarification was provided. Assessment completed upon patients arrival to unit and care assumed.

## 2020-02-13 NOTE — H&P
History & Physical 
 
Patient: Maximilian Strong MRN: [id number]  CSN: [id number] YOB: 1933  Age: 80 y.o. Sex: female DOA: 2/11/2020 CC:follow up from vascular surgery clinic PCP: Valdene Kanner, NP 
    
HPI:  
 
Maximilian Strong is a 80 y.o. female transfer from [address] [place] [address] for follow up from vascular clinic on 2/11/2020. Concern for venous insufficiency, eschar and ulcerations of lower extremities, BLE cellulitis. Possible wound debridement by vascular surgery. Pain from lower extremities, swelling, redness and wounds. Aleve at home for pain management. Patient started on vanco and zosyn at [facility]. Bilateral pain of both legs. Bilateral weeping of blood and fluid. Patient asking for pain medications. Spoke with daughter and son at the the bedside for updates. Review of Systems Constitutional: Negative. HENT: Negative. Respiratory: Negative. Cardiovascular: Negative. Gastrointestinal: Negative. Genitourinary: Negative. Musculoskeletal: Negative. Skin:  
     Bilateral leg ulcerations, redness and oozing of fluids Neurological: Negative. Psychiatric/Behavioral: Negative. Past Medical History:  
Diagnosis Date  Aortic valve stenosis, mild April 2014 EF 24-52%, Grade 1 diastolic dysfunction, mild aortic stenosis,  mean gradient 10 mm Hg  CAD (coronary artery disease) aortic stenosis  Cardiac echocardiogram 06/2018 EF 55-60%. No RWMA. Mild-mod LVH. Severe LAE. Mild AS.  Cardiac nuclear imaging test 08/27/2008 No evidence of ischemia or prior scarring. EF 62%. No WMA. No significant change from study of 3/23/06.  Cardiovascular RLE venous duplex 12/01/2016 Right leg:  No DVT.  Carotid duplex 04/24/2013 Mild 7-58% LICA stenosis.  DM (diabetes mellitus) ([address]  Dyslipidemia  Heart failure ([address]  HTN (hypertension)  Mixed hyperlipidemia  Sciatica  Traumatic closed displaced fracture of acromial end of clavicle, left, initial encounter Past Surgical History:  
Procedure Laterality Date  HX APPENDECTOMY [address]  HX OTHER SURGICAL Right 2010  
 ankle surgery  HX TUMOR REMOVAL Left   
 ovarian  HX UROLOGICAL    
 kidney stone removal  
 
 
Family History Problem Relation Age of Onset  Stroke Mother  Heart Disease Mother  Lung Disease Father  Cancer Neg Hx  Diabetes Neg Hx  Hypertension Neg Hx Social History Socioeconomic History  Marital status:  Spouse name: Not on file  Number of children: Not on file  Years of education: Not on file  Highest education level: Not on file Tobacco Use  Smoking status: Never Smoker  Smokeless tobacco: Never Used Substance and Sexual Activity  Alcohol use: No  
 Drug use: No  
 
 
Prior to Admission medications Medication Sig Start Date End Date Taking? Authorizing Provider  
multivitamin (ONE A DAY) tablet Take 1 Tab by mouth daily. Yes Danika Joe MD  
RABEprazole (ACIPHEX) 20 mg tablet take 1 tablet by mouth once daily 2/10/20  Yes Wendy Blackburn MD  
JANUVIA 100 mg tablet take 1 tablet by mouth every morning 10/14/19  Yes Rosanna Davidson MD  
furosemide (LASIX) 40 mg tablet take 1 tablet by mouth once daily Patient taking differently: Take 40 mg by mouth daily. 10/14/19  Yes Allan Bailey MD  
ergocalciferol (VITAMIN D2) 50,000 unit capsule Take 1 Cap by mouth every seven (7) days. 5/3/19  Yes Opal Glynn NP  
lidocaine (LIDODERM) 5 % Apply patch to the affected area for 12 hours a day and remove for 12 hours a day.  3/22/19  Yes Rosanna Davidson MD  
glipiZIDE-metFORMIN (METAGLIP) 5-500 mg per tablet TAKE 1 TABLET BY MOUTH BEFORE BREAKFAST AND 1 TABLET BY MOUTH BEFORE DINNER 11/16/18  Yes Rosanna Davidson MD  
naproxen sodium (ALEVE PO) Take 220 mg by mouth two (2) times daily as needed for Pain (For pain). PRN Pain   Yes Provider, Historical  
loperamide (IMODIUM) 2 mg capsule Take 1 mg by mouth Three (3) times a week. Yes Provider, Historical  
pravastatin (PRAVACHOL) 40 mg tablet Take 1 Tab by mouth nightly. 8/10/16  Yes Bell Jose MD  
aspirin delayed-release 81 mg tablet Take 81 mg by mouth daily. Yes Provider, Historical  
sertraline (ZOLOFT) 25 mg tablet take 1 tablet by mouth once daily 2/12/20   Laura Tam MD  
busPIRone (BUSPAR) 5 mg tablet 1 tablet twice per day 3/22/19   Bell Jose MD  
 
 
No Known Allergies Physical Exam:  
  
Visit Vitals BP 97/53 (BP 1 Location: Left arm, BP Patient Position: Supine) Pulse 62 Temp 98.6 °F (37 °C) Resp 16 Ht 5' 1\" (1.549 m) Wt 62.1 kg (137 lb) SpO2 96% BMI 25.89 kg/m² Physical Exam: 
General:  Alert, NAD Cardiovascular:  RRR Pulmonary:  LSC throughout; respiratory effort WNL 
GI:  +BS in all four quadrants, soft, non-tender Extremities: BLE redness, ulcerations, oozing of fluid and blood, edema of legs and ankles Neuro: alert and oriented with some confusion. Poor historian, daughter is primary care taker Lab/Data Review: 
Labs: Results:  
   
Chemistry Recent Labs  
  02/13/20 
0359 02/11/20 
1708 * 184*  137  
K 4.1 4.2  100 CO2 26 27 BUN 20* 31* CREA 0.92 1.20 CA 8.4* 9.6 AGAP 9 10 BUCR 22* 26* AP  --  155* TP  --  7.6 ALB  --  2.8*  
GLOB  --  4.8* AGRAT  --  0.6* CBC w/Diff Recent Labs  
  02/11/20 
1708 WBC 9.8  
RBC 3.77* HGB 10.4* HCT 32.7*  
 GRANS 83* LYMPH 9* EOS 1 Coagulation No results for input(s): PTP, INR, APTT, INREXT in the last 72 hours. Iron/Ferritin No results for input(s): IRON in the last 72 hours. No lab exists for component: TIBCCALC BNP No results for input(s): BNPP in the last 72 hours. Cardiac Enzymes No results for input(s): CPK, CKND1, TEAGAN in the last 72 hours. No lab exists for component: [place] Liver Enzymes Recent Labs  
  02/11/20 
1708 TP 7.6 ALB 2.8* * SGOT 20 Thyroid Studies Lab Results Component Value Date/Time TSH 1.41 01/16/2019 02:01 AM  
    
 
All Micro Results Procedure Component Value Units Date/Time CULTURE, URINE [[id number]] Collected:  02/11/20 1719 Order Status:  Completed Specimen:  Urine from Clean catch Updated:  02/13/20 1006 Special Requests: NO SPECIAL REQUESTS Culture result:    
  [id number] COLONIES/mL MIXED GRAM POSITIVE ABHI, PROBABLE SKIN/GENITAL CONTAMINATION. CULTURE, BLOOD [[id number]] Collected:  02/11/20 1708 Order Status:  Completed Specimen:  Blood Updated:  02/13/20 2455 Special Requests: --     
  RIGHT Antecubital 
  
  Culture result: NO GROWTH 2 DAYS     
 CULTURE, BLOOD [[id number]] Collected:  02/11/20 1722 Order Status:  Completed Specimen:  Blood Updated:  02/13/20 0208 Special Requests: --     
  NO SPECIAL REQUESTS 
RIGHT Antecubital 
  
  Culture result: NO GROWTH 2 DAYS Imaging Reviewed: 
Images completed in ED on 2/11/2020 Duplex lower extremities 2/12/2020 Assessment:  
Active Problems: 
  Eschar of lower leg (2/11/2020) Bilateral lower leg cellulitis (2/11/2020) Multiple ulcerations of BLE Venous insufficiency PMH 
HTN 
HLD Chronic diastolic HF Lower leg edema Venous insufficiency CAD 
DMT2 GERD Plan: 1. Vascular surgery consulted. Dr. Wes Ritchie on treatment team 
2. ID consulted. Dr. Yennifer alston. Shaye Agustin on consult 3. Wound care consulted with nursing recommendations for wound care 4. Continue vanco and zosyn 5. POC glucose, SSI, [place] and [place] 6. Bowel regimen 7. protonix and Lovenox 8. Pain management tylenol, morphine and percocet 9. One time dose IV lasix today. No lasix given since ER admission. Continue daily lasix tomorrow. 10. Code status discussed. Full code status Lenny Hudson NP 
 2/13/2020, 2:46 PM

## 2020-02-13 NOTE — ROUTINE PROCESS
TRANSFER - OUT REPORT: 
 
Verbal report given to [facility] RN(name) on Boni Nelson  being transferred to [facility](unit) for routine progression of care Report consisted of patients Situation, Background, Assessment and  
Recommendations(SBAR). Information from the following report(s) SBAR was reviewed with the receiving nurse. Lines:  
Peripheral IV 02/11/20 Right Antecubital (Active) Site Assessment Clean, dry, & intact 2/11/2020  5:24 PM  
Phlebitis Assessment 0 2/11/2020  5:24 PM  
Infiltration Assessment 0 2/11/2020  5:24 PM  
Dressing Status Clean, dry, & intact 2/11/2020  5:24 PM  
Dressing Type Transparent 2/11/2020  5:24 PM  
   
Peripheral IV 02/12/20 Left Forearm (Active) Opportunity for questions and clarification was provided. Patient transported with: 
 O2 @ 2 liters

## 2020-02-13 NOTE — ED NOTES
Assumed care of patient. Resting comfortably on hospital bed, no ss distress. Multiple family members at bedside. Will continue to monitor closely while awaiting bed assignment.

## 2020-02-13 NOTE — PROGRESS NOTES
Pt finally transferred from [facility] ED after being sent on Tuesday No one from our service currently in house at time of transfer Will make npo and evaluate in am for possible debridement of leg ulcers

## 2020-02-13 NOTE — ED NOTES
Patient continues to sleep soundly. No ss distress. Lights dimmed and call bell within reach. Will continue to monitor closely while awaiting room assignment.

## 2020-02-14 NOTE — PROGRESS NOTES
[facility] Patient: Maximilian Strong Age: 80 y.o. : 1933 MR#: [id number] SSN: [id number] Date: 2020 Subjective:  
States pain of bilateral legs. Patient seen post vascular I and D procedure. Daughter at bedside. NAD and patient resting comfortably. Assessment/Plan: 1. Eschar of lower leg 2. BLE cellulitis 3. Multiple ulceration of BLE 
4. Chronic Venous insufficiency 5. Mild hyponatremia 6. HTN 7. HLD 8. CAD 9. DMT2. a1c 6.0 10. GERD Plan 1. Vascular surgery debridement of skin and subcutaneous tissue of BLE. Pain management. Wound care 2. ID consult. Continue zosyn and vanco 
3. Continue PO lasix 4. Monitor metabolic panel and renal function Additional Notes:   
 
Case discussed with:  [x]Patient  [x]Family  [x]Nursing  [x]Case Management DVT Prophylaxis:  [x]Lovenox  []Hep SQ  []SCDs  []Coumadin   []On Heparin gtt Objective:  
VS:  
Visit Vitals /57 Pulse 70 Temp 97.2 °F (36.2 °C) Resp 17 Ht 5' 1\" (1.549 m) Wt 62.1 kg (137 lb) SpO2 95% BMI 25.89 kg/m² Tmax/24hrs: Temp (24hrs), Av.7 °F (36.5 °C), Min:97 °F (36.1 °C), Max:98.6 °F (37 °C) Intake/Output Summary (Last 24 hours) at 2020 1528 Last data filed at 2020 1201 Gross per 24 hour Intake 1100 ml Output 300 ml Net 800 ml General:  Alert, NAD Cardiovascular:  RRR Pulmonary:  LSC throughout; respiratory effort WNL 
GI:  +BS in all four quadrants, soft, non-tender Extremities:  Bilateral leg wraps Neuro: alert and oriented Family contact: daughter at bedside Labs:   
Recent Results (from the past 24 hour(s)) GLUCOSE, POC Collection Time: 20  8:46 PM  
Result Value Ref Range Glucose (POC) 199 (H) 70 - 110 mg/dL [place] Collection Time: 20  3:25 AM  
Result Value Ref Range  Vancomycin, random 13.3 5.0 - 66.3 UG/ML  
METABOLIC PANEL, BASIC  
 Collection Time: 02/14/20  3:25 AM  
Result Value Ref Range Sodium 134 (L) 136 - 145 mmol/L Potassium 3.6 3.5 - 5.5 mmol/L Chloride 102 100 - 111 mmol/L  
 CO2 26 21 - 32 mmol/L Anion gap 6 3.0 - 18 mmol/L Glucose 148 (H) 74 - 99 mg/dL BUN 23 (H) 7.0 - 18 MG/DL Creatinine 1.29 0.6 - 1.3 MG/DL  
 BUN/Creatinine ratio 18 12 - 20 GFR est AA 47 (L) >60 ml/min/1.73m2 GFR est non-AA 39 (L) >60 ml/min/1.73m2 Calcium 8.3 (L) 8.5 - 10.1 MG/DL  
CBC WITH AUTOMATED DIFF Collection Time: 02/14/20  3:25 AM  
Result Value Ref Range WBC 7.0 4.6 - 13.2 K/uL  
 RBC 3.05 (L) 4.20 - 5.30 M/uL HGB 8.4 (L) 12.0 - 16.0 g/dL HCT 25.6 (L) 35.0 - 45.0 % MCV 83.9 74.0 - 97.0 FL  
 MCH 27.5 24.0 - 34.0 PG  
 MCHC 32.8 31.0 - 37.0 g/dL  
 RDW 14.9 (H) 11.6 - 14.5 % PLATELET 634 (L) 348 - 420 K/uL MPV 11.3 9.2 - 11.8 FL  
 NEUTROPHILS 83 (H) 40 - 73 % LYMPHOCYTES 11 (L) 21 - 52 % MONOCYTES 4 3 - 10 % EOSINOPHILS 2 0 - 5 % BASOPHILS 0 0 - 2 %  
 ABS. NEUTROPHILS 5.9 1.8 - 8.0 K/UL  
 ABS. LYMPHOCYTES 0.7 (L) 0.9 - 3.6 K/UL  
 ABS. MONOCYTES 0.3 0.05 - 1.2 K/UL  
 ABS. EOSINOPHILS 0.1 0.0 - 0.4 K/UL  
 ABS. BASOPHILS 0.0 0.0 - 0.1 K/UL  
 DF AUTOMATED HEMOGLOBIN A1C WITH EAG Collection Time: 02/14/20  3:25 AM  
Result Value Ref Range Hemoglobin A1c 6.0 (H) 4.2 - 5.6 % Est. average glucose 126 mg/dL GLUCOSE, POC Collection Time: 02/14/20  6:21 AM  
Result Value Ref Range Glucose (POC) 140 (H) 70 - 110 mg/dL GLUCOSE, POC Collection Time: 02/14/20 10:40 AM  
Result Value Ref Range Glucose (POC) 138 (H) 70 - 110 mg/dL Signed By: Dirk Branch NP February 14, 2020

## 2020-02-14 NOTE — ROUTINE PROCESS
TRANSFER - OUT REPORT: 
 
Verbal report given to pre-op on Jett Emms  being transferred to OR for ordered procedure Report consisted of patients Situation, Background, Assessment and  
Recommendations(SBAR). Information from the following report(s) SBAR, Kardex, Intake/Output, MAR and Recent Results was reviewed with the receiving nurse. Lines:  
Peripheral IV 02/11/20 Right Antecubital (Active) Site Assessment Clean, dry, & intact 2/13/2020  5:00 PM  
Phlebitis Assessment 0 2/13/2020  5:00 PM  
Infiltration Assessment 0 2/13/2020  5:00 PM  
Dressing Status Clean, dry, & intact 2/13/2020  5:00 PM  
Dressing Type Transparent 2/13/2020  5:00 PM  
   
Peripheral IV 02/12/20 Left Forearm (Active) Site Assessment Clean, dry, & intact 2/13/2020  5:00 PM  
Phlebitis Assessment 0 2/13/2020  5:00 PM  
Infiltration Assessment 0 2/13/2020  5:00 PM  
Dressing Status Clean, dry, & intact 2/13/2020  5:00 PM  
Dressing Type Transparent 2/13/2020  5:00 PM  
  
 
Opportunity for questions and clarification was provided. Patient transported with: 
 Solexant

## 2020-02-14 NOTE — CONSULTS
Infectious Disease Consultation Note Reason: bilateral leg infected ulcrs Current abx Prior abx Pip/tazo, vancomycin since 2/11/20 Lines:  
 
 
Assessment : 
 
80 y.o. female  With hx of HTN, CAD, CHF, type 2 DM (last hgbA1C 6.0 on 2/14/20), hyperlipidemia and venous insufficiency, presented to the ED on 2/11/20sent from the vascular surgery clinic for admission due to bilat LE cellulitis and ulcerations. Wound cultures 5/2018- acinetobacter, mrsa 12/2018- enterobacter, mrsa; 1/2019- mrsa Recommendations: 
 
Clinical presentation c/w bilateral infected LE ulcers s/p I&D 2/14/20. Intra op cultures pending Chronic venous stasis likely predisposing patient to non healing ulcers. RECOMMENDATIONS: 
 
1. Continue pip/tazo, vancomycin 2. F/u intra op cultures 3. Will de escalate abx based on cultures 4. Monitor renal function closely since current abx combination has high risk of nephrotoxicity 5. Wound care bilateral LE ulcers per vascular surgeon. Thank you for consultation request. Above plan was discussed in details with patient. . Please call me if any further questions or concerns. Will continue to participate in the care of this patient. HPI: 
 
80 y.o. female  With hx of HTN, CAD, CHF, type 2 DM (last hgbA1C 6.0 on 2/14/20), hyperlipidemia and venous insufficiency, presented to the ED on 2/11/20sent from the vascular surgery clinic for admission due to bilat LE cellulitis and ulcerations. Pt was seen by NP for wound care who noticed rapidly progressing signs of infection including ulcerations with necrotic areas/echar. Vascular surgery plan to take pt to OR for wound debridement. Pt reported increased pain to legs, decreased appetite and decreased water intake. Pt's daughter states she noticed significantly worsening of swelling, redness and wounds over the 4 days PTA. Patient started on vanco and zosyn at [facility]. Underwent I&D of the bilateral leg ulcers on 2/14/20.  I have been consulted for further recommendations. Patient denies headaches, visual disturbances, sore throat, runny nose, earaches, cp, sob, chills, cough, abdominal pain, diarrhea, burning micturition, or weakness in extremities. she denies back pain/flank pain. known h/o MRSA colonization or infection in the past. 
 
 
Past Medical History:  
Diagnosis Date  Aortic valve stenosis, mild April 2014 EF 81-85%, Grade 1 diastolic dysfunction, mild aortic stenosis,  mean gradient 10 mm Hg  CAD (coronary artery disease) aortic stenosis  Cardiac echocardiogram 06/2018 EF 55-60%. No RWMA. Mild-mod LVH. Severe LAE. Mild AS.  Cardiac nuclear imaging test 08/27/2008 No evidence of ischemia or prior scarring. EF 62%. No WMA. No significant change from study of 3/23/06.  Cardiovascular RLE venous duplex 12/01/2016 Right leg:  No DVT.  Carotid duplex 04/24/2013 Mild 0-11% LICA stenosis.  DM (diabetes mellitus) ([address]  Dyslipidemia  Heart failure ([address]  HTN (hypertension)  Mixed hyperlipidemia  Sciatica  Traumatic closed displaced fracture of acromial end of clavicle, left, initial encounter Past Surgical History:  
Procedure Laterality Date  HX APPENDECTOMY [address]  HX OTHER SURGICAL Right 2010  
 ankle surgery  HX TUMOR REMOVAL Left   
 ovarian  HX UROLOGICAL    
 kidney stone removal  
 
 
home Medication List  
 Details  
sertraline (ZOLOFT) 25 mg tablet take 1 tablet by mouth once daily 
Qty: 30 Tab, Refills: 0 Associated Diagnoses: Mild depression ([address] Details  
multivitamin (ONE A DAY) tablet Take 1 Tab by mouth daily. RABEprazole (ACIPHEX) 20 mg tablet take 1 tablet by mouth once daily 
Qty: 30 Tab, Refills: 5 JANUVIA 100 mg tablet take 1 tablet by mouth every morning 
Qty: 30 Tab, Refills: 5  
  
furosemide (LASIX) 40 mg tablet take 1 tablet by mouth once daily 
Qty: 90 Tab, Refills: 3 ergocalciferol (VITAMIN D2) 50,000 unit capsule Take 1 Cap by mouth every seven (7) days. Qty: 4 Cap, Refills: 1 Associated Diagnoses: Vitamin D deficiency  
  
lidocaine (LIDODERM) 5 % Apply patch to the affected area for 12 hours a day and remove for 12 hours a day. Qty: 5 Each, Refills: 0  
  
glipiZIDE-metFORMIN (METAGLIP) 5-500 mg per tablet TAKE 1 TABLET BY MOUTH BEFORE BREAKFAST AND 1 TABLET BY MOUTH BEFORE DINNER Qty: 120 Tab, Refills: 10  
  
naproxen sodium (ALEVE PO) Take 220 mg by mouth two (2) times daily as needed for Pain (For pain). PRN Pain  
  
loperamide (IMODIUM) 2 mg capsule Take 1 mg by mouth Three (3) times a week. pravastatin (PRAVACHOL) 40 mg tablet Take 1 Tab by mouth nightly. Qty: 90 Tab, Refills: 3  
  
aspirin delayed-release 81 mg tablet Take 81 mg by mouth daily. Associated Diagnoses: HTN (hypertension); Aortic stenosis  
  
busPIRone (BUSPAR) 5 mg tablet 1 tablet twice per day 
Qty: 60 Tab, Refills: 2 Current Facility-Administered Medications Medication Dose Route Frequency  sodium chloride (NS) flush 5-40 mL  5-40 mL IntraVENous Q8H  
 sodium chloride (NS) flush 5-40 mL  5-40 mL IntraVENous PRN  
 ondansetron (ZOFRAN) injection 4 mg  4 mg IntraVENous ONCE  
 morphine injection 2 mg  2 mg IntraVENous Q2H PRN  
 fentaNYL citrate (PF) 50 mcg/mL injection  piperacillin-tazobactam (ZOSYN) 2.25 g in 0.9% sodium chloride (MBP/ADV) 50 mL MBP  2.25 g IntraVENous Q6H  
 vancomycin (VANCOCIN) 1,000 mg in 0.9% sodium chloride (MBP/ADV) 250 mL adv  1,000 mg IntraVENous Q24H  
 acetaminophen (TYLENOL) tablet 650 mg  650 mg Oral Q4H PRN  
 ondansetron (ZOFRAN) injection 4 mg  4 mg IntraVENous Q4H PRN  
 enoxaparin (LOVENOX) injection 40 mg  40 mg SubCUTAneous Q24H  
 insulin lispro (HUMALOG) injection   SubCUTAneous AC&HS  
 glucose chewable tablet 16 g  4 Tab Oral PRN  
 glucagon (GLUCAGEN) injection 1 mg  1 mg IntraMUSCular PRN  
  polyethylene glycol (MIRALAX) packet 17 g  17 g Oral DAILY  sertraline (ZOLOFT) tablet 25 mg  25 mg Oral DAILY  furosemide (LASIX) tablet 40 mg  40 mg Oral DAILY  pantoprazole (PROTONIX) tablet 40 mg  40 mg Oral ACB  dextrose 10% infusion 125-250 mL  125-250 mL IntraVENous PRN  
 oxyCODONE-acetaminophen (PERCOCET) 5-325 mg per tablet 1 Tab  1 Tab Oral Q4H PRN  
 busPIRone (BUSPAR) tablet 5 mg  5 mg Oral BID  pravastatin (PRAVACHOL) tablet 40 mg  40 mg Oral QHS Allergies: Patient has no known allergies. Family History Problem Relation Age of Onset  Stroke Mother  Heart Disease Mother  Lung Disease Father  Cancer Neg Hx  Diabetes Neg Hx  Hypertension Neg Hx Social History Socioeconomic History  Marital status:  Spouse name: Not on file  Number of children: Not on file  Years of education: Not on file  Highest education level: Not on file Occupational History  Not on file Social Needs  Financial resource strain: Not on file  Food insecurity:  
  Worry: Not on file Inability: Not on file  Transportation needs:  
  Medical: Not on file Non-medical: Not on file Tobacco Use  Smoking status: Never Smoker  Smokeless tobacco: Never Used Substance and Sexual Activity  Alcohol use: No  
 Drug use: No  
 Sexual activity: Not on file Lifestyle  Physical activity:  
  Days per week: Not on file Minutes per session: Not on file  Stress: Not on file Relationships  Social connections:  
  Talks on phone: Not on file Gets together: Not on file Attends Tenriism service: Not on file Active member of club or organization: Not on file Attends meetings of clubs or organizations: Not on file Relationship status: Not on file  Intimate partner violence:  
  Fear of current or ex partner: Not on file Emotionally abused: Not on file Physically abused: Not on file Forced sexual activity: Not on file Other Topics Concern  Not on file Social History Narrative  Not on file Social History Tobacco Use Smoking Status Never Smoker Smokeless Tobacco Never Used Temp (24hrs), Av.8 °F (36.6 °C), Min:97 °F (36.1 °C), Max:98.8 °F (37.1 °C) Visit Vitals BP 98/56 (BP 1 Location: Right arm, BP Patient Position: Supine) Pulse 75 Temp 98.8 °F (37.1 °C) Resp 17 Ht 5' 1\" (1.549 m) Wt 62.1 kg (137 lb) SpO2 96% BMI 25.89 kg/m² ROS: 12 point ROS obtained in details. Pertinent positives as mentioned in HPI,  
otherwise negative Physical Exam: 
 
General: Well developed, well nourished female laying on the bed AAOx3 in no acute distress. General:   awake alert and oriented HEENT:  Normocephalic, atraumatic, PERRL, EOMI, no scleral icterus or pallor; no conjunctival hemmohage;  nasal and oral mucous are moist and without evidence of lesions. No thrush. Neck supple, no bruits. Lymph Nodes:   no cervical, axillary or inguinal adenopathy Lungs:   non-labored, bilaterally clear to auscultation- no crackles wheezes rales or rhonchi Heart:  RRR, s1 and s2; no  rubs or gallops, no edema, + pedal pulses Abdomen:  soft, non-distended, active bowel sounds, no hepatomegaly, no splenomegaly. Non-tender Genitourinary:  deferred Extremities:   no clubbing, cyanosis; no joint effusions or swelling; Full ROM of all large joints to the upper and lower extremities; muscle mass appropriate for age Neurologic:  No gross focal sensory abnormalities; 5/5 muscle strength to upper and lower extremities. Speech appropriate. Cranial nerves intact Skin:  Surgical changes bilateral LE per report Back:  no spinal or paraspinal muscle tenderness or rigidity, no CVA tenderness Psychiatric:  No suicidal or homicidal ideations, appropriate mood and affect Labs: Results:  
Chemistry Recent Labs  
  20 2543 02/13/20 
1336 * 104* * 141  
K 3.6 4.1  106 CO2 26 26 BUN 23* 20* CREA 1.29 0.92  
CA 8.3* 8.4* AGAP 6 9 BUCR 18 22* CBC w/Diff Recent Labs  
  02/14/20 
0325 WBC 7.0  
RBC 3.05* HGB 8.4* HCT 25.6*  
* GRANS 83* LYMPH 11* EOS 2 Microbiology No results for input(s): CULT in the last 72 hours. RADIOLOGY: 
 
All available imaging studies/reports in [facility] for this admission were reviewed Dr. Rosalia Garza, Infectious Disease Specialist 
[phone] February 14, 2020 
5:48 PM

## 2020-02-14 NOTE — CONSULTS
Surgery Consult Patient: Alex Ivey MRN: [id number]  [facility]: [id number] YOB: 1933 Age: 80 y.o. Sex: female DOA: 2/11/2020 HPI:  
 
Alex Ivey is a 80 y.o. female who has been followed in the office for compression wraps due to issues associated with bilateral lower extremity edema. Kathlyn Bloch was seen on Tuesday this week but it was nearly a year ago since she was last seen. She does have history of chronic venous insufficiency with history of phlebitis, as well as diastolic heart failure.  But she was again experiencing increased leg edema with some open sores and drainage, prompting request for a return visit. 
  
The appearance of her legs though was quite alarming compared to what we have seen of her in the past and her daughter who is with her who had not actually seen the wound since Thursday said this is very rapid progression based on the appearance just a few days ago. Her level of pain is also significant compared to spells that she has had in the past 
  
She definitely has the significant edema but she has multiple ulcerations of both lower extremities, some with that necrotic eschar and just with the quick progression and amount of pain any recent case of this as well I am concerned that she could have a staph infection or even necrotizing fasciitis that is best managed within the hospital setting 
  
I did assess her arterial status which she has multiphasic flow so not concerned about arterial compromise 
  
We directed her toward the emergency room for admission where we can establish some antibiotic and pain control, along with wound care, infectious disease consult, and then we can attempt possible debridement under sedation in the next day or 2 Past Medical History:  
Diagnosis Date  Aortic valve stenosis, mild April 2014 EF 49-04%, Grade 1 diastolic dysfunction, mild aortic stenosis,  mean gradient 10 mm Hg  CAD (coronary artery disease) aortic stenosis  Cardiac echocardiogram 06/2018 EF 55-60%. No RWMA. Mild-mod LVH. Severe LAE. Mild AS.  Cardiac nuclear imaging test 08/27/2008 No evidence of ischemia or prior scarring. EF 62%. No WMA. No significant change from study of 3/23/06.  Cardiovascular RLE venous duplex 12/01/2016 Right leg:  No DVT.  Carotid duplex 04/24/2013 Mild 4-27% LICA stenosis.  DM (diabetes mellitus) ([facility]  Dyslipidemia  Heart failure ([facility]  HTN (hypertension)  Mixed hyperlipidemia  Sciatica  Traumatic closed displaced fracture of acromial end of clavicle, left, initial encounter Past Surgical History:  
Procedure Laterality Date  HX APPEN[address]CTOMY [address]  HX OTHER SURGICAL Right 2010  
 ankle surgery  HX TUMOR REMOVAL Left   
 ovarian  HX UROLOGICAL    
 kidney stone removal  
 
 
Family History Problem Relation Age of Onset  Stroke Mother  Heart Disease Mother  Lung Disease Father  Cancer Neg Hx  Diabetes Neg Hx  Hypertension Neg Hx Social History Socioeconomic History  Marital status:  Spouse name: Not on file  Number of children: Not on file  Years of education: Not on file  Highest education level: Not on file Tobacco Use  Smoking status: Never Smoker  Smokeless tobacco: Never Used Substance and Sexual Activity  Alcohol use: No  
 Drug use: No  
 
 
Prior to Admission medications Medication Sig Start Date End Date Taking? Authorizing Provider  
multivitamin (ONE A DAY) tablet Take 1 Tab by mouth daily. Yes Other, MD Danika  
RABEprazole (ACIPHEX) 20 mg tablet take 1 tablet by mouth once daily 2/10/20  Yes Pb Roberts MD  
JANUVIA 100 mg tablet take 1 tablet by mouth every morning 10/14/19  Yes Moody Rangel MD  
furosemide (LASIX) 40 mg tablet take 1 tablet by mouth once daily Patient taking differently: Take 40 mg by mouth daily. 10/14/19  Yes Weston Vicente MD  
ergocalciferol (VITAMIN D2) 50,000 unit capsule Take 1 Cap by mouth every seven (7) days. 5/3/19  Yes Opal Glynn NP  
lidocaine (LIDODERM) 5 % Apply patch to the affected area for 12 hours a day and remove for 12 hours a day. 3/22/19  Yes Sudheer Babcock MD  
glipiZIDE-metFORMIN (METAGLIP) 5-500 mg per tablet TAKE 1 TABLET BY MOUTH BEFORE BREAKFAST AND 1 TABLET BY MOUTH BEFORE DINNER 11/16/18  Yes Sudheer Babcock MD  
naproxen sodium (ALEVE PO) Take 220 mg by mouth two (2) times daily as needed for Pain (For pain). PRN Pain   Yes Provider, Historical  
loperamide (IMODIUM) 2 mg capsule Take 1 mg by mouth Three (3) times a week. Yes Provider, Historical  
pravastatin (PRAVACHOL) 40 mg tablet Take 1 Tab by mouth nightly. 8/10/16  Yes Sudheer Babcock MD  
aspirin delayed-release 81 mg tablet Take 81 mg by mouth daily. Yes Provider, Historical  
sertraline (ZOLOFT) 25 mg tablet take 1 tablet by mouth once daily 2/12/20   José Miguel Maurer MD  
busPIRone (BUSPAR) 5 mg tablet 1 tablet twice per day 3/22/19   Sudheer Babcock MD  
 
 
No Known Allergies Review of Systems Constitutional: Positive for activity change and appetite change. Negative for chills and fever. Respiratory: Negative for cough and shortness of breath. Cardiovascular: Positive for leg swelling. Negative for chest pain and palpitations. Gastrointestinal: Negative for abdominal pain, nausea and vomiting. Genitourinary: Negative. Musculoskeletal: Positive for arthralgias. Negative for back pain, joint swelling and myalgias. Skin: Positive for color change and wound. Allergic/Immunologic: Negative. Neurological: Negative for dizziness, light-headedness and headaches. Physical Exam:  
  
Visit Vitals /72 Pulse 72 Temp 97 °F (36.1 °C) Resp 16 Ht 5' 1\" (1.549 m) Wt 137 lb (62.1 kg) SpO2 97% BMI 25.89 kg/m² Physical Exam: 
General:  Alert, NAD Cardiovascular:  RRR Pulmonary:  LSC throughout; respiratory effort WNL 
GI:  +BS in all four quadrants, soft, non-tender Extremities: bilateral lower extremity edema with multiple scattered ulcerations with some including necrosis. There is surrounding erythema and serous drainage. Significant discomfort Vascular: multiphasic arterial flow with doppler Data Review: CBC:  
Lab Results Component Value Date/Time WBC 7.0 02/14/2020 03:25 AM  
 RBC 3.05 (L) 02/14/2020 03:25 AM  
 HGB 8.4 (L) 02/14/2020 03:25 AM  
 HCT 25.6 (L) 02/14/2020 03:25 AM  
 PLATELET 171 (L) 80/87/1266 03:25 AM  
  
BMP:  
Lab Results Component Value Date/Time Glucose 148 (H) 02/14/2020 03:25 AM  
 Sodium 134 (L) 02/14/2020 03:25 AM  
 Potassium 3.6 02/14/2020 03:25 AM  
 Chloride 102 02/14/2020 03:25 AM  
 CO2 26 02/14/2020 03:25 AM  
 BUN 23 (H) 02/14/2020 03:25 AM  
 Creatinine 1.29 02/14/2020 03:25 AM  
 Calcium 8.3 (L) 02/14/2020 03:25 AM  
 
Coagulation:  
Lab Results Component Value Date/Time Prothrombin time 13.4 04/02/2019 07:15 AM  
 INR 1.1 04/02/2019 07:15 AM  
 aPTT 29.8 04/02/2019 07:15 AM  
 
 
 
Assessment/Plan Chronic venous insufficiency and lymphedema now with quickly progressive celluliltis/ulcerations with necrosis. ID and wound care, pain control, antibiotic therapy. Consider debridement to help stabilize wounds Active Problems: 
  Eschar of lower leg (2/11/2020) Bilateral lower leg cellulitis (2/11/2020) FERMIN Pina February 14, 2020

## 2020-02-14 NOTE — PROGRESS NOTES
Reason for Admission:  Bilateral lower leg cellulitis [L03.116, D34.563] Eschar of lower leg [R23.4] RRAT Score:    11 Plan for utilizing home health: To be determined Likelihood of Readmission:   LOW Transition of Care Plan:         
 
 
Initial assessment completed with patient. Cognitive status of patient: alert and oriented Face sheet information confirmed:  yes. The patient designates her daughter Darylene Fell [phone] her son Christine Nick to participate in her discharge plan and to receive any needed information. This patient lives in a duplex home with son. Patient is not able to navigate steps as needed. Prior to hospitalization, patient was considered to be independent with ADLs/IADLS : yes . Patient has a current ACP document on file: no The daughter will be available to transport patient home upon discharge. The patient already has Lorena Fontana,  medical equipment at home Patient is not currently active with home health. Patient has not stayed in a skilled nursing facility or rehab. Was  stay within last 60 days : no. This patient is on dialysis :no 
 
List of available Home Health agencies were provided and reviewed with the patient prior to discharge. Freedom of choice signed: yes, for [facility] INPAT[facility]. Currently, the discharge plan is Home vs home health. Per pt's daughter Karime Belcher, pt will not go to snf. The patient states that she can obtain her medications from the pharmacy, and take her medications as directed. Patient's current insurance is [employer], Chyna Kelly. Care Management Interventions PCP Verified by CM: Yes(per pt's daughter, it's been a while pt saw her pcp but had appointment on [address] but did not make it to the pcp.) Palliative Care Criteria Met (RRAT>21 & CHF Dx)?: No 
Mode of Transport at Discharge: Other (see comment)(family) Transition of Care Consult (CM Consult): Discharge Planning Physical Therapy Consult: No 
Occupational Therapy Consult: No 
Speech Therapy Consult: No 
Current Support Network: Other(pt's son lives with her) Confirm Follow Up Transport: Family The Patient and/or Patient Representative was Provided with a Choice of Provider and Agrees with the Discharge Plan?: Yes Name of the Patient Representative Who was Provided with a Choice of Provider and Agrees with the Discharge Plan: Andrew Akins [place] of Choice List was Provided with Basic Dialogue that Supports the Patient's Individualized Plan of Care/Goals, Treatment Preferences and Shares the Quality Data Associated with the Providers?: Yes Discharge Location Discharge Placement: Unable to determine at this time ADELAIDE Grubbs RN Care Management Pager: [phone]

## 2020-02-14 NOTE — PERIOP NOTES
TRANSFER - OUT REPORT: 
 
Verbal report given to Elana Chirinos RN on Maykel Bradshaw  being transferred to [facility] for routine post - op Report consisted of patients Situation, Background, Assessment and  
Recommendations(SBAR). Information from the following report(s) SBAR, OR Summary, Procedure Summary, Intake/Output, MAR and Recent Results was reviewed with the receiving nurse. Lines:  
Peripheral IV 02/11/20 Right Antecubital (Active) Site Assessment Intact;Drainage (comment) 2/14/2020 10:22 AM  
Phlebitis Assessment 0 2/14/2020 10:22 AM  
Infiltration Assessment 0 2/14/2020 10:22 AM  
Dressing Status Intact; Wet 2/14/2020 10:22 AM  
Dressing Type Transparent 2/14/2020 10:22 AM  
Hub Color/Line Status Pink;Capped 2/14/2020 10:22 AM  
Action Taken Open ports on tubing capped 2/14/2020  9:09 AM  
Alcohol Cap Used Yes 2/14/2020  9:09 AM  
   
Peripheral IV 02/12/20 Left Forearm (Active) Site Assessment Clean, dry, & intact 2/14/2020 10:22 AM  
Phlebitis Assessment 0 2/14/2020 10:22 AM  
Infiltration Assessment 0 2/14/2020 10:22 AM  
Dressing Status Clean, dry, & intact 2/14/2020 10:22 AM  
Dressing Type Transparent 2/14/2020 10:22 AM  
Hub Color/Line Status Pink; Infusing;Patent 2/14/2020 10:22 AM  
Action Taken Open ports on tubing capped 2/14/2020  9:09 AM  
Alcohol Cap Used Yes 2/14/2020  9:09 AM  
  
 
Opportunity for questions and clarification was provided. Patient transported with: 
 Registered Nurse

## 2020-02-14 NOTE — OP NOTES
Preoperative diagnosis: Class VI chronic venous insufficiency of bilateral lower extremities with numerous ulcerations to bilateral calves Postoperative diagnosis: Class VI chronic venous insufficiency of bilateral lower extremities with numerous ulcerations of bilateral calves Procedures performed: #1  Debridement of skin and subcutaneous tissue of bilateral lower extremities to below measurements 
right lower extremity anterior: 
1.7-1.2-0.3 
0.6-0.5-0.1 
1.5-0.3-flush 
1.4-0.9-0.3 
2.2-2-0.3 
1.5-1.8-f 
0.9-1.2-0.2 
0.5-0.9-0.1 
0.8-0.5-0.1 
0.6-0.5-0.1 
1-0.9-0.2 
1.2-1-0.1 2.4-2-0.3 
2.6-1.9-0.2 
1.2-1-0.2 
9-4.8-0.6 
2.1-0.8-0.1 
3.7-2.8-0.3 
1.5-1-0.1 
1.4-1.1-0.1 Right lower extremity posterior: 
1.2-0.6-0.1 
1.4-0.7-f 
0.9-0.7-0.1 
1.5-1-0.2 
0.9-0.7-0.1 
1.5-1-0.2 
1-0.6-0.2 
1.6-0.7-0.2 Left lower extremity anterior: 
2-1.4-0.2 
0.6-0.6-f 
0.7-0.7-0.1 
1.6-1.4-0.1 
2.2-2.6-0.3 
4.4-2.5-0.4 
1.4-2.5-0.4 
1.4-1.4-0.1 
1.3-1-0.1 
2.7-1.5-f 
1.5-1.2-0.1 
2.1-1.7-0.2 
2.1-1.5-0.2 
1.1-0.6-0.3 
1.6-1-0.2 
1.5-1-0.2 
3.4-2.2-0.6 
3-2.2-0.6 
3-2.2-0.5 
1.9-1.7-0.1 
6-2.7-0.3 Left lower extremity posterior: 
2.4-1.7-0.3 
1.7-0.9-0.2 
0.8-1.2-0.2 
2-1.4-f 
2.2-1.2-0.1 Cultures: None Specimens: None Drains: None Estimated blood loss: 50 mL Assistants: None Implants: Please see above Complications: None Anesthesia: General  
 
Indications for the procedure: 
Carl Aguirre is a 80 y.o. female with class VI chronic venous insufficiency of bilateral lower extremities with numerous ulcerations in need of debridement. Patient was given the appropriate risk and benefits of the procedure including but not limited to bleeding, infection, damage to adjacent structures, MI, stroke, death, loss of lower extremity, need for further surgery. Patient was understanding of all the risks and underwent a procedure. Operative findings:  
#1  Numerous ulcerations bilaterally Procedure: Patient was correctly identified in the precath area and taken to the OR in stable condition. Patient had pre-incision timeout prior to any incision. Patient was prepped and draped in the normal sterile fashion according to CDC guidelines aseptic technique. Patient had preoperative antibiotics prior to any incision. We are able to use the back of a DeBakey to scrape and excisionally remove all fibrinous exudate from numerous ulcerations of bilateral lower extremities. Once we got to good pink granulation tissue that was bleeding we are able to gain hemostasis with  manual compression. We then were able to place Telfa circumferentially around each leg. We wrapped the legs with Kerlix, Ace bandage, and Covan from the base of the toes to the base of the knee bilaterally. Patient tolerated procedure well without any issues.

## 2020-02-14 NOTE — ANESTHESIA POSTPROCEDURE EVALUATION
Procedure(s): BILATERAL LEG DEBRIDEMENT. general 
 
Anesthesia Post Evaluation Multimodal analgesia: multimodal analgesia used between 6 hours prior to anesthesia start to PACU discharge Patient location during evaluation: PACU Patient participation: complete - patient participated Level of consciousness: awake Pain management: adequate Airway patency: patent Anesthetic complications: no 
Cardiovascular status: acceptable and hemodynamically stable Respiratory status: acceptable Hydration status: acceptable Post anesthesia nausea and vomiting:  controlled Vitals Value Taken Time /56 2/14/2020 11:02 AM  
Temp 36.2 °C (97.2 °F) 2/14/2020 10:27 AM  
Pulse 68 2/14/2020 11:06 AM  
Resp 16 2/14/2020 11:06 AM  
SpO2 100 % 2/14/2020 11:06 AM  
Vitals shown include unvalidated device data.

## 2020-02-14 NOTE — INTERVAL H&P NOTE
H&P Update: 
Jonnathan Laura was seen and examined. History and physical has been reviewed. Significant clinical changes have occurred as noted:  Now in need of bilateral debridement

## 2020-02-14 NOTE — ANESTHESIA PREPROCEDURE EVALUATION
Anesthetic History No history of anesthetic complications Review of Systems / Medical History Patient summary reviewed and pertinent labs reviewed Pulmonary Within defined limits Neuro/Psych Comments: 03/2019 Normal right extracranial carotid duplex examination.   
Mild plaque (<50%) is present within the left internal carotid artery Cardiovascular Hypertension Valvular problems/murmurs: tricuspid insufficiency, mitral insufficiency and aortic stenosis PAD and hyperlipidemia Comments: 03/2019 ECHO 
EF 55%. VISUALLY THE AORTIC VALVE APPEARS MODERATELY STENOTIC. AV PEAK VELOCITY  CM/S, AV MG 17 MMHG / AV PG 33 MMHG, MALCOM 0.72 CM2. MILD TRICUSPID AND MITRAL REGURGITATION. GI/Hepatic/Renal 
  
GERD: well controlled Endo/Other Diabetes: type 2 Anemia Other Findings Comments:  
 
 
  
 
 
 
Physical Exam 
 
Airway Mallampati: III 
TM Distance: 4 - 6 cm Neck ROM: normal range of motion Mouth opening: Normal 
 
 Cardiovascular Rhythm: regular Dental 
 
 
Comments: Not able to fully assess Pulmonary Breath sounds clear to auscultation Abdominal 
GI exam deferred Other Findings Anesthetic Plan ASA: 3 Anesthesia type: general - backup and general 
 
 
 
 
 
Anesthetic plan and risks discussed with: Son / Daughter and Healthcare power of

## 2020-02-14 NOTE — ROUTINE PROCESS
Verbal shift change report given to ANABEL (oncoming nurse) by Ethel Gastelum (offgoing nurse). Report included the following information SBAR and Kardex.

## 2020-02-14 NOTE — H&P (VIEW-ONLY)
Surgery Consult Patient: Jorge Luis Gamez MRN: [id number]  CSN: [id number] YOB: 1933 Age: 80 y.o. Sex: female DOA: 2/11/2020 HPI:  
 
Jorge Luis Gamez is a 80 y.o. female who has been followed in the office for compression wraps due to issues associated with bilateral lower extremity edema. Dionna Bryant was seen on Tuesday this week but it was nearly a year ago since she was last seen. She does have history of chronic venous insufficiency with history of phlebitis, as well as diastolic heart failure.  But she was again experiencing increased leg edema with some open sores and drainage, prompting request for a return visit. 
  
The appearance of her legs though was quite alarming compared to what we have seen of her in the past and her daughter who is with her who had not actually seen the wound since Thursday said this is very rapid progression based on the appearance just a few days ago. Her level of pain is also significant compared to spells that she has had in the past 
  
She definitely has the significant edema but she has multiple ulcerations of both lower extremities, some with that necrotic eschar and just with the quick progression and amount of pain any recent case of this as well I am concerned that she could have a staph infection or even necrotizing fasciitis that is best managed within the hospital setting 
  
I did assess her arterial status which she has multiphasic flow so not concerned about arterial compromise 
  
We directed her toward the emergency room for admission where we can establish some antibiotic and pain control, along with wound care, infectious disease consult, and then we can attempt possible debridement under sedation in the next day or 2 Past Medical History:  
Diagnosis Date  Aortic valve stenosis, mild April 2014 EF 82-24%, Grade 1 diastolic dysfunction, mild aortic stenosis,  mean gradient 10 mm Hg  CAD (coronary artery disease) aortic stenosis  Cardiac echocardiogram 06/2018 EF 55-60%. No RWMA. Mild-mod LVH. Severe LAE. Mild AS.  Cardiac nuclear imaging test 08/27/2008 No evidence of ischemia or prior scarring. EF 62%. No WMA. No significant change from study of 3/23/06.  Cardiovascular RLE venous duplex 12/01/2016 Right leg:  No DVT.  Carotid duplex 04/24/2013 Mild 1-23% LICA stenosis.  DM (diabetes mellitus) ([facility] [address]  Dyslipidemia  Heart failure ([facility] [address]  HTN (hypertension)  Mixed hyperlipidemia  Sciatica  Traumatic closed displaced fracture of acromial end of clavicle, left, initial encounter Past Surgical History:  
Procedure Laterality Date  HX APPENDECTOMY [address]  HX OTHER SURGICAL Right 2010  
 ankle surgery  HX TUMOR REMOVAL Left   
 ovarian  HX UROLOGICAL    
 kidney stone removal  
 
 
Family History Problem Relation Age of Onset  Stroke Mother  Heart Disease Mother  Lung Disease Father  Cancer Neg Hx  Diabetes Neg Hx  Hypertension Neg Hx Social History Socioeconomic History  Marital status:  Spouse name: Not on file  Number of children: Not on file  Years of education: Not on file  Highest education level: Not on file Tobacco Use  Smoking status: Never Smoker  Smokeless tobacco: Never Used Substance and Sexual Activity  Alcohol use: No  
 Drug use: No  
 
 
Prior to Admission medications Medication Sig Start Date End Date Taking? Authorizing Provider  
multivitamin (ONE A DAY) tablet Take 1 Tab by mouth daily. Yes Danika Joe MD  
RABEprazole (ACIPHEX) 20 mg tablet take 1 tablet by mouth once daily 2/10/20  Yes Angeline Ma MD  
JANUVIA 100 mg tablet take 1 tablet by mouth every morning 10/14/19  Yes Amanuel Colon MD  
furosemide (LASIX) 40 mg tablet take 1 tablet by mouth once daily Patient taking differently: Take 40 mg by mouth daily. 10/14/19  Yes Lucy Diaz MD  
ergocalciferol (VITAMIN D2) 50,000 unit capsule Take 1 Cap by mouth every seven (7) days. 5/3/19  Yes Opal Glynn NP  
lidocaine (LIDODERM) 5 % Apply patch to the affected area for 12 hours a day and remove for 12 hours a day. 3/22/19  Yes Keyla Gibbs MD  
glipiZIDE-metFORMIN (METAGLIP) 5-500 mg per tablet TAKE 1 TABLET BY MOUTH BEFORE BREAKFAST AND 1 TABLET BY MOUTH BEFORE DINNER 11/16/18  Yes Keyla Gibbs MD  
naproxen sodium (ALEVE PO) Take 220 mg by mouth two (2) times daily as needed for Pain (For pain). PRN Pain   Yes Provider, Historical  
loperamide (IMODIUM) 2 mg capsule Take 1 mg by mouth Three (3) times a week. Yes Provider, Historical  
pravastatin (PRAVACHOL) 40 mg tablet Take 1 Tab by mouth nightly. 8/10/16  Yes Keyla Gibbs MD  
aspirin delayed-release 81 mg tablet Take 81 mg by mouth daily. Yes Provider, Historical  
sertraline (ZOLOFT) 25 mg tablet take 1 tablet by mouth once daily 2/12/20   Bonny Ruiz MD  
busPIRone (BUSPAR) 5 mg tablet 1 tablet twice per day 3/22/19   Keyla Gibbs MD  
 
 
No Known Allergies Review of Systems Constitutional: Positive for activity change and appetite change. Negative for chills and fever. Respiratory: Negative for cough and shortness of breath. Cardiovascular: Positive for leg swelling. Negative for chest pain and palpitations. Gastrointestinal: Negative for abdominal pain, nausea and vomiting. Genitourinary: Negative. Musculoskeletal: Positive for arthralgias. Negative for back pain, joint swelling and myalgias. Skin: Positive for color change and wound. Allergic/Immunologic: Negative. Neurological: Negative for dizziness, light-headedness and headaches. Physical Exam:  
  
Visit Vitals /72 Pulse 72 Temp 97 °F (36.1 °C) Resp 16 Ht 5' 1\" (1.549 m) Wt 137 lb (62.1 kg) SpO2 97% BMI 25.89 kg/m² Physical Exam: 
General:  Alert, NAD Cardiovascular:  RRR Pulmonary:  LSC throughout; respiratory effort WNL 
GI:  +BS in all four quadrants, soft, non-tender Extremities: bilateral lower extremity edema with multiple scattered ulcerations with some including necrosis. There is surrounding erythema and serous drainage. Significant discomfort Vascular: multiphasic arterial flow with doppler Data Review: CBC:  
Lab Results Component Value Date/Time WBC 7.0 02/14/2020 03:25 AM  
 RBC 3.05 (L) 02/14/2020 03:25 AM  
 HGB 8.4 (L) 02/14/2020 03:25 AM  
 HCT 25.6 (L) 02/14/2020 03:25 AM  
 PLATELET 878 (L) 18/29/3468 03:25 AM  
  
BMP:  
Lab Results Component Value Date/Time Glucose 148 (H) 02/14/2020 03:25 AM  
 Sodium 134 (L) 02/14/2020 03:25 AM  
 Potassium 3.6 02/14/2020 03:25 AM  
 Chloride 102 02/14/2020 03:25 AM  
 CO2 26 02/14/2020 03:25 AM  
 BUN 23 (H) 02/14/2020 03:25 AM  
 Creatinine 1.29 02/14/2020 03:25 AM  
 Calcium 8.3 (L) 02/14/2020 03:25 AM  
 
Coagulation:  
Lab Results Component Value Date/Time Prothrombin time 13.4 04/02/2019 07:15 AM  
 INR 1.1 04/02/2019 07:15 AM  
 aPTT 29.8 04/02/2019 07:15 AM  
 
 
 
Assessment/Plan Chronic venous insufficiency and lymphedema now with quickly progressive celluliltis/ulcerations with necrosis. ID and wound care, pain control, antibiotic therapy. Consider debridement to help stabilize wounds Active Problems: 
  Eschar of lower leg (2/11/2020) Bilateral lower leg cellulitis (2/11/2020) FERMIN Hanley February 14, 2020

## 2020-02-15 NOTE — PROGRESS NOTES
Received in bed awake and confused. In no acute distress. Denies discomfort. Family at bedside. Vital signs stable. Bed low, siderailsx3 up, call bell near pt. Bed alarm on.

## 2020-02-15 NOTE — PROGRESS NOTES
Pt sitting up in chair She looks so much better compared to when I saw her in clinic and sent to ED for admission She is s/p debridements yesterday Dressings dry, and to remain in place until Wednesday I have left message with wound nurse to coordinate dressing change/time on Wednesday Appreciate hospitalist management and ID input

## 2020-02-15 NOTE — ROUTINE PROCESS
Bedside and Verbal shift change report given to Stalin Howell (oncoming nurse) by Manuel Moulton RN (offgoing nurse). Report included the following information SBAR, Kardex and MAR.

## 2020-02-15 NOTE — PROGRESS NOTES
conducted an initial consultation and Spiritual Assessment for Jorge Luis Gamez, who is a 80 y.o.,female. Patients Primary Language is: [place]. According to the patients EMR Oriental orthodox Affiliation is: Kannan Raza.  
 
The reason the Patient came to the hospital is:  
Patient Active Problem List  
 Diagnosis Date Noted  Eschar of lower leg 02/11/2020  Bilateral lower leg cellulitis 02/11/2020  Age related osteoporosis 11/01/2019  Type 2 diabetes with nephropathy ([facility] [address] 07/24/2018  Diabetes mellitus without complication ([facility] [address] 87/07/5063  AS (aortic stenosis) 11/14/2016  URI (upper respiratory infection) 11/15/2015  Decubitus ulcer of buttock, stage 1 11/15/2015  Mixed hyperlipidemia  Dependent edema 10/14/2015  
 HTN (hypertension) 10/13/2014  DM (diabetes mellitus) ([facility] [address]  Dyslipidemia  Aortic valve stenosis, mild 04/01/2014 The  provided the following Interventions: 
Initiated a relationship of care and support. Explored issues of kristine, belief, spirituality and Anglican/ritual needs while hospitalized. Listened empathically. Provided chaplaincy education. Provided information about Spiritual Care Services. Offered assurance of continued prayers on patient's behalf. Chart reviewed. The following outcomes where achieved: 
Patient shared limited information about both their medical narrative and spiritual journey/beliefs.  confirmed Patient's Oriental orthodox Affiliation. Patient processed feeling about current hospitalization. Patient expressed gratitude for 's visit. Assessment: 
Patient does not have any Anglican/cultural needs that will affect patients preferences in health care. There are no spiritual or Anglican issues which require intervention at this time. Plan: 
Chaplains will continue to follow and will provide pastoral care on an as needed/requested basis.  recommends bedside caregivers page  on duty if patient shows signs of acute spiritual or emotional distress. [address] [place] [address] Spiritual Care  
[phone]

## 2020-02-15 NOTE — PROGRESS NOTES
1910- patient assessed; see flowsheet, Patient alert&oriented x 3; verbal, soft spoken  And unclear at times, clear on RA, bowel sounds active, denies pain, skin warm and dry, pericare performed, dsg to BLE intact, patient denies pain or distress, call light inreach  
 
2000- in bed; watching tv, no distress 2200-placed on purewick-pericare performed, 200cc caroline color urine 
 
0000-in bed resting quietly  
 
0200-in bed resting quietly no distress 0400-in bed, eyes open spontaneously, denies pain or distress, voiding well,  
 
0600- no overnight events, dsgs to BLE intact, remains on contact, call light  In reach

## 2020-02-15 NOTE — PROGRESS NOTES
Hospitalist Progress Note Patient: Partha Oh Age: 80 y.o. : 1933 MR#: [id number] SSN: [id number] Date/Time: 2/15/2020 8:31 AM 
 
DOA: 2020 PCP: Yajaira Vieira NP Subjective:  
 
Patient feels better today, feels her pain is better controlled. Family at bedside. Tolerated Gabapentin today. No fever. Has been on IV zosyn/vancomycin Vascular follow, dressing remains until Wednesday Interval Hospital Course: 
80 y.o female with HTN, CAD, chronic diastolic heart failure with aortic stenosis, dyslipidemia, DM2, chronic bilateral lower leg edema with chronic venous insufficiency, who was sent to ER after she was seen in vascular office for rapid progression of lower leg infection, multiple ulceraton with necrotic eschar. She was given IV antibiotics and admitted for debridement. She underwent Debridement of skin and subcutaneous tissue of bilateral lower extremities (2020). Post procedure, she developed urinary retention required pizarro placement ROS:  no fever/chills, no headache, no dizziness, no facial pain, no sinus congestion, No swallowing pain, No chest pain, no palpitation, no shortness of breath, no abd pain, No diarrhea, no urinary complaint, ++ leg pain or swelling Assessment/Plan: 1. BLE cellulitis with Eschar of lower extremities 2. Multiple ulcerations of BLE associate with Chronic Venous insufficiency 3.  s/p Debridement of skin and subcutaneous tissue of bilateral lower extremities (2020) 4. Acute Urinary retention due to post anesthesia and acute illness 5. Mild hyponatremia 6. HTN 7. HLD 8. CAD with chronic diastolic heart failure, stable 9. DMT2. HgbA1c 6.0% 
10. GERD 11. Normocytic anemia, chronic disease Continue IV vancomycin/zosyn, follow up on blood cultures, wound culture Wound care, pain controlled. Tolerated Gabapentin Monitor Hgb/hct. BMP Vascular and ID follow Discontinue pizarro and has voiding trial today. Resume home medications as tolerated. PT/OT 
ICS Family has been updated on clinical status and plan of care. Full code Additional Notes:   
Time spent >30 minutes Case discussed with:  [x]Patient  [x]Family  [x]Nursing  [x]Case Management DVT Prophylaxis:  [x]Lovenox  []Hep SQ  []SCDs  []Coumadin   []On Heparin gtt Signed By: Leonila Zamarripa MD   
 February 15, 2020 8:31 AM  
  
 
 
 
 
Objective:  
VS:  
Visit Vitals /66 (BP 1 Location: Right arm, BP Patient Position: Supine) Pulse 80 Temp 98.7 °F (37.1 °C) Resp 18 Ht 5' 1\" (1.549 m) Wt 62.1 kg (137 lb) SpO2 96% BMI 25.89 kg/m² Tmax/24hrs: Temp (24hrs), Av.9 °F (36.6 °C), Min:97.2 °F (36.2 °C), Max:98.8 °F (37.1 °C) Intake/Output Summary (Last 24 hours) at 2/15/2020 0831 Last data filed at 2/15/2020 0400 Gross per 24 hour Intake 470 ml Output 1300 ml Net -830 ml General:  Cooperative, Not in acute distress, speaks in full sentence while in bed HEENT: PERRL, EOMI, supple neck, no JVD, dry oral mucosa Cardiovascular: S1S2 regular, no rub/gallop Pulmonary: Clear air entry bilaterally, no wheezing, no crackle GI:  Soft, non tender, non distended, +bs, no guarding Extremities:  trace pedal edema, +distal pulses appreciated Leg wrapped, toes with good perfusion Neuro: AOx3, moving all extremities, no gross deficit. Additional:  
 
 
Current Facility-Administered Medications Medication Dose Route Frequency  gabapentin (NEURONTIN) capsule 100 mg  100 mg Oral TID  lactobacillus sp. 50 billion cpu (BIO-K PLUS) capsule 1 Cap  1 Cap Oral DAILY  sodium chloride (NS) flush 5-40 mL  5-40 mL IntraVENous Q8H  
 sodium chloride (NS) flush 5-40 mL  5-40 mL IntraVENous PRN  
 morphine injection 2 mg  2 mg IntraVENous Q2H PRN  piperacillin-tazobactam (ZOSYN) 2.25 g in 0.9% sodium chloride (MBP/ADV) 50 mL MBP  2.25 g IntraVENous Q6H  
  vancomycin (VANCOCIN) 1,000 mg in 0.9% sodium chloride (MBP/ADV) 250 mL adv  1,000 mg IntraVENous Q24H  
 acetaminophen (TYLENOL) tablet 650 mg  650 mg Oral Q4H PRN  
 ondansetron (ZOFRAN) injection 4 mg  4 mg IntraVENous Q4H PRN  
 enoxaparin (LOVENOX) injection 40 mg  40 mg SubCUTAneous Q24H  
 insulin lispro (HUMALOG) injection   SubCUTAneous AC&HS  
 glucose chewable tablet 16 g  4 Tab Oral PRN  
 glucagon (GLUCAGEN) injection 1 mg  1 mg IntraMUSCular PRN  polyethylene glycol (MIRALAX) packet 17 g  17 g Oral DAILY  sertraline (ZOLOFT) tablet 25 mg  25 mg Oral DAILY  furosemide (LASIX) tablet 40 mg  40 mg Oral DAILY  pantoprazole (PROTONIX) tablet 40 mg  40 mg Oral ACB  dextrose 10% infusion 125-250 mL  125-250 mL IntraVENous PRN  
 oxyCODONE-acetaminophen (PERCOCET) 5-325 mg per tablet 1 Tab  1 Tab Oral Q4H PRN  
 busPIRone (BUSPAR) tablet 5 mg  5 mg Oral BID Lab/Data Review: 
Labs: Results:  
   
Chemistry Recent Labs  
  02/15/20 
0450 02/14/20 
0325 02/13/20 
0359 * 148* 104* * 134* 141  
K 3.8 3.6 4.1  102 106 CO2 25 26 26 BUN 18 23* 20* CREA 0.87 1.29 0.92  
BUCR 21* 18 22* AGAP 6 6 9  
CA 8.2* 8.3* 8.4* No results for input(s): TBIL, ALT, SGOT, ALKP, TP, ALB, GLOB, AGRAT in the last 72 hours. CBC w/Diff Recent Labs  
  02/15/20 
0450 02/14/20 
0325 WBC 5.5 7.0  
RBC 3.08* 3.05* HGB 8.3* 8.4* HCT 26.0* 25.6* MCV 84.4 83.9 MCH 26.9 27.5 MCHC 31.9 32.8  
RDW 14.8* 14.9*  
* 123* GRANS 80* 83* LYMPH 11* 11* EOS 2 2 Coagulation No results for input(s): PTP, INR, APTT, INREXT in the last 72 hours. Iron/Ferritin Lab Results Component Value Date/Time Iron 63 08/17/2017 11:36 AM  
 TIBC 354 08/17/2017 11:36 AM  
 Iron % saturation 18 08/17/2017 11:36 AM  
 Ferritin 32 08/17/2017 11:36 AM  
   
BNP Cardiac Enzymes Lab Results Component Value Date/Time  CK 45 03/07/2019 01:34 AM  
 CK - MB 2.2 03/07/2019 01:34 AM  
 CK-MB Index 4.9 (H) 03/07/2019 01:34 AM  
 Troponin-I, QT 0.03 03/07/2019 04:08 AM  
 
  
Lactic Acid Thyroid Studies All Micro Results Procedure Component Value Units Date/Time CULTURE, BLOOD [[id number]] Collected:  02/11/20 1708 Order Status:  Completed Specimen:  Blood Updated:  02/15/20 5109 Special Requests: --     
  RIGHT Antecubital 
  
  Culture result: NO GROWTH 4 DAYS     
 CULTURE, BLOOD [[id number]] Collected:  02/11/20 1722 Order Status:  Completed Specimen:  Blood Updated:  02/15/20 9564 Special Requests: --     
  NO SPECIAL REQUESTS 
RIGHT Antecubital 
  
  Culture result: NO GROWTH 4 DAYS     
 CULTURE, URINE [[id number]] Collected:  02/11/20 1719 Order Status:  Completed Specimen:  Urine from Clean catch Updated:  02/13/20 1006 Special Requests: NO SPECIAL REQUESTS Culture result:    
  [id number] COLONIES/mL MIXED GRAM POSITIVE ABHI, PROBABLE SKIN/GENITAL CONTAMINATION. Images: 
 
CT (Most Recent). XRAY (Most Recent) EKG Results for orders placed or performed in visit on 11/20/19 AMB POC EKG ROUTINE W/ 12 LEADS, INTER & REP     Status: None Impression See progress note. 2D ECHO

## 2020-02-16 NOTE — PROGRESS NOTES
Hospitalist Progress Note Patient: Neal School Age: 80 y.o. : 1933 MR#: [id number] SSN: [id number] Date/Time: 2020 8:25 AM 
 
DOA: 2020 PCP: Ava Tom NP Subjective:  
 
Still with leg pain, gabapentin helps little. No fever. Otherwise, no overnight event. Has been on IV zosyn/vancomycin, no diarrhea Vascular follow, dressing remains until Wednesday Pizarro d/c yesterday Interval Hospital Course: 
80 y.o female with HTN, CAD, chronic diastolic heart failure with aortic stenosis, dyslipidemia, DM2, chronic bilateral lower leg edema with chronic venous insufficiency, who was sent to ER after she was seen in vascular office for rapid progression of lower leg infection, multiple ulceraton with necrotic eschar. She was given IV antibiotics and admitted for debridement. She underwent Debridement of skin and subcutaneous tissue of bilateral lower extremities (2020). Post procedure, she developed urinary retention required pizarro placement ROS:  no fever/chills, no headache, no dizziness, no facial pain, no sinus congestion, No swallowing pain, No chest pain, no palpitation, no shortness of breath, no abd pain, No diarrhea, no urinary complaint, ++ leg pain or swelling Assessment/Plan: 1. BLE cellulitis with Eschar of lower extremities 2. Multiple ulcerations of BLE associate with Chronic Venous insufficiency Previously with MRSA infection 3.  s/p Debridement of skin and subcutaneous tissue of bilateral lower extremities (2020) 4. Acute Urinary retention due to post anesthesia and acute illness 5. Mild hyponatremia 6. HTN 7. HLD 8. CAD with chronic diastolic heart failure, stable 9. DMT2. HgbA1c 6.0% 
10. GERD 11. Normocytic anemia, chronic disease Will continue with current IV antibiotics. Negative blood cultures to date. ID follows Wound care, pain controlled. increased Gabapentin Monitor Hgb/hct. BMP Vascular and ID follow Resume home medications as tolerated. PT/OT 
ICS Full code Additional Notes:   
Time spent >30 minutes Case discussed with:  [x]Patient  []Family  [x]Nursing  [x]Case Management DVT Prophylaxis:  [x]Lovenox  []Hep SQ  []SCDs  []Coumadin   []On Heparin gtt Signed By: Wendy Hawthorne MD   
 2020 8:25 AM  
  
 
 
 
 
Objective:  
VS:  
Visit Vitals /76 (BP 1 Location: Right arm) Pulse 61 Temp 97.7 °F (36.5 °C) Resp 17 Ht 5' 1\" (1.549 m) Wt 62.1 kg (137 lb) SpO2 100% BMI 25.89 kg/m² Tmax/24hrs: Temp (24hrs), Av.1 °F (36.7 °C), Min:97.7 °F (36.5 °C), Max:98.4 °F (36.9 °C) Intake/Output Summary (Last 24 hours) at 2020 0825 Last data filed at 2020 4826 Gross per 24 hour Intake 2030 ml Output 1800 ml Net 230 ml General:  Cooperative, Not in acute distress, speaks in full sentence while in bed HEENT: PERRL, EOMI, supple neck, no JVD, dry oral mucosa Cardiovascular: S1S2 regular, no rub/gallop Pulmonary: Clear air entry bilaterally, no wheezing, no crackle GI:  Soft, non tender, non distended, +bs, no guarding Extremities:  trace pedal edema, +distal pulses appreciated Leg wrapped, toes with good perfusion Neuro: AOx3, moving all extremities, no gross deficit. Additional:  
 
 
Current Facility-Administered Medications Medication Dose Route Frequency  gabapentin (NEURONTIN) capsule 100 mg  100 mg Oral TID  lactobacillus sp. 50 billion cpu (BIO-K PLUS) capsule 1 Cap  1 Cap Oral DAILY  Vancomycin Lab Information  1 Each Other Once per day on Sun  
 sodium chloride (NS) flush 5-40 mL  5-40 mL IntraVENous Q8H  
 sodium chloride (NS) flush 5-40 mL  5-40 mL IntraVENous PRN  
 morphine injection 2 mg  2 mg IntraVENous Q2H PRN  piperacillin-tazobactam (ZOSYN) 2.25 g in 0.9% sodium chloride (MBP/ADV) 50 mL MBP  2.25 g IntraVENous Q6H  
  vancomycin (VANCOCIN) 1,000 mg in 0.9% sodium chloride (MBP/ADV) 250 mL adv  1,000 mg IntraVENous Q24H  
 acetaminophen (TYLENOL) tablet 650 mg  650 mg Oral Q4H PRN  
 ondansetron (ZOFRAN) injection 4 mg  4 mg IntraVENous Q4H PRN  
 enoxaparin (LOVENOX) injection 40 mg  40 mg SubCUTAneous Q24H  
 insulin lispro (HUMALOG) injection   SubCUTAneous AC&HS  
 glucose chewable tablet 16 g  4 Tab Oral PRN  
 glucagon (GLUCAGEN) injection 1 mg  1 mg IntraMUSCular PRN  polyethylene glycol (MIRALAX) packet 17 g  17 g Oral DAILY  sertraline (ZOLOFT) tablet 25 mg  25 mg Oral DAILY  furosemide (LASIX) tablet 40 mg  40 mg Oral DAILY  pantoprazole (PROTONIX) tablet 40 mg  40 mg Oral ACB  dextrose 10% infusion 125-250 mL  125-250 mL IntraVENous PRN  
 oxyCODONE-acetaminophen (PERCOCET) 5-325 mg per tablet 1 Tab  1 Tab Oral Q4H PRN Lab/Data Review: 
Labs: Results:  
   
Chemistry Recent Labs  
  02/16/20 
0324 02/15/20 
0450 02/14/20 
0325 * 169* 148*  134* 134* K 4.3 3.8 3.6  103 102 CO2 25 25 26 BUN 16 18 23* CREA 0.81 0.87 1.29  
BUCR 20 21* 18 AGAP 6 6 6 CA 8.4* 8.2* 8.3* No results for input(s): TBIL, ALT, SGOT, ALKP, TP, ALB, GLOB, AGRAT in the last 72 hours. CBC w/Diff Recent Labs  
  02/16/20 
0324 02/15/20 
0450 02/14/20 
0325 WBC 5.6 5.5 7.0  
RBC 3.28* 3.08* 3.05* HGB 8.9* 8.3* 8.4* HCT 28.0* 26.0* 25.6* MCV 85.4 84.4 83.9 MCH 27.1 26.9 27.5 MCHC 31.8 31.9 32.8  
RDW 14.6* 14.8* 14.9*  
* 124* 123* GRANS  --  80* 83* LYMPH  --  11* 11* EOS  --  2 2 Coagulation No results for input(s): PTP, INR, APTT, INREXT, INREXT in the last 72 hours. Iron/Ferritin Lab Results Component Value Date/Time Iron 63 08/17/2017 11:36 AM  
 TIBC 354 08/17/2017 11:36 AM  
 Iron % saturation 18 08/17/2017 11:36 AM  
 Ferritin 32 08/17/2017 11:36 AM  
   
BNP Cardiac Enzymes Lab Results Component Value Date/Time CK 45 03/07/2019 01:34 AM  
 CK - MB 2.2 03/07/2019 01:34 AM  
 CK-MB Index 4.9 (H) 03/07/2019 01:34 AM  
 Troponin-I, QT 0.03 03/07/2019 04:08 AM  
 
  
Lactic Acid Thyroid Studies All Micro Results Procedure Component Value Units Date/Time CULTURE, BLOOD [[id number]] Collected:  02/11/20 1708 Order Status:  Completed Specimen:  Blood Updated:  02/16/20 2300 Special Requests: --     
  RIGHT Antecubital 
  
  Culture result: NO GROWTH 5 DAYS     
 CULTURE, BLOOD [[id number]] Collected:  02/11/20 1722 Order Status:  Completed Specimen:  Blood Updated:  02/16/20 1910 Special Requests: --     
  NO SPECIAL REQUESTS 
RIGHT Antecubital 
  
  Culture result: NO GROWTH 5 DAYS     
 CULTURE, URINE [[id number]] Collected:  02/11/20 1719 Order Status:  Completed Specimen:  Urine from Clean catch Updated:  02/13/20 1006 Special Requests: NO SPECIAL REQUESTS Culture result:    
  [id number] COLONIES/mL MIXED GRAM POSITIVE ABHI, PROBABLE SKIN/GENITAL CONTAMINATION. Images: 
 
CT (Most Recent). XRAY (Most Recent) EKG Results for orders placed or performed in visit on 11/20/19 AMB POC EKG ROUTINE W/ 12 LEADS, INTER & REP     Status: None Impression See progress note. 2D ECHO

## 2020-02-16 NOTE — PROGRESS NOTES
Problem: Falls - Risk of 
Goal: *Absence of Falls Description Document Vipul Isaiasteresa Fall Risk and appropriate interventions in the flowsheet. Outcome: Progressing Towards Goal 
Note: Fall Risk Interventions: 
Mobility Interventions: Bed/chair exit alarm, Patient to call before getting OOB Mentation Interventions: Bed/chair exit alarm, Door open when patient unattended, Adequate sleep, hydration, pain control Medication Interventions: Patient to call before getting OOB, Bed/chair exit alarm Elimination Interventions: Call light in reach, Toileting schedule/hourly rounds, Bed/chair exit alarm, Patient to call for help with toileting needs History of Falls Interventions: Door open when patient unattended Problem: Pressure Injury - Risk of 
Goal: *Prevention of pressure injury Description Document Shon Scale and appropriate interventions in the flowsheet. Outcome: Progressing Towards Goal 
Note: Pressure Injury Interventions: 
Sensory Interventions: Assess changes in LOC Moisture Interventions: Absorbent underpads, Check for incontinence Q2 hours and as needed Activity Interventions: Pressure redistribution bed/mattress(bed type) Mobility Interventions: HOB 30 degrees or less, Pressure redistribution bed/mattress (bed type) Nutrition Interventions: Document food/fluid/supplement intake Friction and Shear Interventions: HOB 30 degrees or less Problem: Pain Goal: *Control of Pain Outcome: Progressing Towards Goal

## 2020-02-16 NOTE — PROGRESS NOTES
Problem: Falls - Risk of 
Goal: *Absence of Falls Description Document Edgar Brunner Fall Risk and appropriate interventions in the flowsheet. Outcome: Progressing Towards Goal 
Note: Fall Risk Interventions: 
Mobility Interventions: Bed/chair exit alarm, Communicate number of staff needed for ambulation/transfer, PT Consult for mobility concerns, PT Consult for assist device competence Mentation Interventions: Adequate sleep, hydration, pain control, Bed/chair exit alarm, Door open when patient unattended Medication Interventions: Bed/chair exit alarm, Evaluate medications/consider consulting pharmacy, Patient to call before getting OOB, Teach patient to arise slowly Elimination Interventions: Bed/chair exit alarm, Call light in reach, Patient to call for help with toileting needs, Stay With Me (per policy) History of Falls Interventions: Bed/chair exit alarm, Consult care management for discharge planning, Door open when patient unattended Problem: Risk for Spread of Infection Goal: Prevent transmission of infectious organism to others Description Prevent the transmission of infectious organisms to other patients, staff members, and visitors. Outcome: Progressing Towards Goal 
  
Problem: Pressure Injury - Risk of 
Goal: *Prevention of pressure injury Description Document Shon Scale and appropriate interventions in the flowsheet. Outcome: Progressing Towards Goal 
Note: Pressure Injury Interventions: 
Sensory Interventions: Assess need for specialty bed, Assess changes in LOC, Avoid rigorous massage over bony prominences, Keep linens dry and wrinkle-free, Maintain/enhance activity level, Minimize linen layers, Monitor skin under medical devices Moisture Interventions: Apply protective barrier, creams and emollients, Absorbent underpads, Assess need for specialty bed, Maintain skin hydration (lotion/cream), Minimize layers Activity Interventions: Assess need for specialty bed, Increase time out of bed Mobility Interventions: Assess need for specialty bed, Float heels, HOB 30 degrees or less, Turn and reposition approx. every two hours(pillow and wedges) Nutrition Interventions: Document food/fluid/supplement intake Friction and Shear Interventions: Apply protective barrier, creams and emollients Problem: General Medical Care Plan Goal: *Optimize nutritional status Outcome: Progressing Towards Goal 
  
Problem: Pain Goal: *Control of Pain Outcome: Progressing Towards Goal

## 2020-02-16 NOTE — PROGRESS NOTES
Infectious Disease progress Note Reason: bilateral leg infected ulcrs Current abx Prior abx Pip/tazo, vancomycin since 2/11/20 Lines:  
 
 
Assessment : 
 
80 y.o. female  With hx of HTN, CAD, CHF, type 2 DM (last hgbA1C 6.0 on 2/14/20), hyperlipidemia and venous insufficiency, presented to the ED on 2/11/20sent from the vascular surgery clinic for admission due to bilat LE cellulitis and ulcerations. Wound cultures 5/2018- acinetobacter, mrsa 12/2018- enterobacter, mrsa; 1/2019- mrsa Clinical presentation c/w bilateral infected LE ulcers s/p I&D 2/14/20. Intra op cultures pending Chronic venous stasis likely predisposing patient to non healing ulcers. RECOMMENDATIONS: 
 
1. Continue pip/tazo, vancomycin 2. F/u vascular surgery recommendations 3. Will finalize abx on Wednesday based on clinical picture 4. Monitor renal function closely since current abx combination has high risk of nephrotoxicity 5. Wound care bilateral LE ulcers per vascular surgeon. 6. Unfortunately will need to make abx decisions based on prior cultures since no intra op cultures available. Above plan was discussed in details with patient, dr. Gerardo Resendiz. Please call me if any further questions or concerns. Will continue to participate in the care of this patient. HPI: 
 
 
Patient denies headaches, visual disturbances, sore throat, runny nose, earaches, cp, sob, chills, cough, abdominal pain, diarrhea, burning micturition, or weakness in extremities. she denies back pain/flank pain. home Medication List  
 Details  
sertraline (ZOLOFT) 25 mg tablet take 1 tablet by mouth once daily 
Qty: 30 Tab, Refills: 0 Associated Diagnoses: Mild depression ([address] Details  
multivitamin (ONE A DAY) tablet Take 1 Tab by mouth daily. RABEprazole (ACIPHEX) 20 mg tablet take 1 tablet by mouth once daily 
Qty: 30 Tab, Refills: 5 JANUVIA 100 mg tablet take 1 tablet by mouth every morning Qty: 30 Tab, Refills: 5  
  
furosemide (LASIX) 40 mg tablet take 1 tablet by mouth once daily 
Qty: 90 Tab, Refills: 3  
  
ergocalciferol (VITAMIN D2) 50,000 unit capsule Take 1 Cap by mouth every seven (7) days. Qty: 4 Cap, Refills: 1 Associated Diagnoses: Vitamin D deficiency  
  
lidocaine (LIDODERM) 5 % Apply patch to the affected area for 12 hours a day and remove for 12 hours a day. Qty: 5 Each, Refills: 0  
  
glipiZIDE-metFORMIN (METAGLIP) 5-500 mg per tablet TAKE 1 TABLET BY MOUTH BEFORE BREAKFAST AND 1 TABLET BY MOUTH BEFORE DINNER Qty: 120 Tab, Refills: 10  
  
naproxen sodium (ALEVE PO) Take 220 mg by mouth two (2) times daily as needed for Pain (For pain). PRN Pain  
  
loperamide (IMODIUM) 2 mg capsule Take 1 mg by mouth Three (3) times a week. pravastatin (PRAVACHOL) 40 mg tablet Take 1 Tab by mouth nightly. Qty: 90 Tab, Refills: 3  
  
aspirin delayed-release 81 mg tablet Take 81 mg by mouth daily. Associated Diagnoses: HTN (hypertension); Aortic stenosis  
  
busPIRone (BUSPAR) 5 mg tablet 1 tablet twice per day 
Qty: 60 Tab, Refills: 2 Current Facility-Administered Medications Medication Dose Route Frequency  gabapentin (NEURONTIN) capsule 100 mg  100 mg Oral TID  lactobacillus sp. 50 billion cpu (BIO-K PLUS) capsule 1 Cap  1 Cap Oral DAILY  Vancomycin Lab Information  1 Each Other Once per day on Sun  
 sodium chloride (NS) flush 5-40 mL  5-40 mL IntraVENous Q8H  
 sodium chloride (NS) flush 5-40 mL  5-40 mL IntraVENous PRN  
 morphine injection 2 mg  2 mg IntraVENous Q2H PRN  piperacillin-tazobactam (ZOSYN) 2.25 g in 0.9% sodium chloride (MBP/ADV) 50 mL MBP  2.25 g IntraVENous Q6H  
 vancomycin (VANCOCIN) 1,000 mg in 0.9% sodium chloride (MBP/ADV) 250 mL adv  1,000 mg IntraVENous Q24H  
 acetaminophen (TYLENOL) tablet 650 mg  650 mg Oral Q4H PRN  
 ondansetron (ZOFRAN) injection 4 mg  4 mg IntraVENous Q4H PRN  
  enoxaparin (LOVENOX) injection 40 mg  40 mg SubCUTAneous Q24H  
 insulin lispro (HUMALOG) injection   SubCUTAneous AC&HS  
 glucose chewable tablet 16 g  4 Tab Oral PRN  
 glucagon (GLUCAGEN) injection 1 mg  1 mg IntraMUSCular PRN  polyethylene glycol (MIRALAX) packet 17 g  17 g Oral DAILY  sertraline (ZOLOFT) tablet 25 mg  25 mg Oral DAILY  furosemide (LASIX) tablet 40 mg  40 mg Oral DAILY  pantoprazole (PROTONIX) tablet 40 mg  40 mg Oral ACB  dextrose 10% infusion 125-250 mL  125-250 mL IntraVENous PRN  
 oxyCODONE-acetaminophen (PERCOCET) 5-325 mg per tablet 1 Tab  1 Tab Oral Q4H PRN Allergies: Patient has no known allergies. Temp (24hrs), Av.1 °F (36.7 °C), Min:97.7 °F (36.5 °C), Max:98.4 °F (36.9 °C) Visit Vitals /69 (BP 1 Location: Right arm, BP Patient Position: At rest) Pulse 76 Temp 98.3 °F (36.8 °C) Resp 16 Ht 5' 1\" (1.549 m) Wt 62.1 kg (137 lb) SpO2 98% BMI 25.89 kg/m² ROS: 12 point ROS obtained in details. Pertinent positives as mentioned in HPI,  
otherwise negative Physical Exam: 
 
General: Well developed, well nourished female laying on the bed AAOx3 in no acute distress. General:   awake alert and oriented HEENT:  Normocephalic, atraumatic, PERRL, EOMI, no scleral icterus or pallor; no conjunctival hemmohage;  nasal and oral mucous are moist and without evidence of lesions. No thrush. Neck supple, no bruits. Lymph Nodes:   no cervical, axillary or inguinal adenopathy Lungs:   non-labored, bilaterally clear to auscultation- no crackles wheezes rales or rhonchi Heart:  RRR, s1 and s2; no  rubs or gallops, no edema, + pedal pulses Abdomen:  soft, non-distended, active bowel sounds, no hepatomegaly, no splenomegaly. Non-tender Genitourinary:  deferred Extremities:   no clubbing, cyanosis; no joint effusions or swelling; Full ROM of all large joints to the upper and lower extremities; muscle mass appropriate for age Neurologic:  No gross focal sensory abnormalities; 5/5 muscle strength to upper and lower extremities. Speech appropriate. Cranial nerves intact Skin:  Surgical changes bilateral LE per report Back:  no spinal or paraspinal muscle tenderness or rigidity, no CVA tenderness Psychiatric:  No suicidal or homicidal ideations, appropriate mood and affect Labs: Results:  
Chemistry Recent Labs  
  02/16/20 
1835 02/15/20 
0450 02/14/20 
0325 * 169* 148*  134* 134* K 4.3 3.8 3.6  103 102 CO2 25 25 26 BUN 16 18 23* CREA 0.81 0.87 1.29  
CA 8.4* 8.2* 8.3* AGAP 6 6 6 BUCR 20 21* 18  
  
CBC w/Diff Recent Labs  
  02/16/20 
0324 02/15/20 
0450 02/14/20 
0325 WBC 5.6 5.5 7.0  
RBC 3.28* 3.08* 3.05* HGB 8.9* 8.3* 8.4* HCT 28.0* 26.0* 25.6*  
* 124* 123* GRANS  --  80* 83* LYMPH  --  11* 11* EOS  --  2 2 Microbiology No results for input(s): CULT in the last 72 hours. RADIOLOGY: 
 
All available imaging studies/reports in [facility] for this admission were reviewed Dr. Bobby Walker, Infectious Disease Specialist 
[phone] February 16, 2020 
5:48 PM

## 2020-02-17 NOTE — ROUTINE PROCESS
Bedside and Verbal shift change report given to Tianna Romeo (oncoming nurse) by Yasir Childress RN (offgoing nurse). Report included the following information SBAR, Kardex, Intake/Output, MAR and Recent Results.

## 2020-02-17 NOTE — HOME CARE
Rounded on this \"[facility]\" patient. I left a brochure on [facility] with the patient's daughter Carla Harp. [facility] will be available if needed. Errol Ellis LPN  
[facility] Liaison [phone]

## 2020-02-17 NOTE — PROGRESS NOTES
Pt resting comfortably. She is s/p debridements of BLE ulcers 2/14. Dressings dry, and note plan to remain in place until Wednesday She is having some heel pain which she states is new but minimal  
Discussed will reassess in AM. If pain persistent or worsening will take down dressings tomorrow and assess. ABX per ID. Continue current treatment per primary team.

## 2020-02-17 NOTE — PROGRESS NOTES
Physical Therapy Screening: 
Services are indicated and therapy order is required. An InBasket screening referral was triggered for physical therapy based on results obtained during the nursing admission assessment. The patients chart was reviewed and the patient is appropriate for a skilled therapy evaluation. Please order a consult for physical therapy if you are in agreement and would like an evaluation to be completed. Thank you.  
 
Celestino Avila, PT

## 2020-02-17 NOTE — PROGRESS NOTES
Problem: Falls - Risk of 
Goal: *Absence of Falls Description Document Kathi Salcedo Fall Risk and appropriate interventions in the flowsheet. Outcome: Progressing Towards Goal 
Note: Fall Risk Interventions: 
Mobility Interventions: Bed/chair exit alarm, Communicate number of staff needed for ambulation/transfer, Patient to call before getting OOB, PT Consult for mobility concerns Mentation Interventions: Adequate sleep, hydration, pain control, Bed/chair exit alarm, Door open when patient unattended, Reorient patient Medication Interventions: Patient to call before getting OOB Elimination Interventions: Call light in reach History of Falls Interventions: Door open when patient unattended Problem: Patient Education: Go to Patient Education Activity Goal: Patient/Family Education Outcome: Progressing Towards Goal 
  
Problem: Risk for Spread of Infection Goal: Prevent transmission of infectious organism to others Description Prevent the transmission of infectious organisms to other patients, staff members, and visitors. Outcome: Progressing Towards Goal 
  
Problem: General Medical Care Plan Goal: *Vital signs within specified parameters Outcome: Progressing Towards Goal 
  
Problem: Pain Goal: *Control of Pain Outcome: Progressing Towards Goal

## 2020-02-17 NOTE — PROGRESS NOTES
Hospitalist Progress Note Patient: Juan J Garcia Age: 80 y.o. : 1933 MR#: [id number] SSN: [id number] Date/Time: 2020 10:24 AM 
 
DOA: 2020 PCP: Jeni Fofana NP Subjective: No fever. Tolerated IV antibiotics. Has some heal pain but overall better than when she came in. Tolerated Gabapentin. Otherwise, no overnight event. no diarrhea Vascular follow, dressing remains until Wednesday Interval Hospital Course: 
80 y.o female with HTN, CAD, chronic diastolic heart failure with aortic stenosis, dyslipidemia, DM2, chronic bilateral lower leg edema with chronic venous insufficiency, who was sent to ER after she was seen in vascular office for rapid progression of lower leg infection, multiple ulceraton with necrotic eschar. She was given IV antibiotics and admitted for debridement. She underwent Debridement of skin and subcutaneous tissue of bilateral lower extremities (2020). Post procedure, she developed urinary retention required pizarro placement ROS:  no fever/chills, no headache, no dizziness, no facial pain, no sinus congestion, No swallowing pain, No chest pain, no palpitation, no shortness of breath, no abd pain, No diarrhea, no urinary complaint, ++ leg pain or swelling Assessment/Plan: 1. BLE cellulitis with Eschar of lower extremities 2. Multiple ulcerations of BLE associate with Chronic Venous insufficiency Previously with MRSA infection 3.  s/p Debridement of skin and subcutaneous tissue of bilateral lower extremities (2020) 4. Acute Urinary retention due to post anesthesia and acute illness 5. Mild hyponatremia 6. HTN 7. HLD 8. CAD with chronic diastolic heart failure, stable 9. DMT2. HgbA1c 6.0% 
10. GERD 11. Normocytic anemia, chronic disease Continue current IV antibiotics. Negative blood cultures to date. ID follows Wound care, pain controlled with Gabapentin, morphine prn 
Monitor Hgb/hct. BMP Vascular and ID follow Resume home medications as tolerated. PT/OT today ICS Full code Additional Notes:   
Time spent >30 minutes Case discussed with:  [x]Patient  [x]Family  [x]Nursing  [x]Case Management DVT Prophylaxis:  [x]Lovenox  []Hep SQ  []SCDs  []Coumadin   []On Heparin gtt Signed By: Miriam Rawls MD   
 2020 10:24 AM  
  
 
 
 
 
Objective:  
VS:  
Visit Vitals /57 (BP 1 Location: Right arm, BP Patient Position: Supine) Pulse 71 Temp 98.7 °F (37.1 °C) Resp 17 Ht 5' 1\" (1.549 m) Wt 62.1 kg (137 lb) SpO2 97% BMI 25.89 kg/m² Tmax/24hrs: Temp (24hrs), Av.7 °F (36.5 °C), Min:97.2 °F (36.2 °C), Max:98.7 °F (37.1 °C) Intake/Output Summary (Last 24 hours) at 2020 1024 Last data filed at 2020 8737 Gross per 24 hour Intake 1540 ml Output 2002 ml Net -462 ml General:  Cooperative, Not in acute distress, speaks in full sentence while in bed HEENT: PERRL, EOMI, supple neck, no JVD, dry oral mucosa Cardiovascular: S1S2 regular, no rub/gallop Pulmonary: Clear air entry bilaterally, no wheezing, no crackle GI:  Soft, non tender, non distended, +bs, no guarding Extremities:  trace pedal edema, +distal pulses appreciated Leg wrapped, toes with good perfusion Neuro: AOx3, moving all extremities, no gross deficit. Additional:  
 
 
Current Facility-Administered Medications Medication Dose Route Frequency  ferrous sulfate tablet 325 mg  1 Tab Oral DAILY WITH BREAKFAST  gabapentin (NEURONTIN) capsule 200 mg  200 mg Oral TID  vancomycin (VANCOCIN) 1,000 mg in 0.9% sodium chloride (MBP/ADV) 250 mL adv  1,000 mg IntraVENous Q18H  
 lactobacillus sp. 50 billion cpu (BIO-K PLUS) capsule 1 Cap  1 Cap Oral DAILY  sodium chloride (NS) flush 5-40 mL  5-40 mL IntraVENous Q8H  
 sodium chloride (NS) flush 5-40 mL  5-40 mL IntraVENous PRN  
 morphine injection 2 mg  2 mg IntraVENous Q2H PRN  
  piperacillin-tazobactam (ZOSYN) 2.25 g in 0.9% sodium chloride (MBP/ADV) 50 mL MBP  2.25 g IntraVENous Q6H  
 acetaminophen (TYLENOL) tablet 650 mg  650 mg Oral Q4H PRN  
 ondansetron (ZOFRAN) injection 4 mg  4 mg IntraVENous Q4H PRN  
 enoxaparin (LOVENOX) injection 40 mg  40 mg SubCUTAneous Q24H  
 insulin lispro (HUMALOG) injection   SubCUTAneous AC&HS  
 glucose chewable tablet 16 g  4 Tab Oral PRN  
 glucagon (GLUCAGEN) injection 1 mg  1 mg IntraMUSCular PRN  polyethylene glycol (MIRALAX) packet 17 g  17 g Oral DAILY  sertraline (ZOLOFT) tablet 25 mg  25 mg Oral DAILY  furosemide (LASIX) tablet 40 mg  40 mg Oral DAILY  pantoprazole (PROTONIX) tablet 40 mg  40 mg Oral ACB  dextrose 10% infusion 125-250 mL  125-250 mL IntraVENous PRN  
 oxyCODONE-acetaminophen (PERCOCET) 5-325 mg per tablet 1 Tab  1 Tab Oral Q4H PRN Lab/Data Review: 
Labs: Results:  
   
Chemistry Recent Labs  
  02/17/20 
0302 02/16/20 
0324 02/15/20 
6057 * 121* 169*  137 134* K 3.8 4.3 3.8  106 103 CO2 27 25 25 BUN 14 16 18 CREA 0.77 0.81 0.87 BUCR 18 20 21* AGAP 4 6 6 CA 8.5 8.4* 8.2* No results for input(s): TBIL, ALT, SGOT, ALKP, TP, ALB, GLOB, AGRAT in the last 72 hours. CBC w/Diff Recent Labs  
  02/17/20 
0302 02/16/20 
0324 02/15/20 
0450 WBC 5.7 5.6 5.5  
RBC 3.12* 3.28* 3.08* HGB 8.5* 8.9* 8.3* HCT 26.6* 28.0* 26.0*  
MCV 85.3 85.4 84.4 MCH 27.2 27.1 26.9 MCHC 32.0 31.8 31.9  
RDW 14.9* 14.6* 14.8*  
* 131* 124* GRANS  --   --  80* LYMPH  --   --  11* EOS  --   --  2 Coagulation No results for input(s): PTP, INR, APTT, INREXT, INREXT in the last 72 hours. Iron/Ferritin Lab Results Component Value Date/Time Iron 63 08/17/2017 11:36 AM  
 TIBC 354 08/17/2017 11:36 AM  
 Iron % saturation 18 08/17/2017 11:36 AM  
 Ferritin 32 08/17/2017 11:36 AM  
   
BNP Cardiac Enzymes Lab Results Component Value Date/Time CK 45 03/07/2019 01:34 AM  
 CK - MB 2.2 03/07/2019 01:34 AM  
 CK-MB Index 4.9 (H) 03/07/2019 01:34 AM  
 Troponin-I, QT 0.03 03/07/2019 04:08 AM  
 
  
Lactic Acid Thyroid Studies All Micro Results Procedure Component Value Units Date/Time CULTURE, BLOOD [[id number]] Collected:  02/11/20 1722 Order Status:  Completed Specimen:  Blood Updated:  02/17/20 7733 Special Requests: --     
  NO SPECIAL REQUESTS 
RIGHT Antecubital 
  
  Culture result: NO GROWTH 6 DAYS     
 CULTURE, BLOOD [[id number]] Collected:  02/11/20 1708 Order Status:  Completed Specimen:  Blood Updated:  02/17/20 9878 Special Requests: --     
  RIGHT Antecubital 
  
  Culture result: NO GROWTH 6 DAYS     
 CULTURE, URINE [[id number]] Collected:  02/11/20 1719 Order Status:  Completed Specimen:  Urine from Clean catch Updated:  02/13/20 1006 Special Requests: NO SPECIAL REQUESTS Culture result:    
  51702 COLONIES/mL MIXED GRAM POSITIVE ABHI, PROBABLE SKIN/GENITAL CONTAMINATION. Images: 
 
CT (Most Recent). XRAY (Most Recent) EKG Results for orders placed or performed in visit on 11/20/19 AMB POC EKG ROUTINE W/ 12 LEADS, INTER & REP     Status: None Impression See progress note. 2D ECHO

## 2020-02-18 NOTE — PROGRESS NOTES
Problem: Mobility Impaired (Adult and Pediatric) Goal: *Acute Goals and Plan of Care (Insert Text) Description Physical Therapy Goals Initiated 2/18/2020 and to be accomplished within 7 day(s) 1. Patient will move from supine to sit and sit to supine , scoot up and down and roll side to side in bed with supervision/set-up. 2.  Patient will transfer from bed to chair and chair to bed with supervision/set-up using the least restrictive device. 3.  Patient will perform sit to stand with supervision/set-up. 4.  Patient will ambulate with supervision/set-up for >150 feet with the least restrictive device. 5.  Patient will ascend/descend 4 stairs with 1 handrail(s) with contact guard assist. 
 
PLOF: Patient lives with stepson in 1 story home with several DOM with HR. Patient walks with a walker but notes there are times when she doesn't use a walker in the house. Patient reports her step son is mostly home with her except when he runs errands. Outcome: Progressing Towards Goal 
 PHYSICAL THERAPY EVALUATION Patient: Kristen Nino (89 y.o. female) Date: 2/18/2020 Primary Diagnosis: Bilateral lower leg cellulitis [L03.116, W43.016] Eschar of lower leg [R23.4] Procedure(s) (LRB): 
BILATERAL LEG DEBRIDEMENT (Bilateral) 4 Days Post-Op Precautions: Fall ASSESSMENT : 
Patient is 81yo F admitted to hospital for BLE edema with ulcers. Patient has PMH of chronic speech issues since spine surgery, hepatic malignancy with abdominal distension, and is [facility]. Patient presents today alert and agreeable to therapy and was supine in bed upon arrival and was incontinent of bowel. Patient stood for shanta-care with OT assist and reports needing to continue to use BSC. Patient required Mary to transfer to sitting on commode. Patient transferred to standing for final shanta-care and used RW to ambulate to locked recliner.  Patient tolerated activity well and was left resting with call bell by her side. Patient will benefit from skilled intervention to address the above impairments. Patient's rehabilitation potential is considered to be Good Factors which may influence rehabilitation potential include:  
[]         None noted 
[]         Mental ability/status [x]         Medical condition 
[x]         Home/family situation and support systems 
[x]         Safety awareness [x]         Pain tolerance/management 
[]         Other: PLAN : 
Recommendations and Planned Interventions:  
[x]           Bed Mobility Training             [x]    Neuromuscular Re-Education 
[x]           Transfer Training                   []    Orthotic/Prosthetic Training 
[x]           Gait Training                          []    Modalities [x]           Therapeutic Exercises           []    Edema Management/Control 
[x]           Therapeutic Activities            [x]    Family Training/Education 
[x]           Patient Education 
[]           Other (comment): Frequency/Duration: Patient will be followed by physical therapy 1-2 times per day/4-7 days per week to address goals. Discharge Recommendations: Home Health with 24/7 supervision Further Equipment Recommendations for Discharge: rolling walker SUBJECTIVE:  
Patient stated This feels so good.  OBJECTIVE DATA SUMMARY:  
 
Past Medical History:  
Diagnosis Date Aortic valve stenosis, mild April 2014 EF 87-12%, Grade 1 diastolic dysfunction, mild aortic stenosis,  mean gradient 10 mm Hg CAD (coronary artery disease) aortic stenosis Cardiac echocardiogram 06/2018 EF 55-60%. No RWMA. Mild-mod LVH. Severe LAE. Mild AS. Cardiac nuclear imaging test 08/27/2008 No evidence of ischemia or prior scarring. EF 62%. No WMA. No significant change from study of 3/23/06. Cardiovascular RLE venous duplex 12/01/2016 Right leg:  No DVT. Carotid duplex 04/24/2013 Mild 5-97% LICA stenosis. DM (diabetes mellitus) ([facility] [address] Dyslipidemia Heart failure ([facility] [address] HTN (hypertension) Mixed hyperlipidemia Sciatica Traumatic closed displaced fracture of acromial end of clavicle, left, initial encounter Past Surgical History:  
Procedure Laterality Date HX APPENDECTOMY 25 [facility] HX OTHER SURGICAL Right 2010  
 ankle surgery HX TUMOR REMOVAL Left   
 ovarian HX UROLOGICAL    
 kidney stone removal  
 
Barriers to Learning/Limitations: None Compensate with: N/A Home Situation: 
Home Situation Home Environment: Private residence One/Two Story Residence: One story Living Alone: No 
Support Systems: Child(vangie) Patient Expects to be Discharged to[de-identified] Private residence Current DME Used/Available at Home: Other (comment) Critical Behavior: 
Neurologic State: Alert Strength:   
Strength: Generally decreased, functional(BLE) Tone & Sensation:  
Tone: Normal(BLE) Sensation: Intact(BLE) Range Of Motion: 
AROM: Within functional limits(BLE) Functional Mobility: 
Bed Mobility: 
 Supine to Sit: Stand-by assistance Scooting: Supervision Transfers: 
Sit to Stand: Supervision Stand to Sit: Supervision Bed to Chair: Minimum assistance Balance:  
Sitting: Intact Standing: Impaired; With support Standing - Static: Fair Standing - Dynamic : Fair Ambulation/Gait Training: 
Distance (ft): 35 Feet (ft) Assistive Device: Walker, rolling Ambulation - Level of Assistance: Contact guard assistance;Minimal assistance Gait Abnormalities: Decreased step clearance Base of Support: Narrowed Speed/Erum: Slow Step Length: Left shortened;Right shortened Interventions: Verbal cues; Visual/Demos Pain: 
Pain level pre-treatment: 0/10 Pain level post-treatment: 0/10 Activity Tolerance:  
Patient tolerated activity well and was left resting with call bell by her side. Please refer to the flowsheet for vital signs taken during this treatment. After treatment:  
[]         Patient left in no apparent distress sitting up in chair 
[x]         Patient left in no apparent distress in bed 
[x]         Call bell left within reach [x]         Nursing notified 
[x]         Caregiver present 
[]         Bed alarm activated 
[]         SCDs applied COMMUNICATION/EDUCATION:  
[x]         Role of Physical Therapy in the acute care setting. [x]         Fall prevention education was provided and the patient/caregiver indicated understanding. [x]         Patient/family have participated as able in goal setting and plan of care. [x]         Patient/family agree to work toward stated goals and plan of care. []         Patient understands intent and goals of therapy, but is neutral about his/her participation. []         Patient is unable to participate in goal setting/plan of care: ongoing with therapy staff. 
[]         Other: Thank you for this referral. 
Tricia Jaramillo, PT Time Calculation: 29 mins Eval Complexity: History: LOW Complexity : Zero comorbidities / personal factors that will impact the outcome / POCExam:LOW Complexity : 1-2 Standardized tests and measures addressing body structure, function, activity limitation and / or participation in recreation  Presentation: LOW Complexity : Stable, uncomplicated  Clinical Decision Making:Low Complexity    Overall Complexity:LOW

## 2020-02-18 NOTE — PROGRESS NOTES
Chart reviewed: 
PT/OT recommending home health. Pt's daughter Ochoa Solorzano had signed Freedom of Choice form for [facility]. Case Management will continue to monitor for home health orders. ASHLEY JainN RN Care Management Pager: [phone]

## 2020-02-18 NOTE — PROGRESS NOTES
Problem: Self Care Deficits Care Plan (Adult) Goal: *Acute Goals and Plan of Care (Insert Text) Description Occupational Therapy Goals Initiated 2/18/2020 within 7 day(s). 1.  Patient will perform upper body dressing with supervision/set-up. 2.  Patient will perform lower body dressing with supervision/set-up. 3.  Patient will perform toileting with supervision/set-up. 4.  Patient will perform toilet transfers with supervision/set-up. 5.  Patient will participate in upper extremity therapeutic exercise/activities with supervision/set-up for 8 minutes. Prior Level of Function: Pt reports she was (I) with basic self-care/ADLs and functional mobility PTA. Outcome: Progressing Towards Goal 
 OCCUPATIONAL THERAPY EVALUATION Patient: Kristen Nino (29 y.o. female) Date: 2/18/2020 Primary Diagnosis: Bilateral lower leg cellulitis [L03.116, Z13.002] Eschar of lower leg [R23.4] Procedure(s) (LRB): 
BILATERAL LEG DEBRIDEMENT (Bilateral) 4 Days Post-Op Precautions: Falls ASSESSMENT : 
 Upon entering room, pt received sitting EOB, alert, and agreeable to OT eval. Pt seen in conjunction with PT to maximize safety of patient and staff members. Based on the objective data described below, the patient presents with decreased strength, decreased activity tolerance, decreased standing balance, decreased ability to safely perform functional transfers and mobility, and decreased independence in ADLs increasing the need for assistance from others. Pt performed sit-stand from bed with CGA; noted pt actively having BM in standing, therefore pt was transferred to [facility]. Pt is able to perform shanta-care in sitting with supervision/setup. Pt donned new gown and socks with min assist. Pt required min assist for sit-stand from [facility] with cues to push up from arm rests of [facility].  Pt is able to perform toilet hygiene in standing, but therapist provided max assist for thoroughness. Pt then transferred to locked recliner. Educated pt on the role of Ot, evaluation process, goals for therapy, use of a call bell, and to call for assistance of nursing staff to the bathroom with pt demo good understanding. The patient requires skilled OT services to assess safety and increase independence with basic self-care/ADLs, enhancing the patients quality of life by improving their ability to return to PLOF. At the end of the session, pt left resting comfortably in recliner, call bell in reach, with all needs met. Patient will benefit from skilled intervention to address the above impairments. Patient's rehabilitation potential is considered to be Good Factors which may influence rehabilitation potential include:  
[]             None noted []             Mental ability/status [x]             Medical condition []             Home/family situation and support systems []             Safety awareness []             Pain tolerance/management 
[]             Other: PLAN : 
Recommendations and Planned Interventions:  
[x]               Self Care Training                  [x]      Therapeutic Activities [x]               Functional Mobility Training   []      Cognitive Retraining 
[x]               Therapeutic Exercises           [x]      Endurance Activities [x]               Balance Training                    []      Neuromuscular Re-Education []               Visual/Perceptual Training     [x]      Home Safety Training 
[x]               Patient Education                   [x]      Family Training/Education []               Other (comment): Frequency/Duration: Patient will be followed by occupational therapy 1-2 times per day/4-7 days per week to address goals. Discharge Recommendations: Home Health with 24/7 Supervision Further Equipment Recommendations for Discharge: Rolling walker SUBJECTIVE:  
Patient stated you girls are so pretty OBJECTIVE DATA SUMMARY:  
 
 Past Medical History:  
Diagnosis Date Aortic valve stenosis, mild April 2014 EF 66-76%, Grade 1 diastolic dysfunction, mild aortic stenosis,  mean gradient 10 mm Hg CAD (coronary artery disease) aortic stenosis Cardiac echocardiogram 06/2018 EF 55-60%. No RWMA. Mild-mod LVH. Severe LAE. Mild AS. Cardiac nuclear imaging test 08/27/2008 No evidence of ischemia or prior scarring. EF 62%. No WMA. No significant change from study of 3/23/06. Cardiovascular RLE venous duplex 12/01/2016 Right leg:  No DVT. Carotid duplex 04/24/2013 Mild 3-86% LICA stenosis. DM (diabetes mellitus) ([facility] [address] Dyslipidemia Heart failure ([facility] [address] HTN (hypertension) Mixed hyperlipidemia Sciatica Traumatic closed displaced fracture of acromial end of clavicle, left, initial encounter Past Surgical History:  
Procedure Laterality Date HX APPENDECTOMY 25 [facility] HX OTHER SURGICAL Right 2010  
 ankle surgery HX TUMOR REMOVAL Left   
 ovarian HX UROLOGICAL    
 kidney stone removal  
 
Barriers to Learning/Limitations: None Compensate with: visual, verbal, tactile, kinesthetic cues/model Home Situation:  
Home Situation Home Environment: Private residence One/Two Story Residence: One story Living Alone: No 
Support Systems: Child(vangie) Patient Expects to be Discharged to[de-identified] Private residence Current DME Used/Available at Home: Other (comment) []  Right hand dominant   []  Left hand dominant Cognitive/Behavioral Status: 
Neurologic State: Alert Orientation Level: Oriented X4 Cognition: Appropriate decision making; Follows commands Safety/Judgement: Fall prevention Skin: Visible skin appeared intact. Edema: None noted Coordination: BUE Coordination: Within functional limits Fine Motor Skills-Upper: Left Intact; Right Intact Gross Motor Skills-Upper: Left Intact; Right Intact Balance: 
Sitting: Intact Standing: Impaired; With support Standing - Static: Fair Standing - Dynamic : Fair Strength: BUE Strength: Generally decreased, functional 
 
Tone & Sensation: BUE Tone: Normal 
Sensation: Intact Range of Motion: BUE 
AROM: Within functional limits Functional Mobility and Transfers for ADLs: 
Bed Mobility: 
Supine to Sit: Stand-by assistance Scooting: Supervision Transfers: 
Sit to Stand: Minimum assistance Stand to Sit: Supervision Bed to Chair: Minimum assistance Toilet Transfer : [place] [facility] ADL Assessment:  
Feeding: Independent Oral Facial Hygiene/Grooming: Independent Bathing: Minimum assistance Upper Body Dressing: Stand-by assistance Lower Body Dressing: Minimum assistance Toileting: Minimum assistance ADL Intervention: 
Grooming: Setup using sani-wipes Upper Body Dressing Assistance Dressing Assistance: Minimum assistance Hospital Gown: Minimum  assistance Toileting Bladder Hygiene: Supervision Bowel Hygiene: Moderate assistance Cognitive Retraining Safety/Judgement: Fall prevention Pain: 
Pain level pre-treatment: 0/10 Pain level post-treatment: 0/10 Pain Intervention(s): Medication (see MAR); Rest, Ice, Repositioning Response to intervention: Nurse notified, See doc flow Activity Tolerance:  
Good Please refer to the flowsheet for vital signs taken during this treatment. After treatment:  
[x] Patient left in no apparent distress sitting up in chair 
[] Patient left in no apparent distress in bed 
[x] Call bell left within reach [x] Nursing notified 
[] Caregiver present 
[] Bed alarm activated COMMUNICATION/EDUCATION:  
[x] Role of Occupational Therapy in the acute care setting 
[x] Home safety education was provided and the patient/caregiver indicated understanding. [x] Patient/family have participated as able in goal setting and plan of care. [x] Patient/family agree to work toward stated goals and plan of care. [] Patient understands intent and goals of therapy, but is neutral about his/her participation. [] Patient is unable to participate in goal setting and plan of care. Thank you for this referral. 
Chema Treviño MS, OTR/L Time Calculation: 26 mins Eval Complexity: History: MEDIUM Complexity : Expanded review of history including physical, cognitive and psychosocial  history ; Examination: LOW Complexity : 1-3 performance deficits relating to physical, cognitive , or psychosocial skils that result in activity limitations and / or participation restrictions ; Decision Making:LOW Complexity : No comorbidities that affect functional and no verbal or physical assistance needed to complete eval tasks

## 2020-02-18 NOTE — PROGRESS NOTES
Infectious Disease progress Note Reason: bilateral leg infected ulcrs Current abx Prior abx Pip/tazo, vancomycin since 2/11/20 Lines:  
 
 
Assessment : 
 
80 y.o. female  With hx of HTN, CAD, CHF, type 2 DM (last hgbA1C 6.0 on 2/14/20), hyperlipidemia and venous insufficiency, presented to the ED on 2/11/20sent from the vascular surgery clinic for admission due to bilat LE cellulitis and ulcerations. Wound cultures 5/2018- acinetobacter, mrsa 12/2018- enterobacter, mrsa; 1/2019- mrsa Clinical presentation c/w bilateral infected LE ulcers s/p I&D 2/14/20. No Intra op cultures available Chronic venous stasis likely predisposing patient to non healing ulcers. RECOMMENDATIONS: 
 
1. Continue pip/tazo, vancomycin 2. F/u vascular surgery recommendations 3. Monitor renal function closely since current abx combination has high risk of nephrotoxicity 4. Wound care bilateral LE ulcers per vascular surgeon. 5. Unfortunately will need to make abx decisions based on prior cultures since no intra op cultures available. Will switch to po abx in am if clinical improvement noted in bilateral LE ulcers - dressing not to be opened till Wednesday per vascular sx. Above plan was discussed in details with patient, dr. Izaiah Dueñas. Please call me if any further questions or concerns. Will continue to participate in the care of this patient. HPI: 
 
 
Patient denies headaches, visual disturbances, sore throat, runny nose, earaches, cp, sob, chills, cough, abdominal pain, diarrhea, burning micturition, or weakness in extremities. she denies back pain/flank pain. home Medication List  
 Details  
sertraline (ZOLOFT) 25 mg tablet take 1 tablet by mouth once daily 
Qty: 30 Tab, Refills: 0 Associated Diagnoses: Mild depression ([address] Details  
multivitamin (ONE A DAY) tablet Take 1 Tab by mouth daily. RABEprazole (ACIPHEX) 20 mg tablet take 1 tablet by mouth once daily Qty: 30 Tab, Refills: 5 JANUVIA 100 mg tablet take 1 tablet by mouth every morning 
Qty: 30 Tab, Refills: 5  
  
furosemide (LASIX) 40 mg tablet take 1 tablet by mouth once daily 
Qty: 90 Tab, Refills: 3  
  
ergocalciferol (VITAMIN D2) 50,000 unit capsule Take 1 Cap by mouth every seven (7) days. Qty: 4 Cap, Refills: 1 Associated Diagnoses: Vitamin D deficiency  
  
lidocaine (LIDODERM) 5 % Apply patch to the affected area for 12 hours a day and remove for 12 hours a day. Qty: 5 Each, Refills: 0  
  
glipiZIDE-metFORMIN (METAGLIP) 5-500 mg per tablet TAKE 1 TABLET BY MOUTH BEFORE BREAKFAST AND 1 TABLET BY MOUTH BEFORE DINNER Qty: 120 Tab, Refills: 10  
  
naproxen sodium (ALEVE PO) Take 220 mg by mouth two (2) times daily as needed for Pain (For pain). PRN Pain  
  
loperamide (IMODIUM) 2 mg capsule Take 1 mg by mouth Three (3) times a week. pravastatin (PRAVACHOL) 40 mg tablet Take 1 Tab by mouth nightly. Qty: 90 Tab, Refills: 3  
  
aspirin delayed-release 81 mg tablet Take 81 mg by mouth daily. Associated Diagnoses: HTN (hypertension); Aortic stenosis  
  
busPIRone (BUSPAR) 5 mg tablet 1 tablet twice per day 
Qty: 60 Tab, Refills: 2 Current Facility-Administered Medications Medication Dose Route Frequency  magnesium oxide (MAG-OX) tablet 400 mg  400 mg Oral DAILY  potassium chloride (K-DUR, KLOR-CON) SR tablet 20 mEq  20 mEq Oral BID  ferrous sulfate tablet 325 mg  1 Tab Oral DAILY WITH BREAKFAST  gabapentin (NEURONTIN) capsule 200 mg  200 mg Oral TID  vancomycin (VANCOCIN) 1,000 mg in 0.9% sodium chloride (MBP/ADV) 250 mL adv  1,000 mg IntraVENous Q18H  
 lactobacillus sp. 50 billion cpu (BIO-K PLUS) capsule 1 Cap  1 Cap Oral DAILY  sodium chloride (NS) flush 5-40 mL  5-40 mL IntraVENous Q8H  
 sodium chloride (NS) flush 5-40 mL  5-40 mL IntraVENous PRN  
 morphine injection 2 mg  2 mg IntraVENous Q2H PRN  
  piperacillin-tazobactam (ZOSYN) 2.25 g in 0.9% sodium chloride (MBP/ADV) 50 mL MBP  2.25 g IntraVENous Q6H  
 acetaminophen (TYLENOL) tablet 650 mg  650 mg Oral Q4H PRN  
 ondansetron (ZOFRAN) injection 4 mg  4 mg IntraVENous Q4H PRN  
 enoxaparin (LOVENOX) injection 40 mg  40 mg SubCUTAneous Q24H  
 insulin lispro (HUMALOG) injection   SubCUTAneous AC&HS  
 glucose chewable tablet 16 g  4 Tab Oral PRN  
 glucagon (GLUCAGEN) injection 1 mg  1 mg IntraMUSCular PRN  polyethylene glycol (MIRALAX) packet 17 g  17 g Oral DAILY  sertraline (ZOLOFT) tablet 25 mg  25 mg Oral DAILY  furosemide (LASIX) tablet 40 mg  40 mg Oral DAILY  pantoprazole (PROTONIX) tablet 40 mg  40 mg Oral ACB  dextrose 10% infusion 125-250 mL  125-250 mL IntraVENous PRN  
 oxyCODONE-acetaminophen (PERCOCET) 5-325 mg per tablet 1 Tab  1 Tab Oral Q4H PRN Allergies: Patient has no known allergies. Temp (24hrs), Av °F (37.2 °C), Min:98.5 °F (36.9 °C), Max:99.8 °F (37.7 °C) Visit Vitals /66 (BP 1 Location: Left arm, BP Patient Position: Sitting) Pulse (!) 102 Temp 98.7 °F (37.1 °C) Resp 16 Ht 5' 1\" (1.549 m) Wt 62.1 kg (137 lb) SpO2 99% BMI 25.89 kg/m² ROS: 12 point ROS obtained in details. Pertinent positives as mentioned in HPI,  
otherwise negative Physical Exam: 
 
General: Well developed, well nourished female laying on the bed AAOx3 in no acute distress. General:   awake alert and oriented HEENT:  Normocephalic, atraumatic, PERRL, EOMI, no scleral icterus or pallor; no conjunctival hemmohage;  nasal and oral mucous are moist and without evidence of lesions. No thrush. Neck supple, no bruits. Lymph Nodes:   no cervical, axillary or inguinal adenopathy Lungs:   non-labored, bilaterally clear to auscultation- no crackles wheezes rales or rhonchi Heart:  RRR, s1 and s2; no  rubs or gallops, no edema, + pedal pulses Abdomen:  soft, non-distended, active bowel sounds, no hepatomegaly, no splenomegaly. Non-tender Genitourinary:  deferred Extremities:   no clubbing, cyanosis; no joint effusions or swelling; Full ROM of all large joints to the upper and lower extremities; muscle mass appropriate for age Neurologic:  No gross focal sensory abnormalities; 5/5 muscle strength to upper and lower extremities. Speech appropriate. Cranial nerves intact Skin:  Surgical changes bilateral LE per report Back:  no spinal or paraspinal muscle tenderness or rigidity, no CVA tenderness Psychiatric:  No suicidal or homicidal ideations, appropriate mood and affect Labs: Results:  
Chemistry Recent Labs  
  02/18/20 
0326 02/17/20 
0302 02/16/20 
3589 * 168* 121*  138 137  
K 3.4* 3.8 4.3  107 106 CO2 26 27 25 BUN 13 14 16 CREA 0.67 0.77 0.81 CA 8.3* 8.5 8.4* AGAP 6 4 6 BUCR 19 18 20 CBC w/Diff Recent Labs  
  02/18/20 
0326 02/17/20 
0302 02/16/20 
0324 WBC 6.2 5.7 5.6  
RBC 3.07* 3.12* 3.28* HGB 8.3* 8.5* 8.9* HCT 26.1* 26.6* 28.0*  
* 129* 131* Microbiology No results for input(s): CULT in the last 72 hours. RADIOLOGY: 
 
All available imaging studies/reports in [facility] for this admission were reviewed Dr. Winnie Perea, Infectious Disease Specialist 
[phone] February 18, 2020 
5:48 PM

## 2020-02-18 NOTE — ROUTINE PROCESS
Bedside and Verbal shift change report given to Ty Merrill RN (oncoming nurse) by [facility], RN (offgoing nurse). Report included the following information SBAR, Kardex and Recent Results.

## 2020-02-18 NOTE — PROGRESS NOTES
Hospitalist Progress Note Patient: Hilda Phelps Age: 80 y.o. : 1933 MR#: [id number] SSN: [id number] Date/Time: 2020 10:24 AM 
 
DOA: 2020 PCP: Simran Pires NP Subjective:  
Pt has no complaints. She states that she has been able to walk within her room and has tolerated it well. Assessment/Plan:  
 
80 y.o female with multiple medical problems including chronic bilateral lower leg edema with chronic venous insufficiency, who was sent to ER after she was seen in vascular office for rapid progression of lower leg infection, multiple ulceraton with necrotic eschar. She underwent Debridement of skin and subcutaneous tissue of bilateral lower extremities (2020). Post procedure, she developed urinary retention required pizarro placement -  BLE cellulitis with Eschar of lower extremities, ID following on abx -  Multiple ulcerations of BLE associate with Chronic Venous insufficiency. S/p Debridement of skin and subcutaneous tissue of bilateral lower extremities (2020)Previously with MRSA infection. Vascular surgery following, to have dressing changes tomorrow. - Acute Urinary retention due to post anesthesia and acute illness - Mild hyponatremia-resolved HISTORY OF: 
-HTN 
-HLD 
-CAD with chronic diastolic heart failure, stable 
-DMT2. HgbA1c 6.0% 
-GERD 
- Normocytic anemia, chronic disease PLAN: 
-Antibiotics per ID.  
-Wound care, pain controlled with Gabapentin, morphine prn 
-Cont home medications as tolerated. Aminta Rome PT/OT Dispo: Home health w supervision, discussed w/ daughter, possible d/c home tomorrow. Full code Additional Notes:   
Time spent >30 minutes Case discussed with:  [x]Patient  [x]Family  [x]Nursing  [x]Case Management DVT Prophylaxis:  [x]Lovenox  []Hep SQ  []SCDs  []Coumadin   []On Heparin gtt Signed By: Lisandra Emanuel MD   
 2020 10:24 AM  
  
 
 
 
 
Objective:  
VS:  
Visit Vitals /66 (BP 1 Location: Left arm, BP Patient Position: Sitting) Pulse (!) 102 Temp 98.7 °F (37.1 °C) Resp 16 Ht 5' 1\" (1.549 m) Wt 62.1 kg (137 lb) SpO2 99% BMI 25.89 kg/m² Tmax/24hrs: Temp (24hrs), Av °F (37.2 °C), Min:98.5 °F (36.9 °C), Max:99.8 °F (37.7 °C) Intake/Output Summary (Last 24 hours) at 2020 1349 Last data filed at 2020 8399 Gross per 24 hour Intake  Output 550 ml Net -550 ml General:  Cooperative, Not in acute distress HEENT: PERRL, EOMI, supple neck, no JVD, dry oral mucosa Cardiovascular: S1S2 regular, no rub/gallop Pulmonary: Clear air entry bilaterally, no wheezing, no crackle GI:  Soft, non tender, non distended, +bs, no guarding Extremities: w/ dressing c/d/i Leg wrapped, toes with good perfusion Neuro: AOx3, moving all extremities, no gross deficit. Additional:  
 
 
Current Facility-Administered Medications Medication Dose Route Frequency  magnesium oxide (MAG-OX) tablet 400 mg  400 mg Oral DAILY  potassium chloride (K-DUR, KLOR-CON) SR tablet 20 mEq  20 mEq Oral BID  ferrous sulfate tablet 325 mg  1 Tab Oral DAILY WITH BREAKFAST  gabapentin (NEURONTIN) capsule 200 mg  200 mg Oral TID  vancomycin (VANCOCIN) 1,000 mg in 0.9% sodium chloride (MBP/ADV) 250 mL adv  1,000 mg IntraVENous Q18H  
 lactobacillus sp. 50 billion cpu (BIO-K PLUS) capsule 1 Cap  1 Cap Oral DAILY  sodium chloride (NS) flush 5-40 mL  5-40 mL IntraVENous Q8H  
 sodium chloride (NS) flush 5-40 mL  5-40 mL IntraVENous PRN  
 morphine injection 2 mg  2 mg IntraVENous Q2H PRN  piperacillin-tazobactam (ZOSYN) 2.25 g in 0.9% sodium chloride (MBP/ADV) 50 mL MBP  2.25 g IntraVENous Q6H  
 acetaminophen (TYLENOL) tablet 650 mg  650 mg Oral Q4H PRN  
 ondansetron (ZOFRAN) injection 4 mg  4 mg IntraVENous Q4H PRN  
 enoxaparin (LOVENOX) injection 40 mg  40 mg SubCUTAneous Q24H  
 insulin lispro (HUMALOG) injection   SubCUTAneous AC&HS  
  glucose chewable tablet 16 g  4 Tab Oral PRN  
 glucagon (GLUCAGEN) injection 1 mg  1 mg IntraMUSCular PRN  polyethylene glycol (MIRALAX) packet 17 g  17 g Oral DAILY  sertraline (ZOLOFT) tablet 25 mg  25 mg Oral DAILY  furosemide (LASIX) tablet 40 mg  40 mg Oral DAILY  pantoprazole (PROTONIX) tablet 40 mg  40 mg Oral ACB  dextrose 10% infusion 125-250 mL  125-250 mL IntraVENous PRN  
 oxyCODONE-acetaminophen (PERCOCET) 5-325 mg per tablet 1 Tab  1 Tab Oral Q4H PRN Lab/Data Review: 
Labs: Results:  
   
Chemistry Recent Labs  
  02/18/20 
0326 02/17/20 
0302 02/16/20 
7410 * 168* 121*  138 137  
K 3.4* 3.8 4.3  107 106 CO2 26 27 25 BUN 13 14 16 CREA 0.67 0.77 0.81 BUCR 19 18 20 AGAP 6 4 6  
CA 8.3* 8.5 8.4* No results for input(s): TBIL, ALT, SGOT, ALKP, TP, ALB, GLOB, AGRAT in the last 72 hours. CBC w/Diff Recent Labs  
  02/18/20 
0326 02/17/20 
0302 02/16/20 
0324 WBC 6.2 5.7 5.6  
RBC 3.07* 3.12* 3.28* HGB 8.3* 8.5* 8.9* HCT 26.1* 26.6* 28.0*  
MCV 85.0 85.3 85.4 MCH 27.0 27.2 27.1 MCHC 31.8 32.0 31.8  
RDW 15.0* 14.9* 14.6*  
* 129* 131* Coagulation No results for input(s): PTP, INR, APTT, INREXT, INREXT in the last 72 hours. Iron/Ferritin Lab Results Component Value Date/Time Iron 63 08/17/2017 11:36 AM  
 TIBC 354 08/17/2017 11:36 AM  
 Iron % saturation 18 08/17/2017 11:36 AM  
 Ferritin 32 08/17/2017 11:36 AM  
   
BNP Cardiac Enzymes Lab Results Component Value Date/Time CK 45 03/07/2019 01:34 AM  
 CK - MB 2.2 03/07/2019 01:34 AM  
 CK-MB Index 4.9 (H) 03/07/2019 01:34 AM  
 Troponin-I, QT 0.03 03/07/2019 04:08 AM  
 
  
Lactic Acid Thyroid Studies All Micro Results Procedure Component Value Units Date/Time CULTURE, BLOOD [[id number]] Collected:  02/11/20 1722 Order Status:  Completed Specimen:  Blood Updated:  02/17/20 2935 Special Requests: --     
  NO SPECIAL REQUESTS RIGHT Antecubital 
  
  Culture result: NO GROWTH 6 DAYS     
 CULTURE, BLOOD [[id number]] Collected:  02/11/20 1708 Order Status:  Completed Specimen:  Blood Updated:  02/17/20 1858 Special Requests: --     
  RIGHT Antecubital 
  
  Culture result: NO GROWTH 6 DAYS     
 CULTURE, URINE [[id number]] Collected:  02/11/20 1719 Order Status:  Completed Specimen:  Urine from Clean catch Updated:  02/13/20 1006 Special Requests: NO SPECIAL REQUESTS Culture result:    
  [id number] COLONIES/mL MIXED GRAM POSITIVE ABHI, PROBABLE SKIN/GENITAL CONTAMINATION. Images: 
 
CT (Most Recent). XRAY (Most Recent) EKG Results for orders placed or performed in visit on 11/20/19 AMB POC EKG ROUTINE W/ 12 LEADS, INTER & REP     Status: None Impression See progress note. 2D ECHO

## 2020-02-18 NOTE — PROGRESS NOTES
NUTRITION Nutrition Screen RECOMMENDATIONS / PLAN:  
 
- Monitor PO intake - Continue RD inpatient monitoring and evaluation. NUTRITION INTERVENTIONS & DIAGNOSIS:  
 
- Meals/snacks: modified composition Nutrition Diagnosis: No nutrition diagnosis at this time. ASSESSMENT:  
 
Admitted with BLE cellulitis; s/p debridement 2/14. Pt reports good appetite and meal intake PTA and since admission. Pt stated that usually eats smaller portions at home than what is served in-house. Last charted meal intake reflects poor to fair intake; Pt states she ate almost all of her lunch today. Pt states that she tries to be careful with her nutrition regarding her cancer. Denies any unintentional weight loss. Note low K; Replacement ordered. Nutritional intake adequate to meet patients estimated nutritional needs:  Unable to determine at this time Diet: DIET DIABETIC CONSISTENT CARB Regular Food Allergies: NKFA Current Appetite: Good Appetite/meal intake prior to admission: Good, consumes 3 small meals daily Feeding Limitations:  [] Swallowing difficulty    [] Chewing difficulty    [] Other: 
Current Meal Intake:  
Patient Vitals for the past 100 hrs: 
 % Diet Eaten 02/16/20 1732 25 % 02/16/20 1326 50 % 02/16/20 0937 0 % BM: 2/18 - per chart, noted pt was having loose BM, then soft during past 1-2 days Skin Integrity: Multiple venous ulcerations on bilateral legs Edema:   [] No     [x] Yes Pertinent Medications: Reviewed: SSI, ferrous sulfate, furosemide, Lactobacillus, Mag-Ox, pantoprazole, ondansetron, Miralax, KCl Recent Labs  
  02/18/20 
0326 02/17/20 
0302 02/16/20 
0324  138 137  
K 3.4* 3.8 4.3  107 106 CO2 26 27 25 * 168* 121* BUN 13 14 16 CREA 0.67 0.77 0.81 CA 8.3* 8.5 8.4* MG  --   --  1.6 Intake/Output Summary (Last 24 hours) at 2/18/2020 6586 Last data filed at 2/18/2020 0911 Gross per 24 hour Intake  Output 550 ml  
 Net -550 ml Anthropometrics: 
Ht Readings from Last 1 Encounters:  
02/11/20 5' 1\" (1.549 m) Last 3 Recorded Weights in this Encounter 02/11/20 1615 Weight: 62.1 kg (137 lb) Body mass index is 25.89 kg/m². Weight History:  Per chart hx, noted -13 lb, 8.7% x 5 years PTA Weight Metrics 2/11/2020 2/11/2020 11/20/2019 4/8/2019 4/2/2019 3/22/2019 3/7/2019 Weight 137 lb 137 lb 137 lb 139 lb 9.6 oz 146 lb 4 oz 134 lb 133 lb BMI 25.89 kg/m2 25.89 kg/m2 25.89 kg/m2 26.38 kg/m2 27.63 kg/m2 26.17 kg/m2 25.97 kg/m2 Admitting Diagnosis: Bilateral lower leg cellulitis [L03.116, U26.685] Eschar of lower leg [R23.4] Pertinent PMHx: DM2, hepatic malignancy, HTN, CHF, CAD, dyslipidemia Education Needs:        [x] None identified  [] Identified - Not appropriate at this time  []  Identified and addressed - refer to education log Learning Limitations:   [] None identified  [x] Identified: Speech difficulty Cultural, Anabaptist & ethnic food preferences:  [x] None identified    [] Identified and addressed ESTIMATED NUTRITION NEEDS:  
 
Calories: 1158-7176 kcal (HBEx1.2-1.3) based on  [x] Actual BW: 62kg      [] IBW Protein: 62-74 gm (1-1.2 gm/kg) based on  [x] Actual BW      [] IBW Fluid: 1 mL/kcal 
  
MONITORING & EVALUATION:  
 
Nutrition Goal(s):  
- PO nutrition intake will meet >75% of patient estimated nutritional needs within the next 7 days. Outcome: New/Initial goal  
 
Monitoring:   [x] Food and nutrient intake   [x] Food and nutrient administration  [x] Comparative standards   [x] Nutrition-focused physical findings   [x] Anthropometric Measurements   [x] Treatment/therapy   [x] Biochemical data, medical tests, and procedures Previous Recommendations (for follow-up assessments only):  Not Applicable Discharge Planning: No nutritional discharge needs at this time. Participated in care planning, discharge planning, & interdisciplinary rounds as appropriate. Hali Godinez, Dietetic Intern

## 2020-02-18 NOTE — ROUTINE PROCESS
Bedside shift change report given to [employer] (oncoming nurse) by Tez Rashid (offgoing nurse). Report included the following information SBAR, Kardex, Intake/Output, MAR and Recent Results.

## 2020-02-18 NOTE — PROGRESS NOTES
Pt resting comfortably. No new complaints She is s/p debridements of BLE ulcers 2/14. Dressings dry, dressing change planned for tomorrow Pain is much improved since admission. ABX per ID. Continue current treatment per primary team.

## 2020-02-19 NOTE — ROUTINE PROCESS
Bedside shift change report given to [facility] RN and Santosh Rodgers RN (oncoming nurse) by Francisco Mayorga RN (offgoing nurse). Report included the following information SBAR, Kardex, Intake/Output, MAR and Recent Results.

## 2020-02-19 NOTE — PROGRESS NOTES
Problem: Mobility Impaired (Adult and Pediatric) Goal: *Acute Goals and Plan of Care (Insert Text) Description Physical Therapy Goals Initiated 2/18/2020 and to be accomplished within 7 day(s) 1. Patient will move from supine to sit and sit to supine , scoot up and down and roll side to side in bed with supervision/set-up. 2.  Patient will transfer from bed to chair and chair to bed with supervision/set-up using the least restrictive device. 3.  Patient will perform sit to stand with supervision/set-up. 4.  Patient will ambulate with supervision/set-up for >150 feet with the least restrictive device. 5.  Patient will ascend/descend 4 stairs with 1 handrail(s) with contact guard assist. 
 
PLOF: Patient lives with ralf in 1 story home with several DOM with HR. Patient walks with a walker but notes there are times when she doesn't use a walker in the house. Patient reports her step son is mostly home with her except when he runs errands. Outcome: Progressing Towards Goal 
 PHYSICAL THERAPY TREATMENT Patient: Neal Campos (51 y.o. female) Date: 2/19/2020 Diagnosis: Bilateral lower leg cellulitis [L03.116, O98.420] Eschar of lower leg [R23.4] <principal problem not specified> Procedure(s) (LRB): 
BILATERAL LEG DEBRIDEMENT (Bilateral) 5 Days Post-Op Chart, physical therapy assessment, plan of care and goals were reviewed. OBJECTIVE/ ASSESSMENT: 
Patient found supine in bed willing to work with PT. Pt sat at EOB then stood to RW. Pt took 2 small steps before losing balance backward and sitting back on EOB. Pt then stood a second time and ambulated into the bathroom. Pt urinated then performed shanta-care on her own. Pt ambulated back to EOB and was returned to supine. Needs left in reach. Progression toward goals: 
[]      Improving appropriately and progressing toward goals [x]      Improving slowly and progressing toward goals []      Not making progress toward goals and plan of care will be adjusted PLAN: 
Patient continues to benefit from skilled intervention to address the above impairments. Continue treatment per established plan of care. Discharge Recommendations:  [facility] vs home with 24 hour supervision. Further Equipment Recommendations for Discharge:  rolling walker SUBJECTIVE:  
Patient stated I used to have screaming spells.  \"I need to keep my neck warm to sleep. \" Pt has scarf on in bed. OBJECTIVE DATA SUMMARY:  
Critical Behavior: 
Neurologic State: Alert Orientation Level: Oriented X4 Cognition: Appropriate decision making Safety/Judgement: Fall prevention Functional Mobility Training: 
Bed Mobility: 
Supine to Sit: Stand-by assistance; Additional time Sit to Supine: Stand-by assistance; Additional time Transfers: 
Sit to Stand: Stand-by assistance;Minimum assistance Stand to Sit: Stand-by assistance;Contact guard assistance Balance: 
Sitting: Intact Standing: Impaired; With support Standing - Static: Fair(+) Standing - Dynamic : Fair Ambulation/Gait Training: 
Distance (ft): 25 Feet (ft) Assistive Device: Walker, rolling Ambulation - Level of Assistance: Contact guard assistance Gait Abnormalities: Decreased step clearance Base of Support: Narrowed Speed/Erum: Slow Step Length: Left shortened;Right shortened Interventions: Verbal cues Pain: 
Pain level pre-treatment: Not rated Pain level post-treatment: Not rated Activity Tolerance:  
Fair Please refer to the flowsheet for vital signs taken during this treatment. After treatment:  
[] Patient left in no apparent distress sitting up in chair 
[x] Patient left in no apparent distress in bed 
[x] Call bell left within reach 
[] Nursing notified 
[x] Caregiver present (nursing entering room) [] Bed alarm activated 
[] SCDs applied COMMUNICATION/EDUCATION:  
[x]         Role of Physical Therapy [x]         Fall prevention 
[x]         Bed mobility [x]         Transfers 
[x]         Ambulation / gait [x]         Assistive device management 
[]         Stairs [x]         Body mechanics [x]         Position change  
[]         Therapeutic exercise [x]         Activity pacing / energy conservation 
[]         Other: 
   
America Halsted, PTA Time Calculation: 23 mins

## 2020-02-19 NOTE — PROGRESS NOTES
Discharge order noted for today. Pt has been accepted to [facility] agency. Met with patient  and are agreeable to the transition plan today. Transport has been arranged through pt's family. Patient's home health  orders have been forwarded to [facility] home health  agency. Updated bedside RN,Jhonny,  to the transition plan. Discharge information has been documented on the AVS. ADELAIDE Snider RN Care Management Pager: [phone]

## 2020-02-19 NOTE — WOUND CARE
Physical Exam  
Room 406: pt seen with [facility]. Pt has mostly partial thickness & few full thickness wounds on lower legs below knees, above ankles, scattered, recently had Acell grafts applied while in OR, approximately 14 wounds on right side, approximately 16 wounds on left side. Applied therahoney to open wounds, applied single layer Vaseline gauze to open wounds, applied 3flex 3 layer compression wraps from base of toes to tibias. Pt to be discharged home today. Will turn over care to nursing staff at this time.  
Roque RAMIREZN, RN, [facility], [address]

## 2020-02-19 NOTE — DISCHARGE INSTRUCTIONS
DISCHARGE SUMMARY from Nurse    PATIENT INSTRUCTIONS:    After general anesthesia or intravenous sedation, for 24 hours or while taking prescription Narcotics:  · Limit your activities  · Do not drive and operate hazardous machinery  · Do not make important personal or business decisions  · Do  not drink alcoholic beverages  · If you have not urinated within 8 hours after discharge, please contact your surgeon on call. Report the following to your surgeon:  · Excessive pain, swelling, redness or odor of or around the surgical area  · Temperature over 100.5  · Nausea and vomiting lasting longer than 4 hours or if unable to take medications  · Any signs of decreased circulation or nerve impairment to extremity: change in color, persistent  numbness, tingling, coldness or increase pain  · Any questions    What to do at Home:  Recommended activity: Activity as tolerated    If you experience any of the following symptoms of infection such as increased pain, fever or chills please follow up with your primary care provider or the closest emergency room. *  Please give a list of your current medications to your Primary Care Provider. *  Please update this list whenever your medications are discontinued, doses are      changed, or new medications (including over-the-counter products) are added. *  Please carry medication information at all times in case of emergency situations. These are general instructions for a healthy lifestyle:    No smoking/ No tobacco products/ Avoid exposure to second hand smoke  Surgeon General's Warning:  Quitting smoking now greatly reduces serious risk to your health.     Obesity, smoking, and sedentary lifestyle greatly increases your risk for illness    A healthy diet, regular physical exercise & weight monitoring are important for maintaining a healthy lifestyle    You may be retaining fluid if you have a history of heart failure or if you experience any of the following symptoms:  Weight gain of 3 pounds or more overnight or 5 pounds in a week, increased swelling in our hands or feet or shortness of breath while lying flat in bed. Please call your doctor as soon as you notice any of these symptoms; do not wait until your next office visit. The discharge information has been reviewed with the patient and caregiver. The patient and caregiver verbalized understanding. Discharge medications reviewed with the patient and caregiver and appropriate educational materials and side effects teaching were provided.   ___________________________________________________________________________________________________________________________________

## 2020-02-19 NOTE — ROUTINE PROCESS
Bedside and Verbal shift change report given to Zarina Baird (oncoming nurse) by Marcus Kim RN (offgoing nurse). Report included the following information SBAR, Kardex and Recent Results.

## 2020-02-19 NOTE — PROGRESS NOTES
Infectious Disease progress Note Reason: bilateral leg infected ulcrs Current abx Prior abx Pip/tazo, vancomycin since 2/11/20 Lines:  
 
 
Assessment : 
 
80 y.o. female  With hx of HTN, CAD, CHF, type 2 DM (last hgbA1C 6.0 on 2/14/20), hyperlipidemia and venous insufficiency, presented to the ED on 2/11/20sent from the vascular surgery clinic for admission due to bilat LE cellulitis and ulcerations. Wound cultures 5/2018- acinetobacter, mrsa 12/2018- enterobacter, mrsa; 1/2019- mrsa Clinical presentation c/w bilateral infected LE ulcers s/p I&D 2/14/20. No Intra op cultures available Chronic venous stasis likely predisposing patient to non healing ulcers. Wound examined with wound care nurse, vascular surgery PA. Significant improvement in the erythema. Minimal yellowish slough noted on RLE, LLE. Resolved bright red erythema. RECOMMENDATIONS: 
 
1. discontinue pip/tazo, vancomycin. Start po bactrim x 14 days 2. F/u vascular surgery recommendations 3. Cancel venous duplex, arterial duplex - d/w vascular surgery team - not needed 4. Needs good Wound care bilateral LE ulcers per vascular surgery team to aid healing 5. Unfortunately will need to make abx decisions based on prior cultures since no intra op cultures available. Above plan was discussed in details with patient, dr. Viera Person. Please call me if any further questions or concerns. Will continue to participate in the care of this patient. HPI: 
No new complaints Patient denies headaches, visual disturbances, sore throat, runny nose, earaches, cp, sob, chills, cough, abdominal pain, diarrhea, burning micturition, or weakness in extremities. she denies back pain/flank pain. home Medication List  
 Details  
sertraline (ZOLOFT) 25 mg tablet take 1 tablet by mouth once daily 
Qty: 30 Tab, Refills: 0 Associated Diagnoses: Mild depression ([address] Details multivitamin (ONE A DAY) tablet Take 1 Tab by mouth daily. RABEprazole (ACIPHEX) 20 mg tablet take 1 tablet by mouth once daily 
Qty: 30 Tab, Refills: 5 JANUVIA 100 mg tablet take 1 tablet by mouth every morning 
Qty: 30 Tab, Refills: 5  
  
furosemide (LASIX) 40 mg tablet take 1 tablet by mouth once daily 
Qty: 90 Tab, Refills: 3  
  
ergocalciferol (VITAMIN D2) 50,000 unit capsule Take 1 Cap by mouth every seven (7) days. Qty: 4 Cap, Refills: 1 Associated Diagnoses: Vitamin D deficiency  
  
lidocaine (LIDODERM) 5 % Apply patch to the affected area for 12 hours a day and remove for 12 hours a day. Qty: 5 Each, Refills: 0  
  
glipiZIDE-metFORMIN (METAGLIP) 5-500 mg per tablet TAKE 1 TABLET BY MOUTH BEFORE BREAKFAST AND 1 TABLET BY MOUTH BEFORE DINNER Qty: 120 Tab, Refills: 10  
  
naproxen sodium (ALEVE PO) Take 220 mg by mouth two (2) times daily as needed for Pain (For pain). PRN Pain  
  
loperamide (IMODIUM) 2 mg capsule Take 1 mg by mouth Three (3) times a week. pravastatin (PRAVACHOL) 40 mg tablet Take 1 Tab by mouth nightly. Qty: 90 Tab, Refills: 3  
  
aspirin delayed-release 81 mg tablet Take 81 mg by mouth daily. Associated Diagnoses: HTN (hypertension); Aortic stenosis  
  
busPIRone (BUSPAR) 5 mg tablet 1 tablet twice per day 
Qty: 60 Tab, Refills: 2 Current Facility-Administered Medications Medication Dose Route Frequency  VANCOMYCIN TROUGH DUE   Other Daily  magnesium oxide (MAG-OX) tablet 400 mg  400 mg Oral DAILY  potassium chloride (K-DUR, KLOR-CON) SR tablet 20 mEq  20 mEq Oral BID  ferrous sulfate tablet 325 mg  1 Tab Oral DAILY WITH BREAKFAST  gabapentin (NEURONTIN) capsule 200 mg  200 mg Oral TID  vancomycin (VANCOCIN) 1,000 mg in 0.9% sodium chloride (MBP/ADV) 250 mL adv  1,000 mg IntraVENous Q18H  
 lactobacillus sp. 50 billion cpu (BIO-K PLUS) capsule 1 Cap  1 Cap Oral DAILY  sodium chloride (NS) flush 5-40 mL  5-40 mL IntraVENous Q8H  
 sodium chloride (NS) flush 5-40 mL  5-40 mL IntraVENous PRN  
 morphine injection 2 mg  2 mg IntraVENous Q2H PRN  
 acetaminophen (TYLENOL) tablet 650 mg  650 mg Oral Q4H PRN  
 ondansetron (ZOFRAN) injection 4 mg  4 mg IntraVENous Q4H PRN  
 enoxaparin (LOVENOX) injection 40 mg  40 mg SubCUTAneous Q24H  
 insulin lispro (HUMALOG) injection   SubCUTAneous AC&HS  
 glucose chewable tablet 16 g  4 Tab Oral PRN  
 glucagon (GLUCAGEN) injection 1 mg  1 mg IntraMUSCular PRN  polyethylene glycol (MIRALAX) packet 17 g  17 g Oral DAILY  sertraline (ZOLOFT) tablet 25 mg  25 mg Oral DAILY  furosemide (LASIX) tablet 40 mg  40 mg Oral DAILY  pantoprazole (PROTONIX) tablet 40 mg  40 mg Oral ACB  dextrose 10% infusion 125-250 mL  125-250 mL IntraVENous PRN  
 oxyCODONE-acetaminophen (PERCOCET) 5-325 mg per tablet 1 Tab  1 Tab Oral Q4H PRN Allergies: Patient has no known allergies. Temp (24hrs), Av.9 °F (37.2 °C), Min:98 °F (36.7 °C), Max:100.4 °F (38 °C) Visit Vitals /59 (BP 1 Location: Right arm, BP Patient Position: Supine) Pulse (!) 58 Temp 99.2 °F (37.3 °C) Resp 17 Ht 5' 1\" (1.549 m) Wt 62.9 kg (138 lb 9.6 oz) SpO2 95% BMI 26.19 kg/m² ROS: 12 point ROS obtained in details. Pertinent positives as mentioned in HPI,  
otherwise negative Physical Exam: 
 
General: Well developed, well nourished female laying on the bed AAOx3 in no acute distress. General:   awake alert and oriented HEENT:  Normocephalic, atraumatic, PERRL, EOMI, no scleral icterus or pallor; no conjunctival hemmohage;  nasal and oral mucous are moist and without evidence of lesions. No thrush. Neck supple, no bruits. Lymph Nodes:   no cervical, axillary or inguinal adenopathy Lungs:   non-labored, bilaterally clear to auscultation- no crackles wheezes rales or rhonchi Heart:  RRR, s1 and s2; no  rubs or gallops, no edema, + pedal pulses Abdomen:  soft, non-distended, active bowel sounds, no hepatomegaly, no splenomegaly. Non-tender Genitourinary:  deferred Extremities:   no clubbing, cyanosis; no joint effusions or swelling; Full ROM of all large joints to the upper and lower extremities; muscle mass appropriate for age Neurologic:  No gross focal sensory abnormalities; 5/5 muscle strength to upper and lower extremities. Speech appropriate. Cranial nerves intact Skin:  Surgical changes bilateral LE per report Back:  no spinal or paraspinal muscle tenderness or rigidity, no CVA tenderness Psychiatric:  No suicidal or homicidal ideations, appropriate mood and affect Labs: Results:  
Chemistry Recent Labs  
  02/19/20 
0714 02/18/20 
0326 02/17/20 
0302 * 158* 168*  140 138  
K 4.1 3.4* 3.8  108 107 CO2 27 26 27 BUN 13 13 14 CREA 0.75 0.67 0.77 CA 8.7 8.3* 8.5 AGAP 5 6 4 BUCR 17 19 18  
  
CBC w/Diff Recent Labs  
  02/19/20 
0314 02/18/20 
0326 02/17/20 
0302 WBC 5.6 6.2 5.7  
RBC 3.12* 3.07* 3.12* HGB 8.5* 8.3* 8.5* HCT 26.6* 26.1* 26.6*  
 128* 129* Microbiology No results for input(s): CULT in the last 72 hours. RADIOLOGY: 
 
All available imaging studies/reports in [facility] for this admission were reviewed Dr. Sidra Steward, Infectious Disease Specialist 
[phone] February 19, 2020 
5:48 PM

## 2020-02-19 NOTE — HOME CARE
Received HH referral, Discharge order noted for today, [facility] will follow for SN,wound care,PT,OT ; pt's son Dimple Carson) ,states he and his sister (Justina) are caregivers to patient and both will be willing to learn wound care by [facility] nurses ,states patient has RW,shower chair and a toilet riser; [facility] referral processed to [facility] central intake. KRYSTLE ARTHUR.

## 2020-02-19 NOTE — PROGRESS NOTES
Assessed wounds today with wound nurse Dressing completely removed. That process was painful but wounds are markedly improved Swelling also decreased Will implement new wound orders - solosite/medihoney where appropriate then adaptic and profor wrap Likely home today so will write dressing orders to begin Monday and visit in office in about 2-3 weeks I will call daughter with update today

## 2020-02-19 NOTE — DISCHARGE SUMMARY
[facility] Patient: Marika He Age: 80 y.o. : 1933 MR#: [id number] SSN: [id number] PCP on record: Cyrus Bumpers, NP Admit date: 2020 Discharge date: 2020 Consults:  Dr Olayinka Vizcarra, Petra Martinez., -ID - Ms Nadia Marshallkaipl., PA- vascular surgery Procedures:20: 
Procedures performed: #1  Debridement of skin and subcutaneous tissue of bilateral lower extremities to below measurements 
right lower extremity anterior: - Significant Diagnostic Studies: -XR tib /fib LT 20: IMPRESSION:   
  
1. No acute fracture or subluxation. Degenerative changes in the left knee. Heel spurs. 2.  Diffuse soft tissue edema. Soft tissue depressions in the lower 
extremities. -  XR tib/fib RT 20: IMPRESSION:   
  
1. No acute fracture or subluxation. 2.  Diffuse soft tissue edema. Soft tissue depressions in the lower extremity. 3.  Degenerative changes. 4.  Heel spurs. CXR 20: IMPRESSION: 
  
1. Sequela of mild to moderate congestion. Follow-up with plain imaging. Discharge Diagnoses:  -BLE cellulitis associated w/ multiple ulcerations of ble, unrelated to cellulitis 
-Acute urinary retention 
-Mild hyponatremia Patient Active Problem List  
Diagnosis Code  DM (diabetes mellitus) ([facility] [address] E11.9  Dyslipidemia E78.5  Aortic valve stenosis, mild I35.0  
 HTN (hypertension) I10  
 Dependent edema R60.9  Mixed hyperlipidemia E78.2  
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  
 Age related osteoporosis M81.0  
 Eschar of lower leg R23.4  Bilateral lower leg cellulitis L03.116, L03.115 Hospital Course by Problem 80 y.o female with multiple medical problems including chronic bilateral lower leg edema with chronic venous insufficiency, who was sent to ER after she was seen in vascular office for rapid progression of lower leg infection, multiple ulceraton with necrotic eschar. She underwent Debridement of skin and subcutaneous tissue of bilateral lower extremities (2020), also treated w/ abx with the assistance of ID consultant. She is to be discharged on a 14 day course of bactrim, she is to have wound care changes a few times a week. Post procedure, she developed urinary retention required pizarro placement which resolved. 
  
 
  
Dispo: Home health  supervision, discussed w/ daughter. Today's examination of the patient revealed:  
 
Subjective:  
Pt w/o complaints. Objective:  
VS:  
Visit Vitals BP 95/57 (BP 1 Location: Right arm, BP Patient Position: Supine) Pulse 74 Temp 97.7 °F (36.5 °C) Resp 18 Ht 5' 1\" (1.549 m) Wt 62.9 kg (138 lb 9.6 oz) SpO2 98% BMI 26.19 kg/m² Tmax/24hrs: Temp (24hrs), Av.7 °F (37.1 °C), Min:97.7 °F (36.5 °C), Max:100.4 °F (38 °C) Input/Output:  
 
Intake/Output Summary (Last 24 hours) at 2020 1346 Last data filed at 2020 0500 Gross per 24 hour Intake 50 ml Output 1200 ml Net -1150 ml General: alert, awake, in nad Cardiovascular:  Rrr, no murmurs Pulmonary: ctab GI:soft, nt, nd   
Extremities:bilateral lower extremities examined. Pt w/ multiple ulcerations distal aspect of legs with granulation tissue, appears to be healing well. Additional:   
 
Labs:   
Recent Results (from the past 24 hour(s)) GLUCOSE, POC Collection Time: 20  3:30 PM  
Result Value Ref Range Glucose (POC) 182 (H) 70 - 110 mg/dL GLUCOSE, POC Collection Time: 20  9:17 PM  
Result Value Ref Range Glucose (POC) 185 (H) 70 - 110 mg/dL CBC W/O DIFF Collection Time: 20  3:14 AM  
Result Value Ref Range WBC 5.6 4.6 - 13.2 K/uL  
 RBC 3.12 (L) 4.20 - 5.30 M/uL HGB 8.5 (L) 12.0 - 16.0 g/dL HCT 26.6 (L) 35.0 - 45.0 % MCV 85.3 74.0 - 97.0 FL  
 MCH 27.2 24.0 - 34.0 PG  
 MCHC 32.0 31.0 - 37.0 g/dL  
 RDW 15.5 (H) 11.6 - 14.5 % PLATELET 207 714 - 572 K/uL MPV 11.6 9.2 - 58.6 FL  
METABOLIC PANEL, BASIC Collection Time: 02/19/20  3:14 AM  
Result Value Ref Range Sodium 140 136 - 145 mmol/L Potassium 4.1 3.5 - 5.5 mmol/L Chloride 108 100 - 111 mmol/L  
 CO2 27 21 - 32 mmol/L Anion gap 5 3.0 - 18 mmol/L Glucose 183 (H) 74 - 99 mg/dL BUN 13 7.0 - 18 MG/DL Creatinine 0.75 0.6 - 1.3 MG/DL  
 BUN/Creatinine ratio 17 12 - 20 GFR est AA >60 >60 ml/min/1.73m2 GFR est non-AA >60 >60 ml/min/1.73m2 Calcium 8.7 8.5 - 10.1 MG/DL  
GLUCOSE, POC Collection Time: 02/19/20  6:14 AM  
Result Value Ref Range Glucose (POC) 149 (H) 70 - 110 mg/dL GLUCOSE, POC Collection Time: 02/19/20 12:12 PM  
Result Value Ref Range Glucose (POC) 211 (H) 70 - 110 mg/dL Additional Data Reviewed: 
  
Condition on d/c: stable Disposition:  
 []Home   [x]Home with Home Health   []SNF/NH   []Rehab   []Home with family []Alternate Facility:____________________ Discharge Medications:  
 
Current Discharge Medication List  
  
START taking these medications Details  
ferrous sulfate 325 mg (65 mg iron) tablet Take 1 Tab by mouth daily (with breakfast). Qty: 30 Tab, Refills: 0  
  
magnesium oxide (MAG-OX) 400 mg tablet Take 1 Tab by mouth daily. Qty: 30 Tab, Refills: 0  
  
polyethylene glycol (MIRALAX) 17 gram packet Take 1 Packet by mouth daily. Qty: 30 Packet, Refills: 0  
  
trimethoprim-sulfamethoxazole (BACTRIM DS, SEPTRA DS) 160-800 mg per tablet Take 1 Tab by mouth two (2) times a day for 14 days. Qty: 28 Tab, Refills: 0 CONTINUE these medications which have NOT CHANGED Details  
multivitamin (ONE A DAY) tablet Take 1 Tab by mouth daily. RABEprazole (ACIPHEX) 20 mg tablet take 1 tablet by mouth once daily 
Qty: 30 Tab, Refills: 5 JANUVIA 100 mg tablet take 1 tablet by mouth every morning 
Qty: 30 Tab, Refills: 5  
  
furosemide (LASIX) 40 mg tablet take 1 tablet by mouth once daily 
Qty: 90 Tab, Refills: 3  
  
ergocalciferol (VITAMIN D2) 50,000 unit capsule Take 1 Cap by mouth every seven (7) days. Qty: 4 Cap, Refills: 1 Associated Diagnoses: Vitamin D deficiency  
  
lidocaine (LIDODERM) 5 % Apply patch to the affected area for 12 hours a day and remove for 12 hours a day. Qty: 5 Each, Refills: 0  
  
naproxen sodium (ALEVE PO) Take 220 mg by mouth two (2) times daily as needed for Pain (For pain). PRN Pain  
  
loperamide (IMODIUM) 2 mg capsule Take 1 mg by mouth Three (3) times a week. pravastatin (PRAVACHOL) 40 mg tablet Take 1 Tab by mouth nightly. Qty: 90 Tab, Refills: 3  
  
aspirin delayed-release 81 mg tablet Take 81 mg by mouth daily. Associated Diagnoses: HTN (hypertension); Aortic stenosis  
  
sertraline (ZOLOFT) 25 mg tablet take 1 tablet by mouth once daily 
Qty: 30 Tab, Refills: 0 Associated Diagnoses: Mild depression ([address] busPIRone (BUSPAR) 5 mg tablet 1 tablet twice per day 
Qty: 60 Tab, Refills: 2 STOP taking these medications  
  
 glipiZIDE-metFORMIN (METAGLIP) 5-500 mg per tablet Comments:  
Reason for Stopping: Follow-up Appointments:  
1. Your PCP: Elise Rodriguez NP, within [id number] 2. Vascular surgery in 2-3 wks >30 minutes spent coordinating this discharge (review instructions/follow-up, prescriptions, preparing report for sign off) Signed: 
Jorge Anand MD 
2/19/2020 
1:46 PM

## 2020-02-20 NOTE — Clinical Note
[facility] 2/11-2/19 bilateral lower extremities cellulitis associated with multiple ulcerations/necrotic escharTOC appt 2/28 @ 345Daughter unclear why metformin was discontinued. Please discuss at visit.

## 2020-02-20 NOTE — PROGRESS NOTES
Hospital Discharge Follow-Up Date/Time:  2020 10:28 AM 
 
Patient was admitted to [facility] on 20 and discharged on 20 for bilateral lower extremities cellulitis associated with multiple ulcerations/necrotic eschar. The physician discharge summary was available at the time of outreach. Patient was contacted within 1 business days of discharge. Top Challenges reviewed with the provider Daughter unclear why metformin was discontinued. Please discuss at visit. Advance Care Planning:  
Does patient have an Advance Directive:  not on file Was this a readmission? no  
Patient stated reason for the readmission: N/A Care Transition Nurse (CTN) contacted the patient by telephone to perform post hospital discharge assessment. Spoke with Nelsy Perry, daughter/caregiver. Verified PHI,name and  with caregiver as identifiers. Provided introduction to self, and explanation of the CTN role. Caregiver voiced patient did good overnight and denied any c/o pain. Reported dressings to legs are clean/dry/intact. Writer suggested a Ashwin for wound healing. Caregiver voiced patient already drinks chocolate Ensure. Writer suggested switching to Glucerna as patient is diabetic. Informed caregiver patient may also take Ashwin along with Glucerna. Writer offered samples of chocolate flavored Glucerna as well as coupons for [employer] [facility]. Caregiver received hospital discharge instructions. CTN reviewed discharge instructions and red flags with caregiver who verbalized understanding. Caregiver given an opportunity to ask questions and does not have any further questions or concerns at this time. The caregiver agrees to contact the PCP office for questions related to their healthcare. CTN provided contact information for future reference. Patients top risk factors for readmission:  None at this time Home Health orders at discharge: PT,  [address]: [facility] Date of initial visit: TBD Durable Medical Equipment ordered at discharge: none [address]: None Durable Medical Equipment received: None Medication(s):  
New Medications at Discharge: iton, Mag-Ox, Miralax, Bactrim DS Changed Medications at Discharge: None Discontinued Medications at Discharge: Metformin Medication reconciliation was performed with caregiver, who verbalizes understanding of administration of home medications. There were barriers to obtaining medications identified at this time. Caregiver voiced did not  meds from pharmacy b/c she was told that they were under staffed. Caregiver voiced meds were dropped off at pharmacy and will be picked up today. Referral to Pharm D needed: no  
 
Current Outpatient Medications Medication Sig  
 spironolactone (ALDACTONE PO) Take 1 Tab by mouth daily.  sertraline (ZOLOFT) 25 mg tablet take 1 tablet by mouth once daily  multivitamin (ONE A DAY) tablet Take 1 Tab by mouth daily.  RABEprazole (ACIPHEX) 20 mg tablet take 1 tablet by mouth once daily  JANUVIA 100 mg tablet take 1 tablet by mouth every morning  furosemide (LASIX) 40 mg tablet take 1 tablet by mouth once daily (Patient taking differently: Take 40 mg by mouth daily.)  busPIRone (BUSPAR) 5 mg tablet 1 tablet twice per day  naproxen sodium (ALEVE PO) Take 220 mg by mouth two (2) times daily as needed for Pain (For pain). PRN Pain  loperamide (IMODIUM) 2 mg capsule Take 1 mg by mouth Three (3) times a week.  pravastatin (PRAVACHOL) 40 mg tablet Take 1 Tab by mouth nightly.  aspirin delayed-release 81 mg tablet Take 81 mg by mouth daily.  ferrous sulfate 325 mg (65 mg iron) tablet Take 1 Tab by mouth daily (with breakfast).  magnesium oxide (MAG-OX) 400 mg tablet Take 1 Tab by mouth daily.  polyethylene glycol (MIRALAX) 17 gram packet Take 1 Packet by mouth daily.  trimethoprim-sulfamethoxazole (BACTRIM DS, SEPTRA DS) 160-800 mg per tablet Take 1 Tab by mouth two (2) times a day for 14 days. No current facility-administered medications for this visit. Medications Discontinued During This Encounter Medication Reason  ergocalciferol (VITAMIN D2) 50,000 unit capsule Not A Current Medication  lidocaine (LIDODERM) 5 % Not A Current Medication [facility] follow up appointment(s):  
Future Appointments Date Time Provider Cecil Beckwithi 2/28/2020  3:45 PM MYAH Snider [facility] SCHED  
3/12/2020 10:45 AM FERMIN Do 2VV [facility] SCHED  
5/8/2020  2:00 PM [facility] INFUSION NURSE 2 [facility]  
8/21/2020  2:20 PM Shawn Haynes  [facility] follow up appointment(s): None Goals Addressed This Visit's Progress  COMPLETED: Attends follow up appointments on schedule 12/17/19 Patient will attend all scheduled appointments through 02/17/20  Attends follow-up appointments as directed. Ensure provider appt is scheduled within 7 days post-discharge; 2/20: Caregiver aware of upcoming appts with PCP and vascular. Confirm patient attended post-discharge provider appt Complete post-visit call to confirm attendance and update care needs  COMPLETED: Knowledge and adherence of prescribed medication (ie. action, side effects, missed dose, etc.).     
  12/17/19 Pt will take all medications prescribed to be evaluated on each outreach through  02/17/20 
  
  COMPLETED: Prepare patients and caregivers for end of life decisions (ie. need for hospice, pain management, symptom relief, advance directives etc.)     
  12/17/19 Pt will complete an ACP and have it scanned into their EMR by 02/17/20  Prevent complications post hospitalization. Plan of Care:  
Monitor X 4 weeks Review/educate common or potential \"red flags\" of condition worsening; 2/20:Reviewed/educated caregiver on s/s of infection to monitor and report: redness, warmth at site(s), swelling, bleeding or pus-like drainage, chills, fever (> 100.5), low body temperature (<97.2), nausea/vomiting that prevents eating/drinking/taking medications, SOB, chest pain/pressure, palpitations, increased respirations, decreased urine output or any other new/concerning s/s. Monitor lab results and symptoms, escalate to provider Ensure DME in place and used as instructed Review the Home Visit or Home Health documentation for coordinating care and goals Discuss and provide resources for ACP

## 2020-02-28 NOTE — PROGRESS NOTES
This is the Subsequent Medicare Annual Wellness Exam, performed 12 months or more after the Initial AWV or the last Subsequent AWV I have reviewed the patient's medical history in detail and updated the computerized patient record. History Patient Active Problem List  
Diagnosis Code  DM (diabetes mellitus) ([facility] E11.9  Dyslipidemia E78.5  Aortic valve stenosis, mild I35.0  
 HTN (hypertension) I10  
 Dependent edema R60.9  Mixed hyperlipidemia E78.2  
 URI (upper respiratory infection) J06.9  Decubitus ulcer of buttock, stage 1 L89.301  AS (aortic stenosis) I35.0  Diabetes mellitus without complication ([facility] [address] Y82.9  Type 2 diabetes with nephropathy (HCC) E11.21  
 Age related osteoporosis M81.0  
 Eschar of lower leg R23.4  Bilateral lower leg cellulitis L03.116, L03.115 Past Medical History:  
Diagnosis Date  Aortic valve stenosis, mild April 2014 EF 53-47%, Grade 1 diastolic dysfunction, mild aortic stenosis,  mean gradient 10 mm Hg  CAD (coronary artery disease) aortic stenosis  Cardiac echocardiogram 06/2018 EF 55-60%. No RWMA. Mild-mod LVH. Severe LAE. Mild AS.  Cardiac nuclear imaging test 08/27/2008 No evidence of ischemia or prior scarring. EF 62%. No WMA. No significant change from study of 3/23/06.  Cardiovascular RLE venous duplex 12/01/2016 Right leg:  No DVT.  Carotid duplex 04/24/2013 Mild 1-18% LICA stenosis.  DM (diabetes mellitus) ([facility] [address]  Dyslipidemia  Heart failure ([facility] [address]  HTN (hypertension)  Mixed hyperlipidemia  Sciatica  Traumatic closed displaced fracture of acromial end of clavicle, left, initial encounter Past Surgical History:  
Procedure Laterality Date  HX APPENDECTOMY [address]  HX OTHER SURGICAL Right 2010  
 ankle surgery  HX TUMOR REMOVAL Left   
 ovarian  HX UROLOGICAL    
 kidney stone removal  
 
 Current Outpatient Medications Medication Sig Dispense Refill  busPIRone (BUSPAR) 5 mg tablet 1 tablet twice per day 60 Tab 2  
 spironolactone (ALDACTONE PO) Take 1 Tab by mouth daily.  ferrous sulfate 325 mg (65 mg iron) tablet Take 1 Tab by mouth daily (with breakfast). 30 Tab 0  
 magnesium oxide (MAG-OX) 400 mg tablet Take 1 Tab by mouth daily. 30 Tab 0  
 sertraline (ZOLOFT) 25 mg tablet take 1 tablet by mouth once daily 30 Tab 0  
 multivitamin (ONE A DAY) tablet Take 1 Tab by mouth daily.  RABEprazole (ACIPHEX) 20 mg tablet take 1 tablet by mouth once daily 30 Tab 5  JANUVIA 100 mg tablet take 1 tablet by mouth every morning 30 Tab 5  furosemide (LASIX) 40 mg tablet take 1 tablet by mouth once daily (Patient taking differently: Take 40 mg by mouth daily.) 90 Tab 3  
 naproxen sodium (ALEVE PO) Take 220 mg by mouth two (2) times daily as needed for Pain (For pain). 1 pill po 2 times daily for pain prn  loperamide (IMODIUM) 2 mg capsule Take 1 mg by mouth Three (3) times a week.  pravastatin (PRAVACHOL) 40 mg tablet Take 1 Tab by mouth nightly. 90 Tab 3  
 aspirin delayed-release 81 mg tablet Take 81 mg by mouth daily. 1 pill po daily No Known Allergies Family History Problem Relation Age of Onset  Stroke Mother  Heart Disease Mother  Lung Disease Father  Cancer Neg Hx  Diabetes Neg Hx  Hypertension Neg Hx Social History Tobacco Use  Smoking status: Never Smoker  Smokeless tobacco: Never Used Substance Use Topics  Alcohol use: No  
 
 
Depression Risk Factor Screening:  
 
3 most recent PHQ Screens 2/11/2020 Little interest or pleasure in doing things Not at all Feeling down, depressed, irritable, or hopeless Not at all Total Score PHQ 2 0 Trouble falling or staying asleep, or sleeping too much - Feeling tired or having little energy - Poor appetite, weight loss, or overeating -  
 Feeling bad about yourself - or that you are a failure or have let yourself or your family down - Trouble concentrating on things such as school, work, reading, or watching TV - Moving or speaking so slowly that other people could have noticed; or the opposite being so fidgety that others notice - Thoughts of being better off dead, or hurting yourself in some way -  
PHQ 9 Score - How difficult have these problems made it for you to do your work, take care of your home and get along with others - Alcohol Risk Factor Screening: Do you average 1 drink per night or more than 7 drinks a week:  No 
 
On any one occasion in the past three months have you have had more than 3 drinks containing alcohol:  No 
 
 
Functional Ability and Level of Safety:  
Hearing: The patient needs further evaluation. Activities of Daily Living: The home contains: handrails and grab bars Patient does total self care Ambulation: with no difficulty Fall Risk: 
Fall Risk Assessment, last 12 mths 2/11/2020 Able to walk? Yes Fall in past 12 months? No  
Fall with injury? -  
Number of falls in past 12 months - Fall Risk Score -  
 
 
Abuse Screen: 
Patient is not abused Cognitive Screening Has your family/caregiver stated any concerns about your memory: no 
Cognitive Screening: Normal - Clock Drawing Test, Animal Naming Test 
 
Patient Care Team  
Patient Care Team: 
Cyrus Bumpers, NP as PCP - General (Nurse Practitioner) Cyrus Bumpers, NP as PCP [facility] Brigida Hyman MD (Cardiology) Cezar Garcia MD (Cardiology) Jin Gonzalez NP (Nurse Practitioner) Nalini Kenyon MD as Physician (Orthopedic Surgery) Elroy Mccoy MD as Physician (Orthopedic Surgery) Suzie Recinos RN as Care Transitions Nurse (Internal Medicine) Assessment/Plan Education and counseling provided: 
Are appropriate based on today's review and evaluation Diagnoses and all orders for this visit: 
 
1. Medicare annual wellness visit, subsequent 2. Essential hypertension -     MAGNESIUM; Future -     CBC WITH AUTOMATED DIFF; Future -     METABOLIC PANEL, COMPREHENSIVE; Future 3. Type 2 diabetes with nephropathy (HCC) -     MAGNESIUM; Future -     METABOLIC PANEL, COMPREHENSIVE; Future 4. Dyslipidemia 5. Mixed hyperlipidemia -     LIPID PANEL; Future Other orders -     busPIRone (BUSPAR) 5 mg tablet; 1 tablet twice per day Health Maintenance Due Topic Date Due  
 DTaP/Tdap/Td series (1 - Tdap) 01/05/1944  Shingrix Vaccine Age 50> (1 of 2) 01/05/1983  MICROALBUMIN Q1  05/15/2019  Lipid Screen  05/15/2019 Hilda Phelps is a 80 y.o. female presenting today for Annual Wellness Visit and Hospital Follow Up Aung Hewitt HPI:  Hilda Phelps presents to the office today for annual wellness and hospital follow-up visit. She was admitted to [facility] on February 11, 2020 and discharged on February 19, 2020. She presents to the practice noting she is feeling better. She continues to have lower extremity edema. While in the hospital patient had debridement of skin and subcutaneous tissue of the bilateral lower extremities on February 14, 2020. She had multiple ulcerations and necrotic eschar. She was discharged home with a 14-day course of Bactrim and is to have wound changes 3 times a week. She does have a history of chronic bilateral lower extremity edema with chronic venous insufficiency. Review of Systems Respiratory: Negative for cough and sputum production. Cardiovascular: Positive for leg swelling. Negative for chest pain and palpitations. Neurological: Negative for dizziness and headaches. No Known Allergies Current Outpatient Medications Medication Sig Dispense Refill  busPIRone (BUSPAR) 5 mg tablet 1 tablet twice per day 60 Tab 2  
 spironolactone (ALDACTONE PO) Take 1 Tab by mouth daily.  ferrous sulfate 325 mg (65 mg iron) tablet Take 1 Tab by mouth daily (with breakfast). 30 Tab 0  
 magnesium oxide (MAG-OX) 400 mg tablet Take 1 Tab by mouth daily. 30 Tab 0  
 sertraline (ZOLOFT) 25 mg tablet take 1 tablet by mouth once daily 30 Tab 0  
 multivitamin (ONE A DAY) tablet Take 1 Tab by mouth daily.  RABEprazole (ACIPHEX) 20 mg tablet take 1 tablet by mouth once daily 30 Tab 5  JANUVIA 100 mg tablet take 1 tablet by mouth every morning 30 Tab 5  furosemide (LASIX) 40 mg tablet take 1 tablet by mouth once daily (Patient taking differently: Take 40 mg by mouth daily.) 90 Tab 3  
 naproxen sodium (ALEVE PO) Take 220 mg by mouth two (2) times daily as needed for Pain (For pain). 1 pill po 2 times daily for pain prn  loperamide (IMODIUM) 2 mg capsule Take 1 mg by mouth Three (3) times a week.  pravastatin (PRAVACHOL) 40 mg tablet Take 1 Tab by mouth nightly. 90 Tab 3  
 aspirin delayed-release 81 mg tablet Take 81 mg by mouth daily. 1 pill po daily Past Medical History:  
Diagnosis Date  Aortic valve stenosis, mild April 2014 EF 39-42%, Grade 1 diastolic dysfunction, mild aortic stenosis,  mean gradient 10 mm Hg  CAD (coronary artery disease) aortic stenosis  Cardiac echocardiogram 06/2018 EF 55-60%. No RWMA. Mild-mod LVH. Severe LAE. Mild AS.  Cardiac nuclear imaging test 08/27/2008 No evidence of ischemia or prior scarring. EF 62%. No WMA. No significant change from study of 3/23/06.  Cardiovascular RLE venous duplex 12/01/2016 Right leg:  No DVT.  Carotid duplex 04/24/2013 Mild 4-52% LICA stenosis.  DM (diabetes mellitus) ([address]  Dyslipidemia  Heart failure ([address]  HTN (hypertension)  Mixed hyperlipidemia  Sciatica  Traumatic closed displaced fracture of acromial end of clavicle, left, initial encounter Past Surgical History:  
Procedure Laterality Date  HX APP[address]OMY [address]  HX OTHER SURGICAL Right 2010  
 ankle surgery  HX TUMOR REMOVAL Left   
 ovarian  HX UROLOGICAL    
 kidney stone removal  
 
 
Social History Socioeconomic History  Marital status:  Spouse name: Not on file  Number of children: Not on file  Years of education: Not on file  Highest education level: Not on file Occupational History  Not on file Social Needs  Financial resource strain: Not on file  Food insecurity:  
  Worry: Not on file Inability: Not on file  Transportation needs:  
  Medical: Not on file Non-medical: Not on file Tobacco Use  Smoking status: Never Smoker  Smokeless tobacco: Never Used Substance and Sexual Activity  Alcohol use: No  
 Drug use: No  
 Sexual activity: Not on file Lifestyle  Physical activity:  
  Days per week: Not on file Minutes per session: Not on file  Stress: Not on file Relationships  Social connections:  
  Talks on phone: Not on file Gets together: Not on file Attends Hoahaoism service: Not on file Active member of club or organization: Not on file Attends meetings of clubs or organizations: Not on file Relationship status: Not on file  Intimate partner violence:  
  Fear of current or ex partner: Not on file Emotionally abused: Not on file Physically abused: Not on file Forced sexual activity: Not on file Other Topics Concern  Not on file Social History Narrative  Not on file Patient does not have an advanced directive on file Vitals:  
 02/28/20 1620 BP: 107/51 Pulse: 80 Resp: 12 Temp: 96.8 °F (36 °C) TempSrc: Oral  
SpO2: 97% Weight: 129 lb (58.5 kg) Height: 5' 1\" (1.549 m) PainSc:   0 - No pain Physical Exam 
Vitals signs and nursing note reviewed. Constitutional:   
   Appearance: Normal appearance. Cardiovascular:  
   Rate and Rhythm: Normal rate and regular rhythm. Pulmonary:  
   Effort: Pulmonary effort is normal.  
   Breath sounds: Normal breath sounds. Musculoskeletal:  
   Right lower leg: Edema present. Left lower leg: Edema present. Comments: Wrapping to bilateral lower extremities Neurological:  
   Mental Status: She is alert. No results displayed because visit has over 200 results. .No results found for any visits on 02/28/20. Assessment / Plan: ICD-10-CM ICD-9-CM 1. Medicare annual wellness visit, subsequent Z00.00 V70.0 2. Essential hypertension I10 401.9 MAGNESIUM  
   CBC WITH AUTOMATED DIFF  
   METABOLIC PANEL, COMPREHENSIVE 3. Type 2 diabetes with nephropathy (HCC) E11.21 250.40 MAGNESIUM  
  273.50 METABOLIC PANEL, COMPREHENSIVE 4. Dyslipidemia E78.5 272.4 5. Mixed hyperlipidemia E78.2 272.2 LIPID PANEL Fasting labs next office visit Glimiperide discontinued in hospital./ HGB a1c on 2/14 was 6.0  Repeat in 3 month and if remains therapeutic will continue with Januvia only. Vascular on March 12, 2010 She will make follow-up appointment with Onclologist and Liver Specialoist 
She is also followed by Dr. Ludwin Dyer. Follow-up and Dispositions · Return in about 3 months (around 5/28/2020). I asked the patient if she  had any questions and answered her  questions. The patient stated that she understands the treatment plan and agrees with the treatment plan This document was created with a voice activated dictation system and may contain transcription errors.

## 2020-02-28 NOTE — PATIENT INSTRUCTIONS
Medicare Wellness Visit, Female The best way to live healthy is to have a lifestyle where you eat a well-balanced diet, exercise regularly, limit alcohol use, and quit all forms of tobacco/nicotine, if applicable. Regular preventive services are another way to keep healthy. Preventive services (vaccines, screening tests, monitoring & exams) can help personalize your care plan, which helps you manage your own care. Screening tests can find health problems at the earliest stages, when they are easiest to treat. [facility] follows the current, evidence-based guidelines published by the [facility] Mendes ([facility]) when recommending preventive services for our patients. Because we follow these guidelines, sometimes recommendations change over time as research supports it. (For example, mammograms used to be recommended annually. Even though Medicare will still pay for an annual mammogram, the newer guidelines recommend a mammogram every two years for women of average risk). Of course, you and your doctor may decide to screen more often for some diseases, based on your risk and your co-morbidities (chronic disease you are already diagnosed with). Preventive services for you include: - Medicare offers their members a free annual wellness visit, which is time for you and your primary care provider to discuss and plan for your preventive service needs. Take advantage of this benefit every year! 
-All adults over the age of 72 should receive the recommended pneumonia vaccines. Current USPSTF guidelines recommend a series of two vaccines for the best pneumonia protection.  
-All adults should have a flu vaccine yearly and a tetanus vaccine every 10 years.  
-All adults age 48 and older should receive the shingles vaccines (series of two vaccines). -All adults age 38-68 who are overweight should have a diabetes screening test once every three years. Referred by: Elvis Tavera MD; Medical Diagnosis (from order):    Diagnosis Information      Diagnosis    719.41 (ICD-9-CM) - M25.512 (ICD-10-CM) - Left shoulder pain, unspecified chronicity                Physical Therapy -  Daily Treatment Note    Visit:  5   Diagnosis Precautions: None.    SUBJECTIVE                                                                                                             Patient reports that his left shoulder is feeling pretty much the same, not getting worse though, continues to have discomfort with overhead activity. Pain is located more at infraspinatus tendon region versus supraspinatus today.      Functional Change: Per above.    Pain / Symptoms:  Pain rating (out of 10): Current: 0 ; Worst: 8  Location: Posterior aspect of left shoulder     OBJECTIVE                                                                                                                          TREATMENT                                                                                                                  Therapeutic Exercise:  Seated:  UBE level 4, 5 minutes for strength and range of motion    Standing:  ER/IR 2.5# freemotion 2x10   Rows 5# freemotion 2x10  Wall slides left shoulder flexion x 10   Wall slides left shoulder scaption x 20   Upper trapezius stretch x :30 each side  Scalene stretch x :30 each side - light downward pressure across clavicle     Supine:  Shoulder AAROM with dowel flexion, abduction x10 ea       -abduction 2 x :30 holds       -external rotation at 90 deg abduction x 10     Prone:  Scapular I on green swiss ball 2x10 - verbal cues for increased scapular activation          -Horizontal abduction x 15    Sidelying:  Shoulder AROM flexion 2# dumbbell x10   *Shoulder external rotation 2# dumbbell x 10 - verbal/tactile cues for slow, eccentric lower  Neuromuscular Re-Education:  Quadruped shoulder flexion x 8 each side  Plank x :30   Plank with alternating leg  -All adults born between [birth year] and 1965 should be screened once for Hepatitis C. 
-Other screening tests and preventive services for persons with diabetes include: an eye exam to screen for diabetic retinopathy, a kidney function test, a foot exam, and stricter control over your cholesterol.  
-Cardiovascular screening for adults with routine risk involves an electrocardiogram (ECG) at intervals determined by your doctor.  
-Colorectal cancer screenings should be done for adults age 54-65 with no increased risk factors for colorectal cancer. There are a number of acceptable methods of screening for this type of cancer. Each test has its own benefits and drawbacks. Discuss with your doctor what is most appropriate for you during your annual wellness visit. The different tests include: colonoscopy (considered the best screening method), a fecal occult blood test, a fecal DNA test, and sigmoidoscopy. 
 
-A bone mass density test is recommended when a woman turns 65 to screen for osteoporosis. This test is only recommended one time, as a screening. Some providers will use this same test as a disease monitoring tool if you already have osteoporosis. -Breast cancer screenings are recommended every other year for women of normal risk, age 54-69. 
-Cervical cancer screenings for women over age 72 are only recommended with certain risk factors. Here is a list of your current Health Maintenance items (your personalized list of preventive services) with a due date: 
Health Maintenance Due Topic Date Due  
 DTaP/Tdap/Td  (1 - Tdap) 01/05/1944  Shingles Vaccine (1 of 2) 01/05/1983  Albumin Urine Test  05/15/2019  Cholesterol Test   05/15/2019 [address] [place] [address] Annual Well Visit  05/19/2019 lift 2 x 5  Bird dogs x5  Bent over row 4# dumbbell 2 x 10  Bent over shoulder extension 2# dumbbell 2 x 10    Trialed bent over horizontal abduction but too painful today so discontinued     Skilled input: verbal instruction/cues    Writer verbally educated and received verbal consent for hand placement, positioning of patient, and techniques to be performed today from patient for therapist position for techniques, clothing adjustments for techniques and hand placement and palpation for techniques as described above and how they are pertinent to the patient's plan of care.    Home Exercise Program: (*above indicates provided as part of home exercise program)  Access Code: [email or web address]   URL: [email or web address]   Date: 01/23/2020   Prepared by: Hannah Toledo      Exercises Shoulder External Rotation Reactive Isometrics - 10 reps - 2 sets - 1x daily - 7x weekly   Shoulder Internal Rotation Reactive Isometrics - 10 reps - 2 sets - 1x daily - 7x weekly   Shoulder Extension with Resistance - 10 reps - 2 sets - 1x daily - 7x weekly   Standing Shoulder Row with Anchored Resistance - 10 reps - 2 sets - 1x daily - 7x weekly   Scaption Wall Slide with Towel - 10 reps - 2 sets - 1x daily - 7x weekly   Supine Shoulder Flexion with Dowel - 10 reps - 1x daily - 7x weekly   Supine Shoulder Abduction AAROM with Dowel - 10 reps - 1x daily - 7x weekly         ASSESSMENT                                                                                                                 Continues to remain more symptomatic with anti-gravity left shoulder abduction but fair tolerance with supine abduction. Requires education and instruction to continue to move within pain-free ranges for all exercises. Patient will continue to benefit from physical therapy to achieve all established long term goals.      Pain/symptoms after session: 0  Patient Education:   Results of above outlined education: Verbalizes understanding and  Demonstrates understanding   PLAN                                                                                                                             Suggestions for next session as indicated: Progress per plan of care, continue to progress range/strength as tolerated.        Procedures and total treatment time documented Time Entry flowsheet.

## 2020-02-28 NOTE — PROGRESS NOTES
Visit Vitals /51 Pulse 80 Temp 96.8 °F (36 °C) (Oral) Resp 12 Ht 5' 1\" (1.549 m) Wt 129 lb (58.5 kg) SpO2 97% BMI 24.37 kg/m² Gonzalo Bautista presents today for Chief Complaint Patient presents with [address] [facility] Annual Wellness Visit [facility] Follow Up Is someone accompanying this pt? yes Is the patient using any DME equipment during [address]? no 
 
Depression Screening: 
3 most recent PHQ Screens 2/11/2020 Little interest or pleasure in doing things Not at all Feeling down, depressed, irritable, or hopeless Not at all Total Score PHQ 2 0 Trouble falling or staying asleep, or sleeping too much - Feeling tired or having little energy - Poor appetite, weight loss, or overeating - Feeling bad about yourself - or that you are a failure or have let yourself or your family down - Trouble concentrating on things such as school, work, reading, or watching TV - Moving or speaking so slowly that other people could have noticed; or the opposite being so fidgety that others notice - Thoughts of being better off dead, or hurting yourself in some way -  
PHQ 9 Score - How difficult have these problems made it for you to do your work, take care of your home and get along with others - Learning Assessment: 
Learning Assessment 2/11/2019 PRIMARY LEARNER Patient HIGHEST LEVEL OF EDUCATION - PRIMARY LEARNER  -  
CO-LEARNER CAREGIVER No  
PRIMARY LANGUAGE ENGLISH  
LEARNER PREFERENCE PRIMARY LISTENING  
ANSWERED BY patient RELATIONSHIP SELF Abuse Screening: 
Abuse Screening Questionnaire 7/26/2016 Do you ever feel afraid of your partner? Sheila Cooney Are you in a relationship with someone who physically or mentally threatens you? Sheila Cooney Is it safe for you to go home? Carey Lara Fall Risk Fall Risk Assessment, last 12 mths 2/11/2020 Able to walk? Yes Fall in past 12 months? No  
Fall with injury? -  
Number of falls in past 12 months - Fall Risk Score -  
 
 
 Health Maintenance reviewed and discussed and ordered per Provider. Health Maintenance Due Topic Date Due  
 DTaP/Tdap/Td series (1 - Tdap) 01/05/1944  Shingrix Vaccine Age 50> (1 of 2) 01/05/1983  MICROALBUMIN Q1  05/15/2019  Lipid Screen  05/15/2019  Medicare Yearly Exam  05/19/2019 Coordination of Care: 1. Have you been to the ER, urgent care clinic since your last visit? Hospitalized since your last visit? yes 2. Have you seen or consulted any other health care providers outside of the [address] [place] [address] since your last visit? Include any pap smears or colon screening.  no

## 2020-03-12 NOTE — PROGRESS NOTES
1. Have you been to an emergency room or urgent care clinic since your last visit? No    Hospitalized since your last visit? If yes, where, when, and reason for visit? NO  2. Have you seen or consulted any other health care providers outside of the [facility] since your last visit including any procedures, health maintenance items. If yes, where, when and reason for visit?  No

## 2020-03-12 NOTE — PROGRESS NOTES
Subjective:      Delta Ward is a 80 y.o. female who presents today for post op visit, status post hospitalization during which she had Debridement of skin and subcutaneous tissue of bilateral lower extremities. I observed her wounds post op prior to discharge and had been able to describe marked improvement from when I saw her in clinic pre admission. We set her up with home health and she follows today    Objective:     Visit Vitals  /62 (BP 1 Location: Left arm, BP Patient Position: Sitting)   Pulse 86   Resp 17   Ht 5' 1\" (1.549 m)   Wt 129 lb (58.5 kg)   SpO2 96%   BMI 24.37 kg/m²     Myself and our nurse were both completely impressed with the improvement in her legs!!! She had just a couple remnant wounds on each leg, greatly reduced from her initial visit  Swelling also greatly improved    Assessment:     Wound check/discharge visit. Plan:       ICD-10-CM ICD-9-CM    1. Bilateral leg edema R60.0 782.3    2. Venous (peripheral) insufficiency I87.2 459.81    3. Wound of left leg, sequela S81.802S 906.1    4. Wound of right leg, sequela S81.801S 906.1      No orders of the defined types were placed in this encounter. We suggested a few modifications in the wound care with more mild compression and alternate wound gel/adaptic and d/c profor wraps since so stable now  Will see her back in one month for a recheck at which point I believe she will be healed    Follow-up and Dispositions    · Return in about 1 month (around 4/12/2020). FERMIN James    Portions of this note have been entered using voice recognition software.

## 2020-03-12 NOTE — PROGRESS NOTES
Cleansed bilateral lower extremities with wound cleanser and gauze, patient tolerated well. Applied aquaphor to both legs and feet due to dry skin. Applied solosite wound gel and adaptic gauze to open areas of both legs and covered with ABD and wrapped legs with kerlix and coban, patient tolerated well. Will send new wound care orders to [facility] - INPATIENT.

## 2020-03-28 NOTE — TELEPHONE ENCOUNTER
Spoke with Denisa Walden, daughter. She voiced Dr. Graeme Mabry prescribed Spirolactone. Daughter unsure of dosage amount. Writer suggested contacting Dr. Graeme Mabry for refill. Daughter voiced she will contact pharmacy and they will send refill request to Dr. Graeme Mabry.

## 2020-03-28 NOTE — PROGRESS NOTES
Patient has graduated from the Transitions of Care Coordination  program on 3/28/20. Contacted scottie Horn for follow up. Daughter voiced patient is doing well. Daughter expressed concern about home health visits running out. Informed daughter referral does not  until . Advised daughter  that if there is an issue to contact PCP. Daughter  also voiced patient needs refill on Spirolactone. Verified pharmacy on file. Daughter denied any other needs/concerns at this time. Patient's symptoms are stable at this time. Patient/family has the ability to self-manage. Care management goals have been completed at this time. No further care transitions nurse follow up scheduled. Goals Addressed                 This Visit's Progress     COMPLETED: Attends follow-up appointments as directed. On track     Ensure provider appt is scheduled within 7 days post-discharge; : Caregiver aware of upcoming appts with PCP and vascular. Confirm patient attended post-discharge provider appt; Patient attended [facility] appt on  as scheduled    Complete post-visit call to confirm attendance and update care needs; Done         COMPLETED: Prevent complications post hospitalization. On track     Plan of Care:   Monitor X 4 weeks   Review/educate common or potential \"red flags\" of condition worsening; :Reviewed/educated caregiver on s/s of infection to monitor and report: redness, warmth at site(s), swelling, bleeding or pus-like drainage, chills, fever (> 100.5), low body temperature (<97.2), nausea/vomiting that prevents eating/drinking/taking medications, SOB, chest pain/pressure, palpitations, increased respirations, decreased urine output or any other new/concerning s/s.    Monitor lab results and symptoms, escalate to provider       Ensure DME in place and used as instructed      Review the Home Visit or Home Health documentation for coordinating care and goals; 3/28: Patient discharged from OT on 3/18 and PT on 3/24; still current with SN                 Patient has care transitions nurse contact information for any further questions, concerns, or needs.   Patient's upcoming visits:    Future Appointments   Date Time Provider Cecil Lyly   3/30/2020 To Be Determined KRYSTLE Diaz   4/2/2020 To Be Determined Gentry Saunders LPN [address] [facility]   5/8/2020  2:00 PM [facility] INFUSION NURSE 2 [facility]   5/20/2020  1:30 PM MYAH Ward-MO MK SCHED   6/9/2020  3:15 PM FERMIN Tovar 2VV MK SCHED   8/21/2020  2:20 PM Ebony Moss MD [address] [place] [address]

## 2020-04-06 NOTE — TELEPHONE ENCOUNTER
Isidra with [facility] called stating it looks like the patient might have pink eye. It is running, crusted up, and red.   Please call her back at [phone]

## 2020-04-09 NOTE — TELEPHONE ENCOUNTER
I called and left a message with the [facility] nurse to call back to let her know that the medication has been called into the pharmacy for the patients eye and her daughter Sagar Andino is a where that it has been sent in.

## 2020-04-16 NOTE — TELEPHONE ENCOUNTER
Had FERMIN Hernandez review wound culture results and antibiotic has been sent into pharmacy. Doxycycline 100 mg, take one tablet by mouth twice day for 14 days, quantity 28 with no refills.

## 2020-04-20 NOTE — TELEPHONE ENCOUNTER
Last seen 02/28/20  Next appt  05/20/20    Requested Prescriptions     Pending Prescriptions Disp Refills    SITagliptin (Januvia) 100 mg tablet 30 Tab 5

## 2020-04-28 PROBLEM — L97.909 VENOUS STASIS ULCER (HCC): Status: ACTIVE | Noted: 2020-01-01

## 2020-04-28 PROBLEM — I83.009 VENOUS STASIS ULCER (HCC): Status: ACTIVE | Noted: 2020-01-01

## 2020-04-28 NOTE — ED PROVIDER NOTES
EMERGENCY DEPARTMENT HISTORY AND PHYSICAL EXAM 
 
7:26 PM 
 
Date: 4/28/2020 Patient Name: Khanh Palacios History of Presenting Illness Chief Complaint Patient presents with  Leg Pain  
  bilateral  
 
History Provided By: Patient Location/Duration/Severity/Modifying factors Patient with PMHx of T2DM, HLD, Aortic stenosis, HTN and osteoporosis reports bilateral LE pain worsening for a little over two weeks that is severe with no modifying factors or associated symptoms. She had a wound debridement in early Feb and was D/Cd on bactrim. After finishing that course she was seen in follow up about 1 month later and was apparently doing much better at that time. She was told to follow up in another month. At that time her wound care nurse called in for concern for worsening symptoms and a wound culture was taken that grew staph. She was started on doxycycline that she is now on day 10/14 for with no improvement. She denies any systemic symptoms such as SOB/fevers/chills. She reports being compliant with medications including both courses of antibiotics. She says she has been walking at least 150 steps daily until very recently 2/2 pain. PCP: Collins Dejesus NP Current Facility-Administered Medications Medication Dose Route Frequency Provider Last Rate Last Dose  
 0.9% sodium chloride infusion 250 mL  250 mL IntraVENous PRN Leann Gupta MD      
 
Current Outpatient Medications Medication Sig Dispense Refill  DOXYCYCLINE MONOHYDRATE PO Take 100 mg by mouth two (2) times a day. 1 pill po twice daily  SITagliptin (Januvia) 100 mg tablet take 1 tablet by mouth every morning 30 Tab 4  
 doxycycline (MONODOX) 100 mg capsule Take 1 Cap by mouth two (2) times a day. 28 Cap 0  
 ciprofloxacin HCl (CILOXAN) 0.3 % ophthalmic solution Administer 1 Drop to left eye every two (2) hours.     
 ciprofloxacin HCl (CILOXAN) 0.3 % ophthalmic solution 1-2 drops into the affected eye every 2 hours while awake x 2 days and 1-2 drops every 4 hours while awake x 5 days 5 mL 0  
 sertraline (ZOLOFT) 25 mg tablet take 1 tablet by mouth once daily 90 Tab 3  pravastatin (PRAVACHOL) 40 mg tablet take 1 tablet by mouth at bedtime 90 Tab 3  
 busPIRone (BUSPAR) 5 mg tablet 1 tablet twice per day 60 Tab 2  
 spironolactone (ALDACTONE PO) Take 1 Tab by mouth daily.  ferrous sulfate 325 mg (65 mg iron) tablet Take 1 Tab by mouth daily (with breakfast). 30 Tab 0  
 magnesium oxide (MAG-OX) 400 mg tablet Take 1 Tab by mouth daily. 30 Tab 0  
 multivitamin (ONE A DAY) tablet Take 1 Tab by mouth daily.  RABEprazole (ACIPHEX) 20 mg tablet take 1 tablet by mouth once daily 30 Tab 5  furosemide (LASIX) 40 mg tablet take 1 tablet by mouth once daily (Patient taking differently: Take 40 mg by mouth daily.) 90 Tab 3  
 naproxen sodium (ALEVE PO) Take 220 mg by mouth two (2) times daily as needed for Pain (For pain). 1 pill po 2 times daily for pain prn  loperamide (IMODIUM) 2 mg capsule Take 1 mg by mouth Three (3) times a week.  aspirin delayed-release 81 mg tablet Take 81 mg by mouth daily. 1 pill po daily Past History Past Medical History: 
Past Medical History:  
Diagnosis Date  Aortic valve stenosis, mild April 2014 EF 43-87%, Grade 1 diastolic dysfunction, mild aortic stenosis,  mean gradient 10 mm Hg  CAD (coronary artery disease) aortic stenosis  Cardiac echocardiogram 06/2018 EF 55-60%. No RWMA. Mild-mod LVH. Severe LAE. Mild AS.  Cardiac nuclear imaging test 08/27/2008 No evidence of ischemia or prior scarring. EF 62%. No WMA. No significant change from study of 3/23/06.  Cardiovascular RLE venous duplex 12/01/2016 Right leg:  No DVT.  Carotid duplex 04/24/2013 Mild 3-39% LICA stenosis.  DM (diabetes mellitus) [address]  Dyslipidemia  [facility]  HTN (hypertension)  Mixed hyperlipidemia  Sciatica  Traumatic closed displaced fracture of acromial end of clavicle, left, initial encounter Past Surgical History: 
Past Surgical History:  
Procedure Laterality Date  HX APPENDECTOMY [address]  HX OTHER SURGICAL Right 2010  
 ankle surgery  HX TUMOR REMOVAL Left   
 ovarian  HX UROLOGICAL    
 kidney stone removal  
 
 
Family History: 
Family History Problem Relation Age of Onset  Stroke Mother  Heart Disease Mother  Lung Disease Father  Cancer Neg Hx  Diabetes Neg Hx  Hypertension Neg Hx Social History: 
Social History Tobacco Use  Smoking status: Never Smoker  Smokeless tobacco: Never Used Substance Use Topics  Alcohol use: No  
 Drug use: No  
 
 
Allergies: 
No Known Allergies Review of Systems Review of Systems Constitutional: Negative for chills and fever. HENT: Negative for congestion and sore throat. Eyes: Negative for discharge. Respiratory: Negative for cough and shortness of breath. Cardiovascular: Negative for chest pain and palpitations. Gastrointestinal: Negative for abdominal pain, nausea and vomiting. Endocrine: Positive for cold intolerance. Genitourinary: Negative for dysuria and urgency. Musculoskeletal: Negative for arthralgias, myalgias and neck pain. Skin: Positive for color change and wound. Negative for pallor. Allergic/Immunologic: Negative for environmental allergies. Neurological: Negative for dizziness and headaches. Hematological: Negative for adenopathy. Psychiatric/Behavioral: Negative for agitation. Physical Exam  
 
Visit Vitals /49 Pulse 75 Temp 98.4 °F (36.9 °C) Resp 18 Ht 5' 2\" (1.575 m) Wt 59 kg (130 lb) SpO2 98% BMI 23.78 kg/m² Physical Exam 
Constitutional:   
   Appearance: Normal appearance. She is well-developed. HENT:  
   Head: Normocephalic and atraumatic. Eyes: General:     
   Right eye: No discharge. Left eye: No discharge. Extraocular Movements: Extraocular movements intact. Conjunctiva/sclera: Conjunctivae normal.  
Neck: Musculoskeletal: Normal range of motion and neck supple. Cardiovascular:  
   Rate and Rhythm: Normal rate and regular rhythm. Heart sounds: Normal heart sounds. Pulmonary:  
   Effort: Pulmonary effort is normal. No respiratory distress. Breath sounds: Normal breath sounds. No wheezing or rales. Abdominal:  
   General: Bowel sounds are normal. There is no distension. Palpations: Abdomen is soft. Tenderness: There is no abdominal tenderness. There is no rebound. Genitourinary: 
   Rectum: Guaiac result negative. Musculoskeletal: Normal range of motion. Comments: R leg swollen more than L, Left foot swollen more than R. No tenderness in areas without wounds. Skin: 
   General: Skin is warm and dry. Comments: Photographs in media section of chart taken by wound care nurse today and on the 20th. Very tender when removing dressings from wounds. ~6 cm ulcers ~2 on each leg with no surrounding erythema but purulence centrally. Neurological:  
   Mental Status: She is alert and oriented to person, place, and time. Deep Tendon Reflexes: Reflexes are normal and symmetric. Comments: Speech very quiet and tremulous, patient needs time to get words out, occasionally she will hit her stride and loudly say a few words. Diagnostic Study Results Labs - Recent Results (from the past 12 hour(s)) CBC WITH AUTOMATED DIFF Collection Time: 04/28/20  4:55 PM  
Result Value Ref Range WBC 6.2 4.6 - 13.2 K/uL  
 RBC 1.51 (L) 4.20 - 5.30 M/uL HGB 4.1 (LL) 12.0 - 16.0 g/dL HCT 12.2 (LL) 35.0 - 45.0 % MCV 80.8 74.0 - 97.0 FL  
 MCH 27.2 24.0 - 34.0 PG  
 MCHC 33.6 31.0 - 37.0 g/dL  
 RDW 16.3 (H) 11.6 - 14.5 % PLATELET 40 (L) 308 - 420 K/uL MPV 12.1 (H) 9.2 - 00.8 FL  
METABOLIC PANEL, BASIC Collection Time: 04/28/20  4:55 PM  
Result Value Ref Range Sodium 133 (L) 136 - 145 mmol/L Potassium 4.5 3.5 - 5.5 mmol/L Chloride 100 100 - 111 mmol/L  
 CO2 24 21 - 32 mmol/L Anion gap 9 3.0 - 18 mmol/L Glucose 257 (H) 74 - 99 mg/dL BUN 36 (H) 7.0 - 18 MG/DL Creatinine 1.37 (H) 0.6 - 1.3 MG/DL  
 BUN/Creatinine ratio 26 (H) 12 - 20 GFR est AA 44 (L) >60 ml/min/1.73m2 GFR est non-AA 36 (L) >60 ml/min/1.73m2 Calcium 9.1 8.5 - 10.1 MG/DL  
PTT Collection Time: 04/28/20  4:55 PM  
Result Value Ref Range aPTT 26.6 23.0 - 36.4 SEC PROTHROMBIN TIME + INR Collection Time: 04/28/20  4:55 PM  
Result Value Ref Range Prothrombin time 14.6 11.5 - 15.2 sec INR 1.2 0.8 - 1.2 NT-PRO BNP Collection Time: 04/28/20  4:55 PM  
Result Value Ref Range NT pro-BNP 5,549 (H) 0 - 1,800 PG/ML  
C REACTIVE PROTEIN, QT Collection Time: 04/28/20  4:55 PM  
Result Value Ref Range C-Reactive protein 18.5 (H) 0 - 0.3 mg/dL OCCULT BLOOD, STOOL Collection Time: 04/28/20  6:10 PM  
Result Value Ref Range Occult blood, stool Negative NEG Radiologic Studies -  
XR TIB/FIB RT Final Result IMPRESSION:  
1. No evidence of osteomyelitis. Diffuse soft tissue edema. XR CHEST SNGL V    (Results Pending) Medical Decision Making I am the first provider for this patient. I reviewed the vital signs, available nursing notes, past medical history, past surgical history, family history and social history. Vital Signs-Reviewed the patient's vital signs. Records Reviewed: Nursing Notes and Old Medical Records (Time of Review: 7:26 PM) 
 
ED Course: Progress Notes, Reevaluation, and Consults: 
  
Provider Notes (Medical Decision Making): MDM Number of Diagnoses or Management Options Venous stasis ulcers of both lower extremities ([address]):  
 Diagnosis management comments: Patient with class VI chronic venous insufficiency of bilateral lower extremities with numerous ulcerations of bilateral calves that were improving after initial debridement by vascular Feb 2020 that are now failing outpatient wound care and antibiotic therapy evidenced by pictures in chart from wound care nurse and worsening symptoms. She also has an REFUGIO with Cr 1.37 up from baseline ~0.8. Were informed that initial result of hemoglobin 4 was due to lab error Hgb repeated Discussed case with vascular who said she should have had repeat debridement already and they would see her tomorrow AM to ellie pt. Discussed case with Hospitalist Davide who agreed to admit patient and suggested starting vancomycin to cover MRSA. Ddx: DVT less likely with no tenderness outside wounds Wound cultures gave been sent Arterial ulcer less likely with documented vascular surgery evaluation of venous cause CHF exascerbation may contribute to swelling and venous stasis but less likely with patient with no respiratory complaints and good functional status ATTENDING ATTESTATION: 
Patient with b/l non healing wounds. I personally saw and examined the patient. I have reviewed and agree with the residents findings, including all diagnostic interpretations, and plans as written. I was present during the key portions of separately billed procedures. Pooja Rowe MD 
 
Procedures Critical Care Time:  
 
Diagnosis Clinical Impression: No diagnosis found. Disposition: Admit Follow-up Information None Patient's Medications Start Taking No medications on file Continue Taking ASPIRIN DELAYED-RELEASE 81 MG TABLET    Take 81 mg by mouth daily. 1 pill po daily BUSPIRONE (BUSPAR) 5 MG TABLET    1 tablet twice per day  CIPROFLOXACIN HCL (CILOXAN) 0.3 % OPHTHALMIC SOLUTION    1-2 drops into the affected eye every 2 hours while awake x 2 days and 1-2 drops every 4 hours while awake x 5 days CIPROFLOXACIN HCL (CILOXAN) 0.3 % OPHTHALMIC SOLUTION    Administer 1 Drop to left eye every two (2) hours. DOXYCYCLINE (MONODOX) 100 MG CAPSULE    Take 1 Cap by mouth two (2) times a day. DOXYCYCLINE MONOHYDRATE PO    Take 100 mg by mouth two (2) times a day. 1 pill po twice daily FERROUS SULFATE 325 MG (65 MG IRON) TABLET    Take 1 Tab by mouth daily (with breakfast). FUROSEMIDE (LASIX) 40 MG TABLET    take 1 tablet by mouth once daily LOPERAMIDE (IMODIUM) 2 MG CAPSULE    Take 1 mg by mouth Three (3) times a week. MAGNESIUM OXIDE (MAG-OX) 400 MG TABLET    Take 1 Tab by mouth daily. MULTIVITAMIN (ONE A DAY) TABLET    Take 1 Tab by mouth daily. NAPROXEN SODIUM (ALEVE PO)    Take 220 mg by mouth two (2) times daily as needed for Pain (For pain). 1 pill po 2 times daily for pain prn PRAVASTATIN (PRAVACHOL) 40 MG TABLET    take 1 tablet by mouth at bedtime RABEPRAZOLE (ACIPHEX) 20 MG TABLET    take 1 tablet by mouth once daily SERTRALINE (ZOLOFT) 25 MG TABLET    take 1 tablet by mouth once daily SITAGLIPTIN (JANUVIA) 100 MG TABLET    take 1 tablet by mouth every morning SPIRONOLACTONE (ALDACTONE PO)    Take 1 Tab by mouth daily. These Medications have changed No medications on file Stop Taking No medications on file Sharyle Cong, MD, PGY1 [address] [facility] Emergency Department

## 2020-04-28 NOTE — TELEPHONE ENCOUNTER
Patient's home health nurse, Salvador, called and stated that patient's wounds to bilateral lower legs have become much worse since last week and patient is now having 10/10 pain. She states there are red dot like areas going up both legs as well. She states patient is still taking antibiotic. Reviewed with FERMIN Rojas and advised for patient to go to ED. She stated she understood.

## 2020-04-29 NOTE — PROGRESS NOTES
conducted an initial consultation and Spiritual Assessment for Ryan Cool, who is a 80 y.o.,female. Patients Primary Language is: [place]. According to the patients EMR Synagogue Affiliation is: [employer].  
 
The reason the Patient came to the hospital is:  
Patient Active Problem List  
 Diagnosis Date Noted  [facility] 04/28/2020  Eschar of lower leg 02/11/2020  Bilateral lower leg cellulitis 02/11/2020  Age related osteoporosis 11/01/2019  Type 2 diabetes with nephropathy ([facility] 07/24/2018  Diabetes mellitus without complication ([facility] 07/70/8174  AS (aortic stenosis) 11/14/2016  URI (upper respiratory infection) 11/15/2015  Decubitus ulcer of buttock, stage 1 11/15/2015  Mixed hyperlipidemia  Dependent edema 10/14/2015  
 HTN (hypertension) 10/13/2014  DM (diabetes mellitus) ([facility]  Dyslipidemia  Aortic valve stenosis, mild 04/01/2014 The  provided the following Interventions: 
Initiated a relationship of care and support. Explored issues of kristine, belief, spirituality and Yazidi/ritual needs while hospitalized. Listened empathically. Provided chaplaincy education. Provided information about Spiritual Care Services. Offered prayer and assurance of continued prayers on patient's behalf. Chart reviewed. The following outcomes where achieved: 
Patient shared limited information about both their medical narrative and spiritual journey/beliefs. Patient processed feeling about current hospitalization. Patient expressed gratitude for 's visit. Assessment: 
Patient does not have any Yazidi/cultural needs that will affect patients preferences in health care. There are no spiritual or Yazidi issues which require intervention at this time. Plan: 
Chaplains will continue to follow and will provide pastoral care on an as needed/requested basis.  recommends bedside caregivers page  on duty if patient shows signs of acute spiritual or emotional distress. [address] [facility] Spiritual Care  
[phone]

## 2020-04-29 NOTE — INTERDISCIPLINARY ROUNDS
Interdisciplinary Round Note Patient Information: Patient Information: Rodolfo Teixeira [address] [facility]. Reason for Admission: Venous stasis ulcer ([facility] [address] [I83.009, L97.298] Attending Provider:   Cristal Laws MD 
 Past Medical History:  
Past Medical History:  
Diagnosis Date  Aortic valve stenosis, mild April 2014 EF 86-45%, Grade 1 diastolic dysfunction, mild aortic stenosis,  mean gradient 10 mm Hg  CAD (coronary artery disease) aortic stenosis  Cardiac echocardiogram 06/2018 EF 55-60%. No RWMA. Mild-mod LVH. Severe LAE. Mild AS.  Cardiac nuclear imaging test 08/27/2008 No evidence of ischemia or prior scarring. EF 62%. No WMA. No significant change from study of 3/23/06.  Cardiovascular RLE venous duplex 12/01/2016 Right leg:  No DVT.  Carotid duplex 04/24/2013 Mild 3-07% LICA stenosis.  DM (diabetes mellitus) ([facility] [address]  Dyslipidemia  Heart failure ([facility] [address]  HTN (hypertension)  Mixed hyperlipidemia  Sciatica  Traumatic closed displaced fracture of acromial end of clavicle, left, initial encounter Hospital day: 1 Estimated discharge date: 3-4 days RRAT Score: High Risk   
      
 29 Total Score 3 Has Seen PCP in Last 6 Months (Yes=3, No=0)  
 4 IP Visits Last 12 Months (1-3=4, 4=9, >4=11) 5 Pt. Coverage (Medicare=5 , Medicaid, or Self-Pay=4) 17 Charlson Comorbidity Score (Age + Comorbid Conditions) Criteria that do not apply:  
 . Living with Significant Other. Assisted Living. LTAC. SNF. or  
Rehab Patient Length of Stay (>5 days = 3) Goals for Today: wounc care, vasculare consult, ID consult VITAL SIGNS Vitals:  
 04/28/20 2258 04/29/20 0033 04/29/20 4949 04/29/20 9434 BP: 131/63 130/67 109/63 120/61 Pulse: 89 82 74 80 Resp: 18 19 18 20 Temp: 98.2 °F (36.8 °C) 98.8 °F (37.1 °C) 98.8 °F (37.1 °C) 97.8 °F (36.6 °C) SpO2: 90% 98% 97% 98% Weight: 61.2 kg (135 lb)  63.5 kg (140 lb) Height:      
 
 
 
 Lines, Drains, & Airways Peripheral IV 65/75/08 Right Cephalic-Site Assessment: Clean, dry, & intact Peripheral IV 04/28/20 Left Antecubital-Site Assessment: Clean, dry, & intact VTE Prophylaxis Intake and Output:  
No intake/output data recorded. 04/29 0701 - 04/29 1900 In: 240 [P.O.:240] Out: -  Current Diet: DIET DIABETIC CONSISTENT CARB Regular; 2 GM NA (House Low NA) Abdominal  
Last Bowel Movement Date: 04/28/20(Small BM per patient) Abdominal Assessment: Distended Bowel Sounds: Active Recent Glucose Results:  
Lab Results Component Value Date/Time  (H) 04/29/2020 01:58 AM  
  (H) 04/28/2020 04:55 PM  
 GLUCPOC 138 (H) 04/29/2020 06:38 AM  
 
 
  
IV Antibiotics? yes Activity Level: Activity Level: Up with Assistance Needs assistance with ADLs: yes PT Consult Status: NO Current Immunizations: 
Immunization History Administered Date(s) Administered  Influenza High Dose Vaccine PF 09/12/2017  Influenza Vaccine 10/01/2015, 09/01/2016  Influenza Vaccine (Quadrivalent) 10/13/2019  Influenza Vaccine (Tri) Adjuvanted 09/02/2018  Pneumococcal Conjugate (PCV-13) 05/17/2017  Pneumococcal Polysaccharide (PPSV-23) 04/18/2013, 09/10/2014, 10/01/2015, 09/02/2018  Td 10/12/2018  Zoster Vaccine, Live 01/17/2012 Recommendations:  
Discharge Disposition: Home with Home Health Medication Reconciliation Completed: YES Cardiac/Pulmonary Rehab Ordered:  NO 
 
Needs for Discharge: [facility] VS SNF Recommendations from IDR team: active with [facility].

## 2020-04-29 NOTE — CONSULTS
Infectious Disease Consultation Note Reason: Infected bilateral lower extremity ulcers Current abx Prior abx Pip/tazo, vancomycin since 4/28/20 Lines:  
 
 
Assessment : 
 
 
80 y. o. female  With hx of HTN, CAD, CHF, type 2 DM (last hgbA1C 7.4 on 4/29/20), hyperlipidemia and venous insufficiency, MRSA, aortic stenosis presented to the ED on  4/28/20 for admission due to bilat LE cellulitis and ulcerations. 
  
Wound cultures 5/2018- acinetobacter, mrsa 12/2018- enterobacter, mrsa; 1/2019- mrsa Hospitalization February 2020 for bilateral infected lower extremity ulcers status post incision and debridement 2/14/2020 
  
Clinical presentation c/w bilateral infected LE ulcers, cellulitis superimposed on nonhealing ulcers secondary to chronic venous stasis. 
  
Evidence of superficial necrosis noted on several ulcers on both lower extremities. No significant erythema both lower extremities. No purulent drainage. Wound culture 4/28/2020 pending 
  
  
RECOMMENDATIONS: 
  
1. continue pip/tazo, vancomycin. 2. F/u vascular surgery recommendations 3. Follow-up the results of wound culture and modify antibiotics as needed 4. Needs good Wound care bilateral LE ulcers per vascular surgery team to aid healing 5. Unfortunately, patient is  at high risk for recurrent infection of the lower extremity ulcers if the ulcers do not heal due to chronic venous stasis 
  
Above plan was discussed in details with patient, dr. Von Bahena. Please call me if any further questions or concerns. Will continue to participate in the care of this patient. Thank you for consultation request.  
 
 
HPI: 
 
80 y. o. female  With hx of HTN, CAD, CHF, MRSA, with results type 2 DM (last hgbA1C 7.4 on 4/29/20), hyperlipidemia and venous insufficiency,  aortic stenosis presented to the ED on  4/28/20 for admission due to bilat LE cellulitis and ulcerations. [facility] She was admitted to the hospital in February 2020 for infected bilateral lower extremity ulceration. she had debridement of her lower extremity venous ulcers -no intraoperative cultures were sent. I was consulted for further recommendations. I recommended to discharge patient on oral Bactrim based on her prior wound cultures. She was doing well and has home health nursing services for wound care until was noted to have increasing pain and poor healing 2 weeks ago. She was started on oral doxycycline 2 weeks ago by vascular clinic, however her symptoms fail to improve. There was no report of fever, nausea, vomiting or other symptoms. She was sent to the ED where she received IV vancomycin and was admitted for further management. Vascular surgery was consulted by the ED and would likely consider debridement. Patient is unable to provide additional history. Past Medical History:  
Diagnosis Date  Aortic valve stenosis, mild April 2014 EF 03-20%, Grade 1 diastolic dysfunction, mild aortic stenosis,  mean gradient 10 mm Hg  CAD (coronary artery disease) aortic stenosis  Cardiac echocardiogram 06/2018 EF 55-60%. No RWMA. Mild-mod LVH. Severe LAE. Mild AS.  Cardiac nuclear imaging test 08/27/2008 No evidence of ischemia or prior scarring. EF 62%. No WMA. No significant change from study of 3/23/06.  Cardiovascular RLE venous duplex 12/01/2016 Right leg:  No DVT.  Carotid duplex 04/24/2013 Mild 6-03% LICA stenosis.  DM (diabetes mellitus) ([address]  Dyslipidemia  Heart failure ([address]  HTN (hypertension)  Mixed hyperlipidemia  Sciatica  Traumatic closed displaced fracture of acromial end of clavicle, left, initial encounter Past Surgical History:  
Procedure Laterality Date  HX APPENDECTOMY [address]  HX OTHER SURGICAL Right 2010  
 ankle surgery  HX TUMOR REMOVAL Left   
 ovarian  HX UROLOGICAL    
 kidney stone removal  
 
 
home Medication List  
 Details Cetirizine 10 mg cap Take 10 mg by mouth two (2) times a day. Indications: inflammation of the nose due to an allergy, a type of allergy that causes red and itchy skin called atopic dermatitis  
  
!! DOXYCYCLINE MONOHYDRATE PO Take 100 mg by mouth two (2) times a day. 1 pill po twice daily  
  
!! ciprofloxacin HCl (CILOXAN) 0.3 % ophthalmic solution Administer 1 Drop to left eye every two (2) hours. !! ciprofloxacin HCl (CILOXAN) 0.3 % ophthalmic solution 1-2 drops into the affected eye every 2 hours while awake x 2 days and 1-2 drops every 4 hours while awake x 5 days Qty: 5 mL, Refills: 0 Associated Diagnoses: Conjunctivitis, unspecified conjunctivitis type, unspecified laterality  
  
naproxen sodium (ALEVE PO) Take 220 mg by mouth two (2) times daily as needed for Pain (For pain). 1 pill po 2 times daily for pain prn SITagliptin (Januvia) 100 mg tablet take 1 tablet by mouth every morning 
Qty: 30 Tab, Refills: 4  
  
!! doxycycline (MONODOX) 100 mg capsule Take 1 Cap by mouth two (2) times a day. Qty: 28 Cap, Refills: 0  
  
sertraline (ZOLOFT) 25 mg tablet take 1 tablet by mouth once daily 
Qty: 90 Tab, Refills: 3 Associated Diagnoses: Mild depression ([address] pravastatin (PRAVACHOL) 40 mg tablet take 1 tablet by mouth at bedtime 
Qty: 90 Tab, Refills: 3  
  
busPIRone (BUSPAR) 5 mg tablet 1 tablet twice per day 
Qty: 60 Tab, Refills: 2  
  
spironolactone (ALDACTONE PO) Take 1 Tab by mouth daily. ferrous sulfate 325 mg (65 mg iron) tablet Take 1 Tab by mouth daily (with breakfast). Qty: 30 Tab, Refills: 0  
  
magnesium oxide (MAG-OX) 400 mg tablet Take 1 Tab by mouth daily. Qty: 30 Tab, Refills: 0  
  
multivitamin (ONE A DAY) tablet Take 1 Tab by mouth daily. RABEprazole (ACIPHEX) 20 mg tablet take 1 tablet by mouth once daily 
Qty: 30 Tab, Refills: 5  
  
furosemide (LASIX) 40 mg tablet take 1 tablet by mouth once daily 
Qty: 90 Tab, Refills: 3  
  
loperamide (IMODIUM) 2 mg capsule Take 1 mg by mouth Three (3) times a week. aspirin delayed-release 81 mg tablet Take 81 mg by mouth daily. 1 pill po daily Associated Diagnoses: HTN (hypertension); Aortic stenosis Current Facility-Administered Medications Medication Dose Route Frequency  insulin lispro (HUMALOG) injection   SubCUTAneous AC&HS  
 glucose chewable tablet 16 g  4 Tab Oral PRN  
 glucagon (GLUCAGEN) injection 1 mg  1 mg IntraMUSCular PRN  
 dextrose 10% infusion 25-50 mL  25-50 mL IntraVENous PRN  
 vancomycin (VANCOCIN) 750 mg in  mL infusion  750 mg IntraVENous Q18H  
 0.9% sodium chloride infusion 250 mL  250 mL IntraVENous PRN  
 aspirin delayed-release tablet 81 mg  81 mg Oral DAILY  loperamide (IMODIUM) 1 mg/5 mL oral solution 1 mg  1 mg Oral 3 times weekly  therapeutic multivitamin (THERAGRAN) tablet 1 Tab  1 Tab Oral DAILY  ferrous sulfate tablet 325 mg  325 mg Oral DAILY WITH BREAKFAST  magnesium oxide (MAG-OX) tablet 400 mg  400 mg Oral DAILY  pravastatin (PRAVACHOL) tablet 40 mg  40 mg Oral QHS  busPIRone (BUSPAR) tablet 5 mg  5 mg Oral BID  furosemide (LASIX) tablet 40 mg  40 mg Oral DAILY  pantoprazole (PROTONIX) tablet 40 mg  40 mg Oral ACB  sertraline (ZOLOFT) tablet 25 mg  25 mg Oral QPM  
 alogliptin (NESINA) tablet 12.5 mg  12.5 mg Oral DAILY  spironolactone (ALDACTONE) tablet 25 mg  25 mg Oral DAILY  piperacillin-tazobactam (ZOSYN) 2.25 g in 0.9% sodium chloride (MBP/ADV) 50 mL MBP  2.25 g IntraVENous Q6H  
 acetaminophen (TYLENOL) tablet 650 mg  650 mg Oral Q6H PRN  
 oxyCODONE-acetaminophen (PERCOCET) 5-325 mg per tablet 1 Tab  1 Tab Oral Q6H PRN Allergies: Patient has no known allergies. Family History Problem Relation Age of Onset  Stroke Mother  Heart Disease Mother  Lung Disease Father  Cancer Neg Hx  Diabetes Neg Hx  Hypertension Neg Hx Social History Socioeconomic History  Marital status:  Spouse name: Not on file  Number of children: Not on file  Years of education: Not on file  Highest education level: Not on file Occupational History  Not on file Social Needs  Financial resource strain: Not on file  Food insecurity Worry: Not on file Inability: Not on file  Transportation needs Medical: Not on file Non-medical: Not on file Tobacco Use  Smoking status: Never Smoker  Smokeless tobacco: Never Used Substance and Sexual Activity  Alcohol use: No  
 Drug use: No  
 Sexual activity: Not on file Lifestyle  Physical activity Days per week: Not on file Minutes per session: Not on file  Stress: Not on file Relationships  Social connections Talks on phone: Not on file Gets together: Not on file Attends Quaker service: Not on file Active member of club or organization: Not on file Attends meetings of clubs or organizations: Not on file Relationship status: Not on file  Intimate partner violence Fear of current or ex partner: Not on file Emotionally abused: Not on file Physically abused: Not on file Forced sexual activity: Not on file Other Topics Concern  Not on file Social History Narrative  Not on file Social History Tobacco Use Smoking Status Never Smoker Smokeless Tobacco Never Used Temp (24hrs), Av.4 °F (36.9 °C), Min:97.8 °F (36.6 °C), Max:98.8 °F (37.1 °C) Visit Vitals /61 (BP 1 Location: Right arm, BP Patient Position: At rest) Pulse 80 Temp 97.8 °F (36.6 °C) Resp 20 Ht 5' 2\" (1.575 m) Wt 63.5 kg (140 lb) SpO2 98% Comment: room air BMI 25.61 kg/m² ROS: 12 point ROS obtained in details. Pertinent positives as mentioned in HPI,  
otherwise negative Physical Exam: 
 
General: Well developed, well nourished female laying on the bed AAOx3 in no acute distress. 
  
General:   awake alert and oriented HEENT:  Normocephalic, atraumatic, PERRL, EOMI, no scleral icterus or pallor; no conjunctival hemmohage;  nasal and oral mucous are moist and without evidence of lesions. No thrush. Neck supple, no bruits. Lymph Nodes:   no cervical, axillary or inguinal adenopathy Lungs:   non-labored, bilaterally clear to auscultation- no crackles wheezes rales or rhonchi Heart:  RRR, s1 and s2; no  rubs or gallops, no edema, + pedal pulses Abdomen:  soft, non-distended, active bowel sounds, no hepatomegaly, no splenomegaly. Non-tender Genitourinary:  deferred Extremities:   no clubbing, cyanosis; no joint effusions or swelling; Full ROM of all large joints to the upper and lower extremities; muscle mass appropriate for age; multiple superficial ulcers on both lower extremity with some evidence of superficial necrosis and yellow scab; tenderness of both lower extremities right greater than the left, increased warmth right lower extremity Neurologic:  No gross focal sensory abnormalities; 5/5 muscle strength to upper and lower extremities. Speech appropriate. Cranial nerves intact Skin:   Multiple superficial bilateral ulcers with superficial necrosis on several ulcers Back:  no spinal or paraspinal muscle tenderness or rigidity, no CVA tenderness 
   
Psychiatric:  No suicidal or homicidal ideations, appropriate mood and affect  
  
 
 
Labs: Results:  
Chemistry Recent Labs  
  04/29/20 
0158 04/28/20 
1655 * 257* * 133* K 3.3* 4.5  
 100 CO2 23 24 BUN 33* 36* CREA 1.03 1.37* CA 8.5 9.1 AGAP 8 9 BUCR 32* 26* CBC w/Diff Recent Labs  
  04/28/20 
1859 04/28/20 
1655 WBC 12.7 6.2 RBC 2.78* 1.51* HGB 7.2* 4.1* HCT 22.3* 12.2*  
* 40* GRANS 88*  --   
LYMPH 8*  --   
EOS 0  --   
  
Microbiology Recent Labs  
  04/28/20 
1655 04/28/20 
1640 CULT NO GROWTH AFTER 12 HOURS NO GROWTH AFTER 12 HOURS  
  
 
 
RADIOLOGY: 
 
All available imaging studies/reports in [facility] for this admission were reviewed Dr. Terrance Barbosa, Infectious Disease Specialist 
[phone] April 29, 2020 
11:09 AM

## 2020-04-29 NOTE — CONSULTS
Rickey Jackson Chief Complaint Patient presents with  Leg Pain  
  bilateral  
 
 
History and Physical   
80year-old female admitted to the hospital with bilateral leg pain. She has a history of stasis ulcers that is been debrided by Dr. Jamal Low and had outpatient wound care. She had notified from her outpatient facility the pain was overwhelming Past Medical History:  
Diagnosis Date  Aortic valve stenosis, mild April 2014 EF 26-00%, Grade 1 diastolic dysfunction, mild aortic stenosis,  mean gradient 10 mm Hg  CAD (coronary artery disease) aortic stenosis  Cardiac echocardiogram 06/2018 EF 55-60%. No RWMA. Mild-mod LVH. Severe LAE. Mild AS.  Cardiac nuclear imaging test 08/27/2008 No evidence of ischemia or prior scarring. EF 62%. No WMA. No significant change from study of 3/23/06.  Cardiovascular RLE venous duplex 12/01/2016 Right leg:  No DVT.  Carotid duplex 04/24/2013 Mild 4-66% LICA stenosis.  DM (diabetes mellitus) ([address]  Dyslipidemia  Heart failure ([address]  HTN (hypertension)  Mixed hyperlipidemia  Sciatica  Traumatic closed displaced fracture of acromial end of clavicle, left, initial encounter Patient Active Problem List  
Diagnosis Code  DM (diabetes mellitus) ([address] E11.9  Dyslipidemia E78.5  Aortic valve stenosis, mild I35.0  
 HTN (hypertension) I10  
 Dependent edema R60.9  Mixed hyperlipidemia E78.2  
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  
 Age related osteoporosis M81.0  
 Eschar of lower leg R23.4  Bilateral lower leg cellulitis L03.116, L03.115  Venous stasis ulcer ([address] I83.009, L97.909 Past Surgical History:  
Procedure Laterality Date  HX [address]CTOMY [address]  HX OTHER SURGICAL Right 2010  
 ankle surgery  HX TUMOR REMOVAL Left   
 ovarian  HX UROLOGICAL    
 kidney stone removal  
 
Current Facility-Administered Medications Medication Dose Route Frequency Provider Last Rate Last Dose  insulin lispro (HUMALOG) injection   SubCUTAneous AC&HS Tarah Mayers MD   Stopped at 04/29/20 0730  
 glucose chewable tablet 16 g  4 Tab Oral PRN Tarah Mayers MD      
 glucagon (GLUCAGEN) injection 1 mg  1 mg IntraMUSCular PRN Cosme Cunningham MD      
 dextrose 10% infusion 25-50 mL  25-50 mL IntraVENous PRN Cosme Cunningham MD      
 vancomycin (VANCOCIN) 750 mg in  mL infusion  750 mg IntraVENous Q18H Cosme Cunningham MD      
 0.9% sodium chloride infusion 250 mL  250 mL IntraVENous PRN Sukh Ordonez MD      
 aspirin delayed-release tablet 81 mg  81 mg Oral DAILY Cosme Cunningham MD   81 mg at 04/29/20 9044  loperamide (IMODIUM) 1 mg/5 mL oral solution 1 mg  1 mg Oral 3 times weekly Tarah Mayers MD      
 therapeutic multivitamin [facility]) tablet 1 Tab  1 Tab Oral DAILY Tarah Mayers MD   1 Tab at 04/29/20 6101  ferrous sulfate tablet 325 mg  325 mg Oral DAILY WITH BREAKFAST Cosme Cunningham MD   325 mg at 04/29/20 9213  magnesium oxide (MAG-OX) tablet 400 mg  400 mg Oral DAILY Cosme Cunningham MD   400 mg at 04/29/20 5909  pravastatin (PRAVACHOL) tablet 40 mg  40 mg Oral QHS Cosme Cunningham MD   40 mg at 04/28/20 2328  busPIRone (BUSPAR) tablet 5 mg  5 mg Oral BID Jr MOLINA MD   5 mg at 04/29/20 3186  furosemide (LASIX) tablet 40 mg  40 mg Oral DAILY Cosme Cunningham MD   40 mg at 04/29/20 6105  pantoprazole (PROTONIX) tablet 40 mg  40 mg Oral ACB Cosme Cunningham MD   40 mg at 04/29/20 9403  sertraline (ZOLOFT) tablet 25 mg  25 mg Oral QPM Cosme Cunningham MD      
 alogliptin (NESINA) tablet 12.5 mg  12.5 mg Oral DAILY Cosme Cunningham MD   12.5 mg at 04/29/20 8443  spironolactone (ALDACTONE) tablet 25 mg  25 mg Oral DAILY Cosme Cunningham MD   25 mg at 04/29/20 1753  piperacillin-tazobactam (ZOSYN) 2.25 g in 0.9% sodium chloride (MBP/ADV) 50 mL MBP  2.25 g IntraVENous Q6H Comse Cunningham  mL/hr at 04/29/20 0504 2.25 g at 04/29/20 0504  acetaminophen (TYLENOL) tablet 650 mg  650 mg Oral Q6H PRN Cosme Cunningham MD      
 oxyCODONE-acetaminophen (PERCOCET) 5-325 mg per tablet 1 Tab  1 Tab Oral Q6H PRN Regis Huntley MD      
 
No Known Allergies Social History Socioeconomic History  Marital status:  Spouse name: Not on file  Number of children: Not on file  Years of education: Not on file  Highest education level: Not on file Occupational History  Not on file Social Needs  Financial resource strain: Not on file  Food insecurity Worry: Not on file Inability: Not on file  Transportation needs Medical: Not on file Non-medical: Not on file Tobacco Use  Smoking status: Never Smoker  Smokeless tobacco: Never Used Substance and Sexual Activity  Alcohol use: No  
 Drug use: No  
 Sexual activity: Not on file Lifestyle  Physical activity Days per week: Not on file Minutes per session: Not on file  Stress: Not on file Relationships  Social connections Talks on phone: Not on file Gets together: Not on file Attends Rastafarian service: Not on file Active member of club or organization: Not on file Attends meetings of clubs or organizations: Not on file Relationship status: Not on file  Intimate partner violence Fear of current or ex partner: Not on file Emotionally abused: Not on file Physically abused: Not on file Forced sexual activity: Not on file Other Topics Concern  Not on file Social History Narrative  Not on file Family History Problem Relation Age of Onset  Stroke Mother  Heart Disease Mother  Lung Disease Father  Cancer Neg Hx  Diabetes Neg Hx  Hypertension Neg Hx Review of Systems A full review of systems was completed times ten organ systems and was deemed negative unless otherwise mentioned in the HPI. Physical Exam:   
Visit Vitals /61 (BP 1 Location: Right arm, BP Patient Position: At rest) Pulse 80 Temp 97.8 °F (36.6 °C) Resp 20 Ht 5' 2\" (1.575 m) Wt 140 lb (63.5 kg) SpO2 98% Comment: room air BMI 25.61 kg/m² She was resting and easily awakened appears in no distress at this point Head is atraumatic Neck no JVD Chest is clear Cardiac regular Abdomen soft flat nontender Bilateral lower extremities has multiple superficial wounds without ischemic appearance, they appear dry Her feet are warm on her left she is very easily palpable pulses little harder to feel on the right but again warm and no the appearance of vascular deficit. Possible cellulitic appearance as well Impression and Plan: ICD-10-CM ICD-9-CM 1. Venous stasis ulcers of both lower extremities ([facility]) I83.019 459.81 I83.029 707.10 R90.988 L97.929 Agree with consult of wound care Pain control Antibiotics for cellulitis which is probably the cause of her pain Tomas Wilkerson MD 
 
PLEASE NOTE: 
This document has been produced using voice recognition software. Unrecognized errors in transcription may be present.

## 2020-04-29 NOTE — PROGRESS NOTES
Reason for Admission:  Venous stasis ulcer ([facility] [I83.009, L97.909] RRAT Score:    11 Plan for utilizing home health:    Already active with [facility] care Likelihood of Readmission:   LOW Transition of Care Plan:         
 
 
Initial assessment completed with patient and relative(s). Cognitive status of patient: disoriented at times. Patient VERY HARD OF HEARING. NO HEARING AIDS Face sheet information confirmed:  yes. The patient designates Anthony Wills [phone] to participate in her discharge plan and to receive any needed information. This patient lives in a single family home with patient and son. ONE LEVEL WITH ONLY ONE STEP TO GET IN. Patient is able to navigate steps as needed. Prior to hospitalization, patient was considered to be independent with ADLs/IADLS : yes . PATIENTS DAUGHTER IS BUILDING A HOME AND PATIENT WILL BE LIVING WITH HER COME June. Patient has a current ACP document on file: no The patient and daughter will be available to transport patient home upon discharge. The patient already has [facility], [address] [facility] chair,  medical equipment available in the home. Patient is currently active with home health. If active, agency name is [facility]. Patient has not stayed in a skilled nursing facility or rehab. This patient is on dialysis :no 
 
List of available Home Health agencies were provided and reviewed with the patient prior to discharge. Freedom of choice signed: no, for PATIENT ACTIVE WITH  [facility]. Currently, the discharge plan is Home with [address] [place] [address] Mejia Silverman. The patient states that she can obtain her medications from the pharmacy, and take her medications as directed. Patient's current insurance is [employer]ARE AND [employer] Care Management Interventions PCP Verified by CM: Yes Mode of Transport at Discharge: Self Transition of Care Consult (CM Consult): Home Health [facility] [place] [employer] - INPATIENT: Yes Current Support Network: Lives with [facility]) Confirm Follow Up Transport: Family The Plan for Transition of Care is Related to the Following Treatment Goals : Home HEalth The Patient and/or Patient Representative was Provided with a Choice of Provider and Agrees with the Discharge Plan?: Yes Freedom of Choice List was Provided with Basic Dialogue that Supports the Patient's Individualized Plan of Care/Goals, Treatment Preferences and Shares the Quality Data Associated with the Providers?: Yes Discharge Location Discharge Placement: Home with home health(Active with [facility]) Dilan Cardenas RN BSN Care Manager [phone]

## 2020-04-29 NOTE — PROGRESS NOTES
[facility] Hospitalist Group Progress Note Patient: Bong Greenwood Age: 80 y.o. : 1933 MR#: [id number] SSN: [id number] Date: 2020 Subjective:  
 
Patient states that on Friday she had a heaviness in her chest and she started her normal routine. She walked 50 steps to the mailbox and upon entering the front door that has one step, her right ankle twisted and she \"went down\" before getting into the house. Her step son and neighbor managed to get her in the house and her [address] came to the house and called her doctor who recommended that she be transported to the hospital for evaluation. Patient states that she no longer has heaviness in her chest.   
 
Patient states that she has multiple wounds on her BLE which were present before falling. She states that it is painful. She denies CP, SOB, n/v/d. Assessment/Plan:  
 
Chronic lower extremity venous stasis Bilateral lower extremity ulcers, with history of debridement 2 months ago Lower extremity cellulitis Chronic anemia DM 
HTN Mild aortic stenosis Consults: 
Vascular Surgery: Will provide wound care, pain control, abx per ID Infectious Disease: blood (no growth at 17 hr) and wound cultures (2+ GRAM POSITIVE COCCI, growth pending), continue zosyn and vancomycin, will adjust abx therapy based on results of cultures Monitor H&H Fecal occult blood testing: negative Monitor and transfuse pr for Hgb < 7.0 Sliding scale insulin Hypoglycemia protocol Continue home meds/diuretics and monitor Replete electrolytes prn 
  
Goals of care:  
Disposition:  [x]PT/OT ordered   [x] Case management referral 
 
Case discussed with:  [x]Patient  []Family  []Nursing  []Case Management DVT Prophylaxis:  []Lovenox  [x]Hep SQ  []SCDs  []Coumadin   []On Heparin gtt Objective:  
VS:  
Visit Vitals /55 (BP 1 Location: Right arm, BP Patient Position: At rest) Pulse 84 Temp 97.9 °F (36.6 °C) Resp 18 Ht 5' 2\" (1.575 m) Wt 63.5 kg (140 lb) SpO2 97% BMI 25.61 kg/m² Tmax/24hrs: Temp (24hrs), Av.3 °F (36.8 °C), Min:97.8 °F (36.6 °C), Max:98.8 °F (37.1 °C) Intake/Output Summary (Last 24 hours) at 2020 1509 Last data filed at 2020 7534 Gross per 24 hour Intake 240 ml Output  Net 240 ml General:  Awake, alert, NAD Cardiovascular: murmur present,  RRR, clicks or gallops Pulmonary: CTA bilaterally, normal respiratory effort GI: soft, NT, normal BS Extremities: multiple large wounds on BLE with red base and purulent centers with scant drainage. No edema or cyanosis Neuro: AAOx3 Current Facility-Administered Medications Medication Dose Route Frequency  insulin lispro (HUMALOG) injection   SubCUTAneous AC&HS  
 glucose chewable tablet 16 g  4 Tab Oral PRN  
 glucagon (GLUCAGEN) injection 1 mg  1 mg IntraMUSCular PRN  
 dextrose 10% infusion 25-50 mL  25-50 mL IntraVENous PRN  
 vancomycin (VANCOCIN) 750 mg in  mL infusion  750 mg IntraVENous Q18H  
 loperamide (IMODIUM) 1 mg/7.5 mL oral solution 1 mg  1 mg Oral 3 times weekly  0.9% sodium chloride infusion 250 mL  250 mL IntraVENous PRN  
 aspirin delayed-release tablet 81 mg  81 mg Oral DAILY  therapeutic multivitamin (THERAGRAN) tablet 1 Tab  1 Tab Oral DAILY  ferrous sulfate tablet 325 mg  325 mg Oral DAILY WITH BREAKFAST  magnesium oxide (MAG-OX) tablet 400 mg  400 mg Oral DAILY  pravastatin (PRAVACHOL) tablet 40 mg  40 mg Oral QHS  busPIRone (BUSPAR) tablet 5 mg  5 mg Oral BID  furosemide (LASIX) tablet 40 mg  40 mg Oral DAILY  pantoprazole (PROTONIX) tablet 40 mg  40 mg Oral ACB  sertraline (ZOLOFT) tablet 25 mg  25 mg Oral QPM  
 alogliptin (NESINA) tablet 12.5 mg  12.5 mg Oral DAILY  spironolactone (ALDACTONE) tablet 25 mg  25 mg Oral DAILY  piperacillin-tazobactam (ZOSYN) 2.25 g in 0.9% sodium chloride (MBP/ADV) 50 mL MBP  2.25 g IntraVENous Q6H  
  acetaminophen (TYLENOL) tablet 650 mg  650 mg Oral Q6H PRN  
 oxyCODONE-acetaminophen (PERCOCET) 5-325 mg per tablet 1 Tab  1 Tab Oral Q6H PRN Labs:   
Recent Results (from the past 24 hour(s)) CULTURE, BLOOD Collection Time: 04/28/20  4:40 PM  
Result Value Ref Range Special Requests: NO SPECIAL REQUESTS Culture result: NO GROWTH AFTER 17 HOURS    
CBC WITH AUTOMATED DIFF Collection Time: 04/28/20  4:55 PM  
Result Value Ref Range WBC 6.2 4.6 - 13.2 K/uL  
 RBC 1.51 (L) 4.20 - 5.30 M/uL HGB 4.1 (LL) 12.0 - 16.0 g/dL HCT 12.2 (LL) 35.0 - 45.0 % MCV 80.8 74.0 - 97.0 FL  
 MCH 27.2 24.0 - 34.0 PG  
 MCHC 33.6 31.0 - 37.0 g/dL  
 RDW 16.3 (H) 11.6 - 14.5 % PLATELET 40 (L) 392 - 420 K/uL MPV 12.1 (H) 9.2 - 91.0 FL  
METABOLIC PANEL, BASIC Collection Time: 04/28/20  4:55 PM  
Result Value Ref Range Sodium 133 (L) 136 - 145 mmol/L Potassium 4.5 3.5 - 5.5 mmol/L Chloride 100 100 - 111 mmol/L  
 CO2 24 21 - 32 mmol/L Anion gap 9 3.0 - 18 mmol/L Glucose 257 (H) 74 - 99 mg/dL BUN 36 (H) 7.0 - 18 MG/DL Creatinine 1.37 (H) 0.6 - 1.3 MG/DL  
 BUN/Creatinine ratio 26 (H) 12 - 20 GFR est AA 44 (L) >60 ml/min/1.73m2 GFR est non-AA 36 (L) >60 ml/min/1.73m2 Calcium 9.1 8.5 - 10.1 MG/DL  
PTT Collection Time: 04/28/20  4:55 PM  
Result Value Ref Range aPTT 26.6 23.0 - 36.4 SEC PROTHROMBIN TIME + INR Collection Time: 04/28/20  4:55 PM  
Result Value Ref Range Prothrombin time 14.6 11.5 - 15.2 sec INR 1.2 0.8 - 1.2 NT-PRO BNP Collection Time: 04/28/20  4:55 PM  
Result Value Ref Range NT pro-BNP 5,549 (H) 0 - 1,800 PG/ML  
SED RATE (ESR) Collection Time: 04/28/20  4:55 PM  
Result Value Ref Range Sed rate, automated 46 (H) 0 - 30 mm/hr C REACTIVE PROTEIN, QT Collection Time: 04/28/20  4:55 PM  
Result Value Ref Range C-Reactive protein 18.5 (H) 0 - 0.3 mg/dL CULTURE, BLOOD  Collection Time: 04/28/20  4:55 PM  
 Result Value Ref Range Special Requests: NO SPECIAL REQUESTS Culture result: NO GROWTH AFTER 17 HOURS    
CULTURE, WOUND W GRAM STAIN Collection Time: 04/28/20  5:40 PM  
Result Value Ref Range Special Requests: NO SPECIAL REQUESTS    
 GRAM STAIN 2+ GRAM POSITIVE COCCI Culture result: PENDING   
OCCULT BLOOD, STOOL Collection Time: 04/28/20  6:10 PM  
Result Value Ref Range Occult blood, stool Negative NEG    
TYPE & SCREEN Collection Time: 04/28/20  6:22 PM  
Result Value Ref Range Crossmatch Expiration 05/01/2020 ABO/Rh(D) O POSITIVE Antibody screen NEG   
CBC WITH AUTOMATED DIFF Collection Time: 04/28/20  6:59 PM  
Result Value Ref Range WBC 12.7 4.6 - 13.2 K/uL  
 RBC 2.78 (L) 4.20 - 5.30 M/uL HGB 7.2 (L) 12.0 - 16.0 g/dL HCT 22.3 (L) 35.0 - 45.0 % MCV 80.2 74.0 - 97.0 FL  
 MCH 25.9 24.0 - 34.0 PG  
 MCHC 32.3 31.0 - 37.0 g/dL  
 RDW 16.2 (H) 11.6 - 14.5 % PLATELET 311 (L) 779 - 420 K/uL MPV 12.1 (H) 9.2 - 11.8 FL  
 NEUTROPHILS 88 (H) 40 - 73 % LYMPHOCYTES 8 (L) 21 - 52 % MONOCYTES 4 3 - 10 % EOSINOPHILS 0 0 - 5 % BASOPHILS 0 0 - 2 %  
 ABS. NEUTROPHILS 11.0 (H) 1.8 - 8.0 K/UL  
 ABS. LYMPHOCYTES 1.1 0.9 - 3.6 K/UL  
 ABS. MONOCYTES 0.6 0.05 - 1.2 K/UL  
 ABS. EOSINOPHILS 0.1 0.0 - 0.4 K/UL  
 ABS. BASOPHILS 0.0 0.0 - 0.1 K/UL  
 DF AUTOMATED METABOLIC PANEL, BASIC Collection Time: 04/29/20  1:58 AM  
Result Value Ref Range Sodium 135 (L) 136 - 145 mmol/L Potassium 3.3 (L) 3.5 - 5.5 mmol/L Chloride 104 100 - 111 mmol/L  
 CO2 23 21 - 32 mmol/L Anion gap 8 3.0 - 18 mmol/L Glucose 132 (H) 74 - 99 mg/dL BUN 33 (H) 7.0 - 18 MG/DL Creatinine 1.03 0.6 - 1.3 MG/DL  
 BUN/Creatinine ratio 32 (H) 12 - 20 GFR est AA >60 >60 ml/min/1.73m2 GFR est non-AA 51 (L) >60 ml/min/1.73m2 Calcium 8.5 8.5 - 10.1 MG/DL  
HEMOGLOBIN A1C WITH EAG Collection Time: 04/29/20  1:58 AM  
Result Value Ref Range Hemoglobin A1c 7.4 (H) 4.2 - 5.6 % Est. average glucose 166 mg/dL GLUCOSE, POC Collection Time: 04/29/20  6:38 AM  
Result Value Ref Range Glucose (POC) 138 (H) 70 - 110 mg/dL GLUCOSE, POC Collection Time: 04/29/20 11:51 AM  
Result Value Ref Range Glucose (POC) 295 (H) 70 - 110 mg/dL Signed By: FERMIN Pardo  April 29, 2020 3:09 PM

## 2020-04-29 NOTE — H&P
Admission History and Physical 
 
Michaelle Lacy is a 80 y.o. female who is being admitted. Chief Complaint Patient presents with  Leg Pain  
  bilateral  
 
 
Impression/Plan An 80years old presented with bilateral lower extremity pain and worsening chronic bilateral leg ulcers over the last 2 weeks 
 
-Chronic lower extremity venous stasis -Bilateral lower extremity ulcers, with history of debridement 2 months ago 
-Lower extremity cellulitis The patient is admitted to [facility] Continue on IV vancomycin and Zosyn Blood culture and wound culture was collected in ED Vascular surgery was consulted Further management per vascular Pain control Wound care consult  
 
-Chronic anemia Hemoglobin noted at 7.2 Repeat in a.m. Fecal occult blood testing Monitor and transfuse pr for Hgb < 7.0 
 
-Diabetes mellitus Continue home meds Sliding scale insulin Hypoglycemia protocol -Hypertension Fair control Continue home meds/diuretics and monitor 
 
-Mild aortic stenosis per records Outpatient follow-up 
 
-Other medical problems as below Continue home medications and outpatient follow-up Admission plan was discussed with patient Heparin for DVT prophylaxis HPI:   
80years old with multiple medical problems including chronic venous stasis and lower extremity ulcers, chronic anemia, diabetes mellitus, hypertension, aortic stenosis and other medical problems as below who was admitted to the hospital 2 months ago where she had debridement of her lower extremity venous ulcers and was discharged on Bactrim. She was doing well and has home health nursing services for wound care until was noted to have increasing pain and poor healing 2 weeks ago. She was started on oral doxycycline 2 weeks ago by vascular clinic, however her symptoms fail to improve. There was no report of fever, nausea, vomiting or other symptoms.   She was sent to the ED where she received IV vancomycin and is being admitted for further management. Vascular surgery was consulted by the ED and would likely consider debridement. Patient is unable to provide additional history. Past Medical History:  
Diagnosis Date  Aortic valve stenosis, mild April 2014 EF 42-45%, Grade 1 diastolic dysfunction, mild aortic stenosis,  mean gradient 10 mm Hg  CAD (coronary artery disease) aortic stenosis  Cardiac echocardiogram 06/2018 EF 55-60%. No RWMA. Mild-mod LVH. Severe LAE. Mild AS.  Cardiac nuclear imaging test 08/27/2008 No evidence of ischemia or prior scarring. EF 62%. No WMA. No significant change from study of 3/23/06.  Cardiovascular RLE venous duplex 12/01/2016 Right leg:  No DVT.  Carotid duplex 04/24/2013 Mild 2-53% LICA stenosis.  DM (diabetes mellitus) ([address]  Dyslipidemia  Heart failure ([address]  HTN (hypertension)  Mixed hyperlipidemia  Sciatica  Traumatic closed displaced fracture of acromial end of clavicle, left, initial encounter Past Surgical History:  
Procedure Laterality Date  HX APPENDECTOMY [address]  HX OTHER SURGICAL Right 2010  
 ankle surgery  HX TUMOR REMOVAL Left   
 ovarian  HX UROLOGICAL    
 kidney stone removal  
 
Social history, she presented from home. Family History Problem Relation Age of Onset  Stroke Mother  Heart Disease Mother  Lung Disease Father  Cancer Neg Hx  Diabetes Neg Hx  Hypertension Neg Hx No Known Allergies Home Medications: No current facility-administered medications on file prior to encounter. Current Outpatient Medications on File Prior to Encounter Medication Sig Dispense Refill  DOXYCYCLINE MONOHYDRATE PO Take 100 mg by mouth two (2) times a day. 1 pill po twice daily  SITagliptin (Januvia) 100 mg tablet take 1 tablet by mouth every morning 30 Tab 4  doxycycline (MONODOX) 100 mg capsule Take 1 Cap by mouth two (2) times a day. 28 Cap 0  
 ciprofloxacin HCl (CILOXAN) 0.3 % ophthalmic solution Administer 1 Drop to left eye every two (2) hours.  ciprofloxacin HCl (CILOXAN) 0.3 % ophthalmic solution 1-2 drops into the affected eye every 2 hours while awake x 2 days and 1-2 drops every 4 hours while awake x 5 days 5 mL 0  
 sertraline (ZOLOFT) 25 mg tablet take 1 tablet by mouth once daily 90 Tab 3  pravastatin (PRAVACHOL) 40 mg tablet take 1 tablet by mouth at bedtime 90 Tab 3  
 busPIRone (BUSPAR) 5 mg tablet 1 tablet twice per day 60 Tab 2  
 spironolactone (ALDACTONE PO) Take 1 Tab by mouth daily.  ferrous sulfate 325 mg (65 mg iron) tablet Take 1 Tab by mouth daily (with breakfast). 30 Tab 0  
 magnesium oxide (MAG-OX) 400 mg tablet Take 1 Tab by mouth daily. 30 Tab 0  
 multivitamin (ONE A DAY) tablet Take 1 Tab by mouth daily.  RABEprazole (ACIPHEX) 20 mg tablet take 1 tablet by mouth once daily 30 Tab 5  furosemide (LASIX) 40 mg tablet take 1 tablet by mouth once daily (Patient taking differently: Take 40 mg by mouth daily.) 90 Tab 3  
 naproxen sodium (ALEVE PO) Take 220 mg by mouth two (2) times daily as needed for Pain (For pain). 1 pill po 2 times daily for pain prn  loperamide (IMODIUM) 2 mg capsule Take 1 mg by mouth Three (3) times a week.  aspirin delayed-release 81 mg tablet Take 81 mg by mouth daily. 1 pill po daily Review of Systems: GEN  no fever or chills CV  no chest pain,or syncope. PULM  No cough GI  no abd pain,  no vomiting   no dysuria, no flank pain M/S- no back pain, no neck pain SKIN  no rashes, no bruising ENDO  no temp intolerance, no polyuria, no polydypsia NEURO  no new focal weakness PSYCH  no  hallucinations HEENT  no headache Physical Assessment:  
 
Visit Vitals /63 Pulse 80 Temp 98.4 °F (36.9 °C) Resp 20 Ht 5' 2\" (1.575 m) Wt 59 kg (130 lb) SpO2 100% BMI 23.78 kg/m² Constitutional: The patient is alert. No distress. Eyes: No scleral icterus. Neck: No JVD present. Cardiovascular: Regular rhythm. Exam reveals no gallop and no friction rub. Pulmonary/Chest: Effort normal. No stridor. No respiratory distress. has no wheezes. has no rales. Abdominal: Soft. Bowel sounds are normal. exhibits no distension. No tenderness. No rebound and no guarding. Musculoskeletal:Normal strength. exhibits no tenderness. Neurological:alert and oriented to person, place, and time. Skin: Has bilateral lower extremity chronic venous stasis, and bilateral legs ulcers with yellowish base Psychiatric: Pleasant and cooperative Recent Results (from the past 24 hour(s)) CBC WITH AUTOMATED DIFF Collection Time: 04/28/20  4:55 PM  
Result Value Ref Range WBC 6.2 4.6 - 13.2 K/uL  
 RBC 1.51 (L) 4.20 - 5.30 M/uL HGB 4.1 (LL) 12.0 - 16.0 g/dL HCT 12.2 (LL) 35.0 - 45.0 % MCV 80.8 74.0 - 97.0 FL  
 MCH 27.2 24.0 - 34.0 PG  
 MCHC 33.6 31.0 - 37.0 g/dL  
 RDW 16.3 (H) 11.6 - 14.5 % PLATELET 40 (L) 872 - 420 K/uL MPV 12.1 (H) 9.2 - 97.3 FL  
METABOLIC PANEL, BASIC Collection Time: 04/28/20  4:55 PM  
Result Value Ref Range Sodium 133 (L) 136 - 145 mmol/L Potassium 4.5 3.5 - 5.5 mmol/L Chloride 100 100 - 111 mmol/L  
 CO2 24 21 - 32 mmol/L Anion gap 9 3.0 - 18 mmol/L Glucose 257 (H) 74 - 99 mg/dL BUN 36 (H) 7.0 - 18 MG/DL Creatinine 1.37 (H) 0.6 - 1.3 MG/DL  
 BUN/Creatinine ratio 26 (H) 12 - 20 GFR est AA 44 (L) >60 ml/min/1.73m2 GFR est non-AA 36 (L) >60 ml/min/1.73m2 Calcium 9.1 8.5 - 10.1 MG/DL  
PTT Collection Time: 04/28/20  4:55 PM  
Result Value Ref Range aPTT 26.6 23.0 - 36.4 SEC PROTHROMBIN TIME + INR Collection Time: 04/28/20  4:55 PM  
Result Value Ref Range Prothrombin time 14.6 11.5 - 15.2 sec INR 1.2 0.8 - 1.2 NT-PRO BNP  
 Collection Time: 04/28/20  4:55 PM  
Result Value Ref Range NT pro-BNP 5,549 (H) 0 - 1,800 PG/ML  
SED RATE (ESR) Collection Time: 04/28/20  4:55 PM  
Result Value Ref Range Sed rate, automated 46 (H) 0 - 30 mm/hr C REACTIVE PROTEIN, QT Collection Time: 04/28/20  4:55 PM  
Result Value Ref Range C-Reactive protein 18.5 (H) 0 - 0.3 mg/dL OCCULT BLOOD, STOOL Collection Time: 04/28/20  6:10 PM  
Result Value Ref Range Occult blood, stool Negative NEG    
TYPE & SCREEN Collection Time: 04/28/20  6:22 PM  
Result Value Ref Range Crossmatch Expiration 05/01/2020 ABO/Rh(D) O POSITIVE Antibody screen NEG   
CBC WITH AUTOMATED DIFF Collection Time: 04/28/20  6:59 PM  
Result Value Ref Range WBC 12.7 4.6 - 13.2 K/uL  
 RBC 2.78 (L) 4.20 - 5.30 M/uL HGB 7.2 (L) 12.0 - 16.0 g/dL HCT 22.3 (L) 35.0 - 45.0 % MCV 80.2 74.0 - 97.0 FL  
 MCH 25.9 24.0 - 34.0 PG  
 MCHC 32.3 31.0 - 37.0 g/dL  
 RDW 16.2 (H) 11.6 - 14.5 % PLATELET 575 (L) 934 - 420 K/uL MPV 12.1 (H) 9.2 - 11.8 FL  
 NEUTROPHILS 88 (H) 40 - 73 % LYMPHOCYTES 8 (L) 21 - 52 % MONOCYTES 4 3 - 10 % EOSINOPHILS 0 0 - 5 % BASOPHILS 0 0 - 2 %  
 ABS. NEUTROPHILS 11.0 (H) 1.8 - 8.0 K/UL  
 ABS. LYMPHOCYTES 1.1 0.9 - 3.6 K/UL  
 ABS. MONOCYTES 0.6 0.05 - 1.2 K/UL  
 ABS. EOSINOPHILS 0.1 0.0 - 0.4 K/UL  
 ABS. BASOPHILS 0.0 0.0 - 0.1 K/UL  
 DF AUTOMATED Diagnostic Studies: XR Rt [place]:  
No evidence of osteomyelitis.

## 2020-04-29 NOTE — ED NOTES
TRANSFER - OUT REPORT: 
 
Verbal report given to Jamie Fonseca RN on Olga Grady  being transferred to  for routine progression of care Report consisted of patients Situation, Background, Assessment and  
Recommendations(SBAR). Information from the following report(s) SBAR, ED Summary, Procedure Summary, Intake/Output, MAR and Recent Results was reviewed with the receiving nurse. Lines:  
Peripheral IV 50/47/67 Right Cephalic (Active) Site Assessment Clean, dry, & intact 4/28/2020  6:27 PM  
Phlebitis Assessment 0 4/28/2020  6:27 PM  
Infiltration Assessment 0 4/28/2020  6:27 PM  
Dressing Status Clean, dry, & intact 4/28/2020  6:27 PM  
Dressing Type 4 X 4;Transparent;Tape 4/28/2020  6:27 PM  
Hub Color/Line Status Pink;Patent; Flushed 4/28/2020  6:27 PM  
  
 
Opportunity for questions and clarification was provided. Patient transported with: 
 [employer]

## 2020-04-29 NOTE — PROGRESS NOTES
[facility]- Initial Progress Note Pharmacy Consult ordered by Dr. Kal Bundy Indication: SSTI Patient clinical status and labs ordered/reviewed. Pt Weight Weight: 59 kg (130 lb) Serum Creatinine Lab Results Component Value Date/Time Creatinine 1.37 (H) 04/28/2020 04:55 PM  
 Creatinine, POC 0.8 07/22/2019 09:59 AM  
   
Creatinine Clearance Estimated Creatinine Clearance: 22.9 mL/min (A) (based on SCr of 1.37 mg/dL (H)). BUN Lab Results Component Value Date/Time BUN 36 (H) 04/28/2020 04:55 PM  
   
WBC Lab Results Component Value Date/Time WBC 12.7 04/28/2020 06:59 PM  
  
Temperature Temp: 98.4 °F (36.9 °C) HR Pulse (Heart Rate): 75 
  
BP BP: 114/49 Drug Levels:  
Vancomycin No results for input(s): VANCP, VANCT, VANCR, VANRA in the last 72 hours. Gentamicin No results for input(s): GENP, GENT in the last 72 hours. No lab exists for component:  GENR Tobramycin No results for input(s): TOBP, TOBT, TOBR in the last 72 hours. Amikacin No results for input(s): Rollen Aas in the last 72 hours. No lab exists for component:  Jackalyn Cranker, Norrine Rad Dose for naïve patient was initiated at: Vancomycin 1250mg x1, further dosing pending labs/levels Continue to monitor Sign: Barrie Santos [facility] Date: 4/28/2020 Time: 9:02 PM

## 2020-04-30 NOTE — PROGRESS NOTES
Infectious Disease progress note Reason: Infected bilateral lower extremity ulcers Current abx Prior abx Pip/tazo, vancomycin since 4/28/20 Lines:  
 
 
Assessment : 
 
 
80 y. o. female  With hx of HTN, CAD, CHF, type 2 DM (last hgbA1C 7.4 on 4/29/20), hyperlipidemia and venous insufficiency, MRSA, aortic stenosis presented to the ED on  4/28/20 for admission due to bilat LE cellulitis and ulcerations. 
  
Wound cultures 5/2018- acinetobacter, mrsa 12/2018- enterobacter, mrsa; 1/2019- mrsa Hospitalization February 2020 for bilateral infected lower extremity ulcers status post incision and debridement 2/14/2020 
  
Clinical presentation c/w bilateral infected LE ulcers, cellulitis superimposed on nonhealing ulcers secondary to chronic venous stasis. 
  
Evidence of superficial necrosis noted on several ulcers on both lower extremities. No significant erythema both lower extremities. No purulent drainage. Wound culture 4/28/2020-staph aureus, gram-negative rods 
  
Significant improvement in the lower extremity pain on today's exam.  Vascular surgery follow-up appreciated. RECOMMENDATIONS: 
  
1. continue pip/tazo, vancomycin. 2. F/u vascular surgery recommendations 3. Follow-up the results of wound culture and modify antibiotics as needed 4. Needs good Wound care bilateral LE ulcers per vascular surgery team to aid healing 5. Unfortunately, patient is  at high risk for recurrent infection of the lower extremity ulcers if the ulcers do not heal due to chronic venous stasis 
  
Above plan was discussed in details with patient, dr. Arian Stovall. Please call me if any further questions or concerns. Will continue to participate in the care of this patient. HPI: 
 
Started speech. States that she feels better. Significant improvement in leg pain. She was able to get up with physical therapy and walk few steps 
 
 
 
home Medication List  
 Details Cetirizine 10 mg cap Take 10 mg by mouth two (2) times a day. Indications: inflammation of the nose due to an allergy, a type of allergy that causes red and itchy skin called atopic dermatitis  
  
!! DOXYCYCLINE MONOHYDRATE PO Take 100 mg by mouth two (2) times a day. 1 pill po twice daily  
  
!! ciprofloxacin HCl (CILOXAN) 0.3 % ophthalmic solution Administer 1 Drop to left eye every two (2) hours. !! ciprofloxacin HCl (CILOXAN) 0.3 % ophthalmic solution 1-2 drops into the affected eye every 2 hours while awake x 2 days and 1-2 drops every 4 hours while awake x 5 days Qty: 5 mL, Refills: 0 Associated Diagnoses: Conjunctivitis, unspecified conjunctivitis type, unspecified laterality  
  
naproxen sodium (ALEVE PO) Take 220 mg by mouth two (2) times daily as needed for Pain (For pain). 1 pill po 2 times daily for pain prn SITagliptin (Januvia) 100 mg tablet take 1 tablet by mouth every morning 
Qty: 30 Tab, Refills: 4  
  
!! doxycycline (MONODOX) 100 mg capsule Take 1 Cap by mouth two (2) times a day. Qty: 28 Cap, Refills: 0  
  
sertraline (ZOLOFT) 25 mg tablet take 1 tablet by mouth once daily 
Qty: 90 Tab, Refills: 3 Associated Diagnoses: Mild depression ([address] pravastatin (PRAVACHOL) 40 mg tablet take 1 tablet by mouth at bedtime 
Qty: 90 Tab, Refills: 3  
  
busPIRone (BUSPAR) 5 mg tablet 1 tablet twice per day 
Qty: 60 Tab, Refills: 2  
  
spironolactone (ALDACTONE PO) Take 1 Tab by mouth daily. ferrous sulfate 325 mg (65 mg iron) tablet Take 1 Tab by mouth daily (with breakfast). Qty: 30 Tab, Refills: 0  
  
magnesium oxide (MAG-OX) 400 mg tablet Take 1 Tab by mouth daily. Qty: 30 Tab, Refills: 0  
  
multivitamin (ONE A DAY) tablet Take 1 Tab by mouth daily. RABEprazole (ACIPHEX) 20 mg tablet take 1 tablet by mouth once daily 
Qty: 30 Tab, Refills: 5  
  
furosemide (LASIX) 40 mg tablet take 1 tablet by mouth once daily 
Qty: 90 Tab, Refills: 3 loperamide (IMODIUM) 2 mg capsule Take 1 mg by mouth Three (3) times a week. aspirin delayed-release 81 mg tablet Take 81 mg by mouth daily. 1 pill po daily Associated Diagnoses: HTN (hypertension); Aortic stenosis Current Facility-Administered Medications Medication Dose Route Frequency  VANCOMYCIN TROUGH DUE   Other Daily  insulin lispro (HUMALOG) injection   SubCUTAneous AC&HS  
 glucose chewable tablet 16 g  4 Tab Oral PRN  
 glucagon (GLUCAGEN) injection 1 mg  1 mg IntraMUSCular PRN  
 dextrose 10% infusion 25-50 mL  25-50 mL IntraVENous PRN  
 vancomycin (VANCOCIN) 750 mg in  mL infusion  750 mg IntraVENous Q18H  
 loperamide (IMODIUM) 1 mg/7.5 mL oral solution 1 mg  1 mg Oral 3 times weekly  0.9% sodium chloride infusion 250 mL  250 mL IntraVENous PRN  
 aspirin delayed-release tablet 81 mg  81 mg Oral DAILY  therapeutic multivitamin (THERAGRAN) tablet 1 Tab  1 Tab Oral DAILY  ferrous sulfate tablet 325 mg  325 mg Oral DAILY WITH BREAKFAST  magnesium oxide (MAG-OX) tablet 400 mg  400 mg Oral DAILY  pravastatin (PRAVACHOL) tablet 40 mg  40 mg Oral QHS  busPIRone (BUSPAR) tablet 5 mg  5 mg Oral BID  furosemide (LASIX) tablet 40 mg  40 mg Oral DAILY  pantoprazole (PROTONIX) tablet 40 mg  40 mg Oral ACB  sertraline (ZOLOFT) tablet 25 mg  25 mg Oral QPM  
 alogliptin (NESINA) tablet 12.5 mg  12.5 mg Oral DAILY  spironolactone (ALDACTONE) tablet 25 mg  25 mg Oral DAILY  piperacillin-tazobactam (ZOSYN) 2.25 g in 0.9% sodium chloride (MBP/ADV) 50 mL MBP  2.25 g IntraVENous Q6H  
 acetaminophen (TYLENOL) tablet 650 mg  650 mg Oral Q6H PRN  
 oxyCODONE-acetaminophen (PERCOCET) 5-325 mg per tablet 1 Tab  1 Tab Oral Q6H PRN Allergies: Patient has no known allergies. Temp (24hrs), Av.1 °F (36.7 °C), Min:97.8 °F (36.6 °C), Max:98.4 °F (36.9 °C) Visit Vitals /73 Pulse 72 Temp 98.2 °F (36.8 °C) Resp 16 Ht 5' 2\" (1.575 m) Wt 63.5 kg (140 lb) SpO2 96% BMI 25.61 kg/m² ROS: 12 point ROS obtained in details. Pertinent positives as mentioned in HPI,  
otherwise negative Physical Exam: 
 
General: Well developed, well nourished female laying on the bed AAOx3 in no acute distress. 
  
General:   awake alert and oriented HEENT:  Normocephalic, atraumatic, PERRL, EOMI, no scleral icterus or pallor; no conjunctival hemmohage;  nasal and oral mucous are moist and without evidence of lesions. No thrush. Neck supple, no bruits. Lymph Nodes:   no cervical, axillary or inguinal adenopathy Lungs:   non-labored, bilaterally clear to auscultation- no crackles wheezes rales or rhonchi Heart:  RRR, s1 and s2; no  rubs or gallops, no edema, + pedal pulses Abdomen:  soft, non-distended, active bowel sounds, no hepatomegaly, no splenomegaly. Non-tender Genitourinary:  deferred Extremities:   no clubbing, cyanosis; no joint effusions or swelling; Full ROM of all large joints to the upper and lower extremities; muscle mass appropriate for age; multiple superficial ulcers on both lower extremity with some evidence of superficial necrosis and yellow scab; tenderness of both lower extremities right greater than the left, increased warmth right lower extremity Neurologic:  No gross focal sensory abnormalities; 5/5 muscle strength to upper and lower extremities. Speech appropriate. Cranial nerves intact Skin:   Multiple superficial bilateral ulcers with superficial necrosis on several ulcers Back:  no spinal or paraspinal muscle tenderness or rigidity, no CVA tenderness 
   
Psychiatric:  No suicidal or homicidal ideations, appropriate mood and affect  
  
 
 
Labs: Results:  
Chemistry Recent Labs 04/30/20 
0157 04/29/20 
0158 04/28/20 
1655 * 132* 257*  135* 133* K 4.3 3.3* 4.5  
 104 100 CO2 22 23 24 BUN 33* 33* 36* CREA 1.19 1.03 1.37* CA 8.3* 8.5 9.1 AGAP 9 8 9 BUCR 28* 32* 26* CBC w/Diff Recent Labs  
  04/28/20 
1859 04/28/20 
1655 WBC 12.7 6.2 RBC 2.78* 1.51* HGB 7.2* 4.1* HCT 22.3* 12.2*  
* 40* GRANS 88*  --   
LYMPH 8*  --   
EOS 0  --   
  
Microbiology Recent Labs  
  04/28/20 
1740 04/28/20 
1655 04/28/20 
1640 CULT PENDING NO GROWTH 2 DAYS NO GROWTH 2 DAYS  
  
 
 
RADIOLOGY: 
 
All available imaging studies/reports in [facility] for this admission were reviewed Dr. Shayla Hahn, Infectious Disease Specialist 
[phone] April 30, 2020 
11:09 AM

## 2020-04-30 NOTE — PROGRESS NOTES
Problem: Self Care Deficits Care Plan (Adult) Goal: *Acute Goals and Plan of Care (Insert Text) Description: Occupational Therapy Goals Initiated 4/30/2020 within 7 day(s). 1.  Patient will perform upper body dressing with modified independence. 2.  Patient will perform lower body dressing with supervision/set-up. 3.  Patient will perform toileting with supervision/set-up. 4.  Patient will perform toilet transfers with supervision/set-up. 5.  Patient will perform a functional activity in standing with supervision/setup for 3-5 minutes. 6.  Patient will participate in upper extremity therapeutic exercise/activities with supervision/set-up for 8 minutes. Prior Level of Function: Pt reports she was Mod(I) with basic self-care/ADLs, IADLs, and functional mobility using a RW PTA. Outcome: Progressing Towards Goal 
 OCCUPATIONAL THERAPY EVALUATION Patient: Bea Charles (67 y.o. female) Date: 4/30/2020 Primary Diagnosis: Venous stasis ulcer [facility] [I83.009, L97.909] Precautions:   Fall ASSESSMENT : 
Pt cleared to participate in OT evaluation by RN. Upon entering room, pt received semi-reclined in bed, alert, and agreeable to OT eval. Based on the objective data described below, the patient presents with decreased strength, decreased activity tolerance,  decreased standing balance, decreased ability to safely perform functional transfers and mobility, and decreased independence in ADLs, increasing the need for assistance from others. Pt performed supine-sit with min to participate in further self-care. Noted BM on dominick pad. Pt stood and rewuired min assist for pericare in standing. Pt able to complete UB ADLs with setup, including donning new hospital gown. Pt transferred from bed>chair min assist, simulating transfers to [facility].  Educated pt on the role of Ot, evaluation process, goals for therapy, and to not get up without assistance of nursing with pt demo good understanding. The patient requires skilled OT services to assess safety and increase independence with basic self-care/ADLs, enhancing the patients quality of life by improving their ability to return to OF. Recommending pt to return home with home health ensure to ensure safety. At the end of the session, pt left resting comfortably in recliner, call bell in reach, with all needs met. Patient will benefit from skilled intervention to address the above impairments. Patient's rehabilitation potential is considered to be Good Factors which may influence rehabilitation potential include:  
[]             None noted []             Mental ability/status [x]             Medical condition []             Home/family situation and support systems []             Safety awareness []             Pain tolerance/management 
[]             Other: PLAN : 
Recommendations and Planned Interventions:  
[x]               Self Care Training                  [x]      Therapeutic Activities [x]               Functional Mobility Training   []      Cognitive Retraining 
[x]               Therapeutic Exercises           [x]      Endurance Activities [x]               Balance Training                    []      Neuromuscular Re-Education []               Visual/Perceptual Training     [x]      Home Safety Training 
[x]               Patient Education                   [x]      Family Training/Education []               Other (comment): Frequency/Duration: Patient will be followed by occupational therapy 1-2 times per day/4-7 days per week to address goals. Discharge Recommendations: Home Health with Assistance Further Equipment Recommendations for Discharge: Shower chair SUBJECTIVE:  
Patient stated I walk 150 steps everyday OBJECTIVE DATA SUMMARY:  
 
Past Medical History:  
Diagnosis Date Aortic valve stenosis, mild April 2014 EF 15-53%, Grade 1 diastolic dysfunction, mild aortic stenosis,  mean gradient 10 mm Hg CAD (coronary artery disease) aortic stenosis Cardiac echocardiogram 06/2018 EF 55-60%. No RWMA. Mild-mod LVH. Severe LAE. Mild AS. Cardiac nuclear imaging test 08/27/2008 No evidence of ischemia or prior scarring. EF 62%. No WMA. No significant change from study of 3/23/06. Cardiovascular RLE venous duplex 12/01/2016 Right leg:  No DVT. Carotid duplex 04/24/2013 Mild 1-57% LICA stenosis. DM (diabetes mellitus) ([facility] Dyslipidemia Heart failure ([facility] HTN (hypertension) Mixed hyperlipidemia Sciatica Traumatic closed displaced fracture of acromial end of clavicle, left, initial encounter Past Surgical History:  
Procedure Laterality Date HX APPENDECTOMY 25 [facility] [place] HX OTHER SURGICAL Right 2010  
 ankle surgery HX TUMOR REMOVAL Left   
 ovarian HX UROLOGICAL    
 kidney stone removal  
 
Barriers to Learning/Limitations: None Compensate with: visual, verbal, tactile, kinesthetic cues/model Home Situation:  
Home Situation Home Environment: Private residence # Steps to Enter: 1 One/Two Story Residence: Two story, live on 1st floor Living Alone: No 
Support Systems: Child(vangie), [employer] Patient Expects to be Discharged to[de-identified] Private residence Current DME Used/Available at Home: Walker, rolling, Commode, bedside 
[]  Right hand dominant   []  Left hand dominant Cognitive/Behavioral Status: 
Neurologic State: Alert Orientation Level: Oriented X4 Cognition: Follows commands Safety/Judgement: Fall prevention Skin: Visible skin appeared intact. Edema: None noted Coordination: BUE Coordination: Within functional limits Fine Motor Skills-Upper: Left Intact; Right Intact Gross Motor Skills-Upper: Left Intact; Right Intact Balance: 
Sitting: Intact Standing: Impaired; With support Standing - Static: Fair Standing - Dynamic : Fair Strength: BUE Strength: Generally decreased, functional 
 
Tone & Sensation: BUE Tone: Normal 
Sensation: Intact Range of Motion: BUE 
AROM: Within functional limits Functional Mobility and Transfers for ADLs: 
Bed Mobility: 
Supine to Sit: Minimum assistance Scooting: Contact guard assistance Transfers: 
Sit to Stand: Minimum assistance Stand to Sit: Contact guard assistance Bed to Chair: Minimum assistance(simulating transfers to [facility]) ADL Assessment:  
Feeding: Modified independent Oral Facial Hygiene/Grooming: Modified Independent Bathing: Minimum assistance Upper Body Dressing: Stand-by assistance Lower Body Dressing: Minimum assistance Toileting: Minimum assistance ADL Intervention: 
Grooming Grooming Assistance: Modified independent Washing Face: Modified independent Upper Body Bathing Bathing Assistance: Set-up Position Performed: Seated in chair Lower Body Bathing Bathing Assistance: Minimum assistance Perineal  : Minimum assistance Position Performed: (Seated in chair and standing) Upper Body Dressing Assistance Dressing Assistance: Stand-by assistance Hospital Gown: Stand-by assistance Lower Body Dressing Assistance Dressing Assistance: Maximum assistance Socks: Maximum assistance Cognitive Retraining Safety/Judgement: Fall prevention Pain: 
Pain level pre-treatment: 0/10 Pain level post-treatment: 0/10 Pain Intervention(s): Medication (see MAR); Rest, Ice, Repositioning Response to intervention: Nurse notified, See doc flow Activity Tolerance:  
Fair Please refer to the flowsheet for vital signs taken during this treatment. After treatment:  
[x] Patient left in no apparent distress sitting up in chair 
[] Patient left in no apparent distress in bed 
[x] Call bell left within reach [x] Nursing notified 
[] Caregiver present [x] Chair alarm activated COMMUNICATION/EDUCATION:  
[x] Role of Occupational Therapy in the acute care setting 
[] Home safety education was provided and the patient/caregiver indicated understanding. [x] Patient/family have participated as able in goal setting and plan of care. [x] Patient/family agree to work toward stated goals and plan of care. [] Patient understands intent and goals of therapy, but is neutral about his/her participation. [] Patient is unable to participate in goal setting and plan of care. Thank you for this referral. 
Juwan Bar MS, OTR/L Time Calculation: 30 mins Eval Complexity: History: MEDIUM Complexity : Expanded review of history including physical, cognitive and psychosocial  history ; Examination: LOW Complexity : 1-3 performance deficits relating to physical, cognitive , or psychosocial skils that result in activity limitations and / or participation restrictions ; Decision Making:LOW Complexity : No comorbidities that affect functional and no verbal or physical assistance needed to complete eval tasks

## 2020-04-30 NOTE — PROGRESS NOTES
Problem: Dysphagia (Adult) Goal: *Acute Goals and Plan of Care (Insert Text) Description: Patient will: 1. Tolerate PO trials with 0 s/s overt distress in 4/5 trials 2. Utilize compensatory swallow strategies/maneuvers (decrease bite/sip, size/rate, alt. liq/sol) with min cues in 4/5 trials Rec:    
Reg solid with thin liquids Aspiration precautions HOB >45 during po intake, remain >30 for 30-45 minutes after po Small bites/sips; alternate liquid/solid with slow feeding rate Oral care TID Meds per pt preference Outcome: Progressing Towards Goal 
  
SPEECH LANGUAGE PATHOLOGY BEDSIDE SWALLOW EVALUATION/TREATMENT Patient: Rin Avila (13 y.o. female) Date: 4/30/2020 Primary Diagnosis: [facility] [I83.009, L97.909] Precautions: aspiration PLOF: As per H&P 
 
ASSESSMENT : 
Based on the objective data described below, the patient presents with oropharyngeal swallow fxn essentially WFL. Strength, ROM, and coordination of the orofacial musculature were all found to be [facility] Pt tolerated reg solid, puree, and thin liquids +/- straw consecutive swallows without any overt s/sx of aspiration. Mastication was appropriate with timely a-p transit. Positive oral clearance observed across all trials. Pt safe for initiation of reg solid diet with thin liquids, aspiration precautions, oral care TID, and meds as tolerated. ST will continue to follow x 1-2 visits to ensure safety of diet tolerance. Patient will benefit from skilled intervention to address the above impairments. Patient's rehabilitation potential is considered to be Good Factors which may influence rehabilitation potential include:  
[x]            None noted PLAN : 
Recommendations and Planned Interventions: See above Frequency/Duration: Patient will be followed by speech-language pathology x 1-2 more visits to address goals. Discharge Recommendations: Home Health, Outpatient, and To Be Determined SUBJECTIVE:  
Patient stated I eat and drink fine. OBJECTIVE:  
 
Past Medical History:  
Diagnosis Date Aortic valve stenosis, mild April 2014 EF 59-43%, Grade 1 diastolic dysfunction, mild aortic stenosis,  mean gradient 10 mm Hg CAD (coronary artery disease) aortic stenosis Cardiac echocardiogram 06/2018 EF 55-60%. No RWMA. Mild-mod LVH. Severe LAE. Mild AS. Cardiac nuclear imaging test 08/27/2008 No evidence of ischemia or prior scarring. EF 62%. No WMA. No significant change from study of 3/23/06. Cardiovascular RLE venous duplex 12/01/2016 Right leg:  No DVT. Carotid duplex 04/24/2013 Mild 3-23% LICA stenosis. DM (diabetes mellitus) ([facility] Dyslipidemia Heart failure ([facility] HTN (hypertension) Mixed hyperlipidemia Sciatica Traumatic closed displaced fracture of acromial end of clavicle, left, initial encounter Past Surgical History:  
Procedure Laterality Date HX APPENDECTOMY 25 [facility] HX OTHER SURGICAL Right 2010  
 ankle surgery HX TUMOR REMOVAL Left   
 ovarian HX UROLOGICAL    
 kidney stone removal  
 
Prior Level of Function/Home Situation: see below Home Situation Home Environment: Private residence # Steps to Enter: 1 One/Two Story Residence: Two story, live on 1st floor Living Alone: No 
Support Systems: Child(vangie), [employer] community Patient Expects to be Discharged to[de-identified] Private residence Current DME Used/Available at Home: [employer] Diet prior to admission: reg solid with thin Current Diet:  reg solid with thin  
 
Cognitive and Communication Status: 
Neurologic State: Alert Orientation Level: Oriented X4 Cognition: Follows commands Oral Assessment: 
Oral Assessment Labial: No impairment Dentition: Natural;Intact Oral Hygiene: adequate Lingual: No impairment Velum: No impairment Mandible: No impairment P.O. Trials: 
Patient Position: 60 at [facility] Vocal quality prior to P.O.: Phonation breaks; Low volume;Tremorous Consistency Presented: Thin liquid; Solid How Presented: Self-fed/presented;Cup/sip;Spoon;Straw Bolus Acceptance: No impairment Bolus Formation/Control: No impairment Propulsion: No impairment Oral Residue: None Initiation of Swallow: No impairment Laryngeal Elevation: Functional 
Aspiration Signs/Symptoms: None Pharyngeal Phase Characteristics: No impairment, issues, or problems Effective Modifications: None Cues for Modifications: None Oral Phase Severity: No impairment Pharyngeal Phase Severity : No impairment PAIN: 
Start of Eval: 0 End of Eval: 0 After treatment:  
[]            Patient left in no apparent distress sitting up in chair 
[x]            Patient left in no apparent distress in bed 
[x]            Call bell left within reach [x]            Nursing notified []            Family present 
[]            Caregiver present 
[]            Bed alarm activated COMMUNICATION/EDUCATION:  
[x]            Aspiration precautions; swallow safety; compensatory techniques. [x]            Patient/family have participated as able in goal setting and plan of care. Thank you for this referral. 
 
Kirstin Brunner M.S. CCC-SLP/L Speech-Language Pathologist

## 2020-04-30 NOTE — PROGRESS NOTES
Interdisciplinary Round Note Patient Information: Patient Information: Sarthak Rajan [address] Pascual [facility]. Reason for Admission: Venous stasis ulcer ([facility] [I83.009, L97.869] Attending Provider:   Francis Denney MD 
 Past Medical History:  
Past Medical History:  
Diagnosis Date  Aortic valve stenosis, mild April 2014 EF 21-98%, Grade 1 diastolic dysfunction, mild aortic stenosis,  mean gradient 10 mm Hg  CAD (coronary artery disease) aortic stenosis  Cardiac echocardiogram 06/2018 EF 55-60%. No RWMA. Mild-mod LVH. Severe LAE. Mild AS.  Cardiac nuclear imaging test 08/27/2008 No evidence of ischemia or prior scarring. EF 62%. No WMA. No significant change from study of 3/23/06.  Cardiovascular RLE venous duplex 12/01/2016 Right leg:  No DVT.  Carotid duplex 04/24/2013 Mild 3-31% LICA stenosis.  DM (diabetes mellitus) ([facility]  Dyslipidemia  Heart failure ([facility]  HTN (hypertension)  Mixed hyperlipidemia  Sciatica  Traumatic closed displaced fracture of acromial end of clavicle, left, initial encounter Hospital day: 2 Estimated discharge date: 5/1/20 RRAT Score: High Risk   
      
 29 Total Score 3 Has Seen PCP in Last 6 Months (Yes=3, No=0)  
 4 IP Visits Last 12 Months (1-3=4, 4=9, >4=11) 5 Pt. Coverage (Medicare=5 , Medicaid, or Self-Pay=4) 17 Charlson Comorbidity Score (Age + Comorbid Conditions) Criteria that do not apply:  
 . Living with Significant Other. Assisted Living. LTAC. SNF. or  
Rehab Patient Length of Stay (>5 days = 3) Goals for Today: Infectious Disease, IV antibiotic, wound care VITAL SIGNS Vitals:  
 04/29/20 2015 04/30/20 0006 04/30/20 0406 04/30/20 5192 BP: 100/58 111/63 107/56 130/73 Pulse: 78 81 73 72 Resp: 19 17 16 16 Temp: 97.8 °F (36.6 °C) 98.2 °F (36.8 °C) 98.3 °F (36.8 °C) 98.2 °F (36.8 °C) SpO2: 97% 97% 94% 96% Weight:      
Height:      
 
 
 
 Lines, Drains, & Airways Peripheral IV 60/46/59 Right Cephalic-Site Assessment: Clean, dry, & intact [REMOVED] Peripheral IV 04/28/20 Left Antecubital-Site Assessment: Clean, dry, & intact VTE Prophylaxis Intake and Output:  
04/28 1901 - 04/30 0700 In: 480 [P.O.:480] Out: - No intake/output data recorded. Current Diet: DIET DIABETIC CONSISTENT CARB Regular; 2 GM NA (House Low NA) Abdominal  
Last Bowel Movement Date: 04/28/20 Stool Appearance: Soft Abdominal Assessment: Distended Bowel Sounds: Active Recent Glucose Results:  
Lab Results Component Value Date/Time  (H) 04/30/2020 01:57 AM  
 GLUCPOC 277 (H) 04/30/2020 11:27 AM  
 GLUCPOC 139 (H) 04/30/2020 06:14 AM  
 GLUCPOC 204 (H) 04/29/2020 08:44 PM  
 
 
  
IV Antibiotics? yes Activity Level: Activity Level: Up with Assistance Needs assistance with ADLs: yes PT Consult Status: YES Current Immunizations: 
Immunization History Administered Date(s) Administered  Influenza High Dose Vaccine PF 09/12/2017  Influenza Vaccine 10/01/2015, 09/01/2016  Influenza Vaccine (Quadrivalent) 10/13/2019  Influenza Vaccine (Tri) Adjuvanted 09/02/2018  Pneumococcal Conjugate (PCV-13) 05/17/2017  Pneumococcal Polysaccharide (PPSV-23) 04/18/2013, 09/10/2014, 10/01/2015, 09/02/2018  Td 10/12/2018  Zoster Vaccine, Live 01/17/2012 Recommendations:  
Discharge Disposition: to be determined Medication Reconciliation Completed: YES Cardiac/Pulmonary Rehab Ordered:  NO 
 
Needs for Discharge: to be determined Recommendations from IDR team:  
  
 
ASHLEY EarlyN RN Care Management Pager: [phone]

## 2020-04-30 NOTE — PROGRESS NOTES
Problem: Mobility Impaired (Adult and Pediatric) Goal: *Acute Goals and Plan of Care (Insert Text) Description: Physical Therapy Goals Initiated 4/30/2020 and to be accomplished within 7 day(s) 1. Patient will move from supine to sit and sit to supine , scoot up and down and roll side to side in bed with modified independence. 2.  Patient will transfer from bed to chair and chair to bed with supervision/set-up using the least restrictive device. 3.  Patient will perform sit to stand with supervision/set-up. 4.  Patient will ambulate with supervision/set-up for 100 feet with the least restrictive device. 5.  Patient will ascend/descend 1 stairs with 1-2 handrail(s) with contact guard assist.  
 
Prior Level of Function:  
Patient was modified independence for all mobility including gait using RW. Patient lives with son in a single story home 1 step to enter. Outcome: Progressing Towards Goal 
 PHYSICAL THERAPY EVALUATION Patient: Rin Avila (29 y.o. female) Date: 4/30/2020 Primary [facility]: [facility] [I83.009, L97.909] Precautions:   Fall ASSESSMENT : 
PT orders received and patient cleared by nursing to participate with therapy. Patient is a 80 y.o. female admitted to the hospital due to worsening B LE ulcers. Pt had a fall recently due to L ankle giving away when walking to the mailbox. Patient consents to PT evaluation and treatment. Performed co-treatment with occupational therapy to increase participation, safety, and functional mobility. Supine to sit minimal assistance. Sit to stands minimal assistance. OT assisting with self care and pericare with PT assisting with standing balance. Gait 4 feet rolling walker contact guard assistance for safety. Pt ended therapy sitting in recliner with all needs met and alarm donned. Patient will benefit from skilled intervention to address the above impairments. Patient's rehabilitation potential is considered to be Good Factors which may influence rehabilitation potential include:   
[]         None noted 
[x]         Mental ability/status [x]         Medical condition 
[]         Home/family situation and support systems 
[]         Safety awareness 
[]         Pain tolerance/management 
[]         Other: PLAN : 
Recommendations and Planned Interventions:   
[x]           Bed Mobility Training             [x]    Neuromuscular Re-Education 
[x]           Transfer Training                   []    Orthotic/Prosthetic Training 
[x]           Gait Training                          [x]    Modalities [x]           Therapeutic Exercises           [x]    Edema Management/Control 
[x]           Therapeutic Activities            [x]    Family Training/Education 
[x]           Patient Education 
[]           Other (comment): Frequency/Duration: Patient will be followed by physical therapy 1-2 times per day/4-7 days per week to address goals. Discharge Recommendations: Home Health with assist as needed Further Equipment Recommendations for Discharge: N/A  
 
SUBJECTIVE:  
Patient stated I walk everyday to the mailbox 150 steps.  OBJECTIVE DATA SUMMARY:  
 
Past Medical History:  
Diagnosis Date Aortic valve stenosis, mild April 2014 EF 72-55%, Grade 1 diastolic dysfunction, mild aortic stenosis,  mean gradient 10 mm Hg CAD (coronary artery disease) aortic stenosis Cardiac echocardiogram 06/2018 EF 55-60%. No RWMA. Mild-mod LVH. Severe LAE. Mild AS. Cardiac nuclear imaging test 08/27/2008 No evidence of ischemia or prior scarring. EF 62%. No WMA. No significant change from study of 3/23/06. Cardiovascular RLE venous duplex 12/01/2016 Right leg:  No DVT. Carotid duplex 04/24/2013 Mild 7-91% LICA stenosis. DM (diabetes mellitus) ([facility] [address] Dyslipidemia Heart failure ([facility] [address] HTN (hypertension) Mixed hyperlipidemia Sciatica Traumatic closed displaced fracture of acromial end of clavicle, left, initial encounter Past Surgical History:  
Procedure Laterality Date HX APPENDECTOMY [address] HX OTHER SURGICAL Right 2010  
 ankle surgery HX TUMOR REMOVAL Left   
 ovarian HX UROLOGICAL    
 kidney stone removal  
 
Barriers to Learning/Limitations: None Compensate with: N/A Home Situation: 
Home Situation Home Environment: Private residence # Steps to Enter: 1 One/Two Story Residence: Two story, live on 1st floor Living Alone: No 
Support Systems: Child(vangie), [employer] / kristine community Patient Expects to be Discharged to[de-identified] Private residence Current DME Used/Available at Home: Walker, rolling, Commode, bedside Critical Behavior: 
Neurologic State: Alert Orientation Level: Oriented X4 Cognition: Follows commands Safety/Judgement: Fall prevention Psychosocial 
Patient Behaviors: Calm; Cooperative B LE Strength:   
Strength: Generally decreased, functional             
B LE Tone & Sensation:  
Tone: Normal         
Sensation: Intact B LE Range Of Motion: 
AROM: Generally decreased, functional       
        
Posture: 
Posture (WDL): Exceptions to [facility] Posture Assessment: Forward head Functional Mobility: 
Bed Mobility: 
  
Supine to Sit: Minimum assistance Scooting: Contact guard assistance Transfers: 
Sit to Stand: Minimum assistance Stand to Sit: Contact guard assistance Balance:  
Sitting: Intact Standing: Impaired; With support Standing - Static: Fair Standing - Dynamic : Fair Ambulation/Gait Training: 
Distance (ft): 4 Feet (ft) Assistive Device: Walker, rolling Ambulation - Level of Assistance: Contact guard assistance Gait Abnormalities: Decreased step clearance Base of Support: Center of gravity altered Speed/Erum: Slow Step Length: Right shortened;Left shortened Therapeutic Exercises: Reviewed and performed ankle pumps to increase blood flow and circulation. Pain: 
Min to mod pain in B LE especially L LE 
Pain Intervention(s) : Medication (see MAR); Rest, Repositioning Response to intervention: Nurse notified, See doc flow Activity Tolerance:  
fair Please refer to the flowsheet for vital signs taken during this treatment. After treatment:  
[x]         Patient left in no apparent distress sitting up in chair 
[]         Patient left in no apparent distress in bed 
[x]         Call bell left within reach [x]   Personal items in reach [x]         Nursing notified  
[]         Caregiver present [x]         Bed/chair alarm activated 
[]         SCDs applied COMMUNICATION/EDUCATION:  
[x]         Role of Physical Therapy in the acute care setting. [x]         Fall prevention education was provided and the patient/caregiver indicated understanding. [x]         Patient/family have participated as able in goal setting and plan of care. [x]         Patient/family agree to work toward stated goals and plan of care. []         Patient understands intent and goals of therapy, but is neutral about his/her participation. []         Patient is unable to participate in goal setting/plan of care: ongoing with therapy staff. [x]         Out of bed with nursing assistance 3-5 times a day. []         Other: Thank you for this referral. 
Cathy Watson, PT, DPT Time Calculation: 30 mins Eval Complexity: History: MEDIUM  Complexity : 1-2 comorbidities / personal factors will impact the outcome/ POC Exam:HIGH Complexity : 4+ Standardized tests and measures addressing body structure, function, activity limitation and / or participation in recreation  Presentation: MEDIUM Complexity : Evolving with changing characteristics  Clinical Decision Making:Medium Complexity    Overall Complexity:MEDIUM

## 2020-04-30 NOTE — PROGRESS NOTES
[facility] Hospitalist Group Progress Note Patient: Michaelle Lacy Age: 80 y.o. : 1933 MR#: [id number] SSN: [id number] Date: 2020 Subjective:  
 
Patient states she is doing well. She likes to talk a lot about her supportive family. She does say that her left ankle is still hurting her. She denies any CP, SOB, chills, n/v.  
 
Assessment/Plan:  
 
Chronic lower extremity venous stasis Bilateral lower extremity ulcers, with history of debridement 2 months ago Lower extremity cellulitis  
Chronic anemia DM 
HTN Mild aortic stenosis 
  
Consults: 
Vascular Surgery: agrees with consulting wound care, pain control, abx per ID Infectious Disease: blood (no growth at 17 hr) and wound cultures (2+ GRAM POSITIVE COCCI, growth pending), continue zosyn and vancomycin, will adjust abx therapy based on results of cultures Wound Care: pending 
   
Monitor H&H Fecal occult blood testing: negative Monitor and transfuse pr for Hgb < 7.0 Sliding scale insulin Hypoglycemia protocol Continue home meds/diuretics and monitor Replete electrolytes prn 
Continue PT & OT Wound care I spoke with patient's son today, Kayy Alfredo. I gave him an update on her condition and explained that we are waiting for wound culture results. He says that she has started having some difficulty getting into his truck, but on  it she had not problem getting into his vehicle. He finds this concerning. He says it is as if she was not able to pick her legs up to get into the truck. He says that her greatest amount of exercise is that she will take her walker into the yard to  garbage and pull weeds. He says that she will spend several hours out there.   
  
Goals of care:  
Disposition:  [x]? PT/OT ordered   [x]? Case management referral 
  
Case discussed with:  [x]? Patient  []? Family  []? Nursing  []? Case Management DVT Prophylaxis:  []? Lovenox  [x]? Hep SQ  []?SCDs  []? Coumadin   []? On Heparin gtt Objective:  
VS:  
Visit Vitals /73 Pulse 72 Temp 98.2 °F (36.8 °C) Resp 16 Ht 5' 2\" (1.575 m) Wt 63.5 kg (140 lb) SpO2 96% BMI 25.61 kg/m² Tmax/24hrs: Temp (24hrs), Av.1 °F (36.7 °C), Min:97.8 °F (36.6 °C), Max:98.4 °F (36.9 °C) Intake/Output Summary (Last 24 hours) at 2020 1053 Last data filed at 2020 1300 Gross per 24 hour Intake 240 ml Output  Net 240 ml General:  Awake, alert, NAD Cardiovascular:  Murmur present. RRR Pulmonary: CTA bilaterally, normal respiratory effort GI: soft, NT, normal BS Extremities:  multiple large wounds on BLE with red base and purulent centers with scant drainage. No edema or cyanosis Neuro: AAOx3 Current Facility-Administered Medications Medication Dose Route Frequency  VANCOMYCIN TROUGH DUE   Other Daily  insulin lispro (HUMALOG) injection   SubCUTAneous AC&HS  
 glucose chewable tablet 16 g  4 Tab Oral PRN  
 glucagon (GLUCAGEN) injection 1 mg  1 mg IntraMUSCular PRN  
 dextrose 10% infusion 25-50 mL  25-50 mL IntraVENous PRN  
 vancomycin (VANCOCIN) 750 mg in  mL infusion  750 mg IntraVENous Q18H  
 loperamide (IMODIUM) 1 mg/7.5 mL oral solution 1 mg  1 mg Oral 3 times weekly  0.9% sodium chloride infusion 250 mL  250 mL IntraVENous PRN  
 aspirin delayed-release tablet 81 mg  81 mg Oral DAILY  therapeutic multivitamin (THERAGRAN) tablet 1 Tab  1 Tab Oral DAILY  ferrous sulfate tablet 325 mg  325 mg Oral DAILY WITH BREAKFAST  magnesium oxide (MAG-OX) tablet 400 mg  400 mg Oral DAILY  pravastatin (PRAVACHOL) tablet 40 mg  40 mg Oral QHS  busPIRone (BUSPAR) tablet 5 mg  5 mg Oral BID  furosemide (LASIX) tablet 40 mg  40 mg Oral DAILY  pantoprazole (PROTONIX) tablet 40 mg  40 mg Oral ACB  sertraline (ZOLOFT) tablet 25 mg  25 mg Oral QPM  
  alogliptin (NESINA) tablet 12.5 mg  12.5 mg Oral DAILY  spironolactone (ALDACTONE) tablet 25 mg  25 mg Oral DAILY  piperacillin-tazobactam (ZOSYN) 2.25 g in 0.9% sodium chloride (MBP/ADV) 50 mL MBP  2.25 g IntraVENous Q6H  
 acetaminophen (TYLENOL) tablet 650 mg  650 mg Oral Q6H PRN  
 oxyCODONE-acetaminophen (PERCOCET) 5-325 mg per tablet 1 Tab  1 Tab Oral Q6H PRN Labs:   
Recent Results (from the past 24 hour(s)) GLUCOSE, POC Collection Time: 04/29/20 11:51 AM  
Result Value Ref Range Glucose (POC) 295 (H) 70 - 110 mg/dL GLUCOSE, POC Collection Time: 04/29/20  4:10 PM  
Result Value Ref Range Glucose (POC) 258 (H) 70 - 110 mg/dL GLUCOSE, POC Collection Time: 04/29/20  8:44 PM  
Result Value Ref Range Glucose (POC) 204 (H) 70 - 110 mg/dL METABOLIC PANEL, BASIC Collection Time: 04/30/20  1:57 AM  
Result Value Ref Range Sodium 139 136 - 145 mmol/L Potassium 4.3 3.5 - 5.5 mmol/L Chloride 108 100 - 111 mmol/L  
 CO2 22 21 - 32 mmol/L Anion gap 9 3.0 - 18 mmol/L Glucose 116 (H) 74 - 99 mg/dL BUN 33 (H) 7.0 - 18 MG/DL Creatinine 1.19 0.6 - 1.3 MG/DL  
 BUN/Creatinine ratio 28 (H) 12 - 20 GFR est AA 52 (L) >60 ml/min/1.73m2 GFR est non-AA 43 (L) >60 ml/min/1.73m2 Calcium 8.3 (L) 8.5 - 10.1 MG/DL  
GLUCOSE, POC Collection Time: 04/30/20  6:14 AM  
Result Value Ref Range Glucose (POC) 139 (H) 70 - 110 mg/dL Signed By: FERMIN Hoffman  April 30, 2020 10:53 AM

## 2020-04-30 NOTE — PROGRESS NOTES
Problem: Voice Impaired (Adult) Goal: *Acute Goals and Plan of Care (Insert Text) Description: 1. Patient will perform laryngeal area relaxation and stretching exercises with min cues and consistent with HEP. 2. Patient will demonstrate good vocal hygiene by increasing hydration and decreasing acidic foods and decreasing cough by 50% and decreased throat clearing (using a hard swallow, take a sip of water or both per patient report in order to decrease trauma to the vocal folds to help improve her voice. 3. Patient will use diaphragmatic/abdominal breath management with good connection of airflow to phonation for vowels words, phrases and sentences with 80% acc with min cues during structured phonation/speaking tasks. 4. Patient will demonstrate a more forward placement of tone, easy onsets of phonation and a legato speaking style and decreased tension/breath-holding for vowels, words, and phrases through nasal sentences with 80% of the time with S cues in order to improve her voice. Outcome: Progressing Towards Goal 
 
 
SPEECH-LANGUAGE PATHOLOGY EVALUATION Patient: Bea Charles (42 y.o. female) Date: 4/30/2020 Primary Diagnosis: [facility] [I83.009, L97.909] Precautions: general    
PLOF: As per H&P  
 
ASSESSMENT : 
Based on the objective data described below, the patient presents with mod voice deficits c/b phonation breaks, low vocal quality and slight tremors in voice during spontaneous and elicited speech production. Deficits occurred ~75% of time during speech production. Pt stated, \"Sometimes I just try to avoid talking at all because my voice bothers me so much. \" She would benefit from an ENT evaluation and OP speech therapy to address voice production. Will continue to follow while in house. Patient will benefit from skilled intervention to address the above impairments. Patient's rehabilitation potential is considered to be Good Factors which may influence rehabilitation potential include:  
[x]              None noted []              Mental ability/status []              Medical condition []              Home/family situation and support systems []              Safety awareness []              Pain tolerance/management 
[]              Other: PLAN : 
Recommendations and Planned Interventions: 
See above Frequency/Duration: Patient will be followed by speech-language pathology 1-2 times per day/4-7 days per week to address goals. Discharge Recommendations: Home Health, Outpatient, and To Be Determined SUBJECTIVE:  
Patient stated Sometimes I just avoid talking all together. OBJECTIVE:  
 
Past Medical History:  
Diagnosis Date Aortic valve stenosis, mild April 2014 EF 79-04%, Grade 1 diastolic dysfunction, mild aortic stenosis,  mean gradient 10 mm Hg CAD (coronary artery disease) aortic stenosis Cardiac echocardiogram 06/2018 EF 55-60%. No RWMA. Mild-mod LVH. Severe LAE. Mild AS. Cardiac nuclear imaging test 08/27/2008 No evidence of ischemia or prior scarring. EF 62%. No WMA. No significant change from study of 3/23/06. Cardiovascular RLE venous duplex 12/01/2016 Right leg:  No DVT. Carotid duplex 04/24/2013 Mild 9-31% LICA stenosis. DM (diabetes mellitus) ([facility] Dyslipidemia Heart failure ([facility] HTN (hypertension) Mixed hyperlipidemia Sciatica Traumatic closed displaced fracture of acromial end of clavicle, left, initial encounter Past Surgical History:  
Procedure Laterality Date HX APPENDECTOMY [address] [facility] HX OTHER SURGICAL Right 2010  
 ankle surgery HX TUMOR REMOVAL Left   
 ovarian HX UROLOGICAL    
 kidney stone removal  
 
Prior Level of Function/Home Situation: see below Home Situation Home Environment: Private residence # Steps to Enter: 1 One/Two Story Residence: Two story, live on 1st floor Living Alone: No 
Support Systems: Child(vangie), [employer] / kristine community Patient Expects to be Discharged to[de-identified] Private residence Current DME Used/Available at Home: Ronnald Real Mental Status: 
Neurologic State: Alert Orientation Level: Oriented X4 Cognition: Follows commands Motor Speech: 
Oral-Motor Structure/Motor Speech Labial: No impairment Dentition: Natural;Intact Oral Hygiene: adequate Lingual: No impairment Velum: No impairment Mandible: No impairment Voice: 
Type of Assessment: Perceptual eval/non-instrumental 
Vocal Onset: Breathy Vocal Quality: Phonation breaks; Low volume;Tremorous Vocal Intensity: Too soft Vocal Pitch: Pitch breaks; Restricted range; Impaired flexibility Resonance: No impairment Breath Support: Clavicular breathing;Respiration/Phonation incoordination Overall Voice Impairment Severity: Moderate PAIN: 
Start of Eval: 0 End of Eval: 0 After treatment:  
[]              Patient left in no apparent distress sitting up in chair 
[x]              Patient left in no apparent distress in bed 
[x]              Call bell left within reach [x]              Nursing notified 
[]              Caregiver present 
[]              Bed alarm activated COMMUNICATION/EDUCATION:  
[x] Patient/family have participated as able in goal setting and plan of care. [x]  Patient/family agree to work toward stated goals and plan of care. Thank you for this referral. 
 
Anne Emmanuel M.S. CCC-SLP/L Speech-Language Pathologist

## 2020-04-30 NOTE — DIABETES MGMT
GLYCEMIC CONTROL PLAN OF CARE Assessment/Recommendations: 
Fasting lab glucose this am 116 mg/dl Continue corrective insulin coverage as needed. Advanced corrective scale to very insulin resistant dosing Will continue inpatient monitoring. Most recent blood glucose values: 
Results for Eric Issa (MRN [id number]) as of 4/30/2020 12:18 Ref. Range 4/29/2020 16:10 4/29/2020 20:44 4/30/2020 06:14 4/30/2020 11:27 GLUCOSE,FAST - POC Latest Ref Range: 70 - 110 mg/dL 258 (H) 204 (H) 139 (H) 277 (H) Current A1C of 7.4 % is equivalent to average blood glucose of 166 mg/dl over the past 2-3 months. Current hospital diabetes medications:  
Lispro corrective insulin coverage AC&HS Previous day's insulin requirements:  
Lispro 16 units corrective insulin Home diabetes medications:  Pt unable to name her diabetes medication, but states she takes two kinds. Information obtained from Care Everywhere summary. Sitagliptin 100 mg daily Glipizide-metformin 5-500 mg bid Diet:   
Diabetic consistent carb Education:  ____Refer to Diabetes Education Record  
          _x__Education not indicated at this time A1C of 7.4%. Acceptable for patient's age. Abdiel Keita RN DCES Ext [phone]

## 2020-05-01 NOTE — PROGRESS NOTES
NUTRITION Nutrition Screen RECOMMENDATIONS / PLAN:  
 
- Continue current nutrition interventions - Continue RD inpatient monitoring and evaluation. NUTRITION INTERVENTIONS & DIAGNOSIS:  
 
- Meals/snacks: modified composition Nutrition Diagnosis: Increased nutrient needs (protein) related to promotion of wound healing as evidenced by pt with venous ulcer wounds ASSESSMENT:  
 
Pt seen per MD request for assessment of protein needs for promotion of wound healing. Asleep/lethargic at time of visit. Tolerating po diet; SLP following good/excellent meal intake per chart. Adequate to meet estimated protein and energy needs; po diet provides 98 gm protein (1.46 gm/kg), adequate to meet protein needs. Pt with venous ulcers. Nutritional intake adequate to meet patients estimated nutritional needs:  Yes Diet: DIET DIABETIC CONSISTENT CARB Regular; 2 GM NA (House Low NA) Food Allergies:  None known Current Appetite: Unknown Appetite/meal intake prior to admission: Unknown Feeding Limitations:  [x] Swallowing difficulty: SLP following    [] Chewing difficulty    [] Other: 
Current Meal Intake:  
Patient Vitals for the past 100 hrs: 
 % Diet Eaten 04/30/20 1322 120 % 04/30/20 1200 75 % 04/29/20 1300 95 % 04/29/20 0917 100 % BM:  4/30 Skin Integrity:   Right and left pretibial venous ulcers Edema:   [x] No     [] Yes Pertinent Medications: Reviewed: ferrous sulfate, furosemide, SSI, loperamide, mag-ox, pantoprazole, aldactone Recent Labs 05/01/20 
0023 04/30/20 
0157 04/29/20 
0158  139 135* K 4.4 4.3 3.3*  
 108 104 CO2 24 22 23 * 116* 132* BUN 31* 33* 33* CREA 1.48* 1.19 1.03  
CA 8.5 8.3* 8.5 Intake/Output Summary (Last 24 hours) at 5/1/2020 1303 Last data filed at 5/1/2020 9098 Gross per 24 hour Intake 413 ml Output 651 ml Net -238 ml Anthropometrics: 
Ht Readings from Last 1 Encounters: 04/28/20 5' 2\" (1.575 m) Last 3 Recorded Weights in this Encounter 04/28/20 2258 04/29/20 1009 05/01/20 3570 Weight: 61.2 kg (135 lb) 63.5 kg (140 lb) 66.7 kg (147 lb) Body mass index is 26.89 kg/m². Weight History:   Weight fluctuations/ recent gain PTA per chart hx 
 
Weight Metrics 5/1/2020 4/28/2020 3/12/2020 2/28/2020 2/19/2020 2/11/2020 2/11/2020 Weight 147 lb - 129 lb 129 lb 138 lb 9.6 oz - 137 lb BMI - 26.89 kg/m2 24.37 kg/m2 24.37 kg/m2 - 26.19 kg/m2 - Admitting Diagnosis: [facility] [I83.009, K12.451] Pertinent PMHx: CAD, DM, dyslipidemia, heart failure, HLD Education Needs:        [x] None identified  [] Identified - Not appropriate at this time  []  Identified and addressed - refer to education log Learning Limitations:   [] None identified  [x] Identified: bilateral [place] Cultural, Judaism & ethnic food preferences:  [x] None identified    [] Identified and addressed ESTIMATED NUTRITION NEEDS:  
 
Calories: 4740-9247 kcal (MSJx1.2-1.4) based on  [x] Actual BW: 67 kg      [] IBW Protein: 54-87 gm (0.8-1.3 gm/kg) based on  [x] Actual BW      [] IBW Fluid: 1 mL/kcal 
  
MONITORING & EVALUATION:  
 
Nutrition Goal(s):  
- PO nutrition intake will meet >75% of patient estimated nutritional needs within the next 7 days. Outcome: New/Initial goal  
 
Monitoring:   [x] Food and nutrient intake   [x] Food and nutrient administration  [x] Comparative standards   [x] Nutrition-focused physical findings   [x] Anthropometric Measurements   [x] Treatment/therapy   [x] Biochemical data, medical tests, and procedures Previous Recommendations (for follow-up assessments only):  Not Applicable Discharge Planning: No nutritional discharge needs at this time. Participated in care planning, discharge planning, & interdisciplinary rounds as appropriate. Dru Deluca RD Pager: [phone]

## 2020-05-01 NOTE — HOME CARE
Patient active to [facility]. Resumption of care order needed prior to discharge. [facility] will continue to follow. John Martinez LPN  
[facility] Liaison [phone]

## 2020-05-01 NOTE — ROUTINE PROCESS
Bedside and Verbal shift change report given to Abbey Wolfe RN 
 (oncoming nurse) by Ella Palma RN (offgoing nurse). Report included the following information SBAR, Kardex, Intake/Output, Accordion and Recent Results.

## 2020-05-01 NOTE — PROGRESS NOTES
Infectious Disease progress note Reason: Infected bilateral lower extremity ulcers Current abx Prior abx Pip/tazo, vancomycin since 4/28/20 Lines:  
 
 
Assessment : 
 
 
80 y. o. female  With hx of HTN, CAD, CHF, type 2 DM (last hgbA1C 7.4 on 4/29/20), hyperlipidemia and venous insufficiency, MRSA, aortic stenosis presented to the ED on  4/28/20 for admission due to bilat LE cellulitis and ulcerations. 
  
Wound cultures 5/2018- acinetobacter, mrsa 12/2018- enterobacter, mrsa; 1/2019- mrsa Hospitalization February 2020 for bilateral infected lower extremity ulcers status post incision and debridement 2/14/2020 
  
Clinical presentation c/w bilateral infected LE ulcers, cellulitis superimposed on nonhealing ulcers secondary to chronic venous stasis. 
  
Evidence of superficial necrosis noted on several ulcers on both lower extremities. No significant erythema both lower extremities. No purulent drainage. Wound culture 4/28/2020-staph aureus, Enterobacter, Klebsiella. Susceptibilities of Enterobacter reviewed. Resistant to cefazolin. Risk of beta-lactamase induced resistance. 
  
Significant improvement in the lower extremity pain on today's exam.  Vascular surgery follow-up appreciated. Mild increased in creatinine today. Creatinine 1.4 compared to 1.19 on 4/30. Piperacillin/tazobactam, vancomycin combination can increase chance of renal dysfunction. Hence will alter current antibiotic regimen RECOMMENDATIONS: 
  
1. Discontinue Pip/tazo, vancomycin. Start oral levofloxacin, clindamycin 2. F/u vascular surgery recommendations 3. Follow-up susceptibilities of staph aureus in the wound culture and modify antibiotics as needed. 4. Needs good Wound care bilateral LE ulcers per vascular surgery team to aid healing 5. Unfortunately, patient is  at high risk for recurrent infection of the lower extremity ulcers if the ulcers do not heal due to chronic venous stasis 6. Monitor renal function closely 
  
Above plan was discussed in details with patient, dr. Denise Carl. Please call me if any further questions or concerns. Will continue to participate in the care of this patient. HPI: 
 
Started speech. States that she feels better. Significant improvement in leg pain. She was able to get up with physical therapy and walk few steps 
 
 
 
home Medication List  
 Details Cetirizine 10 mg cap Take 10 mg by mouth two (2) times a day. Indications: inflammation of the nose due to an allergy, a type of allergy that causes red and itchy skin called atopic dermatitis  
  
!! DOXYCYCLINE MONOHYDRATE PO Take 100 mg by mouth two (2) times a day. 1 pill po twice daily  
  
!! ciprofloxacin HCl (CILOXAN) 0.3 % ophthalmic solution Administer 1 Drop to left eye every two (2) hours. !! ciprofloxacin HCl (CILOXAN) 0.3 % ophthalmic solution 1-2 drops into the affected eye every 2 hours while awake x 2 days and 1-2 drops every 4 hours while awake x 5 days Qty: 5 mL, Refills: 0 Associated Diagnoses: Conjunctivitis, unspecified conjunctivitis type, unspecified laterality  
  
naproxen sodium (ALEVE PO) Take 220 mg by mouth two (2) times daily as needed for Pain (For pain). 1 pill po 2 times daily for pain prn SITagliptin (Januvia) 100 mg tablet take 1 tablet by mouth every morning 
Qty: 30 Tab, Refills: 4  
  
!! doxycycline (MONODOX) 100 mg capsule Take 1 Cap by mouth two (2) times a day. Qty: 28 Cap, Refills: 0  
  
sertraline (ZOLOFT) 25 mg tablet take 1 tablet by mouth once daily 
Qty: 90 Tab, Refills: 3 Associated Diagnoses: Mild depression ([address] pravastatin (PRAVACHOL) 40 mg tablet take 1 tablet by mouth at bedtime 
Qty: 90 Tab, Refills: 3  
  
busPIRone (BUSPAR) 5 mg tablet 1 tablet twice per day 
Qty: 60 Tab, Refills: 2  
  
spironolactone (ALDACTONE PO) Take 1 Tab by mouth daily.   
  
ferrous sulfate 325 mg (65 mg iron) tablet Take 1 Tab by mouth daily (with breakfast). Qty: 30 Tab, Refills: 0  
  
magnesium oxide (MAG-OX) 400 mg tablet Take 1 Tab by mouth daily. Qty: 30 Tab, Refills: 0  
  
multivitamin (ONE A DAY) tablet Take 1 Tab by mouth daily. RABEprazole (ACIPHEX) 20 mg tablet take 1 tablet by mouth once daily 
Qty: 30 Tab, Refills: 5  
  
furosemide (LASIX) 40 mg tablet take 1 tablet by mouth once daily 
Qty: 90 Tab, Refills: 3  
  
loperamide (IMODIUM) 2 mg capsule Take 1 mg by mouth Three (3) times a week. aspirin delayed-release 81 mg tablet Take 81 mg by mouth daily. 1 pill po daily Associated Diagnoses: HTN (hypertension); Aortic stenosis Current Facility-Administered Medications Medication Dose Route Frequency  insulin lispro (HUMALOG) injection   SubCUTAneous AC&HS  
 glucose chewable tablet 16 g  4 Tab Oral PRN  
 glucagon (GLUCAGEN) injection 1 mg  1 mg IntraMUSCular PRN  
 dextrose 10% infusion 25-50 mL  25-50 mL IntraVENous PRN  
 vancomycin (VANCOCIN) 750 mg in  mL infusion  750 mg IntraVENous Q18H  
 loperamide (IMODIUM) 1 mg/7.5 mL oral solution 1 mg  1 mg Oral 3 times weekly  0.9% sodium chloride infusion 250 mL  250 mL IntraVENous PRN  
 aspirin delayed-release tablet 81 mg  81 mg Oral DAILY  therapeutic multivitamin (THERAGRAN) tablet 1 Tab  1 Tab Oral DAILY  ferrous sulfate tablet 325 mg  325 mg Oral DAILY WITH BREAKFAST  magnesium oxide (MAG-OX) tablet 400 mg  400 mg Oral DAILY  pravastatin (PRAVACHOL) tablet 40 mg  40 mg Oral QHS  busPIRone (BUSPAR) tablet 5 mg  5 mg Oral BID  furosemide (LASIX) tablet 40 mg  40 mg Oral DAILY  pantoprazole (PROTONIX) tablet 40 mg  40 mg Oral ACB  sertraline (ZOLOFT) tablet 25 mg  25 mg Oral QPM  
 alogliptin (NESINA) tablet 12.5 mg  12.5 mg Oral DAILY  spironolactone (ALDACTONE) tablet 25 mg  25 mg Oral DAILY  piperacillin-tazobactam (ZOSYN) 2.25 g in 0.9% sodium chloride (MBP/ADV) 50 mL MBP  2.25 g IntraVENous Q6H  
  acetaminophen (TYLENOL) tablet 650 mg  650 mg Oral Q6H PRN  
 oxyCODONE-acetaminophen (PERCOCET) 5-325 mg per tablet 1 Tab  1 Tab Oral Q6H PRN Allergies: Patient has no known allergies. Temp (24hrs), Av.2 °F (36.8 °C), Min:98 °F (36.7 °C), Max:98.6 °F (37 °C) Visit Vitals /67 (BP 1 Location: Right arm, BP Patient Position: At rest) Pulse 86 Temp 98.1 °F (36.7 °C) Resp 18 Ht 5' 2\" (1.575 m) Wt 66.7 kg (147 lb) SpO2 98% BMI 26.89 kg/m² ROS: 12 point ROS obtained in details. Pertinent positives as mentioned in HPI,  
otherwise negative Physical Exam: 
 
General: Well developed, well nourished female laying on the bed AAOx3 in no acute distress. 
  
General:   awake alert and oriented HEENT:  Normocephalic, atraumatic, PERRL, EOMI, no scleral icterus or pallor; no conjunctival hemmohage;  nasal and oral mucous are moist and without evidence of lesions. No thrush. Neck supple, no bruits. Lymph Nodes:   no cervical, axillary or inguinal adenopathy Lungs:   non-labored, bilaterally clear to auscultation- no crackles wheezes rales or rhonchi Heart:  RRR, s1 and s2; no  rubs or gallops, no edema, + pedal pulses Abdomen:  soft, non-distended, active bowel sounds, no hepatomegaly, no splenomegaly. Non-tender Genitourinary:  deferred Extremities:   no clubbing, cyanosis; no joint effusions or swelling; Full ROM of all large joints to the upper and lower extremities; muscle mass appropriate for age; multiple superficial ulcers on both lower extremity with some evidence of superficial necrosis and yellow scab; tenderness of both lower extremities right greater than the left, increased warmth right lower extremity Neurologic:  No gross focal sensory abnormalities; 5/5 muscle strength to upper and lower extremities. Speech appropriate. Cranial nerves intact Skin:   Multiple superficial bilateral ulcers with superficial necrosis on several ulcers Back:  no spinal or paraspinal muscle tenderness or rigidity, no CVA tenderness 
   
Psychiatric:  No suicidal or homicidal ideations, appropriate mood and affect  
  
 
 
Labs: Results:  
Chemistry Recent Labs 05/01/20 
0023 04/30/20 
0157 04/29/20 
0158 * 116* 132*  139 135* K 4.4 4.3 3.3*  
 108 104 CO2 24 22 23 BUN 31* 33* 33* CREA 1.48* 1.19 1.03  
CA 8.5 8.3* 8.5 AGAP 8 9 8 BUCR 21* 28* 32* CBC w/Diff Recent Labs  
  04/28/20 
1859 04/28/20 
1655 WBC 12.7 6.2 RBC 2.78* 1.51* HGB 7.2* 4.1* HCT 22.3* 12.2*  
* 40* GRANS 88*  --   
LYMPH 8*  --   
EOS 0  --   
  
Microbiology Recent Labs  
  04/28/20 
1740 04/28/20 
1655 04/28/20 
1640 CULT HEAVY GRAM NEGATIVE RODS*  MODERATE POSSIBLE STAPHYLOCOCCUS AUREUS* NO GROWTH 3 DAYS NO GROWTH 3 DAYS  
  
 
 
RADIOLOGY: 
 
All available imaging studies/reports in [facility] for this admission were reviewed Dr. Ramirez , Infectious Disease Specialist 
[phone] May 1, 2020 
11:09 AM

## 2020-05-01 NOTE — PROGRESS NOTES
[facility] Hospitalist Group Progress Note Patient: Ryan Cool Age: 80 y.o. : 1933 MR#: [id number] SSN: [id number] Date: 2020 Subjective/24-hour events: No new issues overnight. Assessment:  
Chronic lower extremity venous stasis Bilateral lower extremity ulcers, with history of debridement 2 months ago Lower extremity cellulitis  
Chronic anemia DM 
HTN Mild aortic stenosis Plan: Wound care evaluation done, recommendations noted. Continue wound management as ordered. IV antibiotic therapy transitioned to PO per ID recs. Supportive care to continue otherwise. Follow. Disposition likely soon now that transition to PO ABX made. Case discussed with:  [x]Patient  []Family  [x]Nursing  [x]Case Management DVT Prophylaxis:  []Lovenox  []Hep SQ  []SCDs  []Coumadin   []On Heparin gtt Objective:  
VS:  
Visit Vitals /68 (BP 1 Location: Left arm, BP Patient Position: Sitting) Pulse 85 Temp 98.2 °F (36.8 °C) Resp 18 Ht 5' 2\" (1.575 m) Wt 66.7 kg (147 lb) SpO2 98% BMI 26.89 kg/m² Tmax/24hrs: Temp (24hrs), Av.3 °F (36.8 °C), Min:98.1 °F (36.7 °C), Max:98.6 °F (37 °C) Intake/Output Summary (Last 24 hours) at 2020 1405 Last data filed at 2020 4484 Gross per 24 hour Intake 410 ml Output 651 ml Net -241 ml General:  In NAD. Cardiovascular: RRR. Pulmonary:  No wheezes, effort nonlabored. GI:  Abdomen soft, NTTP. Extremities:  Multiple superficial ulcers bilaterally. Neuro:  Awake and alert, moves extremities spontaneously. Labs:   
Recent Results (from the past 24 hour(s)) GLUCOSE, POC Collection Time: 20  3:43 PM  
Result Value Ref Range Glucose (POC) 262 (H) 70 - 110 mg/dL GLUCOSE, POC Collection Time: 20  9:43 PM  
Result Value Ref Range Glucose (POC) 168 (H) 70 - 110 mg/dL METABOLIC PANEL, BASIC Collection Time: 20 12:23 AM  
Result Value Ref Range Sodium 140 136 - 145 mmol/L Potassium 4.4 3.5 - 5.5 mmol/L Chloride 108 100 - 111 mmol/L  
 CO2 24 21 - 32 mmol/L Anion gap 8 3.0 - 18 mmol/L Glucose 172 (H) 74 - 99 mg/dL BUN 31 (H) 7.0 - 18 MG/DL Creatinine 1.48 (H) 0.6 - 1.3 MG/DL  
 BUN/Creatinine ratio 21 (H) 12 - 20 GFR est AA 40 (L) >60 ml/min/1.73m2 GFR est non-AA 33 (L) >60 ml/min/1.73m2 Calcium 8.5 8.5 - 10.1 MG/DL [facility] Collection Time: 05/01/20 12:23 AM  
Result Value Ref Range Vancomycin,trough 15.8 10.0 - 20.0 ug/mL Reported dose date: [id number] Reported dose time: 0600 Reported dose: 750 MG UNITS  
GLUCOSE, POC Collection Time: 05/01/20  6:09 AM  
Result Value Ref Range Glucose (POC) 186 (H) 70 - 110 mg/dL GLUCOSE, POC Collection Time: 05/01/20 10:28 AM  
Result Value Ref Range Glucose (POC) 167 (H) 70 - 110 mg/dL Signed By: Neal Mcdermott MD   
 May 1, 2020

## 2020-05-01 NOTE — PROGRESS NOTES
Problem: Self Care Deficits Care Plan (Adult) Goal: *Acute Goals and Plan of Care (Insert Text) Description: Occupational Therapy Goals Initiated 4/30/2020 within 7 day(s). 1.  Patient will perform upper body dressing with modified independence. 2.  Patient will perform lower body dressing with supervision/set-up. 3.  Patient will perform toileting with supervision/set-up. 4.  Patient will perform toilet transfers with supervision/set-up. 5.  Patient will perform a functional activity in standing with supervision/setup for 3-5 minutes. 6.  Patient will participate in upper extremity therapeutic exercise/activities with supervision/set-up for 8 minutes. Prior Level of Function: Pt reports she was Mod(I) with basic self-care/ADLs, IADLs, and functional mobility using a RW PTA. Outcome: Progressing Towards Goal 
 OCCUPATIONAL THERAPY TREATMENT Patient: Michaelle Lacy (5 y.o. female) Date: 5/1/2020 Diagnosis: [facility] [I83.009, P59.147] <principal problem not specified> Precautions: Fall Chart, occupational therapy assessment, plan of care, and goals were reviewed. ASSESSMENT: 
 Upon entering room, pt supine with HOB elevated, alert, and agreeable to session. Pt motivated to work with therapy this AM. In supine pt able to don B socks with SBA with min verbal cueing. Noted weeping in BLEs. Pt able to ambulate from bed>toilet CGA while using RW, but requires min-mod assist when transferring onto toilet. Pt able to stand from low toilet seat with min assist then complete pericare and clothing management in standing. Pt able to tolerate standing at sink level,  5 minutes, CGA performing grooming tasks. Pt then transferred to locked recliner for mealtime. Pt progressing well, continue OT POC. At the end of the session, pt resting comfortably in recliner, call-bell in reach, with all needs met. Progression toward goals: [x]          Improving appropriately and progressing toward goals 
[]          Improving slowly and progressing toward goals 
[]          Not making progress toward goals and plan of care will be adjusted PLAN: 
Patient continues to benefit from skilled intervention to address the above impairments. Continue treatment per established plan of care. Discharge Recommendations:  Home health with Assistance Further Equipment Recommendations for Discharge:  Shower chair SUBJECTIVE:  
Patient stated \"These legs used to walk 14 blocks.  OBJECTIVE DATA SUMMARY:  
Cognitive/Behavioral Status: 
Neurologic State: Alert Orientation Level: Oriented X4 Cognition: Follows commands Safety/Judgement: Fall prevention Functional Mobility and Transfers for ADLs: 
 Bed Mobility: 
Supine to Sit: Contact guard assistance Scooting: Stand-by assistance Transfers: 
Sit to Stand: Contact guard assistance Stand to Sit: Minimum assistance(from low toilet seat) Toilet Transfer : Minimum assistance (cueing to utilize grab bar) Balance: 
Sitting: Intact Standing: Impaired; With support Standing - Static: Good Standing - Dynamic : Fair ADL Intervention: 
Feeding Feeding Assistance: Modified independent; Set-up Food to Mouth: Modified independent; Set-up Grooming Grooming Assistance: Contact guard assistance Position Performed: Standing Washing Face: Contact guard assistance Washing Hands: Contact guard assistance Toileting: 
-Clothing management: Min assist 
-Bladder hygiene: CGA in standing Lower Body Dressing Assistance Dressing Assistance: Minimum assistance Socks: Minimum assistance Leg Crossed Method Used: Yes Position Performed: Supine Cognitive Retraining Safety/Judgement: Fall prevention Pain: 
Pain level pre-treatment: 0/10 Pain level post-treatment: 0/10 Pain Intervention(s): Medication (see MAR); Rest, Ice, Repositioning Response to intervention: Nurse notified, See doc flow Activity Tolerance:   
Good Please refer to the flowsheet for vital signs taken during this treatment. After treatment:  
[x]  Patient left in no apparent distress sitting up in chair 
[]  Patient left in no apparent distress in bed 
[x]  Call bell left within reach [x]  Nursing notified and present in room 
[]  Caregiver present 
[]  Bed alarm activated COMMUNICATION/EDUCATION:  
[x] Role of Occupational Therapy in the acute care setting 
[] Home safety education was provided and the patient/caregiver indicated understanding. [x] Patient/family have participated as able in working towards goals and plan of care. [x] Patient/family agree to work toward stated goals and plan of care. [] Patient understands intent and goals of therapy, but is neutral about his/her participation. [] Patient is unable to participate in goal setting and plan of care. Thank you for this referral. 
Hannah Rossi MS, OTR/L Time Calculation: 44 mins

## 2020-05-01 NOTE — DIABETES MGMT
GLYCEMIC CONTROL PLAN OF CARE Assessment/Recommendations: 
Fasting lab glucose this am 172 mg/dl Recommend starting lantus 6 units daily Continue corrective insulin coverage as needed. Advanced corrective scale to very insulin resistant dosing Will continue inpatient monitoring. Most recent blood glucose values: 
  
 
Results for You Neves (MRN [id number]) as of 5/1/2020 13:15 Ref. Range 4/30/2020 11:27 4/30/2020 15:43 4/30/2020 21:43 5/1/2020 06:09 5/1/2020 10:28 GLUCOSE,FAST - POC Latest Ref Range: 70 - 110 mg/dL 277 (H) 262 (H) 168 (H) 186 (H) 167 (H) Current A1C of 7.4 % is equivalent to average blood glucose of 166 mg/dl over the past 2-3 months. Current hospital diabetes medications:  
Lispro corrective insulin coverage AC&HS Previous day's insulin requirements:  
Lispro 21 units corrective insulin Home diabetes medications:  Pt unable to name her diabetes medication, but states she takes two kinds. Information obtained from Care Everywhere summary. Sitagliptin 100 mg daily Glipizide-metformin 5-500 mg bid Diet:   
Diabetic consistent carb Education:  ____Refer to Diabetes Education Record  
          _x__Education not indicated at this time A1C of 7.4%. Acceptable for patient's age. Kristine Burleson RN DCES Ext [phone]

## 2020-05-01 NOTE — PROGRESS NOTES
Problem: Lower Extremity Wound Care Goal: *Non-infected wound: Improvement of existing wound, absence of infection, and maintenance of skin integrity Outcome: Progressing Towards Goal 
Goal: *Infected Wound: Prevention of further infection and promotion of healing Description: Infection control procedures (eg: clean dressings, clean gloves, hand washing, precautions to isolate wound from contamination, sterile instruments used for wound debridement) should be implemented. Outcome: Progressing Towards Goal 
Goal: Interventions Outcome: Progressing Towards Goal 
  
Problem: Falls - Risk of 
Goal: *Absence of Falls Description: Document Zandra Costello Fall Risk and appropriate interventions in the flowsheet. Outcome: Progressing Towards Goal 
Note: Fall Risk Interventions: 
Mobility Interventions: Bed/chair exit alarm, Assess mobility with egress test, Patient to call before getting OOB, PT Consult for mobility concerns, Utilize walker, cane, or other assistive device Medication Interventions: Bed/chair exit alarm, Patient to call before getting OOB, Teach patient to arise slowly Elimination Interventions: Bed/chair exit alarm, Call light in reach, Patient to call for help with toileting needs, Toileting schedule/hourly rounds, Stay With Me (per policy) History of Falls Interventions: Bed/chair exit alarm, Door open when patient unattended Problem: Patient Education: Go to Patient Education Activity Goal: Patient/Family Education Outcome: Progressing Towards Goal 
  
Problem: Diabetes Self-Management Goal: *Disease process and treatment process Description: Define diabetes and identify own type of diabetes; list 3 options for treating diabetes. Outcome: Progressing Towards Goal 
Goal: *Incorporating nutritional management into lifestyle Description: Describe effect of type, amount and timing of food on blood glucose; list 3 methods for planning meals.  
Outcome: Progressing Towards Goal 
 Goal: *Incorporating physical activity into lifestyle Description: State effect of exercise on blood glucose levels. Outcome: Progressing Towards Goal 
Goal: *Developing strategies to promote health/change behavior Description: Define the ABC's of diabetes; identify appropriate screenings, schedule and personal plan for screenings. Outcome: Progressing Towards Goal 
Goal: *Using medications safely Description: State effect of diabetes medications on diabetes; name diabetes medication taking, action and side effects. Outcome: Progressing Towards Goal 
Goal: *Monitoring blood glucose, interpreting and using results Description: Identify recommended blood glucose targets  and personal targets. Outcome: Progressing Towards Goal 
Goal: *Prevention, detection, treatment of acute complications Description: List symptoms of hyper- and hypoglycemia; describe how to treat low blood sugar and actions for lowering  high blood glucose level. Outcome: Progressing Towards Goal 
Goal: *Prevention, detection and treatment of chronic complications Description: Define the natural course of diabetes and describe the relationship of blood glucose levels to long term complications of diabetes. Outcome: Progressing Towards Goal 
Goal: *Developing strategies to address psychosocial issues Description: Describe feelings about living with diabetes; identify support needed and support network Outcome: Progressing Towards Goal 
Goal: *Insulin pump training Outcome: Progressing Towards Goal 
Goal: *Sick day guidelines Outcome: Progressing Towards Goal 
Goal: *Patient Specific Goal (EDIT GOAL, INSERT TEXT) Outcome: Progressing Towards Goal 
  
Problem: Patient Education: Go to Patient Education Activity Goal: Patient/Family Education Outcome: Progressing Towards Goal 
  
Problem: Pressure Injury - Risk of 
Goal: *Prevention of pressure injury Description: Document Shon Scale and appropriate interventions in the flowsheet. Outcome: Progressing Towards Goal 
Note: Pressure Injury Interventions: 
  
 
Moisture Interventions: Absorbent underpads, Check for incontinence Q2 hours and as needed, Maintain skin hydration (lotion/cream), Minimize layers, Limit adult briefs Activity Interventions: Pressure redistribution bed/mattress(bed type), PT/OT evaluation, Increase time out of bed Mobility Interventions: HOB 30 degrees or less, Pressure redistribution bed/mattress (bed type), PT/OT evaluation Nutrition Interventions: Document food/fluid/supplement intake Friction and Shear Interventions: HOB 30 degrees or less, Minimize layers Problem: Patient Education: Go to Patient Education Activity Goal: Patient/Family Education Outcome: Progressing Towards Goal 
  
Problem: Patient Education: Go to Patient Education Activity Goal: Patient/Family Education Outcome: Progressing Towards Goal 
  
Problem: Patient Education: Go to Patient Education Activity Goal: Patient/Family Education Outcome: Progressing Towards Goal 
  
Problem: Patient Education: Go to Patient Education Activity Goal: Patient/Family Education Outcome: Progressing Towards Goal 
  
Problem: Patient Education: Go to Patient Education Activity Goal: Patient/Family Education Outcome: Progressing Towards Goal 
  
Problem: Diabetes Maintenance:Admission Goal: *Blood glucose 80 to 180 mg/dl Outcome: Progressing Towards Goal

## 2020-05-01 NOTE — PROGRESS NOTES
Pt is active with [address] [place] [address] and will need home health ordered when ready for discharge. ASHLEY CaroN RN Care Management Pager: [phone]

## 2020-05-02 NOTE — PROGRESS NOTES
[facility] Hospitalist Group Progress Note Patient: Bret Lee Age: 80 y.o. : 1933 MR#: [id number] SSN: [id number] Date: 2020 Subjective/24-hour events:  
 
Nothing new/acute. Sitting in chair in bedside. Assessment:  
Chronic lower extremity venous stasis Bilateral lower extremity ulcers, with history of debridement 2 months ago Lower extremity cellulitis  
Chronic anemia DM 
HTN Mild aortic stenosis Plan: Wound care evaluation done, recommendations noted. Continue wound management as ordered. IV antibiotic therapy transitioned to PO per ID recs. Supportive care to continue otherwise. Follow. Disposition likely soon now that transition to PO ABX made. Waiting on sensitivities of S. Aureus growing in wound cultures - data still not available. Best supportive care to continue in interim. Case discussed with:  [x]Patient  []Family  [x]Nursing  [x]Case Management DVT Prophylaxis:  []Lovenox  []Hep SQ  []SCDs  []Coumadin   []On Heparin gtt Objective:  
VS:  
Visit Vitals BP 96/60 (BP 1 Location: Right arm, BP Patient Position: At rest;Sitting) Pulse 96 Temp 98.3 °F (36.8 °C) Resp 17 Ht 5' 2\" (1.575 m) Wt 66.7 kg (147 lb) SpO2 97% BMI 26.89 kg/m² Tmax/24hrs: Temp (24hrs), Av.1 °F (36.7 °C), Min:97.9 °F (36.6 °C), Max:98.4 °F (36.9 °C) Intake/Output Summary (Last 24 hours) at 2020 1112 Last data filed at 2020 1740 Gross per 24 hour Intake 840 ml Output 900 ml Net -60 ml General:  In NAD. Cardiovascular: RRR. Pulmonary:  No wheezes, effort nonlabored. GI:  Abdomen soft, NTTP. Extremities:  Multiple superficial ulcers bilaterally. Neuro:  Awake and alert, moves extremities spontaneously. Labs:   
Recent Results (from the past 24 hour(s)) GLUCOSE, POC Collection Time: 20  6:04 PM  
Result Value Ref Range Glucose (POC) 286 (H) 70 - 110 mg/dL GLUCOSE, POC  
 Collection Time: 05/01/20  9:48 PM  
Result Value Ref Range Glucose (POC) 275 (H) 70 - 110 mg/dL METABOLIC PANEL, BASIC Collection Time: 05/02/20  5:43 AM  
Result Value Ref Range Sodium 139 136 - 145 mmol/L Potassium 3.8 3.5 - 5.5 mmol/L Chloride 108 100 - 111 mmol/L  
 CO2 25 21 - 32 mmol/L Anion gap 6 3.0 - 18 mmol/L Glucose 82 74 - 99 mg/dL BUN 28 (H) 7.0 - 18 MG/DL Creatinine 0.99 0.6 - 1.3 MG/DL  
 BUN/Creatinine ratio 28 (H) 12 - 20 GFR est AA >60 >60 ml/min/1.73m2 GFR est non-AA 53 (L) >60 ml/min/1.73m2 Calcium 8.2 (L) 8.5 - 10.1 MG/DL Signed By: Umair Ruiz MD   
 May 2, 2020

## 2020-05-03 NOTE — PROGRESS NOTES
Bedside and Verbal shift change report given to Mauricio Green RN (oncoming nurse) by Lucas Johnson RN (offgoing nurse). Report included the following information SBAR, Kardex, ED Summary, Intake/Output and MAR.

## 2020-05-03 NOTE — PROGRESS NOTES
[facility] Hospitalist Group Progress Note Patient: Michaelle Lacy Age: 80 y.o. : 1933 MR#: [id number] SSN: [id number] Date: 5/3/2020 Subjective/24-hour events:  
 
Unchanged clinically, no new complaints. Remains afebrile. Assessment:  
Chronic lower extremity venous stasis Bilateral lower extremity ulcers, with history of debridement 2 months ago Lower extremity cellulitis  
Chronic anemia DM 
HTN Mild aortic stenosis Plan: Wound care per WOC recommendations. Culture results finalized. S. Aureus essentially pansensitive - on resistance is to erythromycin. Will attempt to f/u with ID today for final recommendations on ABX going forward. Disposition plan is home with previously ordered [place] [phone] services, possibly today pending above. Discussed with daughter via phone today and they are prepared to take patient back in if discharged. Best supportive care to continue in interim. Follow. Case discussed with:  [x]Patient  [x]Family  [x]Nursing  [x]Case Management DVT Prophylaxis:  []Lovenox  []Hep SQ  []SCDs  []Coumadin   []On Heparin gtt Objective:  
VS:  
Visit Vitals /61 (BP 1 Location: Right arm, BP Patient Position: At rest) Pulse 95 Temp 99.2 °F (37.3 °C) Resp 18 Ht 5' 2\" (1.575 m) Wt 66.2 kg (146 lb) SpO2 99% BMI 26.70 kg/m² Tmax/24hrs: Temp (24hrs), Av.1 °F (36.7 °C), Min:97.4 °F (36.3 °C), Max:99.2 °F (37.3 °C) Intake/Output Summary (Last 24 hours) at 5/3/2020 9700 Last data filed at 445 [facility] Gross per 24 hour Intake 1370 ml Output 650 ml Net 720 ml General:  In NAD. Cardiovascular: RRR. Pulmonary:  No wheezes, effort nonlabored. GI:  Abdomen soft, NTTP. Extremities:  Multiple superficial ulcers bilaterally. Neuro:  Awake and alert, moves extremities spontaneously. Labs:   
Recent Results (from the past 24 hour(s)) GLUCOSE, POC  Collection Time: 20 11:24 AM  
 Result Value Ref Range Glucose (POC) 207 (H) 70 - 110 mg/dL GLUCOSE, POC Collection Time: 05/02/20  4:23 PM  
Result Value Ref Range Glucose (POC) 244 (H) 70 - 110 mg/dL GLUCOSE, POC Collection Time: 05/02/20  9:27 PM  
Result Value Ref Range Glucose (POC) 218 (H) 70 - 110 mg/dL GLUCOSE, POC Collection Time: 05/03/20  6:27 AM  
Result Value Ref Range Glucose (POC) 164 (H) 70 - 110 mg/dL METABOLIC PANEL, BASIC Collection Time: 05/03/20  7:05 AM  
Result Value Ref Range Sodium 132 (L) 136 - 145 mmol/L Potassium 4.5 3.5 - 5.5 mmol/L Chloride 102 100 - 111 mmol/L  
 CO2 25 21 - 32 mmol/L Anion gap 5 3.0 - 18 mmol/L Glucose 144 (H) 74 - 99 mg/dL BUN 30 (H) 7.0 - 18 MG/DL Creatinine 1.17 0.6 - 1.3 MG/DL  
 BUN/Creatinine ratio 26 (H) 12 - 20 GFR est AA 53 (L) >60 ml/min/1.73m2 GFR est non-AA 44 (L) >60 ml/min/1.73m2 Calcium 8.4 (L) 8.5 - 10.1 MG/DL Signed By: Irma Kruger MD   
 May 3, 2020

## 2020-05-03 NOTE — DISCHARGE INSTRUCTIONS
Patient Education        Venous Skin Ulcer: Care Instructions  Your Care Instructions  A venous skin ulcer is a shallow wound that develops when the leg veins do not move blood back to the heart normally. Your veins have one-way valves that keep blood flowing toward the heart. When the valves are damaged, the blood can back up and pool in the vein. The blood may leak out of the vein into tissue around the vein. The tissue can break down and form an ulcer. The first sign of a venous skin ulcer is skin that turns dark red or purple over the area where the blood is leaking out of the vein. The skin also may become thick, dry, and itchy. Without treatment, an ulcer may form. The ulcer may be painful. Your leg also may swell and ache. If the ulcer becomes infected, the infection may cause an odor, and pus may drain from the ulcer. The area around the ulcer also may be more tender and red. Follow-up care is a key part of your treatment and safety. Be sure to make and go to all appointments, and call your doctor if you are having problems. It's also a good idea to know your test results and keep a list of the medicines you take. How can you care for yourself at home? · Follow your doctor's instructions on how to clean the ulcer and change the bandage. · If your doctor prescribed antibiotics, take them as directed. Do not stop taking them just because you feel better. You need to take the full course of antibiotics. · Lift your legs above the level of your heart as often as possible. For example, lie down and then prop up your legs with pillows. · Wear compression stockings or bandages. They help the blood circulate in your legs. And they help prevent blood from pooling in your legs. But there are different types of stockings, and they need to fit right. So your doctor will recommend what you need. · After your ulcer has healed, continue to wear compression stockings. Take them off only when you bathe and sleep. Compression helps your blood circulate and helps prevent other ulcers from forming. · Walk daily. Walking helps your blood circulation. When should you call for help? Call your doctor now or seek immediate medical care if:    · You have symptoms of infection, such as:  ? Increased pain, swelling, warmth, or redness. ? Red streaks leading from the ulcer. ? Pus draining from the ulcer. ? A fever.    Watch closely for changes in your health, and be sure to contact your doctor if:    · Your ulcer is not healing.     · You have new ulcers.     · The ulcer starts to bleed, and blood soaks through the bandage. Oozing small amounts of a mix of blood and fluid is normal.     · You have new bleeding.     · You do not get better as expected. Where can you learn more? Go to http://josé miguel-reid.info/  Enter R374 in the search box to learn more about \"Venous Skin Ulcer: Care Instructions. \"  Current as of: June 26, 2019Content Version: 12.4  © 5426-8006 [employer]. Care instructions adapted under license by [employer] (which disclaims liability or warranty for this information). If you have questions about a medical condition or this instruction, always ask your healthcare professional. [place] [address] any warranty or liability for your use of this information. Your A1C  was   Lab Results   Component Value Date/Time    Hemoglobin A1c 7.4 (H) 04/29/2020 01:58 AM    Hemoglobin A1c, External 5.7 07/28/2015   . An A1C of 7.4% is equivalent to an average blood glucose of 166 mg/dl over the past 3 months  This lab test reflects that your blood sugar averaged   over the past 3 months. It is important to follow up with your provider on a routine basis to continue to evaluate your blood sugar discuss any necessary changes in treatment.

## 2020-05-03 NOTE — PROGRESS NOTES
Discharge: 
 
Discharge order noted for today. Pt has been accepted to [facility] agency. Met with patient and she is agreeable to the transition plan today. Transport has been arranged through her family. Patient's discharge summary and home health  orders have been forwarded to [facility] home health  agency via [address] Updated bedside RN, Sania Jackson,  to the transition plan. Discharge information has been documented on the AVS. Carmencita Garcia. [facility] Care Manager Pager#: [phone]

## 2020-05-04 NOTE — PROGRESS NOTES
Patient contacted regarding recent discharge and COVID-19 risk Care Coordinator contacted the caregiver by telephone to perform post discharge assessment. Verified name and  with caregiver as identifiers. Patient has following risk factors of: diabetes and HTN. CTN/ACM reviewed discharge instructions, medical action plan and red flags related to discharge diagnosis. Reviewed and educated them on any new and changed medications related to discharge diagnosis. Advised obtaining a 90-day supply of all daily and as-needed medications. Education provided regarding infection prevention, and signs and symptoms of COVID-19 and when to seek medical attention with caregiver who verbalized understanding. Discussed exposure protocols and quarantine from [address] you at higher risk for severe illness  and given an opportunity for questions and concerns. The caregiver agrees to contact the COVID-19 hotline 225-772-1232 or PCP office for questions related to their healthcare. CTN/ACM provided contact information for future reference. From CDC: Are you at higher risk for severe illness?  Wash your hands often.  Avoid close contact (6 feet, which is about two arm lengths) with people who are sick.  Put distance between yourself and other people if COVID-19 is spreading in your community.  Clean and disinfect frequently touched surfaces.  Avoid all cruise travel and non-essential air travel.  Call your healthcare professional if you have concerns about COVID-19 and your underlying condition or if you are sick. For more information on steps you can take to protect yourself, see CDC's How to Protect Yourself Patient/family/caregiver given information for [employer] and agrees to enroll no Patient's preferred e-mail:  n/a Patient's preferred phone number: n/a Based on Loop alert triggers, patient will be contacted by nurse care manager for worsening symptoms. Plan for follow-up call in 7-14 days based on severity of symptoms and risk factors.

## 2020-05-04 NOTE — HOME CARE
Discharge noted over the weekend, [address] will follow for [facility] with Active patient; [facility] referral processed by [address] central intake over the weekend. Severa Johann.

## 2020-05-08 NOTE — ED PROVIDER NOTES
Hamlet Sharma is a 80 y.o. female with past medical history of aortic stenosis, hyperlipidemia, hypertension, diabetes, chronic lymphedema, and peripheral vascular disease coming in complaining of leg pain. Patient states that she has had ulcers on both of her legs for years. She is being followed by vascular surgery and wound care. She was just admitted and discharged 4 days ago on Augmentin to treat her MRSA infection. She was also at home with Percocet. She states she has been taking her pain medicine and antibiotics as prescribed. She states wound care is been coming. She states this afternoon she noticed loose, fluid-filled blisters and she has been having constant, achy, non-rating pain in bilateral lower extremities. She states that it has not improved much with the Percocet so she came in for evaluation. She denies any fevers or chills. She denies any trauma. Denies any weakness, myalgias, dizziness, or other systemic symptoms. Patient has no other complaints at this time. Past Medical History:  
Diagnosis Date  Aortic valve stenosis, mild April 2014 EF 28-77%, Grade 1 diastolic dysfunction, mild aortic stenosis,  mean gradient 10 mm Hg  CAD (coronary artery disease) aortic stenosis  Cardiac echocardiogram 06/2018 EF 55-60%. No RWMA. Mild-mod LVH. Severe LAE. Mild AS.  Cardiac nuclear imaging test 08/27/2008 No evidence of ischemia or prior scarring. EF 62%. No WMA. No significant change from study of 3/23/06.  Cardiovascular RLE venous duplex 12/01/2016 Right leg:  No DVT.  Carotid duplex 04/24/2013 Mild 8-42% LICA stenosis.  DM (diabetes mellitus) ([address]  Dyslipidemia  Heart failure ([address]  HTN (hypertension)  Mixed hyperlipidemia  Sciatica  Traumatic closed displaced fracture of acromial end of clavicle, left, initial encounter Past Surgical History:  
Procedure Laterality Date  HX APPENDECTOMY [address]  HX OTHER SURGICAL Right 2010  
 ankle surgery  HX TUMOR REMOVAL Left   
 ovarian  HX UROLOGICAL    
 kidney stone removal  
 
   
Family History:  
Problem Relation Age of Onset  Stroke Mother  Heart Disease Mother  Lung Disease Father  Cancer Neg Hx  Diabetes Neg Hx  Hypertension Neg Hx Social History Socioeconomic History  Marital status:  Spouse name: Not on file  Number of children: Not on file  Years of education: Not on file  Highest education level: Not on file Occupational History  Not on file Social Needs  Financial resource strain: Not on file  Food insecurity Worry: Not on file Inability: Not on file  Transportation needs Medical: Not on file Non-medical: Not on file Tobacco Use  Smoking status: Never Smoker  Smokeless tobacco: Never Used Substance and Sexual Activity  Alcohol use: No  
 Drug use: No  
 Sexual activity: Not on file Lifestyle  Physical activity Days per week: Not on file Minutes per session: Not on file  Stress: Not on file Relationships  Social connections Talks on phone: Not on file Gets together: Not on file Attends Yarsanism service: Not on file Active member of club or organization: Not on file Attends meetings of clubs or organizations: Not on file Relationship status: Not on file  Intimate partner violence Fear of current or ex partner: Not on file Emotionally abused: Not on file Physically abused: Not on file Forced sexual activity: Not on file Other Topics Concern  Not on file Social History Narrative  Not on file ALLERGIES: Patient has no known allergies. Review of Systems Constitutional: Negative. Negative for chills and fever. Respiratory: Negative. Negative for shortness of breath. Cardiovascular: Negative. Negative for chest pain. Gastrointestinal: Negative. Negative for nausea and vomiting. Musculoskeletal: Negative for myalgias. Bilateral lower extremity pain Skin: Positive for rash and wound. Neurological: Negative. Negative for dizziness, weakness and light-headedness. All other systems reviewed and are negative. Vitals:  
 05/08/20 0045 05/08/20 0100 05/08/20 0115 05/08/20 0130 BP: 115/57 127/57 118/51 118/52 Pulse: 86 82 78 78 Resp: 18 18 18 18 Temp:      
SpO2: 100% 100% 100% 98% Weight:      
Height:      
      
 
Physical Exam 
Vitals signs reviewed. Constitutional:   
   General: She is not in acute distress. Appearance: Normal appearance. She is well-developed. HENT:  
   Head: Normocephalic and atraumatic. Eyes:  
   Extraocular Movements: Extraocular movements intact. Pupils: Pupils are equal, round, and reactive to light. Neck: Musculoskeletal: Normal range of motion and neck supple. Cardiovascular:  
   Rate and Rhythm: Normal rate and regular rhythm. Heart sounds: S1 normal and S2 normal. No murmur. No friction rub. No gallop. Pulmonary:  
   Effort: Pulmonary effort is normal. No accessory muscle usage or respiratory distress. Breath sounds: Normal breath sounds. Abdominal:  
   General: There is no distension. Musculoskeletal: Normal range of motion. Comments: Chronic bilateral lower extremity edema with extensive chronic ulcerations over bilateral anterior and posterior legs. Patient also has a diffuse maculopapular nonblanching rash. The ulcerations do appear to have some central purulence and necrosis as well as surrounding erythema. The left lower extremity has a few large, flaccid, bullae/blisters. No purulent drainage blisters are serosanguineous fluid containing. Both feet are warm and well-perfused. Skin: 
   General: Skin is warm. Findings: No rash. Neurological:  
   General: No focal deficit present. Mental Status: She is alert and oriented to person, place, and time. Psychiatric:     
   Speech: Speech normal.  
 
  
 
MDM Number of Diagnoses or Management Options Infected stasis ulcer of left lower extremity [facility] Infected stasis ulcer of right lower extremity [facility]):  
Pain in both lower extremities:  
Diagnosis management comments: Aurelio Blackman is a 80 y.o. female coming in complaining of bilateral leg pain and some blisters where she is currently being treated for MRSA and chronic leg ulcerations. Patient is not complaining of any fevers or chills. Afebrile here. Normal heart rate. I have looked through the chart at previous notes from infectious disease and the hospitalist, wound do not appear to be significantly worse than they were when she was admitted. She is receiving wound care at home. Low suspicion of sepsis or necrotizing fasciitis. Will treat patient symptomatically and check basic labs including a CBC. Patient symptomatically feeling much better after a single dose of morphine and Toradol here. Patient has some mild elevation of her baseline creatinine was given a small bolus of fluid. Still has good GFR. Had some issues with REFUGIO while in the hospital as well. No concern for sepsis. Her diffuse rash may be some type of vasculitis versus stasis dermatitis. Patient states this is unchanged since her discharge from the hospital.  With her symptoms resolved I feel that she can be safely discharged home. I have advised her to continue her p.o. antibiotics and p.o. pain medications at home. Her wound care nurse is coming tomorrow to change her dressings. We placed wet-to-dry dressings here in the emergency department. She was given return precautions for high fevers, recurrent worsening pain, myalgias, weakness, or other systemic symptoms. Patient verbalizes understanding agrees with this plan. Procedures Vitals: 
Patient Vitals for the past 12 hrs: Temp Pulse Resp BP SpO2  
05/08/20 0130  78 18 118/52 98 % 05/08/20 0115  78 18 118/51 100 % 05/08/20 0100  82 18 127/57 100 % 05/08/20 0045  86 18 115/57 100 % 05/08/20 0030  84 12 (!) 60/44 99 % 05/08/20 0015  83 17 114/53 98 % 05/08/20 0000    (!) 102/30   
05/07/20 2355 98 °F (36.7 °C) 78 12 107/53 94 % Medications ordered:  
Medications  
morphine injection 2 mg (2 mg IntraVENous Given 5/8/20 0105)  
ketorolac (TORADOL) injection 15 mg (15 mg IntraVENous Given 5/8/20 0105)  
sodium chloride 0.9 % bolus infusion 500 mL (500 mL IntraVENous New Bag 5/8/20 0108) Lab findings: 
Recent Results (from the past 12 hour(s)) CBC WITH AUTOMATED DIFF Collection Time: 05/08/20 12:50 AM  
Result Value Ref Range WBC 12.3 4.6 - 13.2 K/uL  
 RBC 3.26 (L) 4.20 - 5.30 M/uL HGB 8.4 (L) 12.0 - 16.0 g/dL HCT 26.2 (L) 35.0 - 45.0 % MCV 80.4 74.0 - 97.0 FL  
 MCH 25.8 24.0 - 34.0 PG  
 MCHC 32.1 31.0 - 37.0 g/dL  
 RDW 17.0 (H) 11.6 - 14.5 % PLATELET 384 586 - 681 K/uL MPV 10.0 9.2 - 11.8 FL  
 NEUTROPHILS 89 (H) 40 - 73 % LYMPHOCYTES 6 (L) 21 - 52 % MONOCYTES 4 3 - 10 % EOSINOPHILS 1 0 - 5 % BASOPHILS 0 0 - 2 %  
 ABS. NEUTROPHILS 10.9 (H) 1.8 - 8.0 K/UL  
 ABS. LYMPHOCYTES 0.8 (L) 0.9 - 3.6 K/UL  
 ABS. MONOCYTES 0.5 0.05 - 1.2 K/UL  
 ABS. EOSINOPHILS 0.1 0.0 - 0.4 K/UL  
 ABS. BASOPHILS 0.0 0.0 - 0.1 K/UL  
 DF AUTOMATED METABOLIC PANEL, BASIC Collection Time: 05/08/20 12:50 AM  
Result Value Ref Range Sodium 137 136 - 145 mmol/L Potassium 4.3 3.5 - 5.5 mmol/L Chloride 107 100 - 111 mmol/L  
 CO2 25 21 - 32 mmol/L Anion gap 5 3.0 - 18 mmol/L Glucose 150 (H) 74 - 99 mg/dL BUN 32 (H) 7.0 - 18 MG/DL Creatinine 1.60 (H) 0.6 - 1.3 MG/DL  
 BUN/Creatinine ratio 20 12 - 20 GFR est AA 37 (L) >60 ml/min/1.73m2 GFR est non-AA 30 (L) >60 ml/min/1.73m2 Calcium 8.4 (L) 8.5 - 10.1 MG/DL Disposition: 
Diagnosis: 1. Pain in both lower extremities 2. Infected stasis ulcer of left lower extremity ([address] 3. Infected stasis ulcer of right lower extremity ([address] Disposition: Discharge Follow-up Information Follow up With Specialties Details Why Contact Info Yoel Sagastume NP Nurse Practitioner Schedule an appointment as soon as possible for a visit for office follow up [address] [place] [address] [facility] [address] [phone] [facility] EMERGENCY DEPT Emergency Medicine  As needed, If symptoms worsen [address] [place] [address] 
[phone] Patient's Medications Start Taking No medications on file Continue Taking AMOXICILLIN-CLAVULANATE (AUGMENTIN) 875-125 MG PER TABLET    Take 1 Tab by mouth every twelve (12) hours for 7 days. ASPIRIN DELAYED-RELEASE 81 MG TABLET    Take 81 mg by mouth daily. 1 pill po daily BUSPIRONE (BUSPAR) 5 MG TABLET    1 tablet twice per day CARBOXYMETHYLCELLULOSE SODIUM (LUBRICANT EYE DROPS OP)    Administer 1 Drop to both eyes three (3) times daily. CETIRIZINE 10 MG CAP    Take 10 mg by mouth two (2) times a day. Indications: inflammation of the nose due to an allergy, a type of allergy that causes red and itchy skin called atopic dermatitis CIPROFLOXACIN HCL (CILOXAN) 0.3 % OPHTHALMIC SOLUTION    Administer 1 Drop to both eyes every two (2) hours. CIPROFLOXACIN HCL (CIPRO) 500 MG TABLET    Take 1 Tab by mouth two (2) times a day for 7 days. FERROUS SULFATE 325 MG (65 MG IRON) TABLET    Take 1 Tab by mouth daily (with breakfast). FUROSEMIDE (LASIX) 40 MG TABLET    take 1 tablet by mouth once daily LACTOBACILLUS SP. 50 BILLION CPU (BIO-K PLUS) 50 BILLION CELL -375 MG CAP CAPSULE    Take 1 Cap by mouth daily. LOPERAMIDE (IMODIUM) 2 MG CAPSULE    Take 1 mg by mouth Three (3) times a week. MAGNESIUM OXIDE (MAG-OX) 400 MG TABLET    Take 1 Tab by mouth daily. MULTIVITAMIN (ONE A DAY) TABLET    Take 1 Tab by mouth daily. NAPROXEN SODIUM (ALEVE PO)    Take 220 mg by mouth two (2) times daily as needed for Pain (For pain). 1 pill po 2 times daily for pain prn PRAVASTATIN (PRAVACHOL) 40 MG TABLET    take 1 tablet by mouth at bedtime RABEPRAZOLE (ACIPHEX) 20 MG TABLET    take 1 tablet by mouth once daily SERTRALINE (ZOLOFT) 25 MG TABLET    take 1 tablet by mouth once daily SITAGLIPTIN (JANUVIA) 100 MG TABLET    take 1 tablet by mouth every morning SPIRONOLACTONE (ALDACTONE PO)    Take 25 mg by mouth daily. These Medications have changed No medications on file Stop Taking No medications on file

## 2020-05-08 NOTE — PROGRESS NOTES
Patient contacted regarding COVID-19 risk and screening. Care Coordinator contacted the caregiver by telephone to perform follow-up assessment. Verified name and  with caregiver as identifiers. Patient has following risk factors of: diabetes and HTN. Symptoms reviewed with caregiver who verbalized the following symptoms: no new symptoms. Due to no new or worsening symptoms encounter was not routed to provider for escalation. Education provided regarding infection prevention, and signs and symptoms of COVID-19 and when to seek medical attention with caregiver who verbalized understanding. Discussed exposure protocols and quarantine from [address] you at higher risk for severe illness  and given an opportunity for questions and concerns. The caregiver agrees to contact  PCP office for questions related to their healthcare. Care Coordinator provided contact information for future reference. From CDC: Are you at higher risk for severe illness?  Wash your hands often.  Avoid close contact (6 feet, which is about two arm lengths) with people who are sick.  Put distance between yourself and other people if COVID-19 is spreading in your community.  Clean and disinfect frequently touched surfaces.  Avoid all cruise travel and non-essential air travel.  Call your healthcare professional if you have concerns about COVID-19 and your underlying condition or if you are sick. For more information on steps you can take to protect yourself, see CDC's How to Protect Yourself Plan for follow-up call in 7-14 days based on severity of symptoms and risk factors.

## 2020-05-08 NOTE — ED TRIAGE NOTES
Patient presents to the ED via EMS from home for evaluation of bilateral lower leg pain. Per medic, \"She was just discharged from here a few days ago with antibiotics, pain meds and home health. Her pain got to the point tonight that she couldn't stand it. \"

## 2020-05-08 NOTE — PROGRESS NOTES
Patient being followed by another ACM. Chart opened due to patient being placed on my list for contact. Primary ACM assuming care of patient.

## 2020-05-15 NOTE — TELEPHONE ENCOUNTER
Patient's home health nurse called, Salvador from [facility] and notified of worsening condition of legs. There are pictures in media of todays visit and home health nurse states that her legs did not look like this on Tuesday and now she is getting sores and rash on hands as well. Advised for patient to go to ED. Have notified Dr. Gen Corbin.

## 2020-05-15 NOTE — ED NOTES
SBAR report received from [place] [address]. Patient resting in bed quietly family member at bedside.

## 2020-05-15 NOTE — ED TRIAGE NOTES
Patient arrived via triage with daughter stating \"the wound care nurse called her and stated that she had a rash all over her body and when they called Dr Romelia Finley office they advised her to bring her to the ER immediately. \"

## 2020-05-15 NOTE — ED PROVIDER NOTES
EMERGENCY DEPARTMENT HISTORY AND PHYSICAL EXAM 
 
3:52 PM 
 
 
Date: 5/15/2020 Patient Name: Michaelle Lacy History of Presenting Illness Chief Complaint Patient presents with  Rash History Provided By: Patient Location/Duration/Severity/Modifying factors 80year old female with hx of CAD, peripheral vascular disease with venous stasis ulcers of the BLE, HTN, HLD, HFpEF presents with a diffuse petechial/purpural rash. History obtained from patient and her daughter. Symptoms reportedly began on Sunday with mild petechiae to the dorsum of the right hand. Rash was followed by wound care who reported minimal progression Monday but significant worsening today, which prompted them to refer the patient to the ED. Rash reportedly associated with b/l knee pain, which began today, and diarrhea. She reports multiple episodes of watery diarrhea without associated abdominal pain, hematochezia/melena. Review of records from prior ED visit mentions presence of non-blanching maculopapular rash of BLE, unclear if this is progression of that process or new rash. PCP: Juju Bustos NP Current Outpatient Medications Medication Sig Dispense Refill  ciprofloxacin HCl (CILOXAN) 0.3 % ophthalmic solution Administer 1 Drop to both eyes every two (2) hours.  carboxymethylcellulose sodium (LUBRICANT EYE DROPS OP) Administer 1 Drop to both eyes three (3) times daily.  lactobacillus sp. 50 billion cpu (BIO-K PLUS) 50 billion cell -375 mg cap capsule Take 1 Cap by mouth daily. 14 Cap 0  
 Cetirizine 10 mg cap Take 10 mg by mouth two (2) times a day. Indications: inflammation of the nose due to an allergy, a type of allergy that causes red and itchy skin called atopic dermatitis  SITagliptin (Januvia) 100 mg tablet take 1 tablet by mouth every morning 30 Tab 4  
 sertraline (ZOLOFT) 25 mg tablet take 1 tablet by mouth once daily 90 Tab 3  
  pravastatin (PRAVACHOL) 40 mg tablet take 1 tablet by mouth at bedtime 90 Tab 3  
 busPIRone (BUSPAR) 5 mg tablet 1 tablet twice per day 60 Tab 2  
 spironolactone (ALDACTONE PO) Take 25 mg by mouth daily.  ferrous sulfate 325 mg (65 mg iron) tablet Take 1 Tab by mouth daily (with breakfast). 30 Tab 0  
 magnesium oxide (MAG-OX) 400 mg tablet Take 1 Tab by mouth daily. 30 Tab 0  
 multivitamin (ONE A DAY) tablet Take 1 Tab by mouth daily.  RABEprazole (ACIPHEX) 20 mg tablet take 1 tablet by mouth once daily 30 Tab 5  furosemide (LASIX) 40 mg tablet take 1 tablet by mouth once daily (Patient taking differently: Take 40 mg by mouth daily.) 90 Tab 3  
 naproxen sodium (ALEVE PO) Take 220 mg by mouth two (2) times daily as needed for Pain (For pain). 1 pill po 2 times daily for pain prn  loperamide (IMODIUM) 2 mg capsule Take 1 mg by mouth Three (3) times a week.  aspirin delayed-release 81 mg tablet Take 81 mg by mouth daily. 1 pill po daily Past History Past Medical History: 
Past Medical History:  
Diagnosis Date  Aortic valve stenosis, mild April 2014 EF 06-23%, Grade 1 diastolic dysfunction, mild aortic stenosis,  mean gradient 10 mm Hg  CAD (coronary artery disease) aortic stenosis  Cardiac echocardiogram 06/2018 EF 55-60%. No RWMA. Mild-mod LVH. Severe LAE. Mild AS.  Cardiac nuclear imaging test 08/27/2008 No evidence of ischemia or prior scarring. EF 62%. No WMA. No significant change from study of 3/23/06.  Cardiovascular RLE venous duplex 12/01/2016 Right leg:  No DVT.  Carotid duplex 04/24/2013 Mild 3-65% LICA stenosis.  DM (diabetes mellitus) ([address]  Dyslipidemia  Heart failure ([address]  HTN (hypertension)  Mixed hyperlipidemia  Sciatica  Traumatic closed displaced fracture of acromial end of clavicle, left, initial encounter Past Surgical History: 
Past Surgical History: Procedure Laterality Date  HX APPEN[address]CTOMY [address]  HX OTHER SURGICAL Right 2010  
 ankle surgery  HX TUMOR REMOVAL Left   
 ovarian  HX UROLOGICAL    
 kidney stone removal  
 
 
Family History: 
Family History Problem Relation Age of Onset  Stroke Mother  Heart Disease Mother  Lung Disease Father  Cancer Neg Hx  Diabetes Neg Hx  Hypertension Neg Hx Social History: 
Social History Tobacco Use  Smoking status: Never Smoker  Smokeless tobacco: Never Used Substance Use Topics  Alcohol use: No  
 Drug use: No  
 
 
Allergies: 
No Known Allergies Review of Systems Review of Systems Constitutional: Negative for chills, fatigue and fever. HENT: Negative for sore throat and trouble swallowing. Eyes: Negative for visual disturbance. Respiratory: Negative for cough, chest tightness and shortness of breath. Cardiovascular: Positive for leg swelling (Pre-existing, at baseline). Negative for chest pain. Gastrointestinal: Negative for abdominal pain. Musculoskeletal: Positive for arthralgias (b/l knee and ankle) and joint swelling (chronic, unchanged from baseline). Skin: Positive for rash. Neurological: Negative for syncope and headaches. Psychiatric/Behavioral: Negative for agitation and confusion. Physical Exam  
 
Visit Vitals /42 Pulse 84 Temp 97.8 °F (36.6 °C) Resp 19 SpO2 99% Physical Exam 
Constitutional:   
   Appearance: Normal appearance. She is normal weight. HENT:  
   Head: Normocephalic and atraumatic. Mouth/Throat:  
   Mouth: Mucous membranes are moist.  
   Pharynx: Oropharynx is clear. Comments: No mucosal lesions Eyes:  
   General: No scleral icterus. Conjunctiva/sclera: Conjunctivae normal.  
   Pupils: Pupils are equal, round, and reactive to light. Neck: Musculoskeletal: Normal range of motion and neck supple. Cardiovascular: Rate and Rhythm: Normal rate and regular rhythm. Pulses: Normal pulses. Heart sounds: Normal heart sounds. Pulmonary:  
   Effort: Pulmonary effort is normal.  
   Breath sounds: Normal breath sounds. Chest:  
   Chest wall: No tenderness. Abdominal:  
   General: Abdomen is flat. Palpations: Abdomen is soft. Tenderness: There is no abdominal tenderness. There is no right CVA tenderness, left CVA tenderness, guarding or rebound. Comments: Petechial rash to anterior lower abdomen, does not extend superior to umbilicus Musculoskeletal:     
   General: Tenderness (ankles and knees TTP b/l, able to range actively without pain) present. Right lower leg: Edema present. Left lower leg: Edema present. Skin: 
   Findings: Rash (Diffuse petechial/purpural involving the BLE (including soles), lower abdomen, and dorsum of b/l hands) present. Neurological:  
   General: No focal deficit present. Mental Status: She is alert and oriented to person, place, and time. Psychiatric:     
   Mood and Affect: Mood normal.     
   Behavior: Behavior normal.  
 
 
 
 
Diagnostic Study Results Labs - Recent Results (from the past 12 hour(s)) EKG, 12 LEAD, INITIAL Collection Time: 05/15/20  8:05 PM  
Result Value Ref Range Ventricular Rate 72 BPM  
 Atrial Rate 72 BPM  
 P-R Interval 174 ms QRS Duration 102 ms Q-T Interval 400 ms QTC Calculation (Bezet) 438 ms Calculated P Axis 63 degrees Calculated R Axis -30 degrees Calculated T Axis 65 degrees Diagnosis Normal sinus rhythm Left axis deviation Abnormal ECG When compared with ECG of 07-MAR-2019 02:17, No significant change was found METABOLIC PANEL, BASIC Collection Time: 05/15/20 11:40 PM  
Result Value Ref Range Sodium 138 136 - 145 mmol/L Potassium 4.9 3.5 - 5.5 mmol/L Chloride 109 100 - 111 mmol/L  
 CO2 25 21 - 32 mmol/L  Anion gap 4 3.0 - 18 mmol/L  
 Glucose 49 (LL) 74 - 99 mg/dL BUN 42 (H) 7.0 - 18 MG/DL Creatinine 2.52 (H) 0.6 - 1.3 MG/DL  
 BUN/Creatinine ratio 17 12 - 20 GFR est AA 22 (L) >60 ml/min/1.73m2 GFR est non-AA 18 (L) >60 ml/min/1.73m2 Calcium 9.0 8.5 - 10.1 MG/DL PROTHROMBIN TIME + INR Collection Time: 05/16/20 12:05 AM  
Result Value Ref Range Prothrombin time 16.4 (H) 11.5 - 15.2 sec INR 1.3 (H) 0.8 - 1.2 PTT Collection Time: 05/16/20 12:05 AM  
Result Value Ref Range aPTT 37.5 (H) 23.0 - 36.4 SEC GLUCOSE, POC Collection Time: 05/16/20 12:24 AM  
Result Value Ref Range Glucose (POC) 62 (L) 70 - 110 mg/dL URINALYSIS W/ RFLX MICROSCOPIC Collection Time: 05/16/20  1:39 AM  
Result Value Ref Range Color DARK YELLOW Appearance CLOUDY Specific gravity 1.017 1.005 - 1.030    
 pH (UA) 5.0 5.0 - 8.0 Protein 30 (A) NEG mg/dL Glucose Negative NEG mg/dL Ketone Negative NEG mg/dL Bilirubin Negative NEG Blood LARGE (A) NEG Urobilinogen 0.2 0.2 - 1.0 EU/dL Nitrites Negative NEG Leukocyte Esterase TRACE (A) NEG URINE MICROSCOPIC ONLY Collection Time: 05/16/20  1:39 AM  
Result Value Ref Range WBC 0 to 3 0 - 4 /hpf  
 RBC 11 to 20 0 - 5 /hpf Epithelial cells 1+ 0 - 5 /lpf Bacteria Negative NEG /hpf Mucus 1+ (A) NEG /lpf Amorphous Crystals 3+ (A) NEG  
GLUCOSE, POC Collection Time: 05/16/20  5:31 AM  
Result Value Ref Range Glucose (POC) 76 70 - 110 mg/dL Radiologic Studies - No orders to display Medical Decision Making I am the first provider for this patient. I reviewed the vital signs, available nursing notes, past medical history, past surgical history, family history and social history. Vital Signs-Reviewed the patient's vital signs. EKG: normal sinus rhythm Records Reviewed: Nursing Notes, Old Medical Records, Previous electrocardiograms, Previous Radiology Studies and Previous Laboratory Studies (Time of Review: 3:52 PM) 
 
ED Course: Progress Notes, Reevaluation, and Consults: 
 
  
 
Provider Notes (Medical Decision Making): MDM Number of Diagnoses or Management Options Diagnosis management comments: Considered ITP/TTP vs venous stasis vasculitis vs other vasculitis. Lower suspicion for HSP as symptoms have been gradual onset, would be unusual at this age. Also consider meningococcemia given appearance of the rash patient has no other signs discussed this pathology. We will get labs to evaluate for signs of infection, anemia, thrombocytopenia, gross electrolyte abnormality, impaired renal function. 2044: Labs reviewed, notable for significant hyperkalemia and REFUGIO. Platelet count normal, anemia stable. Given the patient's REFUGIO and hyperkalemia feel that she would benefit from admission. Spoke with Dr. Pritesh Lester discussed patient's case. He will evaluate to determine appropriateness for admission versus transfer to facility with Dermatology available. Appreciate his assistance with this case Dr. Pritesh Lester has evaluated this patient, recommends transfer for evaluation by Derm specialist but will accept if no beds are available in a facility with dermatology. 2210: Spoke with Dr. Carmelita Gordillo at [facility] regarding this patient's case. She has agreed to accept the patient but at this time the patient would require a stepdown bed due to her hyperkalemia none is available. They will contact us in several hours to determine the patient's response to her hyperkalemia interventions and state that if her hyperkalemia has resolved would be candidate for a MedSur bed and appreciate her assistance with this case. 0024: Informed by nursing that patient a glucose of 49 on her repeat BMP. Patient reevaluated, awake/alert/mentating normally without complaint. Will give an additional amp of glucose. Procedures Critical Care Time: 0 Diagnosis Clinical Impression: 1. Acute hyperkalemia 2. Acute kidney injury ([facility] [address] 3. Rash and other nonspecific skin eruption Disposition: Transfer Follow-up Information None Patient's Medications Start Taking No medications on file Continue Taking ASPIRIN DELAYED-RELEASE 81 MG TABLET    Take 81 mg by mouth daily. 1 pill po daily BUSPIRONE (BUSPAR) 5 MG TABLET    1 tablet twice per day CARBOXYMETHYLCELLULOSE SODIUM (LUBRICANT EYE DROPS OP)    Administer 1 Drop to both eyes three (3) times daily. CETIRIZINE 10 MG CAP    Take 10 mg by mouth two (2) times a day. Indications: inflammation of the nose due to an allergy, a type of allergy that causes red and itchy skin called atopic dermatitis CIPROFLOXACIN HCL (CILOXAN) 0.3 % OPHTHALMIC SOLUTION    Administer 1 Drop to both eyes every two (2) hours. FERROUS SULFATE 325 MG (65 MG IRON) TABLET    Take 1 Tab by mouth daily (with breakfast). FUROSEMIDE (LASIX) 40 MG TABLET    take 1 tablet by mouth once daily LACTOBACILLUS SP. 50 BILLION CPU (BIO-K PLUS) 50 BILLION CELL -375 MG CAP CAPSULE    Take 1 Cap by mouth daily. LOPERAMIDE (IMODIUM) 2 MG CAPSULE    Take 1 mg by mouth Three (3) times a week. MAGNESIUM OXIDE (MAG-OX) 400 MG TABLET    Take 1 Tab by mouth daily. MULTIVITAMIN (ONE A DAY) TABLET    Take 1 Tab by mouth daily. NAPROXEN SODIUM (ALEVE PO)    Take 220 mg by mouth two (2) times daily as needed for Pain (For pain). 1 pill po 2 times daily for pain prn PRAVASTATIN (PRAVACHOL) 40 MG TABLET    take 1 tablet by mouth at bedtime RABEPRAZOLE (ACIPHEX) 20 MG TABLET    take 1 tablet by mouth once daily SERTRALINE (ZOLOFT) 25 MG TABLET    take 1 tablet by mouth once daily SITAGLIPTIN (JANUVIA) 100 MG TABLET    take 1 tablet by mouth every morning SPIRONOLACTONE (ALDACTONE PO)    Take 25 mg by mouth daily. These Medications have changed No medications on file Stop Taking No medications on file Disclaimer: Sections of this note are dictated using utilizing voice recognition software. Minor typographical errors may be present. If questions arise, please do not hesitate to contact me or call our department.

## 2020-05-16 NOTE — ED NOTES
Obtained metabolic panel for patient but not enough to be ran. Difficult stick in obtaining lab. MD made aware, tech in room to obtain lab and start ultrasound guided IV.

## 2020-05-16 NOTE — ED NOTES
5/15/2020, 12:20AM 
 
Turnover accepted from Dr. Silviano Guy. 87YOF with abnormal rash found to have REFUGIO and hyperkalemia. Received treatment for hyperkalemia. Patient accepted for transfer to [facility]; however, no SDU beds available. Repeat BMP reveals improved potassium, but worsened kidney injury. [facility] will not accept her for the floor given worsening REFUGIO. Patient given IVF. Will fluid resuscitate and reassess. Francisco Hernandez MD 
Emergency Medicine [facility] 5/16/2020, 6:00AM

## 2020-05-16 NOTE — ED NOTES
7 AM :Pt care assumed from Dr. Wilbert Greene, ED provider. Pt complaint(s), current treatment plan, progression and available diagnostic results have been discussed thoroughly. Rounding occurred: yes Intended Disposition: admit Pending diagnostic reports and/or labs (please list): Inpatient bed. 11am: Patient accepted for admission to the medicine floor at [facility] by Pari Brannon MD

## 2020-05-16 NOTE — ED NOTES
1:05 AM 
Call back to [facility] to give them update. I discussed progress with Dr. Barrie López, hospitalist who has accepted patient to stepdown but wanted improvement of renal function and potassium to possibly change to MedSurg. Potassium normalized to 4.9 but BUN and creatinine slightly more increased despite 1-1/2 L fluid. She requests another liter bolus and repeat chemistry, and call her back to reassess for change to MedSurg bed. Reena Jones DO Reassessed patient after another IV fluid bolus, Cameron in place, patient feeling much better, no distress. BUN now 41 creatinine 2.3 which is trending down. Will discuss with [facility] general, hospitalist to see if we can downgrade to MedSurg bed Signed out to Dr. Sandrine Stringer ED attending here to discuss with NG hospitalist

## 2020-05-19 NOTE — DISCHARGE SUMMARY
Discharge Summary Patient: Myrle Phalen MRN: [id number]  CSN: [id number] YOB: 1933  Age: 80 y.o. Sex: female DOA: 4/28/2020 LOS:  LOS: 5 days   Discharge Date: 5/3/2020 Admission Diagnoses: Venous stasis ulcer ([address] [I83.009, P48.494] Discharge Diagnoses:   
Chronic lower extremity venous stasis Bilateral lower extremity ulcers, with history of debridement 2 months ago Lower extremity cellulitis  
Chronic anemia DM 
HTN Mild aortic stenosis Discharge Condition: Stable PHYSICAL EXAM 
Visit Vitals /73 (BP 1 Location: Right arm, BP Patient Position: At rest) Pulse 97 Temp 97.4 °F (36.3 °C) Resp 19 Ht 5' 2\" (1.575 m) Wt 66.2 kg (146 lb) SpO2 97% BMI 26.70 kg/m² General: In NAD. Nontoxic-appearing. HEENT: NC, Atraumatic. PERRLA, EOMI. Anicteric sclerae. Lungs:  CTA Bilaterally. No Wheezing/Rhonchi/Rales. Heart:  RRR. Abdomen: Soft, Non distended, Non tender.  +Bowel sounds, no HSM Extremities: No c/c/e Psych:   Good insight. Not anxious or agitated. Neurologic:  Awake and alert, moves extremities spontaneously. Hard of hearing. Hospital Course:  
Patient was admitted to the hospital after presenting to the emergency department with bilateral lower extremity pain and worsening edema. Patient has chronic bilateral leg ulcers attributable to venous stasis and has had these recently debrided. She is started on antibiotic therapy in the emergency department and vascular surgery and infectious diseases evaluations were obtained. No surgical intervention has been recommended and recommendation by surgery has been to continue wound care. IV antibiotic therapy has been de-escalated to oral therapy and patient has remained without any evidence for worsening infection. She is felt to be medically stable for discharge home with home health care services and continued wound care. Consults:  
Vascular surgery Infectious diseases Significant Diagnostic Studies: CXR: 
IMPRESSION: 
  
No acute finding. Right tib-fib XR: 
IMPRESSION: 
1. No evidence of osteomyelitis. 
  
Diffuse soft tissue edema. Discharge Medications: 
Discharge Medication List as of 5/3/2020  1:50 PM  
  
START taking these medications Details  
lactobacillus sp. 50 billion cpu (BIO-K PLUS) 50 billion cell -375 mg cap capsule Take 1 Cap by mouth daily. , Print, Disp-14 Cap, R-0  
  
oxyCODONE-acetaminophen (PERCOCET) 5-325 mg per tablet Take 1 Tab by mouth every six (6) hours as needed for Pain for up to 3 days. Max Daily Amount: 4 Tabs., Normal, Disp-12 Tab, R-0  
  
amoxicillin-clavulanate (Augmentin) 875-125 mg per tablet Take 1 Tab by mouth every twelve (12) hours for 7 days. , Normal, Disp-14 Tab, R-0  
  
ciprofloxacin HCl (Cipro) 500 mg tablet Take 1 Tab by mouth two (2) times a day for 7 days. , Normal, Disp-14 Tab, R-0  
  
  
CONTINUE these medications which have NOT CHANGED Details Cetirizine 10 mg cap Take 10 mg by mouth two (2) times a day. Indications: inflammation of the nose due to an allergy, a type of allergy that causes red and itchy skin called atopic dermatitis, Historical Med  
  
naproxen sodium (ALEVE PO) Take 220 mg by mouth two (2) times daily as needed for Pain (For pain). 1 pill po 2 times daily for pain prn, Historical Med SITagliptin (Januvia) 100 mg tablet take 1 tablet by mouth every morning, Normal, Disp-30 Tab, R-4  
  
sertraline (ZOLOFT) 25 mg tablet take 1 tablet by mouth once daily, Normal, Disp-90 Tab, R-3  
  
pravastatin (PRAVACHOL) 40 mg tablet take 1 tablet by mouth at bedtime, Normal, Disp-90 Tab, R-3  
  
busPIRone (BUSPAR) 5 mg tablet 1 tablet twice per day, Normal, Disp-60 Tab, R-2  
  
spironolactone (ALDACTONE PO) Take 1 Tab by mouth daily. , Historical Med  
  
ferrous sulfate 325 mg (65 mg iron) tablet Take 1 Tab by mouth daily (with breakfast). , No Print, Disp-30 Tab, R-0  
  
 magnesium oxide (MAG-OX) 400 mg tablet Take 1 Tab by mouth daily. , No Print, Disp-30 Tab, R-0  
  
multivitamin (ONE A DAY) tablet Take 1 Tab by mouth daily. , Historical Med RABEprazole (ACIPHEX) 20 mg tablet take 1 tablet by mouth once daily, Normal, Disp-30 Tab, R-5  
  
furosemide (LASIX) 40 mg tablet take 1 tablet by mouth once daily, Normal, Disp-90 Tab, R-3  
  
loperamide (IMODIUM) 2 mg capsule Take 1 mg by mouth Three (3) times a week., Historical Med  
  
aspirin delayed-release 81 mg tablet Take 81 mg by mouth daily. 1 pill po daily, Historical Med  
  
  
STOP taking these medications DOXYCYCLINE MONOHYDRATE PO Comments:  
Reason for Stopping:   
   
 doxycycline (MONODOX) 100 mg capsule Comments:  
Reason for Stopping:   
   
 ciprofloxacin HCl (CILOXAN) 0.3 % ophthalmic solution Comments:  
Reason for Stopping:   
   
 ciprofloxacin HCl (CILOXAN) 0.3 % ophthalmic solution Comments:  
Reason for Stopping:   
   
  
 
 
 
 
Activity: As tolerated Diet: Cardiac Diet Disposition: Home with home health care services Follow-up: with PCP, Marsha Meyer NP in [id number] Jesse Landau.  Felicita Cuevas MD

## 2020-05-26 NOTE — PROGRESS NOTES
Received request from infusion for a renewal of Prolia order, pt needs a VV with NP for a renewed order

## 2020-11-13 ENCOUNTER — APPOINTMENT (OUTPATIENT)
Dept: INFUSION THERAPY | Age: 85
End: 2020-11-13

## 2021-07-12 NOTE — ROUTINE PROCESS
Alert-The patient is alert, awake and responds to voice. The patient is oriented to time, place, and person. The triage nurse is able to obtain subjective information. TRANSFER - IN REPORT: 
 
Verbal report received from Tatyana Freeman on pt Liliana Garg being received from PACU for routine post - op Report consisted of patients Situation, Background, Assessment and  
Recommendations(SBAR). Information from the following report(s) SBAR, Kardex, OR Summary, Procedure Summary, Intake/Output, MAR and Recent Results was reviewed with the receiving nurse. Opportunity for questions and clarification was provided. Assessment completed upon patients arrival to unit and care assumed.

## 2021-07-30 NOTE — TELEPHONE ENCOUNTER
Patient's home health nurse called with concerns that wounds had been healing but now seem to be getting worse. Order given for a wound culture and she also requested to switch to aquacel to wounds as well. Discussed with FERMIN Thurston and verbal orders were given. No cyanosis, clubbing or edema

## 2025-01-22 NOTE — TELEPHONE ENCOUNTER
Again contacted [phone] listed as patient's son \"Ryan Sykes\", Reached voicemail identified \"Cole\", left message, identified myself/facility/callback number, requested return call to facility. Called [phone], reached Rebecca Gipson, daughter on HIPAA, informed Ms. Stoner of vitamin D level, Rx being sent to pharmacy, informed of how patient should be taking her Vitamin D, informed of follow up lab work after last dose of Vitamin D is completed. Ms. Kaci Aguirre verbalized agreement/understanding. No further action required at this time. Gets hydromorphone and morphine from Pain Management

## (undated) DEVICE — DRAPE, EXTREMITY, BILATERAL, STERILE: Brand: MEDLINE

## (undated) DEVICE — (D)BNDG ADHESIVE FABRIC 3/4X3 -- DISC BY MFR USE ITEM 357960

## (undated) DEVICE — PACK PROCEDURE SURG MAJ W/ BASIN LF

## (undated) DEVICE — INTENDED FOR TISSUE SEPARATION, AND OTHER PROCEDURES THAT REQUIRE A SHARP SURGICAL BLADE TO PUNCTURE OR CUT.: Brand: BARD-PARKER ® STAINLESS STEEL BLADES

## (undated) DEVICE — ELASTIC BANDAGE 6": Brand: CARDINAL HEALTH

## (undated) DEVICE — SHEET, DRAPE, SPLIT, STERILE: Brand: MEDLINE

## (undated) DEVICE — TELFA NON-ADHERENT ABSORBENT DRESSING: Brand: TELFA

## (undated) DEVICE — GLOVE SURG SZ 7 L11.33IN FNGR THK9.8MIL STRW LTX POLYMER

## (undated) DEVICE — DRSG GZ OIL EMUL CURAD 3X8 --

## (undated) DEVICE — BANDAGE COBAN 6 IN WND 6INX5YD FOAM

## (undated) DEVICE — BANDAGE COBAN 4 IN COMPR W4INXL5YD FOAM COHESIVE QUIK STK SELF ADH SFT

## (undated) DEVICE — SOLUTION IV 1000ML 0.9% SOD CHL

## (undated) DEVICE — PAD,NON-ADHERENT,3X8,STERILE,LF,1/PK: Brand: MEDLINE

## (undated) DEVICE — BANDAGE, HONEYCOMB ELAS 4IN: Brand: CARDINAL HEALTH

## (undated) DEVICE — KIT CLN UP BON SECOURS MARYV

## (undated) DEVICE — NEEDLE EPI 18GA L3.5IN CLR HUB TUOHY W/ WNG PERIFIX

## (undated) DEVICE — GAUZE,SPONGE,4"X4",16PLY,STRL,LF,10/TRAY: Brand: MEDLINE

## (undated) DEVICE — JELLY LUB 2OZ CHLORHEXIDINE BACTSTAT ANTIMIC NONSTAINING

## (undated) DEVICE — REM POLYHESIVE ADULT PATIENT RETURN ELECTRODE: Brand: VALLEYLAB

## (undated) DEVICE — SET EPI 18GA L3.5IN TUOHY NDL W/ 20GA CLS TIP NYL CATH

## (undated) DEVICE — INTENDED FOR TISSUE SEPARATION, AND OTHER PROCEDURES THAT REQUIRE A SHARP SURGICAL BLADE TO PUNCTURE OR CUT.: Brand: BARD-PARKER SAFETY BLADES SIZE 15, STERILE

## (undated) DEVICE — DRAPE,TOP,102X53,STERILE: Brand: MEDLINE

## (undated) DEVICE — THREE-QUARTER SHEET: Brand: CONVERTORS

## (undated) DEVICE — BANDAGE,GAUZE,BULKEE II,4.5"X4.1YD,STRL: Brand: MEDLINE

## (undated) DEVICE — Device: Brand: MEDEX

## (undated) DEVICE — SYR 10ML LUER LOK 1/5ML GRAD --

## (undated) DEVICE — TRAY PREP DRY W/ PREM GLV 2 APPL 6 SPNG 2 UNDPD 1 OVERWRAP